# Patient Record
Sex: MALE | Race: WHITE | NOT HISPANIC OR LATINO
[De-identification: names, ages, dates, MRNs, and addresses within clinical notes are randomized per-mention and may not be internally consistent; named-entity substitution may affect disease eponyms.]

---

## 2014-05-15 RX ORDER — ASPIRIN/CALCIUM CARB/MAGNESIUM 324 MG
1 TABLET ORAL
Qty: 0 | Refills: 0 | COMMUNITY
Start: 2014-05-15

## 2017-05-04 ENCOUNTER — APPOINTMENT (OUTPATIENT)
Dept: ORTHOPEDIC SURGERY | Facility: CLINIC | Age: 82
End: 2017-05-04

## 2017-05-04 VITALS
HEART RATE: 64 BPM | BODY MASS INDEX: 26.68 KG/M2 | DIASTOLIC BLOOD PRESSURE: 85 MMHG | HEIGHT: 67 IN | SYSTOLIC BLOOD PRESSURE: 144 MMHG | WEIGHT: 170 LBS

## 2017-05-04 DIAGNOSIS — M25.511 PAIN IN RIGHT SHOULDER: ICD-10-CM

## 2017-05-05 PROBLEM — M25.511 SHOULDER PAIN, RIGHT: Status: ACTIVE | Noted: 2017-05-05

## 2017-05-12 ENCOUNTER — NON-APPOINTMENT (OUTPATIENT)
Age: 82
End: 2017-05-12

## 2017-05-12 ENCOUNTER — APPOINTMENT (OUTPATIENT)
Dept: CARDIOLOGY | Facility: CLINIC | Age: 82
End: 2017-05-12

## 2017-05-12 VITALS
WEIGHT: 170 LBS | OXYGEN SATURATION: 98 % | HEART RATE: 77 BPM | BODY MASS INDEX: 26.68 KG/M2 | HEIGHT: 67 IN | SYSTOLIC BLOOD PRESSURE: 147 MMHG | DIASTOLIC BLOOD PRESSURE: 82 MMHG

## 2017-05-12 DIAGNOSIS — Z86.69 PERSONAL HISTORY OF OTHER DISEASES OF THE NERVOUS SYSTEM AND SENSE ORGANS: ICD-10-CM

## 2017-05-12 DIAGNOSIS — Z78.9 OTHER SPECIFIED HEALTH STATUS: ICD-10-CM

## 2017-05-12 DIAGNOSIS — Z87.39 PERSONAL HISTORY OF OTHER DISEASES OF THE MUSCULOSKELETAL SYSTEM AND CONNECTIVE TISSUE: ICD-10-CM

## 2017-05-12 DIAGNOSIS — E78.00 PURE HYPERCHOLESTEROLEMIA, UNSPECIFIED: ICD-10-CM

## 2017-05-12 DIAGNOSIS — Z82.61 FAMILY HISTORY OF ARTHRITIS: ICD-10-CM

## 2017-05-12 DIAGNOSIS — Z86.79 PERSONAL HISTORY OF OTHER DISEASES OF THE CIRCULATORY SYSTEM: ICD-10-CM

## 2017-05-30 ENCOUNTER — NON-APPOINTMENT (OUTPATIENT)
Age: 82
End: 2017-05-30

## 2017-05-30 ENCOUNTER — APPOINTMENT (OUTPATIENT)
Dept: ELECTROPHYSIOLOGY | Facility: CLINIC | Age: 82
End: 2017-05-30

## 2017-05-30 VITALS
OXYGEN SATURATION: 96 % | BODY MASS INDEX: 26.68 KG/M2 | SYSTOLIC BLOOD PRESSURE: 110 MMHG | DIASTOLIC BLOOD PRESSURE: 70 MMHG | HEIGHT: 67 IN | RESPIRATION RATE: 17 BRPM | HEART RATE: 119 BPM | WEIGHT: 170 LBS

## 2017-06-07 ENCOUNTER — APPOINTMENT (OUTPATIENT)
Dept: ORTHOPEDIC SURGERY | Facility: CLINIC | Age: 82
End: 2017-06-07

## 2017-06-07 DIAGNOSIS — M47.10 OTHER SPONDYLOSIS WITH MYELOPATHY, SITE UNSPECIFIED: ICD-10-CM

## 2017-06-19 ENCOUNTER — FORM ENCOUNTER (OUTPATIENT)
Age: 82
End: 2017-06-19

## 2017-06-20 ENCOUNTER — OUTPATIENT (OUTPATIENT)
Dept: OUTPATIENT SERVICES | Facility: HOSPITAL | Age: 82
LOS: 1 days | End: 2017-06-20
Payer: MEDICARE

## 2017-06-20 ENCOUNTER — APPOINTMENT (OUTPATIENT)
Dept: RADIOLOGY | Facility: HOSPITAL | Age: 82
End: 2017-06-20

## 2017-06-20 DIAGNOSIS — S39.92XA UNSPECIFIED INJURY OF LOWER BACK, INITIAL ENCOUNTER: Chronic | ICD-10-CM

## 2017-06-20 DIAGNOSIS — Z00.00 ENCOUNTER FOR GENERAL ADULT MEDICAL EXAMINATION WITHOUT ABNORMAL FINDINGS: ICD-10-CM

## 2017-06-20 DIAGNOSIS — M48.02 SPINAL STENOSIS, CERVICAL REGION: ICD-10-CM

## 2017-06-20 DIAGNOSIS — Z98.89 OTHER SPECIFIED POSTPROCEDURAL STATES: Chronic | ICD-10-CM

## 2017-06-20 PROCEDURE — 72126 CT NECK SPINE W/DYE: CPT

## 2017-06-20 PROCEDURE — 72129 CT CHEST SPINE W/DYE: CPT | Mod: 26

## 2017-06-20 PROCEDURE — 72126 CT NECK SPINE W/DYE: CPT | Mod: 26

## 2017-06-20 PROCEDURE — 72129 CT CHEST SPINE W/DYE: CPT

## 2017-06-20 PROCEDURE — 62305 MYELOGRAPHY LUMBAR INJECTION: CPT

## 2017-06-20 PROCEDURE — 72132 CT LUMBAR SPINE W/DYE: CPT

## 2017-06-20 PROCEDURE — 72132 CT LUMBAR SPINE W/DYE: CPT | Mod: 26

## 2017-06-21 ENCOUNTER — APPOINTMENT (OUTPATIENT)
Dept: ORTHOPEDIC SURGERY | Facility: CLINIC | Age: 82
End: 2017-06-21

## 2017-06-23 ENCOUNTER — APPOINTMENT (OUTPATIENT)
Dept: CARDIOLOGY | Facility: CLINIC | Age: 82
End: 2017-06-23

## 2017-06-23 VITALS
HEIGHT: 67 IN | WEIGHT: 170 LBS | SYSTOLIC BLOOD PRESSURE: 126 MMHG | OXYGEN SATURATION: 97 % | HEART RATE: 61 BPM | BODY MASS INDEX: 26.68 KG/M2 | DIASTOLIC BLOOD PRESSURE: 81 MMHG

## 2017-07-06 ENCOUNTER — CHART COPY (OUTPATIENT)
Age: 82
End: 2017-07-06

## 2017-07-17 ENCOUNTER — OUTPATIENT (OUTPATIENT)
Dept: OUTPATIENT SERVICES | Facility: HOSPITAL | Age: 82
LOS: 1 days | End: 2017-07-17
Payer: MEDICARE

## 2017-07-17 VITALS
HEIGHT: 65 IN | TEMPERATURE: 98 F | SYSTOLIC BLOOD PRESSURE: 112 MMHG | WEIGHT: 160.06 LBS | HEART RATE: 73 BPM | DIASTOLIC BLOOD PRESSURE: 70 MMHG | RESPIRATION RATE: 14 BRPM

## 2017-07-17 DIAGNOSIS — S39.92XA UNSPECIFIED INJURY OF LOWER BACK, INITIAL ENCOUNTER: Chronic | ICD-10-CM

## 2017-07-17 DIAGNOSIS — Z98.890 OTHER SPECIFIED POSTPROCEDURAL STATES: Chronic | ICD-10-CM

## 2017-07-17 DIAGNOSIS — I25.10 ATHEROSCLEROTIC HEART DISEASE OF NATIVE CORONARY ARTERY WITHOUT ANGINA PECTORIS: ICD-10-CM

## 2017-07-17 DIAGNOSIS — M48.07 SPINAL STENOSIS, LUMBOSACRAL REGION: ICD-10-CM

## 2017-07-17 DIAGNOSIS — G62.9 POLYNEUROPATHY, UNSPECIFIED: ICD-10-CM

## 2017-07-17 DIAGNOSIS — I10 ESSENTIAL (PRIMARY) HYPERTENSION: ICD-10-CM

## 2017-07-17 DIAGNOSIS — I48.91 UNSPECIFIED ATRIAL FIBRILLATION: ICD-10-CM

## 2017-07-17 DIAGNOSIS — E78.5 HYPERLIPIDEMIA, UNSPECIFIED: ICD-10-CM

## 2017-07-17 DIAGNOSIS — Z45.42 ENCOUNTER FOR ADJUSTMENT AND MANAGEMENT OF NEUROSTIMULATOR: Chronic | ICD-10-CM

## 2017-07-17 DIAGNOSIS — Z95.5 PRESENCE OF CORONARY ANGIOPLASTY IMPLANT AND GRAFT: ICD-10-CM

## 2017-07-17 DIAGNOSIS — Z98.89 OTHER SPECIFIED POSTPROCEDURAL STATES: Chronic | ICD-10-CM

## 2017-07-17 LAB
BLD GP AB SCN SERPL QL: NEGATIVE — SIGNIFICANT CHANGE UP
BUN SERPL-MCNC: 17 MG/DL — SIGNIFICANT CHANGE UP (ref 7–23)
CALCIUM SERPL-MCNC: 9 MG/DL — SIGNIFICANT CHANGE UP (ref 8.4–10.5)
CHLORIDE SERPL-SCNC: 101 MMOL/L — SIGNIFICANT CHANGE UP (ref 98–107)
CO2 SERPL-SCNC: 25 MMOL/L — SIGNIFICANT CHANGE UP (ref 22–31)
CREAT SERPL-MCNC: 0.93 MG/DL — SIGNIFICANT CHANGE UP (ref 0.5–1.3)
GLUCOSE SERPL-MCNC: 82 MG/DL — SIGNIFICANT CHANGE UP (ref 70–99)
HCT VFR BLD CALC: 39.3 % — SIGNIFICANT CHANGE UP (ref 39–50)
HGB BLD-MCNC: 12.7 G/DL — LOW (ref 13–17)
MCHC RBC-ENTMCNC: 30.2 PG — SIGNIFICANT CHANGE UP (ref 27–34)
MCHC RBC-ENTMCNC: 32.3 % — SIGNIFICANT CHANGE UP (ref 32–36)
MCV RBC AUTO: 93.3 FL — SIGNIFICANT CHANGE UP (ref 80–100)
NRBC # FLD: 0 — SIGNIFICANT CHANGE UP
PLATELET # BLD AUTO: 152 K/UL — SIGNIFICANT CHANGE UP (ref 150–400)
PMV BLD: 10.1 FL — SIGNIFICANT CHANGE UP (ref 7–13)
POTASSIUM SERPL-MCNC: 4.2 MMOL/L — SIGNIFICANT CHANGE UP (ref 3.5–5.3)
POTASSIUM SERPL-SCNC: 4.2 MMOL/L — SIGNIFICANT CHANGE UP (ref 3.5–5.3)
RBC # BLD: 4.21 M/UL — SIGNIFICANT CHANGE UP (ref 4.2–5.8)
RBC # FLD: 14.1 % — SIGNIFICANT CHANGE UP (ref 10.3–14.5)
RH IG SCN BLD-IMP: POSITIVE — SIGNIFICANT CHANGE UP
SODIUM SERPL-SCNC: 139 MMOL/L — SIGNIFICANT CHANGE UP (ref 135–145)
WBC # BLD: 6.82 K/UL — SIGNIFICANT CHANGE UP (ref 3.8–10.5)
WBC # FLD AUTO: 6.82 K/UL — SIGNIFICANT CHANGE UP (ref 3.8–10.5)

## 2017-07-17 PROCEDURE — 93010 ELECTROCARDIOGRAM REPORT: CPT

## 2017-07-17 NOTE — H&P PST ADULT - HISTORY OF PRESENT ILLNESS
80 y/o male with medical h/o CAD with cardiac stents on Aspirin, Afib on Pradaxa, HTN, HLD, BPH, GERD and Spinal stenosis. Pt has h/o previous spinal surgery in 2003 & 2006. Pt reports in 5/2017 he started experiencing new-onset Neuropathy to both feet and pain to back, buttocks and thighs. Pt presents today for presurgical evaluation for Exploration of Fusion Removal of Hardware Screws, T2 Pelvis Fusion, T11 - T12 Laminectomy scheduled for 8/01/2017.

## 2017-07-17 NOTE — H&P PST ADULT - FAMILY HISTORY
Father  Still living? No  Family history of heart failure, Age at diagnosis: Age Unknown     Mother  Still living? No  Family history of heart failure, Age at diagnosis: Age Unknown     Sibling  Still living? No  Family history of heart failure, Age at diagnosis: Age Unknown

## 2017-07-17 NOTE — H&P PST ADULT - NEGATIVE NEUROLOGICAL SYMPTOMS
no transient paralysis/no generalized seizures/no syncope/no headache/no focal seizures/no difficulty walking/no loss of sensation/no vertigo/no loss of consciousness/no weakness/no tremors

## 2017-07-17 NOTE — H&P PST ADULT - NEGATIVE CARDIOVASCULAR SYMPTOMS
no claudication/no palpitations/no orthopnea/no peripheral edema/no chest pain/no paroxysmal nocturnal dyspnea/no dyspnea on exertion

## 2017-07-17 NOTE — H&P PST ADULT - LYMPHATIC
posterior cervical R/supraclavicular R/posterior cervical L/anterior cervical R/supraclavicular L/anterior cervical L

## 2017-07-17 NOTE — H&P PST ADULT - NSANTHOSAYNRD_GEN_A_CORE
No. DAMIÁN screening performed.  STOP BANG Legend: 0-2 = LOW Risk; 3-4 = INTERMEDIATE Risk; 5-8 = HIGH Risk

## 2017-07-17 NOTE — H&P PST ADULT - NEGATIVE MUSCULOSKELETAL SYMPTOMS
no arthritis/no myalgia/no arm pain R/no neck pain/no muscle cramps/no muscle weakness/no joint swelling/no arm pain L

## 2017-07-17 NOTE — H&P PST ADULT - PROBLEM SELECTOR PLAN 4
Pt on Pradaxa - pending stop date from Cardilogist Pt on Pradaxa - pending stop date from Cardiologist

## 2017-07-17 NOTE — H&P PST ADULT - MUSCULOSKELETAL
details… detailed exam no joint warmth/no joint erythema/ROM intact/no calf tenderness/no joint swelling/normal strength

## 2017-07-17 NOTE — H&P PST ADULT - NEGATIVE GENERAL GENITOURINARY SYMPTOMS
no dysuria/no hematuria/no incontinence/no nocturia/no flank pain L/no bladder infections/no urinary hesitancy/normal urinary frequency/no renal colic/no flank pain R

## 2017-07-17 NOTE — H&P PST ADULT - PMH
Arthritis    Atrial fibrillation    CAD (coronary artery disease)    HLD (hyperlipidemia)    HTN (hypertension)    Inguinal hernia  right  Peripheral neuropathy    Reflux    Rotator cuff disorder, right    Spinal stenosis of lumbosacral region

## 2017-07-17 NOTE — H&P PST ADULT - PROBLEM SELECTOR PLAN 1
Exploration of Fusion Removal of Hardware Screws, T2 Pelvis Fusion, T11 - T12 Laminectomy scheduled for 8/01/2017. Exploration of Fusion Removal of Hardware Screws, T2 Pelvis Fusion, T11 - T12 Laminectomy scheduled for 8/01/2017.  Pre-op instructions given. Pt and wife verbalized understanding..  Pepcid given for GI prophylaxis.  Chlorhexidine wash instructions given.  Medical clearance requested.  Cardiology clearance requested - copy of Echo/Stress done in 2014 in file.  Abnormal EKG - comparison EKG in file

## 2017-07-17 NOTE — H&P PST ADULT - PSH
Back injury  s/p surgery L1-L9 in two separate surgeries 2003 & 2006  S/P angioplasty with stent    S/P cataract extraction    S/P knee replacement, bilateral Back injury  s/p surgery L1-L9 in two separate surgeries 2003 & 2006  Encounter for interrogation of neurostimulator  12/2015  H/O rotator cuff surgery  12/2015  History of skin surgery  melanoma excision - left cheek/face 2016  S/P angioplasty with stent  RCA 2008 & LAD 2014 cardiac stent placement  S/P cataract extraction    S/P knee replacement, bilateral

## 2017-07-20 ENCOUNTER — APPOINTMENT (OUTPATIENT)
Dept: VASCULAR SURGERY | Facility: CLINIC | Age: 82
End: 2017-07-20

## 2017-07-24 ENCOUNTER — NON-APPOINTMENT (OUTPATIENT)
Age: 82
End: 2017-07-24

## 2017-07-24 ENCOUNTER — APPOINTMENT (OUTPATIENT)
Dept: ORTHOPEDIC SURGERY | Facility: CLINIC | Age: 82
End: 2017-07-24

## 2017-07-24 ENCOUNTER — APPOINTMENT (OUTPATIENT)
Dept: CARDIOLOGY | Facility: CLINIC | Age: 82
End: 2017-07-24

## 2017-07-24 VITALS
DIASTOLIC BLOOD PRESSURE: 70 MMHG | HEIGHT: 67 IN | SYSTOLIC BLOOD PRESSURE: 108 MMHG | OXYGEN SATURATION: 96 % | HEART RATE: 65 BPM

## 2017-07-24 VITALS — BODY MASS INDEX: 25.06 KG/M2 | WEIGHT: 160 LBS

## 2017-07-24 DIAGNOSIS — M48.07 SPINAL STENOSIS, LUMBOSACRAL REGION: ICD-10-CM

## 2017-07-31 NOTE — ASU PATIENT PROFILE, ADULT - PSH
Back injury  s/p surgery L1-L9 in two separate surgeries 2003 & 2006  Encounter for interrogation of neurostimulator  12/2015  H/O rotator cuff surgery  12/2015  History of skin surgery  melanoma excision - left cheek/face 2016  S/P angioplasty with stent  RCA 2008 & LAD 2014 cardiac stent placement  S/P cataract extraction    S/P knee replacement, bilateral

## 2017-07-31 NOTE — ASU PATIENT PROFILE, ADULT - VISION (WITH CORRECTIVE LENSES IF THE PATIENT USUALLY WEARS THEM):
Normal vision: sees adequately in most situations; can see medication labels, newsprint Partially impaired: cannot see medication labels or newsprint, but can see obstacles in path, and the surrounding layout; can count fingers at arm's length/wears glasses

## 2017-08-01 ENCOUNTER — INPATIENT (INPATIENT)
Facility: HOSPITAL | Age: 82
LOS: 13 days | Discharge: NURSING, REHAB OR LONG-TERM CARE | End: 2017-08-15
Attending: STUDENT IN AN ORGANIZED HEALTH CARE EDUCATION/TRAINING PROGRAM | Admitting: STUDENT IN AN ORGANIZED HEALTH CARE EDUCATION/TRAINING PROGRAM
Payer: MEDICARE

## 2017-08-01 ENCOUNTER — APPOINTMENT (OUTPATIENT)
Dept: ORTHOPEDIC SURGERY | Facility: HOSPITAL | Age: 82
End: 2017-08-01

## 2017-08-01 ENCOUNTER — RESULT REVIEW (OUTPATIENT)
Age: 82
End: 2017-08-01

## 2017-08-01 VITALS
DIASTOLIC BLOOD PRESSURE: 68 MMHG | RESPIRATION RATE: 16 BRPM | HEIGHT: 65 IN | OXYGEN SATURATION: 98 % | SYSTOLIC BLOOD PRESSURE: 140 MMHG | WEIGHT: 160.06 LBS | HEART RATE: 68 BPM | TEMPERATURE: 98 F

## 2017-08-01 DIAGNOSIS — S39.92XA UNSPECIFIED INJURY OF LOWER BACK, INITIAL ENCOUNTER: Chronic | ICD-10-CM

## 2017-08-01 DIAGNOSIS — Z98.890 OTHER SPECIFIED POSTPROCEDURAL STATES: Chronic | ICD-10-CM

## 2017-08-01 DIAGNOSIS — Z45.42 ENCOUNTER FOR ADJUSTMENT AND MANAGEMENT OF NEUROSTIMULATOR: Chronic | ICD-10-CM

## 2017-08-01 DIAGNOSIS — Z98.89 OTHER SPECIFIED POSTPROCEDURAL STATES: Chronic | ICD-10-CM

## 2017-08-01 DIAGNOSIS — M48.07 SPINAL STENOSIS, LUMBOSACRAL REGION: ICD-10-CM

## 2017-08-01 LAB
BASE EXCESS BLDA CALC-SCNC: -0.4 MMOL/L — SIGNIFICANT CHANGE UP
BASE EXCESS BLDA CALC-SCNC: -1.4 MMOL/L — SIGNIFICANT CHANGE UP
BASE EXCESS BLDA CALC-SCNC: -1.8 MMOL/L — SIGNIFICANT CHANGE UP
BASE EXCESS BLDA CALC-SCNC: -2.9 MMOL/L — SIGNIFICANT CHANGE UP
BASE EXCESS BLDA CALC-SCNC: -3.1 MMOL/L — SIGNIFICANT CHANGE UP
BASE EXCESS BLDA CALC-SCNC: -3.8 MMOL/L — SIGNIFICANT CHANGE UP
BASE EXCESS BLDA CALC-SCNC: -3.9 MMOL/L — SIGNIFICANT CHANGE UP
BASE EXCESS BLDA CALC-SCNC: -4.3 MMOL/L — SIGNIFICANT CHANGE UP
BASOPHILS # BLD AUTO: 0.02 K/UL — SIGNIFICANT CHANGE UP (ref 0–0.2)
BASOPHILS NFR BLD AUTO: 0.1 % — SIGNIFICANT CHANGE UP (ref 0–2)
BUN SERPL-MCNC: 17 MG/DL — SIGNIFICANT CHANGE UP (ref 7–23)
CA-I BLDA-SCNC: 1.04 MMOL/L — LOW (ref 1.15–1.29)
CA-I BLDA-SCNC: 1.08 MMOL/L — LOW (ref 1.15–1.29)
CA-I BLDA-SCNC: 1.14 MMOL/L — LOW (ref 1.15–1.29)
CA-I BLDA-SCNC: 1.19 MMOL/L — SIGNIFICANT CHANGE UP (ref 1.15–1.29)
CA-I BLDA-SCNC: 1.19 MMOL/L — SIGNIFICANT CHANGE UP (ref 1.15–1.29)
CA-I BLDA-SCNC: 1.23 MMOL/L — SIGNIFICANT CHANGE UP (ref 1.15–1.29)
CA-I BLDA-SCNC: 1.23 MMOL/L — SIGNIFICANT CHANGE UP (ref 1.15–1.29)
CALCIUM SERPL-MCNC: 7.3 MG/DL — LOW (ref 8.4–10.5)
CHLORIDE SERPL-SCNC: 106 MMOL/L — SIGNIFICANT CHANGE UP (ref 98–107)
CO2 SERPL-SCNC: 20 MMOL/L — LOW (ref 22–31)
CREAT SERPL-MCNC: 0.75 MG/DL — SIGNIFICANT CHANGE UP (ref 0.5–1.3)
EOSINOPHIL # BLD AUTO: 0.01 K/UL — SIGNIFICANT CHANGE UP (ref 0–0.5)
EOSINOPHIL NFR BLD AUTO: 0.1 % — SIGNIFICANT CHANGE UP (ref 0–6)
GLUCOSE BLDA-MCNC: 110 MG/DL — HIGH (ref 70–99)
GLUCOSE BLDA-MCNC: 137 MG/DL — HIGH (ref 70–99)
GLUCOSE BLDA-MCNC: 145 MG/DL — HIGH (ref 70–99)
GLUCOSE BLDA-MCNC: 174 MG/DL — HIGH (ref 70–99)
GLUCOSE BLDA-MCNC: 180 MG/DL — HIGH (ref 70–99)
GLUCOSE BLDA-MCNC: 185 MG/DL — HIGH (ref 70–99)
GLUCOSE BLDA-MCNC: 205 MG/DL — HIGH (ref 70–99)
GLUCOSE BLDA-MCNC: 210 MG/DL — HIGH (ref 70–99)
GLUCOSE SERPL-MCNC: 222 MG/DL — HIGH (ref 70–99)
GRAM STN WND: SIGNIFICANT CHANGE UP
HCO3 BLDA-SCNC: 21 MMOL/L — LOW (ref 22–26)
HCO3 BLDA-SCNC: 22 MMOL/L — SIGNIFICANT CHANGE UP (ref 22–26)
HCO3 BLDA-SCNC: 22 MMOL/L — SIGNIFICANT CHANGE UP (ref 22–26)
HCO3 BLDA-SCNC: 23 MMOL/L — SIGNIFICANT CHANGE UP (ref 22–26)
HCO3 BLDA-SCNC: 23 MMOL/L — SIGNIFICANT CHANGE UP (ref 22–26)
HCO3 BLDA-SCNC: 25 MMOL/L — SIGNIFICANT CHANGE UP (ref 22–26)
HCT VFR BLD CALC: 26.3 % — LOW (ref 39–50)
HCT VFR BLDA CALC: 27.3 % — LOW (ref 39–51)
HCT VFR BLDA CALC: 28.1 % — LOW (ref 39–51)
HCT VFR BLDA CALC: 29.6 % — LOW (ref 39–51)
HCT VFR BLDA CALC: 30.6 % — LOW (ref 39–51)
HCT VFR BLDA CALC: 31.3 % — LOW (ref 39–51)
HCT VFR BLDA CALC: 32.3 % — LOW (ref 39–51)
HCT VFR BLDA CALC: 36.1 % — LOW (ref 39–51)
HCT VFR BLDA CALC: 36.5 % — LOW (ref 39–51)
HGB BLD-MCNC: 9 G/DL — LOW (ref 13–17)
HGB BLDA-MCNC: 10.1 G/DL — LOW (ref 13–17)
HGB BLDA-MCNC: 10.5 G/DL — LOW (ref 13–17)
HGB BLDA-MCNC: 11.7 G/DL — LOW (ref 13–17)
HGB BLDA-MCNC: 11.8 G/DL — LOW (ref 13–17)
HGB BLDA-MCNC: 8.8 G/DL — LOW (ref 13–17)
HGB BLDA-MCNC: 9.1 G/DL — LOW (ref 13–17)
HGB BLDA-MCNC: 9.6 G/DL — LOW (ref 13–17)
HGB BLDA-MCNC: 9.9 G/DL — LOW (ref 13–17)
HYPOCHROMIA BLD QL: SLIGHT — SIGNIFICANT CHANGE UP
IMM GRANULOCYTES # BLD AUTO: 0.85 # — SIGNIFICANT CHANGE UP
IMM GRANULOCYTES NFR BLD AUTO: 5.3 % — HIGH (ref 0–1.5)
LYMPHOCYTES # BLD AUTO: 0.96 K/UL — LOW (ref 1–3.3)
LYMPHOCYTES # BLD AUTO: 6 % — LOW (ref 13–44)
MANUAL SMEAR VERIFICATION: SIGNIFICANT CHANGE UP
MCHC RBC-ENTMCNC: 30.8 PG — SIGNIFICANT CHANGE UP (ref 27–34)
MCHC RBC-ENTMCNC: 34.2 % — SIGNIFICANT CHANGE UP (ref 32–36)
MCV RBC AUTO: 90.1 FL — SIGNIFICANT CHANGE UP (ref 80–100)
MONOCYTES # BLD AUTO: 1.95 K/UL — HIGH (ref 0–0.9)
MONOCYTES NFR BLD AUTO: 12.2 % — SIGNIFICANT CHANGE UP (ref 2–14)
NEUTROPHILS # BLD AUTO: 12.16 K/UL — HIGH (ref 1.8–7.4)
NEUTROPHILS NFR BLD AUTO: 76.3 % — SIGNIFICANT CHANGE UP (ref 43–77)
NRBC # FLD: 0.03 — SIGNIFICANT CHANGE UP
PCO2 BLDA: 32 MMHG — LOW (ref 35–48)
PCO2 BLDA: 35 MMHG — SIGNIFICANT CHANGE UP (ref 35–48)
PCO2 BLDA: 36 MMHG — SIGNIFICANT CHANGE UP (ref 35–48)
PCO2 BLDA: 40 MMHG — SIGNIFICANT CHANGE UP (ref 35–48)
PCO2 BLDA: 41 MMHG — SIGNIFICANT CHANGE UP (ref 35–48)
PCO2 BLDA: 43 MMHG — SIGNIFICANT CHANGE UP (ref 35–48)
PH BLDA: 7.35 PH — SIGNIFICANT CHANGE UP (ref 7.35–7.45)
PH BLDA: 7.36 PH — SIGNIFICANT CHANGE UP (ref 7.35–7.45)
PH BLDA: 7.38 PH — SIGNIFICANT CHANGE UP (ref 7.35–7.45)
PH BLDA: 7.38 PH — SIGNIFICANT CHANGE UP (ref 7.35–7.45)
PH BLDA: 7.39 PH — SIGNIFICANT CHANGE UP (ref 7.35–7.45)
PH BLDA: 7.47 PH — HIGH (ref 7.35–7.45)
PLATELET # BLD AUTO: 72 K/UL — LOW (ref 150–400)
PLATELET COUNT - ESTIMATE: SIGNIFICANT CHANGE UP
PMV BLD: 10.9 FL — SIGNIFICANT CHANGE UP (ref 7–13)
PO2 BLDA: 218 MMHG — HIGH (ref 83–108)
PO2 BLDA: 253 MMHG — HIGH (ref 83–108)
PO2 BLDA: 270 MMHG — HIGH (ref 83–108)
PO2 BLDA: 276 MMHG — HIGH (ref 83–108)
PO2 BLDA: 277 MMHG — HIGH (ref 83–108)
PO2 BLDA: 280 MMHG — HIGH (ref 83–108)
PO2 BLDA: 290 MMHG — HIGH (ref 83–108)
PO2 BLDA: 313 MMHG — HIGH (ref 83–108)
POTASSIUM BLDA-SCNC: 4 MMOL/L — SIGNIFICANT CHANGE UP (ref 3.4–4.5)
POTASSIUM BLDA-SCNC: 4.1 MMOL/L — SIGNIFICANT CHANGE UP (ref 3.4–4.5)
POTASSIUM BLDA-SCNC: 4.2 MMOL/L — SIGNIFICANT CHANGE UP (ref 3.4–4.5)
POTASSIUM BLDA-SCNC: 4.2 MMOL/L — SIGNIFICANT CHANGE UP (ref 3.4–4.5)
POTASSIUM BLDA-SCNC: 4.3 MMOL/L — SIGNIFICANT CHANGE UP (ref 3.4–4.5)
POTASSIUM BLDA-SCNC: 4.4 MMOL/L — SIGNIFICANT CHANGE UP (ref 3.4–4.5)
POTASSIUM BLDA-SCNC: 4.6 MMOL/L — HIGH (ref 3.4–4.5)
POTASSIUM BLDA-SCNC: 4.9 MMOL/L — HIGH (ref 3.4–4.5)
POTASSIUM SERPL-MCNC: 5.4 MMOL/L — HIGH (ref 3.5–5.3)
POTASSIUM SERPL-SCNC: 5.4 MMOL/L — HIGH (ref 3.5–5.3)
RBC # BLD: 2.92 M/UL — LOW (ref 4.2–5.8)
RBC # FLD: 14.1 % — SIGNIFICANT CHANGE UP (ref 10.3–14.5)
RH IG SCN BLD-IMP: POSITIVE — SIGNIFICANT CHANGE UP
SAO2 % BLDA: 99.4 % — HIGH (ref 95–99)
SAO2 % BLDA: 99.5 % — HIGH (ref 95–99)
SAO2 % BLDA: 99.6 % — HIGH (ref 95–99)
SAO2 % BLDA: 99.8 % — HIGH (ref 95–99)
SAO2 % BLDA: 99.8 % — HIGH (ref 95–99)
SODIUM BLDA-SCNC: 128 MMOL/L — LOW (ref 136–146)
SODIUM BLDA-SCNC: 131 MMOL/L — LOW (ref 136–146)
SODIUM BLDA-SCNC: 133 MMOL/L — LOW (ref 136–146)
SODIUM BLDA-SCNC: 134 MMOL/L — LOW (ref 136–146)
SODIUM BLDA-SCNC: 135 MMOL/L — LOW (ref 136–146)
SODIUM BLDA-SCNC: 137 MMOL/L — SIGNIFICANT CHANGE UP (ref 136–146)
SODIUM BLDA-SCNC: 138 MMOL/L — SIGNIFICANT CHANGE UP (ref 136–146)
SODIUM BLDA-SCNC: 138 MMOL/L — SIGNIFICANT CHANGE UP (ref 136–146)
SODIUM SERPL-SCNC: 136 MMOL/L — SIGNIFICANT CHANGE UP (ref 135–145)
SPECIMEN SOURCE: SIGNIFICANT CHANGE UP
WBC # BLD: 15.95 K/UL — HIGH (ref 3.8–10.5)
WBC # FLD AUTO: 15.95 K/UL — HIGH (ref 3.8–10.5)

## 2017-08-01 PROCEDURE — 63688 REV/RMV IMP SP NPG/R DTCH CN: CPT

## 2017-08-01 PROCEDURE — 22212 INCIS 1 VERTEBRAL SEG THORAC: CPT | Mod: 59

## 2017-08-01 PROCEDURE — 51702 INSERT TEMP BLADDER CATH: CPT

## 2017-08-01 PROCEDURE — 22214 INCIS 1 VERTEBRAL SEG LUMBAR: CPT | Mod: 82

## 2017-08-01 PROCEDURE — 22843 INSERT SPINE FIXATION DEVICE: CPT

## 2017-08-01 PROCEDURE — 22212 INCIS 1 VERTEBRAL SEG THORAC: CPT | Mod: 82,59

## 2017-08-01 PROCEDURE — 63266 EXCISE INTRSPINL LESION THRC: CPT

## 2017-08-01 PROCEDURE — 63046 LAM FACETEC & FORAMOT THRC: CPT

## 2017-08-01 PROCEDURE — 63046 LAM FACETEC & FORAMOT THRC: CPT | Mod: 82,59

## 2017-08-01 PROCEDURE — 22848 INSERT PELV FIXATION DEVICE: CPT

## 2017-08-01 PROCEDURE — 22802 ARTHRD PST DFRM 7-12 VRT SGM: CPT

## 2017-08-01 PROCEDURE — 63661 REMOVE SPINE ELTRD PERQ ARAY: CPT | Mod: 82

## 2017-08-01 PROCEDURE — 22216 INCIS ADDL SPINE SEGMENT: CPT | Mod: 82

## 2017-08-01 PROCEDURE — 22843 INSERT SPINE FIXATION DEVICE: CPT | Mod: 82

## 2017-08-01 PROCEDURE — 88300 SURGICAL PATH GROSS: CPT | Mod: 26,59

## 2017-08-01 PROCEDURE — 22214 INCIS 1 VERTEBRAL SEG LUMBAR: CPT

## 2017-08-01 PROCEDURE — 71010: CPT | Mod: 26

## 2017-08-01 PROCEDURE — 88307 TISSUE EXAM BY PATHOLOGIST: CPT | Mod: 26

## 2017-08-01 PROCEDURE — 99223 1ST HOSP IP/OBS HIGH 75: CPT | Mod: 25,GC

## 2017-08-01 PROCEDURE — 22216 INCIS ADDL SPINE SEGMENT: CPT

## 2017-08-01 PROCEDURE — 63661 REMOVE SPINE ELTRD PERQ ARAY: CPT

## 2017-08-01 PROCEDURE — 63266 EXCISE INTRSPINL LESION THRC: CPT | Mod: 82

## 2017-08-01 PROCEDURE — 22802 ARTHRD PST DFRM 7-12 VRT SGM: CPT | Mod: 82

## 2017-08-01 RX ORDER — CEFAZOLIN SODIUM 1 G
2000 VIAL (EA) INJECTION EVERY 8 HOURS
Qty: 0 | Refills: 0 | Status: COMPLETED | OUTPATIENT
Start: 2017-08-02 | End: 2017-08-02

## 2017-08-01 RX ORDER — ONDANSETRON 8 MG/1
4 TABLET, FILM COATED ORAL EVERY 6 HOURS
Qty: 0 | Refills: 0 | Status: DISCONTINUED | OUTPATIENT
Start: 2017-08-01 | End: 2017-08-01

## 2017-08-01 RX ORDER — ONDANSETRON 8 MG/1
4 TABLET, FILM COATED ORAL EVERY 6 HOURS
Qty: 0 | Refills: 0 | Status: DISCONTINUED | OUTPATIENT
Start: 2017-08-01 | End: 2017-08-02

## 2017-08-01 RX ORDER — SODIUM CHLORIDE 9 MG/ML
1000 INJECTION, SOLUTION INTRAVENOUS
Qty: 0 | Refills: 0 | Status: DISCONTINUED | OUTPATIENT
Start: 2017-08-01 | End: 2017-08-01

## 2017-08-01 RX ORDER — PANTOPRAZOLE SODIUM 20 MG/1
40 TABLET, DELAYED RELEASE ORAL
Qty: 0 | Refills: 0 | Status: DISCONTINUED | OUTPATIENT
Start: 2017-08-01 | End: 2017-08-02

## 2017-08-01 RX ORDER — MORPHINE SULFATE 50 MG/1
2 CAPSULE, EXTENDED RELEASE ORAL EVERY 4 HOURS
Qty: 0 | Refills: 0 | Status: DISCONTINUED | OUTPATIENT
Start: 2017-08-01 | End: 2017-08-02

## 2017-08-01 RX ORDER — DOCUSATE SODIUM 100 MG
100 CAPSULE ORAL THREE TIMES A DAY
Qty: 0 | Refills: 0 | Status: DISCONTINUED | OUTPATIENT
Start: 2017-08-01 | End: 2017-08-02

## 2017-08-01 RX ORDER — SODIUM CHLORIDE 9 MG/ML
3 INJECTION INTRAMUSCULAR; INTRAVENOUS; SUBCUTANEOUS EVERY 8 HOURS
Qty: 0 | Refills: 0 | Status: DISCONTINUED | OUTPATIENT
Start: 2017-08-01 | End: 2017-08-01

## 2017-08-01 RX ORDER — PHENYLEPHRINE HYDROCHLORIDE 10 MG/ML
1 INJECTION INTRAVENOUS
Qty: 80 | Refills: 0 | Status: DISCONTINUED | OUTPATIENT
Start: 2017-08-01 | End: 2017-08-03

## 2017-08-01 RX ORDER — NOREPINEPHRINE BITARTRATE/D5W 8 MG/250ML
0.1 PLASTIC BAG, INJECTION (ML) INTRAVENOUS
Qty: 8 | Refills: 0 | Status: DISCONTINUED | OUTPATIENT
Start: 2017-08-01 | End: 2017-08-01

## 2017-08-01 RX ORDER — MAGNESIUM HYDROXIDE 400 MG/1
30 TABLET, CHEWABLE ORAL EVERY 12 HOURS
Qty: 0 | Refills: 0 | Status: DISCONTINUED | OUTPATIENT
Start: 2017-08-01 | End: 2017-08-02

## 2017-08-01 RX ORDER — HYDROMORPHONE HYDROCHLORIDE 2 MG/ML
30 INJECTION INTRAMUSCULAR; INTRAVENOUS; SUBCUTANEOUS
Qty: 0 | Refills: 0 | Status: DISCONTINUED | OUTPATIENT
Start: 2017-08-01 | End: 2017-08-02

## 2017-08-01 RX ORDER — DEXMEDETOMIDINE HYDROCHLORIDE IN 0.9% SODIUM CHLORIDE 4 UG/ML
0.5 INJECTION INTRAVENOUS
Qty: 200 | Refills: 0 | Status: DISCONTINUED | OUTPATIENT
Start: 2017-08-01 | End: 2017-08-02

## 2017-08-01 RX ORDER — HYDROMORPHONE HYDROCHLORIDE 2 MG/ML
0.5 INJECTION INTRAMUSCULAR; INTRAVENOUS; SUBCUTANEOUS
Qty: 0 | Refills: 0 | Status: DISCONTINUED | OUTPATIENT
Start: 2017-08-01 | End: 2017-08-02

## 2017-08-01 RX ORDER — SODIUM CHLORIDE 9 MG/ML
1000 INJECTION, SOLUTION INTRAVENOUS
Qty: 0 | Refills: 0 | Status: DISCONTINUED | OUTPATIENT
Start: 2017-08-01 | End: 2017-08-02

## 2017-08-01 RX ORDER — TAMSULOSIN HYDROCHLORIDE 0.4 MG/1
0.4 CAPSULE ORAL AT BEDTIME
Qty: 0 | Refills: 0 | Status: DISCONTINUED | OUTPATIENT
Start: 2017-08-01 | End: 2017-08-02

## 2017-08-01 RX ORDER — NALOXONE HYDROCHLORIDE 4 MG/.1ML
0.1 SPRAY NASAL
Qty: 0 | Refills: 0 | Status: DISCONTINUED | OUTPATIENT
Start: 2017-08-01 | End: 2017-08-03

## 2017-08-01 RX ORDER — DILTIAZEM HCL 120 MG
5 CAPSULE, EXT RELEASE 24 HR ORAL
Qty: 125 | Refills: 0 | Status: DISCONTINUED | OUTPATIENT
Start: 2017-08-01 | End: 2017-08-01

## 2017-08-01 RX ORDER — SENNA PLUS 8.6 MG/1
2 TABLET ORAL AT BEDTIME
Qty: 0 | Refills: 0 | Status: DISCONTINUED | OUTPATIENT
Start: 2017-08-01 | End: 2017-08-02

## 2017-08-01 RX ORDER — METOPROLOL TARTRATE 50 MG
25 TABLET ORAL DAILY
Qty: 0 | Refills: 0 | Status: DISCONTINUED | OUTPATIENT
Start: 2017-08-01 | End: 2017-08-02

## 2017-08-01 RX ORDER — HEPARIN SODIUM 5000 [USP'U]/ML
5000 INJECTION INTRAVENOUS; SUBCUTANEOUS EVERY 12 HOURS
Qty: 0 | Refills: 0 | Status: DISCONTINUED | OUTPATIENT
Start: 2017-08-01 | End: 2017-08-02

## 2017-08-01 RX ORDER — HYDROMORPHONE HYDROCHLORIDE 2 MG/ML
1 INJECTION INTRAMUSCULAR; INTRAVENOUS; SUBCUTANEOUS EVERY 4 HOURS
Qty: 0 | Refills: 0 | Status: DISCONTINUED | OUTPATIENT
Start: 2017-08-01 | End: 2017-08-02

## 2017-08-01 RX ADMIN — DEXMEDETOMIDINE HYDROCHLORIDE IN 0.9% SODIUM CHLORIDE 9.07 MICROGRAM(S)/KG/HR: 4 INJECTION INTRAVENOUS at 20:35

## 2017-08-01 RX ADMIN — PHENYLEPHRINE HYDROCHLORIDE 27.23 MICROGRAM(S)/KG/MIN: 10 INJECTION INTRAVENOUS at 20:35

## 2017-08-01 RX ADMIN — HYDROMORPHONE HYDROCHLORIDE 0.5 MILLIGRAM(S): 2 INJECTION INTRAMUSCULAR; INTRAVENOUS; SUBCUTANEOUS at 23:15

## 2017-08-01 RX ADMIN — SODIUM CHLORIDE 100 MILLILITER(S): 9 INJECTION, SOLUTION INTRAVENOUS at 22:37

## 2017-08-01 NOTE — BRIEF OPERATIVE NOTE - PROCEDURE
Spinal cord stimulator replacement  08/01/2017  REMOVAL OF STIMULATOR  Active  PGOLD2  Instrumentation, spine, segmental, posterior  08/01/2017  T4-Pelvis  Active  PGOLD2  Laminectomy  08/01/2017  T11-L1  Active  PGOLD2  Removal of hardware  08/01/2017    Active  PGOLD2

## 2017-08-01 NOTE — PROCEDURE NOTE - NSURITECHNIQUE_GU_A_CORE
The site was cleaned with soap/water and sterile solution (betadine)/The collection bag is below the level of the patient and urinary bladder/All applicable medical record documentation is completed/The catheter was secured with a securement device (e.g. StatLock)/The urinary drainage system is closed at the end of the procedure/Sterile gloves were worn for the duration of the procedure/Proper hand hygiene was performed/A sterile drape was used to cover all adjacent areas/The catheter was appropriately lubricated

## 2017-08-02 ENCOUNTER — TRANSCRIPTION ENCOUNTER (OUTPATIENT)
Age: 82
End: 2017-08-02

## 2017-08-02 LAB
BASE EXCESS BLDA CALC-SCNC: -4.4 MMOL/L — SIGNIFICANT CHANGE UP
BASE EXCESS BLDA CALC-SCNC: -6.2 MMOL/L — SIGNIFICANT CHANGE UP
BASOPHILS # BLD AUTO: 0.02 K/UL — SIGNIFICANT CHANGE UP (ref 0–0.2)
BASOPHILS NFR BLD AUTO: 0.1 % — SIGNIFICANT CHANGE UP (ref 0–2)
BUN SERPL-MCNC: 17 MG/DL — SIGNIFICANT CHANGE UP (ref 7–23)
BUN SERPL-MCNC: 28 MG/DL — HIGH (ref 7–23)
CA-I BLDA-SCNC: 1.14 MMOL/L — LOW (ref 1.15–1.29)
CALCIUM SERPL-MCNC: 7.1 MG/DL — LOW (ref 8.4–10.5)
CALCIUM SERPL-MCNC: 7.3 MG/DL — LOW (ref 8.4–10.5)
CHLORIDE BLDA-SCNC: 106 MMOL/L — SIGNIFICANT CHANGE UP (ref 96–108)
CHLORIDE SERPL-SCNC: 105 MMOL/L — SIGNIFICANT CHANGE UP (ref 98–107)
CHLORIDE SERPL-SCNC: 106 MMOL/L — SIGNIFICANT CHANGE UP (ref 98–107)
CO2 SERPL-SCNC: 18 MMOL/L — LOW (ref 22–31)
CO2 SERPL-SCNC: 20 MMOL/L — LOW (ref 22–31)
CREAT BLDA-MCNC: 0.8 MG/DL — SIGNIFICANT CHANGE UP (ref 0.5–1.3)
CREAT SERPL-MCNC: 0.73 MG/DL — SIGNIFICANT CHANGE UP (ref 0.5–1.3)
CREAT SERPL-MCNC: 0.81 MG/DL — SIGNIFICANT CHANGE UP (ref 0.5–1.3)
EOSINOPHIL # BLD AUTO: 0 K/UL — SIGNIFICANT CHANGE UP (ref 0–0.5)
EOSINOPHIL NFR BLD AUTO: 0 % — SIGNIFICANT CHANGE UP (ref 0–6)
GLUCOSE BLDA-MCNC: 177 MG/DL — HIGH (ref 70–99)
GLUCOSE BLDA-MCNC: 178 MG/DL — HIGH (ref 70–99)
GLUCOSE SERPL-MCNC: 177 MG/DL — HIGH (ref 70–99)
GLUCOSE SERPL-MCNC: 181 MG/DL — HIGH (ref 70–99)
HCO3 BLDA-SCNC: 19 MMOL/L — LOW (ref 22–26)
HCO3 BLDA-SCNC: 21 MMOL/L — LOW (ref 22–26)
HCT VFR BLD CALC: 24.2 % — LOW (ref 39–50)
HCT VFR BLD CALC: 25.9 % — LOW (ref 39–50)
HCT VFR BLD CALC: 31 % — LOW (ref 39–50)
HCT VFR BLDA CALC: 28.2 % — LOW (ref 39–51)
HCT VFR BLDA CALC: 31 % — LOW (ref 39–51)
HGB BLD-MCNC: 10.6 G/DL — LOW (ref 13–17)
HGB BLD-MCNC: 8.4 G/DL — LOW (ref 13–17)
HGB BLD-MCNC: 8.7 G/DL — LOW (ref 13–17)
HGB BLDA-MCNC: 10 G/DL — LOW (ref 13–17)
HGB BLDA-MCNC: 9.1 G/DL — LOW (ref 13–17)
IMM GRANULOCYTES # BLD AUTO: 0.49 # — SIGNIFICANT CHANGE UP
IMM GRANULOCYTES NFR BLD AUTO: 3.2 % — HIGH (ref 0–1.5)
LACTATE BLDA-SCNC: 3.4 MMOL/L — HIGH (ref 0.5–2)
LACTATE BLDA-SCNC: 3.6 MMOL/L — HIGH (ref 0.5–2)
LACTATE SERPL-SCNC: 3 MMOL/L — HIGH (ref 0.5–2)
LYMPHOCYTES # BLD AUTO: 1.56 K/UL — SIGNIFICANT CHANGE UP (ref 1–3.3)
LYMPHOCYTES # BLD AUTO: 10.2 % — LOW (ref 13–44)
MAGNESIUM SERPL-MCNC: 1.3 MG/DL — LOW (ref 1.6–2.6)
MCHC RBC-ENTMCNC: 29.8 PG — SIGNIFICANT CHANGE UP (ref 27–34)
MCHC RBC-ENTMCNC: 30.5 PG — SIGNIFICANT CHANGE UP (ref 27–34)
MCHC RBC-ENTMCNC: 31.2 PG — SIGNIFICANT CHANGE UP (ref 27–34)
MCHC RBC-ENTMCNC: 33.6 % — SIGNIFICANT CHANGE UP (ref 32–36)
MCHC RBC-ENTMCNC: 34.2 % — SIGNIFICANT CHANGE UP (ref 32–36)
MCHC RBC-ENTMCNC: 34.7 % — SIGNIFICANT CHANGE UP (ref 32–36)
MCV RBC AUTO: 88.7 FL — SIGNIFICANT CHANGE UP (ref 80–100)
MCV RBC AUTO: 89.3 FL — SIGNIFICANT CHANGE UP (ref 80–100)
MCV RBC AUTO: 90 FL — SIGNIFICANT CHANGE UP (ref 80–100)
MONOCYTES # BLD AUTO: 2.31 K/UL — HIGH (ref 0–0.9)
MONOCYTES NFR BLD AUTO: 15.1 % — HIGH (ref 2–14)
NEUTROPHILS # BLD AUTO: 10.9 K/UL — HIGH (ref 1.8–7.4)
NEUTROPHILS NFR BLD AUTO: 71.4 % — SIGNIFICANT CHANGE UP (ref 43–77)
NRBC # FLD: 0.02 — SIGNIFICANT CHANGE UP
NRBC # FLD: 0.02 — SIGNIFICANT CHANGE UP
NRBC # FLD: 0.05 — SIGNIFICANT CHANGE UP
PCO2 BLDA: 35 MMHG — SIGNIFICANT CHANGE UP (ref 35–48)
PCO2 BLDA: 36 MMHG — SIGNIFICANT CHANGE UP (ref 35–48)
PH BLDA: 7.33 PH — LOW (ref 7.35–7.45)
PH BLDA: 7.38 PH — SIGNIFICANT CHANGE UP (ref 7.35–7.45)
PHOSPHATE SERPL-MCNC: 2.8 MG/DL — SIGNIFICANT CHANGE UP (ref 2.5–4.5)
PLATELET # BLD AUTO: 65 K/UL — LOW (ref 150–400)
PLATELET # BLD AUTO: 72 K/UL — LOW (ref 150–400)
PLATELET # BLD AUTO: 92 K/UL — LOW (ref 150–400)
PMV BLD: 11.1 FL — SIGNIFICANT CHANGE UP (ref 7–13)
PMV BLD: 11.2 FL — SIGNIFICANT CHANGE UP (ref 7–13)
PMV BLD: 11.4 FL — SIGNIFICANT CHANGE UP (ref 7–13)
PO2 BLDA: 148 MMHG — HIGH (ref 83–108)
PO2 BLDA: 160 MMHG — HIGH (ref 83–108)
POTASSIUM BLDA-SCNC: 4.6 MMOL/L — HIGH (ref 3.4–4.5)
POTASSIUM BLDA-SCNC: 4.8 MMOL/L — HIGH (ref 3.4–4.5)
POTASSIUM SERPL-MCNC: 4.4 MMOL/L — SIGNIFICANT CHANGE UP (ref 3.5–5.3)
POTASSIUM SERPL-MCNC: 5.3 MMOL/L — SIGNIFICANT CHANGE UP (ref 3.5–5.3)
POTASSIUM SERPL-SCNC: 4.4 MMOL/L — SIGNIFICANT CHANGE UP (ref 3.5–5.3)
POTASSIUM SERPL-SCNC: 5.3 MMOL/L — SIGNIFICANT CHANGE UP (ref 3.5–5.3)
RBC # BLD: 2.69 M/UL — LOW (ref 4.2–5.8)
RBC # BLD: 2.92 M/UL — LOW (ref 4.2–5.8)
RBC # BLD: 3.47 M/UL — LOW (ref 4.2–5.8)
RBC # FLD: 14 % — SIGNIFICANT CHANGE UP (ref 10.3–14.5)
RBC # FLD: 14.1 % — SIGNIFICANT CHANGE UP (ref 10.3–14.5)
RBC # FLD: 14.4 % — SIGNIFICANT CHANGE UP (ref 10.3–14.5)
SAO2 % BLDA: 99.3 % — HIGH (ref 95–99)
SAO2 % BLDA: 99.3 % — HIGH (ref 95–99)
SODIUM BLDA-SCNC: 127 MMOL/L — LOW (ref 136–146)
SODIUM BLDA-SCNC: 130 MMOL/L — LOW (ref 136–146)
SODIUM SERPL-SCNC: 135 MMOL/L — SIGNIFICANT CHANGE UP (ref 135–145)
SODIUM SERPL-SCNC: 136 MMOL/L — SIGNIFICANT CHANGE UP (ref 135–145)
WBC # BLD: 14.74 K/UL — HIGH (ref 3.8–10.5)
WBC # BLD: 15.28 K/UL — HIGH (ref 3.8–10.5)
WBC # BLD: 18.13 K/UL — HIGH (ref 3.8–10.5)
WBC # FLD AUTO: 14.74 K/UL — HIGH (ref 3.8–10.5)
WBC # FLD AUTO: 15.28 K/UL — HIGH (ref 3.8–10.5)
WBC # FLD AUTO: 18.13 K/UL — HIGH (ref 3.8–10.5)

## 2017-08-02 PROCEDURE — 99223 1ST HOSP IP/OBS HIGH 75: CPT | Mod: GC

## 2017-08-02 RX ORDER — HYDROMORPHONE HYDROCHLORIDE 2 MG/ML
1 INJECTION INTRAMUSCULAR; INTRAVENOUS; SUBCUTANEOUS EVERY 6 HOURS
Qty: 0 | Refills: 0 | Status: DISCONTINUED | OUTPATIENT
Start: 2017-08-02 | End: 2017-08-03

## 2017-08-02 RX ORDER — PANTOPRAZOLE SODIUM 20 MG/1
40 TABLET, DELAYED RELEASE ORAL
Qty: 0 | Refills: 0 | Status: DISCONTINUED | OUTPATIENT
Start: 2017-08-03 | End: 2017-08-03

## 2017-08-02 RX ORDER — PANTOPRAZOLE SODIUM 20 MG/1
40 TABLET, DELAYED RELEASE ORAL DAILY
Qty: 0 | Refills: 0 | Status: DISCONTINUED | OUTPATIENT
Start: 2017-08-02 | End: 2017-08-02

## 2017-08-02 RX ORDER — MAGNESIUM SULFATE 500 MG/ML
2 VIAL (ML) INJECTION ONCE
Qty: 0 | Refills: 0 | Status: COMPLETED | OUTPATIENT
Start: 2017-08-02 | End: 2017-08-02

## 2017-08-02 RX ORDER — FENTANYL CITRATE 50 UG/ML
1 INJECTION INTRAVENOUS
Qty: 2500 | Refills: 0 | Status: DISCONTINUED | OUTPATIENT
Start: 2017-08-02 | End: 2017-08-02

## 2017-08-02 RX ORDER — ATORVASTATIN CALCIUM 80 MG/1
10 TABLET, FILM COATED ORAL AT BEDTIME
Qty: 0 | Refills: 0 | Status: DISCONTINUED | OUTPATIENT
Start: 2017-08-03 | End: 2017-08-07

## 2017-08-02 RX ORDER — SODIUM CHLORIDE 9 MG/ML
500 INJECTION, SOLUTION INTRAVENOUS ONCE
Qty: 0 | Refills: 0 | Status: COMPLETED | OUTPATIENT
Start: 2017-08-02 | End: 2017-08-02

## 2017-08-02 RX ORDER — SODIUM CHLORIDE 9 MG/ML
1000 INJECTION, SOLUTION INTRAVENOUS
Qty: 0 | Refills: 0 | Status: DISCONTINUED | OUTPATIENT
Start: 2017-08-02 | End: 2017-08-03

## 2017-08-02 RX ORDER — SODIUM CHLORIDE 9 MG/ML
500 INJECTION INTRAMUSCULAR; INTRAVENOUS; SUBCUTANEOUS ONCE
Qty: 0 | Refills: 0 | Status: COMPLETED | OUTPATIENT
Start: 2017-08-02 | End: 2017-08-02

## 2017-08-02 RX ORDER — HEPARIN SODIUM 5000 [USP'U]/ML
5000 INJECTION INTRAVENOUS; SUBCUTANEOUS EVERY 8 HOURS
Qty: 0 | Refills: 0 | Status: DISCONTINUED | OUTPATIENT
Start: 2017-08-02 | End: 2017-08-07

## 2017-08-02 RX ORDER — TAMSULOSIN HYDROCHLORIDE 0.4 MG/1
0.4 CAPSULE ORAL AT BEDTIME
Qty: 0 | Refills: 0 | Status: DISCONTINUED | OUTPATIENT
Start: 2017-08-03 | End: 2017-08-07

## 2017-08-02 RX ORDER — HYDROMORPHONE HYDROCHLORIDE 2 MG/ML
0.5 INJECTION INTRAMUSCULAR; INTRAVENOUS; SUBCUTANEOUS EVERY 4 HOURS
Qty: 0 | Refills: 0 | Status: DISCONTINUED | OUTPATIENT
Start: 2017-08-02 | End: 2017-08-03

## 2017-08-02 RX ORDER — DEXMEDETOMIDINE HYDROCHLORIDE IN 0.9% SODIUM CHLORIDE 4 UG/ML
0.4 INJECTION INTRAVENOUS
Qty: 200 | Refills: 0 | Status: DISCONTINUED | OUTPATIENT
Start: 2017-08-02 | End: 2017-08-02

## 2017-08-02 RX ORDER — DEXTROSE MONOHYDRATE, SODIUM CHLORIDE, AND POTASSIUM CHLORIDE 50; .745; 4.5 G/1000ML; G/1000ML; G/1000ML
1000 INJECTION, SOLUTION INTRAVENOUS
Qty: 0 | Refills: 0 | Status: DISCONTINUED | OUTPATIENT
Start: 2017-08-02 | End: 2017-08-02

## 2017-08-02 RX ADMIN — SODIUM CHLORIDE 1000 MILLILITER(S): 9 INJECTION, SOLUTION INTRAVENOUS at 14:00

## 2017-08-02 RX ADMIN — HYDROMORPHONE HYDROCHLORIDE 0.5 MILLIGRAM(S): 2 INJECTION INTRAMUSCULAR; INTRAVENOUS; SUBCUTANEOUS at 14:21

## 2017-08-02 RX ADMIN — TAMSULOSIN HYDROCHLORIDE 0.4 MILLIGRAM(S): 0.4 CAPSULE ORAL at 21:03

## 2017-08-02 RX ADMIN — HEPARIN SODIUM 5000 UNIT(S): 5000 INJECTION INTRAVENOUS; SUBCUTANEOUS at 17:49

## 2017-08-02 RX ADMIN — HYDROMORPHONE HYDROCHLORIDE 1 MILLIGRAM(S): 2 INJECTION INTRAMUSCULAR; INTRAVENOUS; SUBCUTANEOUS at 17:49

## 2017-08-02 RX ADMIN — HYDROMORPHONE HYDROCHLORIDE 0.5 MILLIGRAM(S): 2 INJECTION INTRAMUSCULAR; INTRAVENOUS; SUBCUTANEOUS at 19:30

## 2017-08-02 RX ADMIN — HEPARIN SODIUM 5000 UNIT(S): 5000 INJECTION INTRAVENOUS; SUBCUTANEOUS at 06:10

## 2017-08-02 RX ADMIN — HYDROMORPHONE HYDROCHLORIDE 0.5 MILLIGRAM(S): 2 INJECTION INTRAMUSCULAR; INTRAVENOUS; SUBCUTANEOUS at 00:00

## 2017-08-02 RX ADMIN — PANTOPRAZOLE SODIUM 40 MILLIGRAM(S): 20 TABLET, DELAYED RELEASE ORAL at 12:00

## 2017-08-02 RX ADMIN — DEXTROSE MONOHYDRATE, SODIUM CHLORIDE, AND POTASSIUM CHLORIDE 125 MILLILITER(S): 50; .745; 4.5 INJECTION, SOLUTION INTRAVENOUS at 13:59

## 2017-08-02 RX ADMIN — PHENYLEPHRINE HYDROCHLORIDE 27.23 MICROGRAM(S)/KG/MIN: 10 INJECTION INTRAVENOUS at 19:00

## 2017-08-02 RX ADMIN — HYDROMORPHONE HYDROCHLORIDE 30 MILLILITER(S): 2 INJECTION INTRAMUSCULAR; INTRAVENOUS; SUBCUTANEOUS at 12:00

## 2017-08-02 RX ADMIN — SODIUM CHLORIDE 125 MILLILITER(S): 9 INJECTION, SOLUTION INTRAVENOUS at 21:03

## 2017-08-02 RX ADMIN — SODIUM CHLORIDE 125 MILLILITER(S): 9 INJECTION, SOLUTION INTRAVENOUS at 15:30

## 2017-08-02 RX ADMIN — FENTANYL CITRATE 7.26 MICROGRAM(S)/KG/HR: 50 INJECTION INTRAVENOUS at 01:39

## 2017-08-02 RX ADMIN — HYDROMORPHONE HYDROCHLORIDE 0.5 MILLIGRAM(S): 2 INJECTION INTRAMUSCULAR; INTRAVENOUS; SUBCUTANEOUS at 19:45

## 2017-08-02 RX ADMIN — FENTANYL CITRATE 1 MICROGRAM(S)/KG/HR: 50 INJECTION INTRAVENOUS at 02:00

## 2017-08-02 RX ADMIN — HYDROMORPHONE HYDROCHLORIDE 0.5 MILLIGRAM(S): 2 INJECTION INTRAMUSCULAR; INTRAVENOUS; SUBCUTANEOUS at 14:07

## 2017-08-02 RX ADMIN — Medication 100 MILLIGRAM(S): at 04:15

## 2017-08-02 RX ADMIN — Medication 50 GRAM(S): at 23:48

## 2017-08-02 RX ADMIN — SODIUM CHLORIDE 100 MILLILITER(S): 9 INJECTION, SOLUTION INTRAVENOUS at 11:00

## 2017-08-02 RX ADMIN — SODIUM CHLORIDE 500 MILLILITER(S): 9 INJECTION INTRAMUSCULAR; INTRAVENOUS; SUBCUTANEOUS at 05:45

## 2017-08-02 NOTE — PROGRESS NOTE ADULT - ASSESSMENT
78 y/o M PMH CAD s/p PCI, Afib on Pradaxa, HTN, HLD, BPH, GERD and Spinal stenosis.Pt admitted for replacement of spinal cord stimulator, T2 Pelvis Fusion, T11 - T12 Laminectomy. intraop pt lost 2L blood. pt received 2u prbc, 800cc cell saver, 6L LR, but still hypotenisve with map 50s. pt required pressors & remained intubated post op & sicu consulted.     Neuro: wean off precedex, on fentanyl drip, once extubated can use pca dilaudid pump prn, q1 neuro checks  Cardio: wean off phenylephedrine   Resp: ventilated, cpap trial in am  GI: npo for nowm  protonix  Renal: LR 100cc/hr, restart tamsulosin once normotensive & off pressors  Heme: on subq heparin for dvt ppx; s/p 2u PRBC intra-op & 1U prbc in pacu  Endo: monitor f/s  ID: s/p ancef  Dispo: SICU

## 2017-08-02 NOTE — PROGRESS NOTE ADULT - SUBJECTIVE AND OBJECTIVE BOX
Patient seen and examined. intubated on precedex and fentanyl. 1U PRBC overnight. requiring pressor support. stable    HEALTH ISSUES - PROBLEM Dx:  Peripheral neuropathy: Peripheral neuropathy  HLD (hyperlipidemia): HLD (hyperlipidemia)  HTN (hypertension): HTN (hypertension)  Atrial fibrillation: Atrial fibrillation  Stented coronary artery: Stented coronary artery  CAD (coronary artery disease): CAD (coronary artery disease)  Spinal stenosis of lumbosacral region: Spinal stenosis of lumbosacral region          MEDICATIONS  (STANDING):  HYDROmorphone PCA (1 mG/mL) 30 milliLiter(s) PCA Continuous PCA Continuous  tamsulosin 0.4 milliGRAM(s) Oral at bedtime  heparin  Injectable 5000 Unit(s) SubCutaneous every 12 hours  phenylephrine    Infusion 1 MICROgram(s)/kG/Min IV Continuous <Continuous>  lactated ringers. 1000 milliLiter(s) IV Continuous <Continuous>  fentaNYL   Infusion 1 MICROgram(s)/kG/Hr IV Continuous <Continuous>  pantoprazole  Injectable 40 milliGRAM(s) IV Push daily  dexmedetomidine Infusion 0.4 MICROgram(s)/kG/Hr IV Continuous <Continuous>  sodium chloride 0.9% Bolus 500 milliLiter(s) IV Bolus once      Allergies    No Known Allergies    Intolerances        PAST MEDICAL & SURGICAL HISTORY:  Spinal stenosis of lumbosacral region  Rotator cuff disorder, right  Arthritis  Inguinal hernia: right  Reflux  CAD (coronary artery disease)  HTN (hypertension)  HLD (hyperlipidemia)  Atrial fibrillation  Peripheral neuropathy  H/O rotator cuff surgery: 12/2015  History of skin surgery: melanoma excision - left cheek/face 2016  Encounter for interrogation of neurostimulator: 12/2015  Back injury: s/p surgery L1-L9 in two separate surgeries 2003 &amp; 2006  S/P angioplasty with stent: RCA 2008 &amp; LAD 2014 cardiac stent placement  S/P knee replacement, bilateral  S/P cataract extraction                            10.6   14.74 )-----------( 65       ( 02 Aug 2017 03:15 )             31.0       02 Aug 2017 00:20    135    |  106    |  17     ----------------------------<  181    5.3     |  20     |  0.73     Ca    7.1        02 Aug 2017 00:20                Vital Signs Last 24 Hrs  T(C): 36.4 (08-02-17 @ 05:00), Max: 36.4 (08-01-17 @ 23:00)  T(F): 97.5 (08-02-17 @ 05:00), Max: 97.5 (08-01-17 @ 23:00)  HR: 83 (08-02-17 @ 06:15) (60 - 105)  BP: 97/61 (08-02-17 @ 06:15) (81/53 - 136/78)  BP(mean): 76 (08-02-17 @ 05:00) (61 - 95)  RR: 12 (08-02-17 @ 06:15) (11 - 26)  SpO2: 100% (08-02-17 @ 06:15) (98% - 100%)    Exam:  Gen: patient intubated/sedated  no gross pitting edema upper and lower extremity  good DP PT pulses bilaterally    A/P: 82 year old male s/p HANNA, T11-L1 lami, T4-Pelvis instrumentation/insitu fusion  - Wean vent as tolerated  - Wean pressors as tolerated  - Monitor CBC, HV output  - Care per SICU

## 2017-08-02 NOTE — CONSULT NOTE ADULT - ATTENDING COMMENTS
(Continued from above)    In the PACU he was treated with LR @ 100 ml/hour and he was given Ancef perioperatively. Additional parenteral fluid was given (LR and NS Bolus) for oliguria. Diltiazem was given to control his heart rate and was given Precedes for sedation. Neosynephrine was given to increase his blood pressure. At approximately 3 AM on 8/2/17 he was placed on an infusion of Fentanyl. During the approximately 22½ hours he was in the PACU he had a:    BP	=	77 – 136/58 - 96  P	=	60 – 120		O2 Sat	=	98% - 100%  R	=	11 – 26			I/O	=	2,100 in/700 out  Temp	=	34.7 – 36.6    While in the PACU reportedly his arterial cannula was not fully functional and a new left axillary arterial cannula was inserted by an anesthesiologist. He continued to require vasopressors and was extubated. Laboratory tests revealed a:    			8/1	8/2	8/2	8/2	8/2  			9	12:20	1:15	3:15	5  			PM	AM	AM	AM	AM  WBC		=	16	15	-	15	-  Neutro		=	76%	72%	-	-	-  Hgb		=	9	9	9	11	-  Hct		=	26%	26%	28%	31%	-  Plts		=	72	72	-	65	-    Na		=	136	135	127	-	130  K		=	5.4	5.3	4.8	-	4.6  Cl		=	106	106	106	-	-  HCO3		=	20	20	21	-	-  Glucose	=	222	181	177	-	170  BUN		=	17	17	-	-	-  Creat		=	0.8	0.7	0.8	-	-    pH		=	-	-	7.38	-	7.33  pCO2		=	-	-	35	-	36  pO2		=	-	-	148	-	160  BE		=	-	-	-4.4	-	-6.2    Lactate		=	-	-	3.6	-	3.4    He was transferred to the SICU for ongoing care and he arrived in the SICU at approximately 7 PM on 7/2/17. He remains in the SICU where he is now seen.    BP		=	116 – 126/90 - 93  P		=	109 - 114		O2 Sat	=	97% - 100%  R		=	14 – 18			I/O	=	2,120 in/730 out (< 24 hours)  Temp		=	36.6					L-Hemovac	=	212 ml  								R-Hemovac	=	100 ml  Glucose	=	-    Brown		=	72.6 Kg  BMI		=	26.6    He is lethargic. When stimulated he is responsive and interactive. He complains of having back pain. He does not appear to be in significant distress and he is not toxic.  He is anicteric. He has reactive pupils. He has healed surgical scars on the left side of his face. He appears pale.  He does not have thrush. His oropharyngeal mucosa is normal. He is edentulous (upper).  He does not have JVD.  His lungs are clear bilaterally and he has non-labored respirations. He has symmetrical chest wall movements.  He has irregular and rapid heart tones. He does not have a murmur.  His abdomen is soft and neither distended nor tender. He does not have guarding or rebound. There are no palpable masses. He has a reducible umbilical hernia. He has a Kim catheter in place.  His extremities are cool. He moves all extremities with better movement in the upper versus the lower extremities.    WBC	=	18	Na		=	136	Lactate		=	3.0  Neutro	=	-	K		=	4.4  Hgb	=	8	Cl		=	105  Hct	=	24%	HCO3		=	18  Plts	=	92	Glucose	=	177  			BUN		=	28  PT	=	-	Creat		=	0.8  PTT	=	-  INR	=	-    Remains on:    1)	NPO  2)	D5½NS @ 125 ml/hour  3)	Neosynephrine IVCD  4)	Dilaudid 0.5 – 1 mg IVP q4 – 6 hours  5)	Protonix 40 mg IV q24 hours  6)	Heparin 5,000 units sq q12 hours  7)	Flomax 0.4 mg po qhs    Assessment:	This patient is assessed as being a hypertensive, 82-year-old male who has chronic atrial fibrillation, hypertension, hyperlipidemia, coronary artery disease (s/p stent placement) without angina, gastro-esophageal reflux, prostatic hypertrophy, melanoma, osteoarthritis, and spinal stenosis who has previously had multiple spinal operations who has had back and buttock pain that radiated to his thighs along with neuropathy in both feet who is now 1 day S/P exploration of the prior spinal fusion, removal of hardware, removal of spinal stimulator, T11-L1 laminectomy, T4 – pelvic spinal fusion with osteotomies. He had a large blood loss and a prolonged general anesthesia. He has had post-operative hypotension for which he is being given parenteral fluid and vasopressors (via a peripheral IV). He has acute blood loss anemia and thrombocytopenia with a low serum bicarbonate and lactic academia.    Plan to:    1)	Admit him to the SICU.  2)	Review imaging studies.  3)	Give additional parenteral fluid.  4)	Transfuse blood  5)	Change the sq heparin to every 8 hours  6)	When off vasopressors will resume ß-blocker and will give a statin.  7)	Provide supplemental oxygen.  8)	Re-check basic laboratory tests – CBC, BMP, Coagulation profile, ABG.  9)	Will require physical therapy. Will leave in bed while he needs vasopressors.  10)	Full support in place    Ben Buchanan  2 hours exclusive of procedures

## 2017-08-02 NOTE — CONSULT NOTE ADULT - ASSESSMENT
80 y/o M PMH CAD s/p PCI, Afib on Pradaxa, HTN, HLD, BPH, GERD and Spinal stenosis. Pt has stimulator from previous lumbar surgery. Pt admitted for replacement of spinal cord stimulator,  Removal of Hardware Screws, T2 Pelvis Fusion, T11 - T12 Laminectomy. intraop pt lost 2L blood. pt received 2u prbc, 800cc cell saver, 6L LR, but still hypotenisve with map 50s. pt required pressors & remained intubated post op & sicu consulted.   Neuro: wean off precedex, on fentanyl drip, once extubated can use pca dilaudid pump prn, q1 neuro checks  Cardio: wean off phenylephedrine   Resp: ventilated, cpap trial in am  GI: npo for nowm  protonix  Renal: LR 100cc/hr, restart tamsulosin once normotensive & off pressors  Heme: on subq heparin for dvt ppx; s/p 2u PRBC intra-op & 1U prbc in pacu  Endo: monitor f/s  ID: s/p ancef  Dispo: SICU

## 2017-08-02 NOTE — CONSULT NOTE ADULT - ATTENDING COMMENTS
August 2nd, 2017  7:40 PM    Hospital Day #1  Post op Day #1    Initial SICU Consultation    I was asked to evaluate this patient, provide a surgical critical care consultation and to provide ongoing care and monitoring. The patient’s chart is reviewed and he is examined.    This patient is an 82-year-old hypertensive male who presented to the orthopedic service at [facility] for elective surgery. He had previously had spinal surgery (2004 and 2006) and in May, 2017 developed back and buttock pain that radiated to his thighs. He also had neuropathy in both feet. A CT myelogram of his cervical, thoracic, and lumbar spine obtained on 6/20/17 revealed a multilevel degenerative disc disease and disc osteophytes with neural foramina narrowing at multiple levels. There was a neurostimulator present and he had spinal fixation hardware form L1 to S1. He had a laminectomy from L3 – L5 levels and there was evidence of chronic arachnoiditis. The findings prompted plans for elective surgery.     He has a medical history if having chronic atrial fibrillation, hypertension, hyperlipidemia, gastro-esophageal reflux, prostatic hypertrophy, melanoma, osteoarthritis, and spinal stenosis. He has undergone spinal surgery in 2003 and 2006 and he has a spinal neurostimulator. He has had coronary artery stents inserted into his right coronary artery in 2008 (95% osteal lesion) and into his LAD in 2014. He also had a cardiac catherization in 2009. He has had a right inguinal herniarrhaphy and he has had surgery for a right rotator cuff abnormality. He has had cataract surgery and he has had bilateral knee replacements. He was hospitalized on the Cardiology Service at [facility] from 5/14/14 – 5/15/14 and underwent a cardiac catherization. A transthoracic echocardiography on 10/29/14 revealed a global left ventricular ejection fraction of 55% with mild MR. A nuclear stress the same revealed a global left ventricular ejection fraction of 70%. There was a medium sized, fixed, mild defect in the inferior wall. On 8/9/16 he had a melanoma resected from the left side of his face with a tissue transfer to cover a defect of the left eyelid. He had a spinal nerve stimulator implanted in 12/15. Prior to the current hospitalization he took:    1)	Pradaxa	2)	Aspirin	3)	Toprol		4)	Protonix  5)	Flomax	6)	Tramadol	7)	Tylenol	8)	Buprenorphine  9)	Viagra		10)	MVI    He is reportedly allergic to lisinopril that results in a rash. Previously, when treated with amiodarone he developed hypothyroidism that resolved when the medication was discontinued. He is not allergic to latex. He does not abuse either ethanol or tobacco. He had second hand smoke exposure as a child. He is  twice and is now  and resides with his spouse, Em Sunshine, in a private home in [place]. There are “five steps up and five steps down” in the home. He has three adult sons. He is a retired  and . His family history is significant for family members who have had heart failure and hypertension. His mother had pancreatitis and one brother succumbed to a trauma with a baseball. He has impaired vision and wears corrective lenses. He has impaired hearing and refused hearing aids. He has upper dentures and lower dental implants. He has neuropathy in both feet and commonly ambulates with the assistance of a cane. He has not had headaches, dizziness or photophobia. He has not recently had chest pain, dyspnea, or palpitations. He has not had anorexia, nausea, or emesis, diarrhea or constipation and he has not been jaundiced. He has not had fever or chills. He has nocturia twice per night and urinary frequency as well as erectile dysfunction. He had had back and joint pain with pain in his legs. He reports that his hands are always cold. His 10-point review of systems was otherwise negative. He has given permission for hospital personnel to discuss his healthcare with his family members.    On presentation to PAT / PST he had a:    BP	=	112/70  P	=	73  R	=	14  Temp	=	36.4  O2 Sat	=	-  VAS	=	-    He was awake, alert, and fully oriented. He was in no acute distress and did not appear toxic.  He was anicteric. He had reactive pupils with post-operative changes. He had intact extra-occular movements.  He did not have thrush. He had normal oropharyngeal mucosa. He had upper dentures.  He did not have JVD. There was no abnormal cervical adenopathy.  He had clear lungs bilaterally and he had non-labored respirations. He had symmetrical chest wall movements.  He had irregular heart tones. He did not have a murmur.  His abdomen was soft and neither distended nor tender. He did not have guarding or rebound. There were no palpable masses, There was a reducible umbilical hernia. He had active bowel sounds. There was no CVA tenderness. He had healed surgical scars.  He had normal external genitalia.  His extremities were well perfused. He did not have a rash.  He had healed surgical scars over his knees, right shoulder and the midline of his back. He had a neurostimulator in the lower left back. His musculoskeletal exam was otherwise normal.    Laboratory tests revealed a    WBC	=	7	Na		=	139  Neutro	=	-	K		=	4.2  Hgb	=	13	Cl		=	101  Hct	=	39%	HCO3		=	25  Plts	=	152	Glucose	=	82  			BUN		=	17  Pt	=	-	Creat		=	0.9  PTTT	=	-  INR	=	-    An EKG revealed atrial fibrillation.    He was admitted to the Orthopedic Spine Service and underwent elective surgery and anesthesia commencing at approximately 7:55 AM on 8/1/17. He was ASA 3 and underwent general anesthesia via a 7.5 ETT that was east to insert. An 18 Fr Coude Kim catheter was inserted. A right radial arterial cannula was inserted for antonia-operative monitoring. During the approximately 13 hours of anesthesia he was given 9,000 ml of crystalloid, 600 ml (2 units) of pRBC, and 820 ml of cell saver blood. He was estimated to have lost 2,000 ml of blood and he produced 1,450 ml of urine. During the anesthesia he required vasopressor support with the administration of Neosynephrine, Ephedrine, and Levophed. At times he was given Diltiazem and Esmolol to control his heart rate. Laboratory tests, obtained during the anesthesia revealed a:    			8:45	10:45	12:10	1:30	2:50	4:30	6	8:30  			AM	AM	PM	PM	PM	PM	PM	PM  pH		=	7.47	7.35	7.36	7.36	7.36	7.38	7.38	7.39  pCO2		=	32	43	41	40	36	35	35	35  pO2		=	280	313	290	277	270	253	276	218  BE		=	-0.4	-1.4	-1.8	-2.9	-4.3	-3.8	-3.9	-3.1    Na		=	137	138	138	135	134	133	131	128  K		=	4.1	4.4	4.0	4.2	4.3	4.2	4.6	4.9  HCO3		=	25	23	23	22	21	21	21	22  Glucose	=	110	137	145	174	205	180	185	210    Hgb		=	12	12	10	10	10	9	11	9  Hct		=	36%	37%	31%	30%	31%	27%	32%	28%    Prior to commencing the operation he was given 2 grams of Ancef and additional doses of Ancef were given at intervals during the operation. He underwent exploration of the prior spinal fusion, removal of hardware, removal of spinal stimulator, T11-L1 laminectomy, T4 – pelvic spinal fusion with osteotomies. On completion of the anesthesia and surgery he was left intubated and transferred to the PACU. He arrived in the PACU at approximately 8:30 PM.    (continued below) August 2nd, 2017  7:40 PM    Hospital Day #1  Post op Day #1    Initial SICU Consultation    I was asked to evaluate this patient, provide a surgical critical care consultation and to provide ongoing care and monitoring. The patient’s chart is reviewed and he is examined.    This patient is an 82-year-old hypertensive male who presented to the orthopedic service at [facility] for elective surgery. He had previously had spinal surgery (2004 and 2006) and in May, 2017 developed back and buttock pain that radiated to his thighs. He also had neuropathy in both feet. A CT myelogram of his cervical, thoracic, and lumbar spine obtained on 6/20/17 revealed a multilevel degenerative disc disease and disc osteophytes with neural foramina narrowing at multiple levels. There was a neurostimulator present and he had spinal fixation hardware form L1 to S1. He had a laminectomy from L3 – L5 levels and there was evidence of chronic arachnoiditis. The findings prompted plans for elective surgery.     He has a medical history if having chronic atrial fibrillation, hypertension, hyperlipidemia, gastro-esophageal reflux, prostatic hypertrophy, melanoma, osteoarthritis, and spinal stenosis. He has undergone spinal surgery in 2003 and 2006 and he has a spinal neurostimulator. He has had coronary artery stents inserted into his right coronary artery in 2008 (95% osteal lesion) and into his LAD in 2014. He also had a cardiac catherization in 2009. He has had a right inguinal herniorrhaphy and he has had surgery for a right rotator cuff abnormality. He has had cataract surgery and he has had bilateral knee replacements. He was hospitalized on the Cardiology Service at [facility] from 5/14/14 – 5/15/14 and underwent a cardiac catherization. A transthoracic echocardiography on 10/29/14 revealed a global left ventricular ejection fraction of 55% with mild MR. A nuclear stress the same revealed a global left ventricular ejection fraction of 70%. There was a medium sized, fixed, mild defect in the inferior wall. On 8/9/16 he had a melanoma resected from the left side of his face with a tissue transfer to cover a defect of the left eyelid. He had a spinal nerve stimulator implanted in 12/15. Prior to the current hospitalization he took:    1)	Pradaxa	2)	Aspirin		3)	Toprol		4)	Protonix  5)	Flomax		6)	Tramadol	7)	Tylenol		8)	Buprenorphine  9)	Viagra		10)	MVI    He is reportedly allergic to lisinopril that results in a rash. Previously, when treated with amiodarone he developed hypothyroidism that resolved when the medication was discontinued. He is not allergic to latex. He does not abuse either ethanol or tobacco. He had second hand smoke exposure as a child. He is  twice and is now  and resides with his spouse, Em Sunshine, in a private home in [place]. There are “five steps up and five steps down” in the home. He has three adult sons. He is a retired  and . His family history is significant for family members who have had heart failure and hypertension. His mother had pancreatitis and one brother succumbed to a trauma with a baseball. He has impaired vision and wears corrective lenses. He has impaired hearing and refused hearing aids. He has upper dentures and lower dental implants. He has neuropathy in both feet and commonly ambulates with the assistance of a cane. He has not had headaches, dizziness or photophobia. He has not recently had chest pain, dyspnea, or palpitations. He has not had anorexia, nausea, or emesis, diarrhea or constipation and he has not been jaundiced. He has not had fever or chills. He has nocturia twice per night and urinary frequency as well as erectile dysfunction. He had had back and joint pain with pain in his legs. He reports that his hands are always cold. His 10-point review of systems was otherwise negative. He has given permission for hospital personnel to discuss his healthcare with his family members.    On presentation to PAT / PST he had a:    BP	=	112/70  P	=	73  R	=	14  Temp	=	36.4  O2 Sat	=	-  VAS	=	-    He was awake, alert, and fully oriented. He was in no acute distress and did not appear toxic.  He was anicteric. He had reactive pupils with post-operative changes. He had intact extra-occular movements.  He did not have thrush. He had normal oropharyngeal mucosa. He had upper dentures.  He did not have JVD. There was no abnormal cervical adenopathy.  He had clear lungs bilaterally and he had non-labored respirations. He had symmetrical chest wall movements.  He had irregular heart tones. He did not have a murmur.  His abdomen was soft and neither distended nor tender. He did not have guarding or rebound. There were no palpable masses, There was a reducible umbilical hernia. He had active bowel sounds. There was no CVA tenderness. He had healed surgical scars.  He had normal external genitalia.  His extremities were well perfused. He did not have a rash.  He had healed surgical scars over his knees, right shoulder and the midline of his back. He had a neurostimulator in the lower left back. His musculoskeletal exam was otherwise normal.    Laboratory tests revealed a    WBC	=	7	Na		=	139  Neutro	=	-	K		=	4.2  Hgb	=	13	Cl		=	101  Hct	=	39%	HCO3		=	25  Plts	=	152	Glucose	=	82  			BUN		=	17  Pt	=	-	Creat		=	0.9  PTTT	=	-  INR	=	-    An EKG revealed atrial fibrillation.    He was admitted to the Orthopedic Spine Service and underwent elective surgery and anesthesia commencing at approximately 7:55 AM on 8/1/17. He was ASA 3 and underwent general anesthesia via a 7.5 ETT that was east to insert. An 18 Fr Coude Kim catheter was inserted. A right radial arterial cannula was inserted for antonia-operative monitoring. During the approximately 13 hours of anesthesia he was given 9,000 ml of crystalloid, 600 ml (2 units) of pRBC, and 820 ml of cell saver blood. He was estimated to have lost 2,000 ml of blood and he produced 1,450 ml of urine. During the anesthesia he required vasopressor support with the administration of Phenylephrine, Ephedrine, and Levophed. At times he was given Diltiazem and Esmolol to control his heart rate. Laboratory tests, obtained during the anesthesia revealed a:    			8:45	10:45	12:10	1:30	2:50	4:30	6	8:30  			AM	AM	PM	PM	PM	PM	PM	PM  pH		=	7.47	7.35	7.36	7.36	7.36	7.38	7.38	7.39  pCO2		=	32	43	41	40	36	35	35	35  pO2		=	280	313	290	277	270	253	276	218  BE		=	-0.4	-1.4	-1.8	-2.9	-4.3	-3.8	-3.9	-3.1    Na		=	137	138	138	135	134	133	131	128  K		=	4.1	4.4	4.0	4.2	4.3	4.2	4.6	4.9  HCO3		=	25	23	23	22	21	21	21	22  Glucose	=	110	137	145	174	205	180	185	210    Hgb		=	12	12	10	10	10	9	11	9  Hct		=	36%	37%	31%	30%	31%	27%	32%	28%    Prior to commencing the operation he was given 2 grams of Ancef and additional doses of Ancef were given at intervals during the operation. He underwent exploration of the prior spinal fusion, removal of hardware, removal of spinal stimulator, T11-L1 laminectomy, T4 – pelvic spinal fusion with osteotomies. On completion of the anesthesia and surgery he was left intubated and transferred to the PACU. He arrived in the PACU at approximately 8:30 PM.    (continued below)

## 2017-08-02 NOTE — CONSULT NOTE ADULT - SUBJECTIVE AND OBJECTIVE BOX
78 y/o M PMH CAD s/p PCI, Afib on Pradaxa, HTN, HLD, BPH, GERD and Spinal stenosis. Pt has stimulator from previous lumbar surgery. Pt admitted for replacement of spinal cord stimulator,  Removal of Hardware Screws, T2 Pelvis Fusion, T11 - T12 Laminectomy. intraop pt lost 2L blood. pt received 2u prbc, 800cc cell saver, 6L LR, but still hypotenisve with map 50s. pt required pressors & remained intubated post op & sicu consulted.       Allergies    No Known Allergies      MEDICATIONS  (STANDING):  HYDROmorphone PCA (1 mG/mL) 30 milliLiter(s) PCA Continuous PCA Continuous  tamsulosin 0.4 milliGRAM(s) Oral at bedtime  ceFAZolin   IVPB 2000 milliGRAM(s) IV Intermittent every 8 hours  heparin  Injectable 5000 Unit(s) SubCutaneous every 12 hours  phenylephrine    Infusion 1 MICROgram(s)/kG/Min (27.225 mL/Hr) IV Continuous <Continuous>  dexmedetomidine Infusion 0.5 MICROgram(s)/kG/Hr (9.075 mL/Hr) IV Continuous <Continuous>  lactated ringers. 1000 milliLiter(s) (100 mL/Hr) IV Continuous <Continuous>  fentaNYL   Infusion 1 MICROgram(s)/kG/Hr (7.26 mL/Hr) IV Continuous <Continuous>  pantoprazole  Injectable 40 milliGRAM(s) IV Push daily    MEDICATIONS  (PRN):  HYDROmorphone PCA (1 mG/mL) Rescue Clinician Bolus 0.5 milliGRAM(s) IV Push every 15 minutes PRN for Pain Scale GREATER THAN 6  naloxone Injectable 0.1 milliGRAM(s) IV Push every 3 minutes PRN For ANY of the following changes in patient status:  A. RR LESS THAN 10 breaths per minute, B. Oxygen saturation LESS THAN 90%, C. Sedation score of 6        PAST MEDICAL & SURGICAL HISTORY:  Spinal stenosis of lumbosacral region  Rotator cuff disorder, right  Arthritis  Inguinal hernia: right  Reflux  CAD (coronary artery disease)  HTN (hypertension)  HLD (hyperlipidemia)  Atrial fibrillation  Peripheral neuropathy  H/O rotator cuff surgery: 12/2015  History of skin surgery: melanoma excision - left cheek/face 2016  Encounter for interrogation of neurostimulator: 12/2015  Back injury: s/p surgery L1-L9 in two separate surgeries 2003 &amp; 2006  S/P angioplasty with stent: RCA 2008 &amp; LAD 2014 cardiac stent placement  S/P knee replacement, bilateral  S/P cataract extraction      FAMILY HISTORY:  Family history of heart failure (Sibling)    ICU Vital Signs Last 24 Hrs  T(C): 36 (02 Aug 2017 01:30), Max: 36.7 (01 Aug 2017 06:06)  T(F): 96.8 (02 Aug 2017 01:30), Max: 98 (01 Aug 2017 06:06)  HR: 91 (02 Aug 2017 02:45) (60 - 98)  BP: 90/68 (02 Aug 2017 02:45) (90/68 - 140/68)  BP(mean): 72 (02 Aug 2017 02:45) (69 - 95)  ABP: 106/66 (02 Aug 2017 00:30) (101/62 - 131/72)  ABP(mean): 79 (02 Aug 2017 00:30) (76 - 82)  RR: 16 (02 Aug 2017 02:45) (11 - 26)  SpO2: 100% (02 Aug 2017 02:45) (98% - 100%)    I&O's Detail    01 Aug 2017 07:01  -  02 Aug 2017 02:54  --------------------------------------------------------  IN:    dexmedetomidine Infusion: 40.9 mL    diltiazem Infusion: 7.5 mL    phenylephrine   Infusion: 122.3 mL  Total IN: 170.7 mL    OUT:    Accordian: 63 mL    Accordian: 650 mL    Indwelling Catheter - Urethral: 315 mL  Total OUT: 1028 mL    Total NET: -857.3 mL        Mode: AC/ CMV (Assist Control/ Continuous Mandatory Ventilation)  RR (machine): 10  TV (machine): 350  FiO2: 40  PEEP: 5  MAP: 7.3  PIP: 16      PHYSICAL EXAM:      General: intubated, on precedex, responds to pain	    Skin/Breast: no e/o  rash, pale	  	  ENMT:	et tube in place    Respiratory and Thorax: ctabl, ventilated  	  Cardiovascular:	iiregularly irregular, s1, s2    Gastrointestinal:	 ntnd, soft    Genitourinary:	    Musculoskeletal: 2 drain from coming on vertebrae	    Neurological: intubated & sedated	          LABS:  CBC Full  -  ( 02 Aug 2017 00:20 )  WBC Count : 15.28 K/uL  Hemoglobin : 8.7 g/dL  Hematocrit : 25.9 %  Platelet Count - Automated : 72 K/uL  Mean Cell Volume : 88.7 fL  Mean Cell Hemoglobin : 29.8 pg  Mean Cell Hemoglobin Concentration : 33.6 %  Auto Neutrophil # : 10.90 K/uL  Auto Lymphocyte # : 1.56 K/uL  Auto Monocyte # : 2.31 K/uL  Auto Eosinophil # : 0.00 K/uL  Auto Basophil # : 0.02 K/uL  Auto Neutrophil % : 71.4 %  Auto Lymphocyte % : 10.2 %  Auto Monocyte % : 15.1 %  Auto Eosinophil % : 0.0 %  Auto Basophil % : 0.1 %    08-02    135  |  106  |  17  ----------------------------<  181<H>  5.3   |  20<L>  |  0.73    Ca    7.1<L>      02 Aug 2017 00:20      CAPILLARY BLOOD GLUCOSE          ABG - ( 02 Aug 2017 01:16 )  pH: 7.38  /  pCO2: 35    /  pO2: 148   / HCO3: 21    / Base Excess: -4.4  /  SaO2: 99.3      EKG:    ECHO, US:    RADIOLOGY:

## 2017-08-02 NOTE — PROGRESS NOTE ADULT - SUBJECTIVE AND OBJECTIVE BOX
Patient intubated/sedated, on phenylephrine. Patient received 1u PRBC overnight.     Vital Signs Last 24 Hrs  T(C): 36.4 (02 Aug 2017 04:00), Max: 36.7 (01 Aug 2017 06:06)  T(F): 97.5 (02 Aug 2017 04:00), Max: 98 (01 Aug 2017 06:06)  HR: 87 (02 Aug 2017 04:30) (60 - 105)  BP: 85/62 (02 Aug 2017 04:30) (81/53 - 140/68)  BP(mean): 68 (02 Aug 2017 04:30) (61 - 95)  RR: 13 (02 Aug 2017 04:30) (11 - 26)  SpO2: 100% (02 Aug 2017 04:30) (98% - 100%)               10.6   14.74 )-----------( 65       ( 02 Aug 2017 03:15 )             31.0   08-02    135  |  106  |  17  ----------------------------<  181<H>  5.3   |  20<L>  |  0.73    Ca    7.1<L>      02 Aug 2017 00:20    Exam:  Gen: patient intubated/sedated    A/P: 82 year old male s/p HANNA, T11-L1 lami, T4-Pelvis instrumentation/insitu fusion  - Wean vent as tolerated  - Wean pressors as tolerated  - Monitor CBC, HV output  - Care per SICU

## 2017-08-02 NOTE — PROCEDURE NOTE - NSPROCDETAILS_GEN_ALL_CORE
location identified, draped/prepped, sterile technique used, needle inserted/introduced/sutured in place/positive blood return obtained via catheter/connected to a pressurized flush line
sterile technique, indwelling urinary device inserted/prior to placement, an active physician order for the placement of a urinary catheter was verified/a urinary catheter insertion kit was used for all insertion materials

## 2017-08-02 NOTE — CONSULT NOTE ADULT - ATTENDING COMMENTS
I have personally examined this patient, reviewed pertinent labs and imaging, and discussed the case with colleagues, residents, physician assistants, and nurses on SICU rounds.     45    minutes in total were spent in providing direct critical care for the diagnoses, assessment and plan outlined below.  These diagnoses are unrelated to the surgical procedure.  Additionally, time spent in the performance of separately billable procedures was not counted toward the critical care time.  There is no overlap.    The active critical care issues are:  1. acute blood loss anemia causing hypotension  2. hypotension, possibly also due to sedative  3. respiratory insufficiency associated with sedation and post-op state    Plan to transfuse blood, stabilize BP and wean sedative in anticipation of vent weaning trial once hypotension has improved.

## 2017-08-02 NOTE — PROGRESS NOTE ADULT - SUBJECTIVE AND OBJECTIVE BOX
SICU Daily Progress Note  =====================================================  Interval/Overnight Events:     ***    POD #          	SICU Day #    ***    HPI:  ((insert from previous note)) ***    PMH:   ***    Allergies: No Known Allergies      MEDICATIONS:   --------------------------------------------------------------------------------------  Neurologic Medications  HYDROmorphone PCA (1 mG/mL) 30 milliLiter(s) PCA Continuous PCA Continuous  HYDROmorphone PCA (1 mG/mL) Rescue Clinician Bolus 0.5 milliGRAM(s) IV Push every 15 minutes PRN for Pain Scale GREATER THAN 6  dexmedetomidine Infusion 0.5 MICROgram(s)/kG/Hr IV Continuous <Continuous>  fentaNYL   Infusion 1 MICROgram(s)/kG/Hr IV Continuous <Continuous>    Respiratory Medications    Cardiovascular Medications  tamsulosin 0.4 milliGRAM(s) Oral at bedtime  phenylephrine    Infusion 1 MICROgram(s)/kG/Min IV Continuous <Continuous>    Gastrointestinal Medications  lactated ringers. 1000 milliLiter(s) IV Continuous <Continuous>  pantoprazole  Injectable 40 milliGRAM(s) IV Push daily    Genitourinary Medications    Hematologic/Oncologic Medications  heparin  Injectable 5000 Unit(s) SubCutaneous every 12 hours    Antimicrobial/Immunologic Medications  ceFAZolin   IVPB 2000 milliGRAM(s) IV Intermittent every 8 hours    Endocrine/Metabolic Medications    Topical/Other Medications  naloxone Injectable 0.1 milliGRAM(s) IV Push every 3 minutes PRN For ANY of the following changes in patient status:  A. RR LESS THAN 10 breaths per minute, B. Oxygen saturation LESS THAN 90%, C. Sedation score of 6    --------------------------------------------------------------------------------------    VITAL SIGNS, INS/OUTS (last 24 hours):  --------------------------------------------------------------------------------------  ((Insert SICU Vitals/Is+Os here))***  --------------------------------------------------------------------------------------    EXAM  NEUROLOGY  RASS:   	GCS:    Exam: Normal, NAD, alert, oriented x3, no focal deficits. ***    HEENT  Exam: Normocephalic, atraumatic, EOMI.  ***    RESPIRATORY  Exam: Lungs clear to auscultation, Normal expansion/effort. ***  Mechanical Ventilation: Mode: AC/ CMV (Assist Control/ Continuous Mandatory Ventilation), RR (machine): 10, TV (machine): 350, FiO2: 50, PEEP: 5, MAP: 7.3, PIP: 16    CARDIOVASCULAR  Exam: S1, S2.  Regular rate and rhythm.   ***    GI/NUTRITION  Exam: Abdomen soft, Non-tender, Non-distended.  ***  Wound:  ***  Current Diet:  NPO***    VASCULAR  Exam: Extremities warm, pink, well-perfused. ***    MUSCULOSKELETAL  Exam: All extremities moving spontaneously without limitations. ***    SKIN  Exam: Good skin turgor, no skin breakdown. ***    METABOLIC/FLUIDS/ELECTROLYTES  lactated ringers. 1000 milliLiter(s) IV Continuous <Continuous>      HEMATOLOGIC  [x] VTE Prophylaxis: heparin  Injectable 5000 Unit(s) SubCutaneous every 12 hours    Transfusions:	[] PRBC	[] Platelets		[] FFP	[] Cryoprecipitate    INFECTIOUS DISEASE  Antimicrobials/Immunologic Medications:  ceFAZolin   IVPB 2000 milliGRAM(s) IV Intermittent every 8 hours    Day #      of     ***    Tubes/Lines/Drains  ***  [x] Peripheral IV  [] Central Venous Line     	[] R	[] L	[] IJ	[] Fem	[] SC	Date Placed:   [] Arterial Line		[] R	[] L	[] Fem	[] Rad	[] Ax	Date Placed:   [] PICC		[] Midline		[] Mediport  [] Urinary Catheter		Date Placed:   [x] Necessity of urinary, arterial, and venous catheters discussed    LABS  --------------------------------------------------------------------------------------  ((Insert SICU Labs here))***  --------------------------------------------------------------------------------------    OTHER LABORATORY:     IMAGING STUDIES:   CXR:     ASSESSMENT:  82y Male    ((insert from previous))***    PLAN:   ***  Neurologic:   Respiratory:   Cardiovascular:   Gastrointestinal/Nutrition:   Renal/Genitourinary:   Hematologic:   Infectious Disease:   Tubes/Lines/Drains:   Endocrine:   Disposition:     --------------------------------------------------------------------------------------    Critical Care Diagnoses: SICU Daily Progress Note  =====================================================  Interval/Overnight Events: hypotensive systolic 80s when weaned off presors. h&h improved with 1prbc.     POD #  1        	SICU Day #    ***    HPI:  80 y/o M PMH CAD s/p PCI, Afib on Pradaxa, HTN, HLD, BPH, GERD and Spinal stenosis.y. Pt admitted for replacement of spinal cord stimulator, T2 Pelvis Fusion, T11 - T12 Laminectomy. intraop pt lost 2L blood. pt received 2u prbc, 800cc cell saver, 6L LR, but was hypotenisve with map 50s. pt required pressors & remained intubated post op & sicu consulted.     PMH:   ***    Allergies: No Known Allergies      MEDICATIONS:   --------------------------------------------------------------------------------------  Neurologic Medications  HYDROmorphone PCA (1 mG/mL) 30 milliLiter(s) PCA Continuous PCA Continuous  HYDROmorphone PCA (1 mG/mL) Rescue Clinician Bolus 0.5 milliGRAM(s) IV Push every 15 minutes PRN for Pain Scale GREATER THAN 6  dexmedetomidine Infusion 0.5 MICROgram(s)/kG/Hr IV Continuous <Continuous>  fentaNYL   Infusion 1 MICROgram(s)/kG/Hr IV Continuous <Continuous>    Respiratory Medications    Cardiovascular Medications  tamsulosin 0.4 milliGRAM(s) Oral at bedtime  phenylephrine    Infusion 1 MICROgram(s)/kG/Min IV Continuous <Continuous>    Gastrointestinal Medications  lactated ringers. 1000 milliLiter(s) IV Continuous <Continuous>  pantoprazole  Injectable 40 milliGRAM(s) IV Push daily    Genitourinary Medications    Hematologic/Oncologic Medications  heparin  Injectable 5000 Unit(s) SubCutaneous every 12 hours    Antimicrobial/Immunologic Medications  ceFAZolin   IVPB 2000 milliGRAM(s) IV Intermittent every 8 hours    Endocrine/Metabolic Medications    Topical/Other Medications  naloxone Injectable 0.1 milliGRAM(s) IV Push every 3 minutes PRN For ANY of the following changes in patient status:  A. RR LESS THAN 10 breaths per minute, B. Oxygen saturation LESS THAN 90%, C. Sedation score of 6    --------------------------------------------------------------------------------------    VITAL SIGNS, INS/OUTS (last 24 hours):  --------------------------------------------------------------------------------------  T(C): 36.4 (08-02-17 @ 04:00), Max: 36.7 (08-01-17 @ 06:06)  HR: 87 (08-02-17 @ 04:30) (60 - 105)  BP: 85/62 (08-02-17 @ 04:30) (81/53 - 140/68)  BP(mean): 68 (08-02-17 @ 04:30) (61 - 95)  ABP: 78/58 (08-02-17 @ 04:00) (77/42 - 131/72)  ABP(mean): 79 (08-02-17 @ 00:30) (76 - 82)  RR: 13 (08-02-17 @ 04:30) (11 - 26)  SpO2: 100% (08-02-17 @ 04:30) (98% - 100%)  Wt(kg): --  CVP(mm Hg): --  CI: --    CAPILLARY BLOOD GLUCOSE       N/A      08-01 @ 07:01  -  08-02 @ 04:42  --------------------------------------------------------  IN:    dexmedetomidine Infusion: 68.2 mL    diltiazem Infusion: 7.5 mL    fentaNYL  Infusion: 14.6 mL    lactated ringers.: 300 mL    phenylephrine   Infusion: 183.5 mL  Total IN: 573.8 mL    OUT:    Accordian: 63 mL    Accordian: 740 mL    Indwelling Catheter - Urethral: 355 mL  Total OUT: 1158 mL    Total NET: -584.2 mL      ((Insert SICU Vitals/Is+Os here))***  --------------------------------------------------------------------------------------    PHYSICAL EXAM:      General: intubated, on precedex, responds to pain	    Skin/Breast: no e/o  rash, pale	  	  ENMT:	et tube in place    Respiratory and Thorax: ctabl, ventilated  	  Cardiovascular:	iiregularly irregular, s1, s2    Gastrointestinal:	 ntnd, soft    Genitourinary:	    Musculoskeletal: 2 drain from coming on vertebrae	    Neurological: intubated & sedated

## 2017-08-03 LAB
ALBUMIN SERPL ELPH-MCNC: 1.9 G/DL — LOW (ref 3.3–5)
ALP SERPL-CCNC: 46 U/L — SIGNIFICANT CHANGE UP (ref 40–120)
ALT FLD-CCNC: 34 U/L — SIGNIFICANT CHANGE UP (ref 4–41)
APPEARANCE UR: CLEAR — SIGNIFICANT CHANGE UP
APTT BLD: 27.7 SEC — SIGNIFICANT CHANGE UP (ref 27.5–37.4)
AST SERPL-CCNC: 61 U/L — HIGH (ref 4–40)
BASE EXCESS BLDA CALC-SCNC: -2.9 MMOL/L — SIGNIFICANT CHANGE UP
BASOPHILS # BLD AUTO: 0.01 K/UL — SIGNIFICANT CHANGE UP (ref 0–0.2)
BASOPHILS NFR BLD AUTO: 0.1 % — SIGNIFICANT CHANGE UP (ref 0–2)
BILIRUB SERPL-MCNC: 0.7 MG/DL — SIGNIFICANT CHANGE UP (ref 0.2–1.2)
BILIRUB UR-MCNC: NEGATIVE — SIGNIFICANT CHANGE UP
BLOOD UR QL VISUAL: HIGH
BUN SERPL-MCNC: 26 MG/DL — HIGH (ref 7–23)
CA-I BLDA-SCNC: 1.18 MMOL/L — SIGNIFICANT CHANGE UP (ref 1.15–1.29)
CALCIUM SERPL-MCNC: 7.1 MG/DL — LOW (ref 8.4–10.5)
CHLORIDE SERPL-SCNC: 105 MMOL/L — SIGNIFICANT CHANGE UP (ref 98–107)
CO2 SERPL-SCNC: 20 MMOL/L — LOW (ref 22–31)
COLOR SPEC: YELLOW — SIGNIFICANT CHANGE UP
CREAT SERPL-MCNC: 0.67 MG/DL — SIGNIFICANT CHANGE UP (ref 0.5–1.3)
EOSINOPHIL # BLD AUTO: 0 K/UL — SIGNIFICANT CHANGE UP (ref 0–0.5)
EOSINOPHIL NFR BLD AUTO: 0 % — SIGNIFICANT CHANGE UP (ref 0–6)
GLUCOSE BLDA-MCNC: 163 MG/DL — HIGH (ref 70–99)
GLUCOSE SERPL-MCNC: 170 MG/DL — HIGH (ref 70–99)
GLUCOSE UR-MCNC: NEGATIVE — SIGNIFICANT CHANGE UP
HCO3 BLDA-SCNC: 22 MMOL/L — SIGNIFICANT CHANGE UP (ref 22–26)
HCT VFR BLD CALC: 23.6 % — LOW (ref 39–50)
HCT VFR BLD CALC: 23.8 % — LOW (ref 39–50)
HCT VFR BLD CALC: 25.3 % — LOW (ref 39–50)
HCT VFR BLDA CALC: 26.6 % — LOW (ref 39–51)
HGB BLD-MCNC: 8.2 G/DL — LOW (ref 13–17)
HGB BLD-MCNC: 8.4 G/DL — LOW (ref 13–17)
HGB BLD-MCNC: 8.8 G/DL — LOW (ref 13–17)
HGB BLDA-MCNC: 8.6 G/DL — LOW (ref 13–17)
IMM GRANULOCYTES # BLD AUTO: 0.25 # — SIGNIFICANT CHANGE UP
IMM GRANULOCYTES NFR BLD AUTO: 1.7 % — HIGH (ref 0–1.5)
INR BLD: 1.07 — SIGNIFICANT CHANGE UP (ref 0.88–1.17)
KETONES UR-MCNC: NEGATIVE — SIGNIFICANT CHANGE UP
LACTATE BLDA-SCNC: 1.8 MMOL/L — SIGNIFICANT CHANGE UP (ref 0.5–2)
LACTATE SERPL-SCNC: 2.6 MMOL/L — HIGH (ref 0.5–2)
LEUKOCYTE ESTERASE UR-ACNC: HIGH
LYMPHOCYTES # BLD AUTO: 1.49 K/UL — SIGNIFICANT CHANGE UP (ref 1–3.3)
LYMPHOCYTES # BLD AUTO: 10.1 % — LOW (ref 13–44)
MCHC RBC-ENTMCNC: 30.8 PG — SIGNIFICANT CHANGE UP (ref 27–34)
MCHC RBC-ENTMCNC: 30.9 PG — SIGNIFICANT CHANGE UP (ref 27–34)
MCHC RBC-ENTMCNC: 30.9 PG — SIGNIFICANT CHANGE UP (ref 27–34)
MCHC RBC-ENTMCNC: 34.7 % — SIGNIFICANT CHANGE UP (ref 32–36)
MCHC RBC-ENTMCNC: 34.8 % — SIGNIFICANT CHANGE UP (ref 32–36)
MCHC RBC-ENTMCNC: 35.3 % — SIGNIFICANT CHANGE UP (ref 32–36)
MCV RBC AUTO: 87.5 FL — SIGNIFICANT CHANGE UP (ref 80–100)
MCV RBC AUTO: 88.5 FL — SIGNIFICANT CHANGE UP (ref 80–100)
MCV RBC AUTO: 89.1 FL — SIGNIFICANT CHANGE UP (ref 80–100)
MONOCYTES # BLD AUTO: 2.33 K/UL — HIGH (ref 0–0.9)
MONOCYTES NFR BLD AUTO: 15.8 % — HIGH (ref 2–14)
MUCOUS THREADS # UR AUTO: SIGNIFICANT CHANGE UP
NEUTROPHILS # BLD AUTO: 10.64 K/UL — HIGH (ref 1.8–7.4)
NEUTROPHILS NFR BLD AUTO: 72.3 % — SIGNIFICANT CHANGE UP (ref 43–77)
NITRITE UR-MCNC: NEGATIVE — SIGNIFICANT CHANGE UP
NRBC # FLD: 0.04 — SIGNIFICANT CHANGE UP
NRBC # FLD: 0.04 — SIGNIFICANT CHANGE UP
NRBC # FLD: 0.06 — SIGNIFICANT CHANGE UP
PCO2 BLDA: 34 MMHG — LOW (ref 35–48)
PH BLDA: 7.41 PH — SIGNIFICANT CHANGE UP (ref 7.35–7.45)
PH UR: 5.5 — SIGNIFICANT CHANGE UP (ref 4.6–8)
PLATELET # BLD AUTO: 66 K/UL — LOW (ref 150–400)
PLATELET # BLD AUTO: 73 K/UL — LOW (ref 150–400)
PLATELET # BLD AUTO: 77 K/UL — LOW (ref 150–400)
PMV BLD: 10.3 FL — SIGNIFICANT CHANGE UP (ref 7–13)
PMV BLD: 10.5 FL — SIGNIFICANT CHANGE UP (ref 7–13)
PMV BLD: 10.8 FL — SIGNIFICANT CHANGE UP (ref 7–13)
PO2 BLDA: 93 MMHG — SIGNIFICANT CHANGE UP (ref 83–108)
POTASSIUM BLDA-SCNC: 3.8 MMOL/L — SIGNIFICANT CHANGE UP (ref 3.4–4.5)
POTASSIUM SERPL-MCNC: 4.1 MMOL/L — SIGNIFICANT CHANGE UP (ref 3.5–5.3)
POTASSIUM SERPL-SCNC: 4.1 MMOL/L — SIGNIFICANT CHANGE UP (ref 3.5–5.3)
PROT SERPL-MCNC: 3.6 G/DL — LOW (ref 6–8.3)
PROT UR-MCNC: 10 — SIGNIFICANT CHANGE UP
PROTHROM AB SERPL-ACNC: 12 SEC — SIGNIFICANT CHANGE UP (ref 9.8–13.1)
RBC # BLD: 2.65 M/UL — LOW (ref 4.2–5.8)
RBC # BLD: 2.72 M/UL — LOW (ref 4.2–5.8)
RBC # BLD: 2.86 M/UL — LOW (ref 4.2–5.8)
RBC # FLD: 13.8 % — SIGNIFICANT CHANGE UP (ref 10.3–14.5)
RBC # FLD: 14.2 % — SIGNIFICANT CHANGE UP (ref 10.3–14.5)
RBC # FLD: 14.6 % — HIGH (ref 10.3–14.5)
RBC CASTS # UR COMP ASSIST: SIGNIFICANT CHANGE UP (ref 0–?)
SAO2 % BLDA: 98.7 % — SIGNIFICANT CHANGE UP (ref 95–99)
SODIUM BLDA-SCNC: 132 MMOL/L — LOW (ref 136–146)
SODIUM SERPL-SCNC: 134 MMOL/L — LOW (ref 135–145)
SP GR SPEC: 1.02 — SIGNIFICANT CHANGE UP (ref 1–1.03)
SQUAMOUS # UR AUTO: SIGNIFICANT CHANGE UP
UROBILINOGEN FLD QL: NORMAL E.U. — SIGNIFICANT CHANGE UP (ref 0.1–0.2)
WBC # BLD: 14.52 K/UL — HIGH (ref 3.8–10.5)
WBC # BLD: 14.72 K/UL — HIGH (ref 3.8–10.5)
WBC # BLD: 15.34 K/UL — HIGH (ref 3.8–10.5)
WBC # FLD AUTO: 14.52 K/UL — HIGH (ref 3.8–10.5)
WBC # FLD AUTO: 14.72 K/UL — HIGH (ref 3.8–10.5)
WBC # FLD AUTO: 15.34 K/UL — HIGH (ref 3.8–10.5)
WBC CLUMPS #/AREA URNS HPF: PRESENT — HIGH (ref 0–?)
WBC UR QL: SIGNIFICANT CHANGE UP (ref 0–?)

## 2017-08-03 PROCEDURE — 99233 SBSQ HOSP IP/OBS HIGH 50: CPT | Mod: GC

## 2017-08-03 RX ORDER — DOCUSATE SODIUM 100 MG
100 CAPSULE ORAL DAILY
Qty: 0 | Refills: 0 | Status: DISCONTINUED | OUTPATIENT
Start: 2017-08-03 | End: 2017-08-03

## 2017-08-03 RX ORDER — DOCUSATE SODIUM 100 MG
100 CAPSULE ORAL THREE TIMES A DAY
Qty: 0 | Refills: 0 | Status: DISCONTINUED | OUTPATIENT
Start: 2017-08-03 | End: 2017-08-04

## 2017-08-03 RX ORDER — METOPROLOL TARTRATE 50 MG
25 TABLET ORAL DAILY
Qty: 0 | Refills: 0 | Status: COMPLETED | OUTPATIENT
Start: 2017-08-03 | End: 2017-08-03

## 2017-08-03 RX ORDER — HYDROMORPHONE HYDROCHLORIDE 2 MG/ML
0.5 INJECTION INTRAMUSCULAR; INTRAVENOUS; SUBCUTANEOUS EVERY 4 HOURS
Qty: 0 | Refills: 0 | Status: DISCONTINUED | OUTPATIENT
Start: 2017-08-03 | End: 2017-08-05

## 2017-08-03 RX ORDER — OXYCODONE HYDROCHLORIDE 5 MG/1
5 TABLET ORAL EVERY 4 HOURS
Qty: 0 | Refills: 0 | Status: DISCONTINUED | OUTPATIENT
Start: 2017-08-03 | End: 2017-08-03

## 2017-08-03 RX ORDER — OXYCODONE HYDROCHLORIDE 5 MG/1
5 TABLET ORAL EVERY 6 HOURS
Qty: 0 | Refills: 0 | Status: DISCONTINUED | OUTPATIENT
Start: 2017-08-03 | End: 2017-08-05

## 2017-08-03 RX ORDER — OXYCODONE HYDROCHLORIDE 5 MG/1
10 TABLET ORAL EVERY 4 HOURS
Qty: 0 | Refills: 0 | Status: DISCONTINUED | OUTPATIENT
Start: 2017-08-03 | End: 2017-08-03

## 2017-08-03 RX ORDER — PANTOPRAZOLE SODIUM 20 MG/1
40 TABLET, DELAYED RELEASE ORAL
Qty: 0 | Refills: 0 | Status: DISCONTINUED | OUTPATIENT
Start: 2017-08-03 | End: 2017-08-07

## 2017-08-03 RX ORDER — METOPROLOL TARTRATE 50 MG
5 TABLET ORAL EVERY 6 HOURS
Qty: 0 | Refills: 0 | Status: DISCONTINUED | OUTPATIENT
Start: 2017-08-03 | End: 2017-08-03

## 2017-08-03 RX ORDER — METOPROLOL TARTRATE 50 MG
25 TABLET ORAL AT BEDTIME
Qty: 0 | Refills: 0 | Status: DISCONTINUED | OUTPATIENT
Start: 2017-08-03 | End: 2017-08-03

## 2017-08-03 RX ORDER — HYDROMORPHONE HYDROCHLORIDE 2 MG/ML
0.5 INJECTION INTRAMUSCULAR; INTRAVENOUS; SUBCUTANEOUS EVERY 6 HOURS
Qty: 0 | Refills: 0 | Status: DISCONTINUED | OUTPATIENT
Start: 2017-08-03 | End: 2017-08-03

## 2017-08-03 RX ORDER — SENNA PLUS 8.6 MG/1
2 TABLET ORAL AT BEDTIME
Qty: 0 | Refills: 0 | Status: DISCONTINUED | OUTPATIENT
Start: 2017-08-03 | End: 2017-08-04

## 2017-08-03 RX ORDER — DILTIAZEM HCL 120 MG
5 CAPSULE, EXT RELEASE 24 HR ORAL
Qty: 125 | Refills: 0 | Status: DISCONTINUED | OUTPATIENT
Start: 2017-08-03 | End: 2017-08-04

## 2017-08-03 RX ORDER — SODIUM CHLORIDE 9 MG/ML
1000 INJECTION, SOLUTION INTRAVENOUS
Qty: 0 | Refills: 0 | Status: DISCONTINUED | OUTPATIENT
Start: 2017-08-03 | End: 2017-08-03

## 2017-08-03 RX ORDER — HYDROMORPHONE HYDROCHLORIDE 2 MG/ML
0.5 INJECTION INTRAMUSCULAR; INTRAVENOUS; SUBCUTANEOUS
Qty: 0 | Refills: 0 | Status: DISCONTINUED | OUTPATIENT
Start: 2017-08-03 | End: 2017-08-03

## 2017-08-03 RX ORDER — DEXTROSE MONOHYDRATE, SODIUM CHLORIDE, AND POTASSIUM CHLORIDE 50; .745; 4.5 G/1000ML; G/1000ML; G/1000ML
1000 INJECTION, SOLUTION INTRAVENOUS
Qty: 0 | Refills: 0 | Status: DISCONTINUED | OUTPATIENT
Start: 2017-08-03 | End: 2017-08-04

## 2017-08-03 RX ORDER — METOPROLOL TARTRATE 50 MG
5 TABLET ORAL ONCE
Qty: 0 | Refills: 0 | Status: COMPLETED | OUTPATIENT
Start: 2017-08-03 | End: 2017-08-03

## 2017-08-03 RX ADMIN — HEPARIN SODIUM 5000 UNIT(S): 5000 INJECTION INTRAVENOUS; SUBCUTANEOUS at 22:03

## 2017-08-03 RX ADMIN — PANTOPRAZOLE SODIUM 40 MILLIGRAM(S): 20 TABLET, DELAYED RELEASE ORAL at 08:12

## 2017-08-03 RX ADMIN — HEPARIN SODIUM 5000 UNIT(S): 5000 INJECTION INTRAVENOUS; SUBCUTANEOUS at 05:55

## 2017-08-03 RX ADMIN — HYDROMORPHONE HYDROCHLORIDE 0.5 MILLIGRAM(S): 2 INJECTION INTRAMUSCULAR; INTRAVENOUS; SUBCUTANEOUS at 17:14

## 2017-08-03 RX ADMIN — HYDROMORPHONE HYDROCHLORIDE 0.5 MILLIGRAM(S): 2 INJECTION INTRAMUSCULAR; INTRAVENOUS; SUBCUTANEOUS at 12:15

## 2017-08-03 RX ADMIN — DEXTROSE MONOHYDRATE, SODIUM CHLORIDE, AND POTASSIUM CHLORIDE 50 MILLILITER(S): 50; .745; 4.5 INJECTION, SOLUTION INTRAVENOUS at 20:22

## 2017-08-03 RX ADMIN — HYDROMORPHONE HYDROCHLORIDE 1 MILLIGRAM(S): 2 INJECTION INTRAMUSCULAR; INTRAVENOUS; SUBCUTANEOUS at 08:13

## 2017-08-03 RX ADMIN — Medication 5 MILLIGRAM(S): at 20:22

## 2017-08-03 RX ADMIN — HYDROMORPHONE HYDROCHLORIDE 0.5 MILLIGRAM(S): 2 INJECTION INTRAMUSCULAR; INTRAVENOUS; SUBCUTANEOUS at 12:30

## 2017-08-03 RX ADMIN — HYDROMORPHONE HYDROCHLORIDE 0.5 MILLIGRAM(S): 2 INJECTION INTRAMUSCULAR; INTRAVENOUS; SUBCUTANEOUS at 17:00

## 2017-08-03 RX ADMIN — Medication 25 MILLIGRAM(S): at 15:54

## 2017-08-03 RX ADMIN — HEPARIN SODIUM 5000 UNIT(S): 5000 INJECTION INTRAVENOUS; SUBCUTANEOUS at 13:45

## 2017-08-03 RX ADMIN — Medication 5 MILLIGRAM(S): at 15:05

## 2017-08-03 RX ADMIN — HYDROMORPHONE HYDROCHLORIDE 1 MILLIGRAM(S): 2 INJECTION INTRAMUSCULAR; INTRAVENOUS; SUBCUTANEOUS at 08:25

## 2017-08-03 NOTE — PHYSICAL THERAPY INITIAL EVALUATION ADULT - PERTINENT HX OF CURRENT PROBLEM, REHAB EVAL
80 y/o male with medical h/o CAD with cardiac stents on Aspirin, Afib on Pradaxa, HTN, HLD, BPH, GERD and Spinal stenosis. Pt has h/o previous spinal surgery in 2003 & 2006. Pt reports in 5/2017 he started experiencing new-onset Neuropathy to both feet and pain to back, buttocks and thighs. S/P back surgery hardware removal.

## 2017-08-03 NOTE — PROGRESS NOTE ADULT - ASSESSMENT
80 y/o M s/p replacement of spinal cord stimulator, T2 Pelvis Fusion, T11 - T12 Laminectomy, required SICU monitoring for hypotension.    Neuro: Dilaudid 0.5mg q4hr, 1mg for break through pain  Cardio: Off david since 3am. s/p 2 uPRBCs. Will start home lopressor for management of a. fib.  Resp: Saturating well on 2L NC  GI: Advance diet today, protonix for GERD  Renal: D5 + NS @ 125 cc/hr, restart tamsulosin once normotensive & off pressors  Heme: SQH for dvt ppx  Endo: No acute issues  ID: No acute issues  Dispo: Downgrade to surgical floor      Suzan Banks PGY-1 78 y/o M s/p replacement of spinal cord stimulator, T2 Pelvis Fusion, T11 - T12 Laminectomy, required SICU monitoring for hypotension.    Neuro: Dilaudid 0.5mg q4hr, 1mg for break through pain  Cardio: Off david since 3am. s/p 2 uPRBCs. Will start home Toprol for management of a. fib.  Resp: Saturating well on 2L NC  GI: Advance diet today, protonix for GERD  Renal: D5 + NS @ 125 cc/hr, restart tamsulosin once normotensive & off pressors  Heme: SQH for dvt ppx  Endo: No acute issues  ID: No acute issues  Dispo: Downgrade to surgical floor      Suzan Banks PGY-1

## 2017-08-03 NOTE — PROGRESS NOTE ADULT - ATTENDING COMMENTS
August 3rd, 2017  6 PM    Hospital Day #2  Post op Day #2  SICU Day #1    This patient was seen and evaluated on daily SICU rounds with the multidisciplinary critical care team. Adjacent SICU resident note and orthopedic resident input reviewed. Care briefly discussed with the patient's surgeon this morning.    BP		=	93 - 136/42 - 93  P		=	70 - 149		O2 Sat	=	95% - 100%  R		=	12 - 23  Temp		=	36.0 - 36.9    Glucose	=	-    Weight		=	72.6  BMI		=	26.6    Lethargic. Responsive and interactive when stimulated. In no distress.  Complains of having back pain.  Pale. Anicteric. Barely opens his eyes.  No thrush.  No JVD.  Lungs clear bilaterally with poor inspiratory effort. Non-labored respirations. Symmetrical chest wall movements.  COR - IRRR. No murmur.  Abdomen soft and neither distended nor tender.  Extremities cool. Upper extremity edema    WBC	=	15	Na		=	134	pH		=	7.41  Neutro	=	72%	K		=	4.1	pCO2		=	34  Hgb	=	8	Cl		=	105	pO2		=	93  Hct	=	24%	HCO3		=	20	BE		=	-2.9  Plts	=	77	Glucose	=	170  			BUN		=	26	Lactate		=	1.8  PT	=	12.0	Creat		=	0.7  PTT	=	27.7  INR	=	1.07	Albumin	=	1.9    			8/3  			2:40  			AM  Bilirubin		=	0.7  Alk Phos	=	46  SGOT		=	61  SGPT		=	34    Remains on:    1)	Regular diet + Ensure enlive  2)	Oxycodone 5 mg po q6 hours  3)	Protonix 40 po qhs  4)	Flomax 0.4 mg po qhs  5)	Lipitor 10 mg po qhs  6)	Colace 100 mg po q24 hours    Assessment:	This patient is assessed as being a hypertensive, 82-year-old male who has chronic atrial fibrillation, hypertension, hyperlipidemia, coronary artery disease (s/p stent placement) without angina, gastro-esophageal reflux, prostatic hypertrophy, melanoma, osteoarthritis, and spinal stenosis who has previously had multiple spinal operations who has had back and buttock pain that radiated to his thighs along with neuropathy in both feet who is now 2 de la cruz S/P exploration of the prior spinal fusion, removal of hardware, removal of spinal stimulator, T11-L1 laminectomy, T4 – pelvic spinal fusion with osteotomies. He had a large blood loss and a prolonged general anesthesia. He has had post-operative hypotension for which he is being given parenteral fluid and vasopressors (via a peripheral IV) and is now off all vasopressors. He has acute blood loss anemia and thrombocytopenia with a low serum bicarbonate and lactic academia. He has been tachycardic.    Plan to:    1)	Give an oral diet. Will plan to continue parenteral fluid as he has not been taking  	much po.  2)	As he is now off vasopressors will resume lopressor. Will give parenterally for now.  3)	Re-check Hgb / Hct and transfuse if needed.  4)	Re-check lactic acid.  5)	Clarify colace and other medication orders.  6)	Consult physical therapy.  7)	Continue SICU monitoring today. Probably can be transferred to the orthopedical  	floor soon.  8)	Full support in place    Ben Buchanan  30 minutes exclusive of procedures August 3rd, 2017  6 PM    Hospital Day #2  Post op Day #2  SICU Day #1    This patient was seen and evaluated on daily SICU rounds with the multidisciplinary critical care team. Adjacent SICU resident note and orthopedic resident input reviewed. Care briefly discussed with the patient's surgeon this morning.    BP		=	93 - 136/42 - 93  P		=	70 - 149		O2 Sat	=	95% - 100%  R		=	12 - 23		I/O	=	3,710 in/1,150  Temp		=	36.0 - 36.9			L-Hemovac = 240  							R-Hemovac = 145  Glucose	=	-    Weight		=	72.6  BMI		=	26.6    Lethargic. Responsive and interactive when stimulated. In no distress.  Complains of having back pain.  Pale. Anicteric. Barely opens his eyes.  No thrush.  No JVD.  Lungs clear bilaterally with poor inspiratory effort. Non-labored respirations. Symmetrical chest wall movements.  COR - IRRR. No murmur.  Abdomen soft and neither distended nor tender.  Extremities cool. Upper extremity edema    WBC	=	15	Na		=	134	pH		=	7.41  Neutro	=	72%	K		=	4.1	pCO2		=	34  Hgb	=	8	Cl		=	105	pO2		=	93  Hct	=	24%	HCO3		=	20	BE		=	-2.9  Plts	=	77	Glucose	=	170  			BUN		=	26	Lactate		=	1.8  PT	=	12.0	Creat		=	0.7  PTT	=	27.7  INR	=	1.07	Albumin	=	1.9    			8/3  			2:40  			AM  Bilirubin		=	0.7  Alk Phos	=	46  SGOT		=	61  SGPT		=	34    Remains on:    1)	Regular diet + Ensure enlive  2)	Oxycodone 5 mg po q6 hours  3)	Protonix 40 po qhs  4)	Flomax 0.4 mg po qhs  5)	Lipitor 10 mg po qhs  6)	Colace 100 mg po q24 hours    Assessment:	This patient is assessed as being a hypertensive, 82-year-old male who has chronic atrial fibrillation, hypertension, hyperlipidemia, coronary artery disease (s/p stent placement) without angina, gastro-esophageal reflux, prostatic hypertrophy, melanoma, osteoarthritis, and spinal stenosis who has previously had multiple spinal operations who has had back and buttock pain that radiated to his thighs along with neuropathy in both feet who is now 2 de la cruz S/P exploration of the prior spinal fusion, removal of hardware, removal of spinal stimulator, T11-L1 laminectomy, T4 – pelvic spinal fusion with osteotomies. He had a large blood loss and a prolonged general anesthesia. He has had post-operative hypotension for which he is being given parenteral fluid and vasopressors (via a peripheral IV) and is now off all vasopressors. He has acute blood loss anemia and thrombocytopenia with a low serum bicarbonate and lactic academia. He has been tachycardic.    Plan to:    1)	Give an oral diet. Will plan to continue parenteral fluid as he has not been taking  	much po.  2)	As he is now off vasopressors will resume lopressor. Will give parenterally for now.  3)	Re-check Hgb / Hct and transfuse if needed.  4)	Re-check lactic acid.  5)	Clarify colace and other medication orders.  6)	Consult physical therapy.  7)	Continue SICU monitoring today. Probably can be transferred to the orthopedical  	floor soon.  8)	Full support in place    Ben Buchanan  30 minutes exclusive of procedures

## 2017-08-03 NOTE — PROGRESS NOTE ADULT - SUBJECTIVE AND OBJECTIVE BOX
Patient seen and examined. No acute events overnight. Pain well controlled. denies numbness/tingling/paresthesias/weakness. Denies headaches. Denies fevers/chills. No other complaints at this time. extubated and off pressors.    HEALTH ISSUES - PROBLEM Dx:  Peripheral neuropathy: Peripheral neuropathy  HLD (hyperlipidemia): HLD (hyperlipidemia)  HTN (hypertension): HTN (hypertension)  Atrial fibrillation: Atrial fibrillation  Stented coronary artery: Stented coronary artery  CAD (coronary artery disease): CAD (coronary artery disease)  Spinal stenosis of lumbosacral region: Spinal stenosis of lumbosacral region          MEDICATIONS  (STANDING):  phenylephrine    Infusion 1 MICROgram(s)/kG/Min IV Continuous <Continuous>  heparin  Injectable 5000 Unit(s) SubCutaneous every 8 hours  pantoprazole  Injectable 40 milliGRAM(s) IV Push <User Schedule>  tamsulosin 0.4 milliGRAM(s) Oral at bedtime  atorvastatin 10 milliGRAM(s) Oral at bedtime  dextrose 5% + sodium chloride 0.9%. 1000 milliLiter(s) IV Continuous <Continuous>      Allergies    No Known Allergies    Intolerances        PAST MEDICAL & SURGICAL HISTORY:  Spinal stenosis of lumbosacral region  Rotator cuff disorder, right  Arthritis  Inguinal hernia: right  Reflux  CAD (coronary artery disease)  HTN (hypertension)  HLD (hyperlipidemia)  Atrial fibrillation  Peripheral neuropathy  H/O rotator cuff surgery: 2015  History of skin surgery: melanoma excision - left cheek/face 2016  Encounter for interrogation of neurostimulator: 2015  Back injury: s/p surgery L1-L9 in two separate surgeries  &amp;   S/P angioplasty with stent: RCA  &amp; LAD  cardiac stent placement  S/P knee replacement, bilateral  S/P cataract extraction                            8.8    14.72 )-----------( 66       ( 03 Aug 2017 02:36 )             25.3       03 Aug 2017 02:36    134    |  105    |  26     ----------------------------<  170    4.1     |  20     |  0.67     Ca    7.1        03 Aug 2017 02:36  Phos  2.8       02 Aug 2017 21:15  Mg     1.3       02 Aug 2017 21:15    TPro  3.6    /  Alb  1.9    /  TBili  0.7    /  DBili  x      /  AST  61     /  ALT  34     /  AlkPhos  46     03 Aug 2017 02:36      PT/INR - ( 03 Aug 2017 02:36 )   PT: 12.0 SEC;   INR: 1.07          PTT - ( 03 Aug 2017 02:36 )  PTT:27.7 SEC    Urinalysis Basic - ( 02 Aug 2017 23:27 )    Color: YELLOW / Appearance: CLEAR / S.023 / pH: 5.5  Gluc: NEGATIVE / Ketone: NEGATIVE  / Bili: NEGATIVE / Urobili: NORMAL E.U.   Blood: SMALL / Protein: 10 / Nitrite: NEGATIVE   Leuk Esterase: SMALL / RBC: 10-25 / WBC 10-25   Sq Epi: OCC / Non Sq Epi: x / Bacteria: x        Vital Signs Last 24 Hrs  T(C): 36.6 (17 @ 04:00), Max: 36.6 (17 @ 16:44)  T(F): 97.9 (17 @ 04:00), Max: 97.9 (17 @ 16:44)  HR: 114 (17 @ 06:00) (72 - 120)  BP: 102/70 (17 @ 16:00) (83/66 - 129/79)  BP(mean): 86 (17 @ 12:00) (63 - 100)  RR: 22 (17 @ 06:00) (11 - 23)  SpO2: 97% (17 @ 06:00) (95% - 100%)    Gen: NAD    Spine PE:  Dressing clean dry intact  HV drain serosang out put: 212, 100    Motor:                   C5                C6              C7               C8           T1   R            5/5                5/5            5/5             5/5          5/5  L             5/5               5/5             5/5             5/5          5/5                L2             L3             L4               L5            S1  R         5/5           5/5          5/5             5/5           5/5  L          5/5          5/5           5/5             5/5           5/5    Sensory:            C5         C6         C7      C8       T1        (0=absent, 1=impaired, 2=normal, NT=not testable)  R         2            2           2        2         2  L          2            2           2        2         2               L2          L3         L4      L5       S1         (0=absent, 1=impaired, 2=normal, NT=not testable)  R         2            2            2        2        2  L          2            2           2        2         2      A/P: 82y Male POD2 HANNA, T11-L1 lami, T4-S2 posterior instrumentation   Pain control  Cont HV drain  WBAT/PT/OT/OOB  Brace for comfort  FU Labs  SCDs  care per SICU  dispo planning

## 2017-08-03 NOTE — DIETITIAN INITIAL EVALUATION ADULT. - OTHER INFO
pt seen for critical care LOS.  At this time, pt remains NPO.  S/P surgery on 8/1-removal of hardware from previous surgery.  Pt now extubated.  Plan to advance diet today.  Unable to obtain nutrition hx at this time.

## 2017-08-03 NOTE — PROGRESS NOTE ADULT - SUBJECTIVE AND OBJECTIVE BOX
SICU Daily Progress Note  =====================================================  Interval/Overnight Events: Overnight, patient was weaned off of pressors and blood pressure remained stable at 110s/60-70s. Patient received 2uPRBCs. Pain was controlled overnight, but has had increased pain this am. A. fib uncontrolled at this time.     POD #  2       	SICU Day #    2    HPI:  80 y/o M PMH CAD s/p PCI, Afib on Pradaxa, HTN, HLD, BPH, GERD and Spinal stenosis, admitted for replacement of spinal cord stimulator, T2 Pelvis Fusion, T11 - T12 Laminectomy. Intraop pt lost 2L blood. pt received 2u prbc, 800cc cell saver, 6L LR, but was hypotenisve with map 50s. pt required pressors & remained intubated post op & sicu consulted.     VITALS      EXAM  NEUROLOGY  Exam: NAD, alert, oriented x3, no focal deficits.     RESPIRATORY  Exam: Non-labored breathing, on room air    CARDIOVASCULAR  Exam: Tachycardic -130s, irregularly irregular    GI/NUTRITION  Exam: Abdomen soft, Non-tender, Non-distended  Current Diet:  NPO except meds    VASCULAR  Exam: Extremities warm, pink, well-perfused.    MUSCULOSKELETAL  Exam: All extremities moving spontaneously without limitations.    SKIN  Exam: Good skin turgor, no skin breakdown.     METABOLIC/FLUIDS/ELECTROLYTES  dextrose 5% + sodium chloride 0.45%. 1000 milliLiter(s) IV Continuous <Continuous>      HEMATOLOGIC  [x] VTE Prophylaxis: heparin  Injectable 5000 Unit(s) SubCutaneous every 8 hours      INFECTIOUS DISEASE    Tubes/Lines/Drains    [x] Peripheral IV  [] Central Venous Line     	[] R	[] L	[] IJ	[] Fem	[] SC	Date Placed:   [] Arterial Line		[] R	[] L	[] Fem	[] Rad	[] Ax	Date Placed:   [] PICC		[] Midline		[] Mediport  [x] Urinary Catheter		Date Placed:   [x] Necessity of urinary, arterial, and venous catheters discussed    LABS  --------------------------------------------------------------------------------------  CBC Full  -  ( 03 Aug 2017 02:36 )  WBC Count : 14.72 K/uL  Hemoglobin : 8.8 g/dL  Hematocrit : 25.3 %  Platelet Count - Automated : 66 K/uL  Mean Cell Volume : 88.5 fL  Mean Cell Hemoglobin : 30.8 pg  Mean Cell Hemoglobin Concentration : 34.8 %  Auto Neutrophil # : 10.64 K/uL  Auto Lymphocyte # : 1.49 K/uL  Auto Monocyte # : 2.33 K/uL  Auto Eosinophil # : 0.00 K/uL  Auto Basophil # : 0.01 K/uL  Auto Neutrophil % : 72.3 %  Auto Lymphocyte % : 10.1 %  Auto Monocyte % : 15.8 %  Auto Eosinophil % : 0.0 %  Auto Basophil % : 0.1 %    03    134<L>  |  105  |  26<H>  ----------------------------<  170<H>  4.1   |  20<L>  |  0.67    Ca    7.1<L>      03 Aug 2017 02:36  Phos  2.8     08-02  Mg     1.3     08-02    TPro  3.6<L>  /  Alb  1.9<L>  /  TBili  0.7  /  DBili  x   /  AST  61<H>  /  ALT  34  /  AlkPhos  46  08-03    LIVER FUNCTIONS - ( 03 Aug 2017 02:36 )  Alb: 1.9 g/dL / Pro: 3.6 g/dL / ALK PHOS: 46 u/L / ALT: 34 u/L / AST: 61 u/L / GGT: x           PT/INR - ( 03 Aug 2017 02:36 )   PT: 12.0 SEC;   INR: 1.07          PTT - ( 03 Aug 2017 02:36 )  PTT:27.7 SEC  Urinalysis Basic - ( 02 Aug 2017 23:27 )    Color: YELLOW / Appearance: CLEAR / S.023 / pH: 5.5  Gluc: NEGATIVE / Ketone: NEGATIVE  / Bili: NEGATIVE / Urobili: NORMAL E.U.   Blood: SMALL / Protein: 10 / Nitrite: NEGATIVE   Leuk Esterase: SMALL / RBC: 10-25 / WBC 10-25   Sq Epi: OCC / Non Sq Epi: x / Bacteria: x  --------------------------------------------------------------------------------------

## 2017-08-03 NOTE — PROGRESS NOTE ADULT - SUBJECTIVE AND OBJECTIVE BOX
SICU Daily Progress Note  =====================================================  Interval/Overnight Events: Was transfused one more unit of pRBCs for downtrending H/H.     POD #  2       	SICU Day #    2    HPI:  78 y/o M PMH CAD s/p PCI, Afib on Pradaxa, HTN, HLD, BPH, GERD and Spinal stenosis, admitted for replacement of spinal cord stimulator, T2 Pelvis Fusion, T11 - T12 Laminectomy. Intraop pt lost 2L blood. pt received 2u prbc, 800cc cell saver, 6L LR, but was hypotenisve with map 50s. pt required pressors & remained intubated post op & sicu consulted.     VITALS      EXAM  NEUROLOGY  Exam: NAD, alert, oriented x3, no focal deficits.     RESPIRATORY  Exam: Non-labored breathing, on room air    CARDIOVASCULAR  Exam: Slightly tachycardic    GI/NUTRITION  Exam: Abdomen soft, Non-tender, Non-distended  Current Diet:  NPO except meds    VASCULAR  Exam: Extremities warm, pink, well-perfused.    MUSCULOSKELETAL  Exam: All extremities moving spontaneously without limitations.    SKIN  Exam: Good skin turgor, no skin breakdown.     METABOLIC/FLUIDS/ELECTROLYTES  dextrose 5% + sodium chloride 0.45%. 1000 milliLiter(s) IV Continuous <Continuous>      HEMATOLOGIC  [x] VTE Prophylaxis: heparin  Injectable 5000 Unit(s) SubCutaneous every 8 hours      INFECTIOUS DISEASE    Tubes/Lines/Drains    [x] Peripheral IV  [] Central Venous Line     	[] R	[] L	[] IJ	[] Fem	[] SC	Date Placed:   [] Arterial Line		[] R	[] L	[] Fem	[] Rad	[] Ax	Date Placed:   [] PICC		[] Midline		[] Mediport  [x] Urinary Catheter		Date Placed:   [x] Necessity of urinary, arterial, and venous catheters discussed    LABS  --------------------------------------------------------------------------------------  CBC Full  -  ( 03 Aug 2017 02:36 )  WBC Count : 14.72 K/uL  Hemoglobin : 8.8 g/dL  Hematocrit : 25.3 %  Platelet Count - Automated : 66 K/uL  Mean Cell Volume : 88.5 fL  Mean Cell Hemoglobin : 30.8 pg  Mean Cell Hemoglobin Concentration : 34.8 %  Auto Neutrophil # : 10.64 K/uL  Auto Lymphocyte # : 1.49 K/uL  Auto Monocyte # : 2.33 K/uL  Auto Eosinophil # : 0.00 K/uL  Auto Basophil # : 0.01 K/uL  Auto Neutrophil % : 72.3 %  Auto Lymphocyte % : 10.1 %  Auto Monocyte % : 15.8 %  Auto Eosinophil % : 0.0 %  Auto Basophil % : 0.1 %        134<L>  |  105  |  26<H>  ----------------------------<  170<H>  4.1   |  20<L>  |  0.67    Ca    7.1<L>      03 Aug 2017 02:36  Phos  2.8     08-02  Mg     1.3     08-02    TPro  3.6<L>  /  Alb  1.9<L>  /  TBili  0.7  /  DBili  x   /  AST  61<H>  /  ALT  34  /  AlkPhos  46  08-03    LIVER FUNCTIONS - ( 03 Aug 2017 02:36 )  Alb: 1.9 g/dL / Pro: 3.6 g/dL / ALK PHOS: 46 u/L / ALT: 34 u/L / AST: 61 u/L / GGT: x           PT/INR - ( 03 Aug 2017 02:36 )   PT: 12.0 SEC;   INR: 1.07          PTT - ( 03 Aug 2017 02:36 )  PTT:27.7 SEC  Urinalysis Basic - ( 02 Aug 2017 23:27 )    Color: YELLOW / Appearance: CLEAR / S.023 / pH: 5.5  Gluc: NEGATIVE / Ketone: NEGATIVE  / Bili: NEGATIVE / Urobili: NORMAL E.U.   Blood: SMALL / Protein: 10 / Nitrite: NEGATIVE   Leuk Esterase: SMALL / RBC: 10-25 / WBC 10-25   Sq Epi: OCC / Non Sq Epi: x / Bacteria: x  --------------------------------------------------------------------------------------

## 2017-08-03 NOTE — PROGRESS NOTE ADULT - ASSESSMENT
78 y/o M s/p replacement of spinal cord stimulator, T2 Pelvis Fusion, T11 - T12 Laminectomy, required SICU monitoring for hypotension.    Neuro: Dilaudid prn  Cardio: Off david since 3am. Did not fully respond to one unit of blood; however remains normotensive  Resp: Saturating well on RA  GI: Advance diet today, protonix for GERD  Renal: D5 + NS @ 125 cc/hr, restart tamsulosin once normotensive & off pressors  Heme: on subq heparin for dvt ppx  Endo: No acute issues  ID: No acute issues  Dispo: Downgrade to surgical floor

## 2017-08-04 LAB
BASOPHILS # BLD AUTO: 0.02 K/UL — SIGNIFICANT CHANGE UP (ref 0–0.2)
BASOPHILS NFR BLD AUTO: 0.1 % — SIGNIFICANT CHANGE UP (ref 0–2)
BLD GP AB SCN SERPL QL: NEGATIVE — SIGNIFICANT CHANGE UP
BUN SERPL-MCNC: 23 MG/DL — SIGNIFICANT CHANGE UP (ref 7–23)
CALCIUM SERPL-MCNC: 7.3 MG/DL — LOW (ref 8.4–10.5)
CHLORIDE SERPL-SCNC: 107 MMOL/L — SIGNIFICANT CHANGE UP (ref 98–107)
CO2 SERPL-SCNC: 22 MMOL/L — SIGNIFICANT CHANGE UP (ref 22–31)
CREAT SERPL-MCNC: 0.57 MG/DL — SIGNIFICANT CHANGE UP (ref 0.5–1.3)
EOSINOPHIL # BLD AUTO: 0 K/UL — SIGNIFICANT CHANGE UP (ref 0–0.5)
EOSINOPHIL NFR BLD AUTO: 0 % — SIGNIFICANT CHANGE UP (ref 0–6)
GLUCOSE SERPL-MCNC: 128 MG/DL — HIGH (ref 70–99)
HCT VFR BLD CALC: 21.8 % — LOW (ref 39–50)
HCT VFR BLD CALC: 25.2 % — LOW (ref 39–50)
HGB BLD-MCNC: 7.6 G/DL — LOW (ref 13–17)
HGB BLD-MCNC: 8.8 G/DL — LOW (ref 13–17)
IMM GRANULOCYTES # BLD AUTO: 0.56 # — SIGNIFICANT CHANGE UP
IMM GRANULOCYTES NFR BLD AUTO: 3.8 % — HIGH (ref 0–1.5)
LACTATE SERPL-SCNC: 2.7 MMOL/L — HIGH (ref 0.5–2)
LYMPHOCYTES # BLD AUTO: 1.64 K/UL — SIGNIFICANT CHANGE UP (ref 1–3.3)
LYMPHOCYTES # BLD AUTO: 11.2 % — LOW (ref 13–44)
MCHC RBC-ENTMCNC: 30.7 PG — SIGNIFICANT CHANGE UP (ref 27–34)
MCHC RBC-ENTMCNC: 31.1 PG — SIGNIFICANT CHANGE UP (ref 27–34)
MCHC RBC-ENTMCNC: 34.9 % — SIGNIFICANT CHANGE UP (ref 32–36)
MCHC RBC-ENTMCNC: 34.9 % — SIGNIFICANT CHANGE UP (ref 32–36)
MCV RBC AUTO: 87.8 FL — SIGNIFICANT CHANGE UP (ref 80–100)
MCV RBC AUTO: 89.3 FL — SIGNIFICANT CHANGE UP (ref 80–100)
MONOCYTES # BLD AUTO: 2.13 K/UL — HIGH (ref 0–0.9)
MONOCYTES NFR BLD AUTO: 14.6 % — HIGH (ref 2–14)
NEUTROPHILS # BLD AUTO: 10.25 K/UL — HIGH (ref 1.8–7.4)
NEUTROPHILS NFR BLD AUTO: 70.3 % — SIGNIFICANT CHANGE UP (ref 43–77)
NRBC # FLD: 0.07 — SIGNIFICANT CHANGE UP
NRBC # FLD: 0.18 — SIGNIFICANT CHANGE UP
NRBC FLD-RTO: 1.3 — SIGNIFICANT CHANGE UP
PLATELET # BLD AUTO: 65 K/UL — LOW (ref 150–400)
PLATELET # BLD AUTO: 69 K/UL — LOW (ref 150–400)
PMV BLD: 10.4 FL — SIGNIFICANT CHANGE UP (ref 7–13)
PMV BLD: 11.3 FL — SIGNIFICANT CHANGE UP (ref 7–13)
POTASSIUM SERPL-MCNC: 4.6 MMOL/L — SIGNIFICANT CHANGE UP (ref 3.5–5.3)
POTASSIUM SERPL-SCNC: 4.6 MMOL/L — SIGNIFICANT CHANGE UP (ref 3.5–5.3)
RBC # BLD: 2.44 M/UL — LOW (ref 4.2–5.8)
RBC # BLD: 2.87 M/UL — LOW (ref 4.2–5.8)
RBC # FLD: 13.7 % — SIGNIFICANT CHANGE UP (ref 10.3–14.5)
RBC # FLD: 14 % — SIGNIFICANT CHANGE UP (ref 10.3–14.5)
RH IG SCN BLD-IMP: POSITIVE — SIGNIFICANT CHANGE UP
SODIUM SERPL-SCNC: 139 MMOL/L — SIGNIFICANT CHANGE UP (ref 135–145)
WBC # BLD: 14.13 K/UL — HIGH (ref 3.8–10.5)
WBC # BLD: 14.6 K/UL — HIGH (ref 3.8–10.5)
WBC # FLD AUTO: 14.13 K/UL — HIGH (ref 3.8–10.5)
WBC # FLD AUTO: 14.6 K/UL — HIGH (ref 3.8–10.5)

## 2017-08-04 PROCEDURE — 99233 SBSQ HOSP IP/OBS HIGH 50: CPT | Mod: GC

## 2017-08-04 PROCEDURE — 71010: CPT | Mod: 26

## 2017-08-04 RX ORDER — FUROSEMIDE 40 MG
20 TABLET ORAL ONCE
Qty: 0 | Refills: 0 | Status: COMPLETED | OUTPATIENT
Start: 2017-08-04 | End: 2017-08-04

## 2017-08-04 RX ORDER — ASPIRIN/CALCIUM CARB/MAGNESIUM 324 MG
81 TABLET ORAL DAILY
Qty: 0 | Refills: 0 | Status: DISCONTINUED | OUTPATIENT
Start: 2017-08-04 | End: 2017-08-07

## 2017-08-04 RX ORDER — CHLORHEXIDINE GLUCONATE 213 G/1000ML
1 SOLUTION TOPICAL DAILY
Qty: 0 | Refills: 0 | Status: DISCONTINUED | OUTPATIENT
Start: 2017-08-04 | End: 2017-08-15

## 2017-08-04 RX ADMIN — Medication 5 MG/HR: at 06:19

## 2017-08-04 RX ADMIN — Medication 81 MILLIGRAM(S): at 12:13

## 2017-08-04 RX ADMIN — HEPARIN SODIUM 5000 UNIT(S): 5000 INJECTION INTRAVENOUS; SUBCUTANEOUS at 14:10

## 2017-08-04 RX ADMIN — HEPARIN SODIUM 5000 UNIT(S): 5000 INJECTION INTRAVENOUS; SUBCUTANEOUS at 21:21

## 2017-08-04 RX ADMIN — Medication 20 MILLIGRAM(S): at 20:19

## 2017-08-04 RX ADMIN — ATORVASTATIN CALCIUM 10 MILLIGRAM(S): 80 TABLET, FILM COATED ORAL at 21:21

## 2017-08-04 RX ADMIN — Medication 100 MILLIGRAM(S): at 06:18

## 2017-08-04 RX ADMIN — HEPARIN SODIUM 5000 UNIT(S): 5000 INJECTION INTRAVENOUS; SUBCUTANEOUS at 06:18

## 2017-08-04 RX ADMIN — TAMSULOSIN HYDROCHLORIDE 0.4 MILLIGRAM(S): 0.4 CAPSULE ORAL at 21:21

## 2017-08-04 RX ADMIN — CHLORHEXIDINE GLUCONATE 1 APPLICATION(S): 213 SOLUTION TOPICAL at 12:13

## 2017-08-04 RX ADMIN — PANTOPRAZOLE SODIUM 40 MILLIGRAM(S): 20 TABLET, DELAYED RELEASE ORAL at 06:18

## 2017-08-04 NOTE — PROGRESS NOTE ADULT - SUBJECTIVE AND OBJECTIVE BOX
Patient seen and examined. 1UPRBC given overnight. mental status have improved. Pain well controlled. Patient has not been out of bed yet. denies numbness/tingling/paresthesias/weakness. Denies headaches. Denies fevers/chills. Patient has difficulty following commands.    HEALTH ISSUES - PROBLEM Dx:  Peripheral neuropathy: Peripheral neuropathy  HLD (hyperlipidemia): HLD (hyperlipidemia)  HTN (hypertension): HTN (hypertension)  Atrial fibrillation: Atrial fibrillation  Stented coronary artery: Stented coronary artery  CAD (coronary artery disease): CAD (coronary artery disease)  Spinal stenosis of lumbosacral region: Spinal stenosis of lumbosacral region          MEDICATIONS  (STANDING):  heparin  Injectable 5000 Unit(s) SubCutaneous every 8 hours  tamsulosin 0.4 milliGRAM(s) Oral at bedtime  atorvastatin 10 milliGRAM(s) Oral at bedtime  senna 2 Tablet(s) Oral at bedtime  pantoprazole    Tablet 40 milliGRAM(s) Oral before breakfast  sodium chloride 0.9% with potassium chloride 20 mEq/L 1000 milliLiter(s) IV Continuous <Continuous>  docusate sodium 100 milliGRAM(s) Oral three times a day  diltiazem Infusion 5 mG/Hr IV Continuous <Continuous>  chlorhexidine 4% Liquid 1 Application(s) Topical daily      Allergies    No Known Allergies    Intolerances        PAST MEDICAL & SURGICAL HISTORY:  Spinal stenosis of lumbosacral region  Rotator cuff disorder, right  Arthritis  Inguinal hernia: right  Reflux  CAD (coronary artery disease)  HTN (hypertension)  HLD (hyperlipidemia)  Atrial fibrillation  Peripheral neuropathy  H/O rotator cuff surgery: 2015  History of skin surgery: melanoma excision - left cheek/face 2016  Encounter for interrogation of neurostimulator: 2015  Back injury: s/p surgery L1-L9 in two separate surgeries  &amp;   S/P angioplasty with stent: RCA  &amp; LAD  cardiac stent placement  S/P knee replacement, bilateral  S/P cataract extraction                            7.6    14.60 )-----------( 69       ( 04 Aug 2017 02:40 )             21.8       04 Aug 2017 02:40    139    |  107    |  23     ----------------------------<  128    4.6     |  22     |  0.57     Ca    7.3        04 Aug 2017 02:40  Phos  2.8       02 Aug 2017 21:15  Mg     1.3       02 Aug 2017 21:15    TPro  3.6    /  Alb  1.9    /  TBili  0.7    /  DBili  x      /  AST  61     /  ALT  34     /  AlkPhos  46     03 Aug 2017 02:36      PT/INR - ( 03 Aug 2017 02:36 )   PT: 12.0 SEC;   INR: 1.07          PTT - ( 03 Aug 2017 02:36 )  PTT:27.7 SEC    Urinalysis Basic - ( 02 Aug 2017 23:27 )    Color: YELLOW / Appearance: CLEAR / S.023 / pH: 5.5  Gluc: NEGATIVE / Ketone: NEGATIVE  / Bili: NEGATIVE / Urobili: NORMAL E.U.   Blood: SMALL / Protein: 10 / Nitrite: NEGATIVE   Leuk Esterase: SMALL / RBC: 10-25 / WBC 10-25   Sq Epi: OCC / Non Sq Epi: x / Bacteria: x        Vital Signs Last 24 Hrs  T(C): 36.7 (17 @ 04:00), Max: 36.9 (17 @ 08:00)  T(F): 98.1 (17 @ 04:00), Max: 98.4 (17 @ 08:00)  HR: 95 (17 @ 06:00) (88 - 149)  BP: 110/61 (17 @ 06:00) (104/54 - 139/86)  BP(mean): 71 (17 @ 06:00) (62 - 99)  RR: 25 (17 @ 06:00) (18 - 32)  SpO2: 99% (17 @ 06:00) (95% - 100%)    Gen: NAD    Spine PE:  AOx3  Dressing clean dry intact  HV drain serosang x2    Motor:                   C5                C6              C7               C8           T1   R            5/5                5/5            5/5             5/5          5/5  L             3+/5               3+/5             4/5             4/5          4/5                  L2             L3             L4               L5            S1  R         5/5           5/5          5/5             5/5           5/5  L          5/5          5/5           5/5             5/5           5/5    Sensory:            C5         C6         C7      C8       T1        (0=absent, 1=impaired, 2=normal, NT=not testable)  R         2            2           2        2         2  L          2            2           2        2         2               L2          L3         L4      L5       S1         (0=absent, 1=impaired, 2=normal, NT=not testable)  R         2            2            2        2        2  L          2            2           2        2         2      A/P: 82y Male POD3 HANNA, T11-L1 laminectomy, T4-S2 posterior instrumentation and insitu fusion  Pain control  Cont HV drain x2  WBAT/PT/OT/OOB  Brace for comfort  FU Labs  SCDs  Medical co-management appreciated  If patient is transferred from the SICU the patient should go to a tele bed, will get cardiology consult  restart patient's aspirin today  monitor mental status  please get patient up and out of bed today

## 2017-08-04 NOTE — PROGRESS NOTE ADULT - ATTENDING COMMENTS
Dx:  I48.2 Chronic a-fib   - rate controlled on diltiazem  D64.9 Anemia, unspecified type   - s/p 2 units without appropriate rise in H/H (Hct 21.8->25.2)  - no clinical signs of bleeding on exam  - continue to monitor  Z91.89 At risk for malnutrition   - on diet, encouraging PO intake  I25.10 Coronary artery disease, angina presence unspecified, unspecified vessel or lesion type, unspecified whether native or transplanted heart   - on asa Dx:  I48.2 Chronic a-fib   - rate controlled on diltiazem  D64.9 Anemia, unspecified type   - s/p 1 unit with appropriate rise in H/H (Hct 21.8->25.2)  - no clinical signs of bleeding on exam  - continue to monitor  Z91.89 At risk for malnutrition   - on diet, encouraging PO intake  I25.10 Coronary artery disease, angina presence unspecified, unspecified vessel or lesion type, unspecified whether native or transplanted heart   - on asa

## 2017-08-04 NOTE — PROGRESS NOTE ADULT - SUBJECTIVE AND OBJECTIVE BOX
SICU Daily Progress Note  =====================================================  Interval/Overnight Events: Overnight patient had an episode of atrial fibrillation which was not rate controlled, he was given a diltiazem load and switched to a diltiazem infusion, he remained rate controlled for the duration of the night. Lethargic, follows very limited simple commands.     POD #  3       	SICU Day #    3    HPI:  78 y/o M PMH CAD s/p PCI, Afib on Pradaxa, HTN, HLD, BPH, GERD and Spinal stenosis, admitted for replacement of spinal cord stimulator, T2 Pelvis Fusion, T11 - T12 Laminectomy. Intraop pt lost 2L blood. pt received 2u prbc, 800cc cell saver, 6L LR, but was hypotenisve with map 50s. pt required pressors & remained intubated post op & sicu consulted.     Allergies: No Known Allergies      MEDICATIONS:   --------------------------------------------------------------------------------------  Neurologic Medications  HYDROmorphone  Injectable 0.5 milliGRAM(s) IV Push every 4 hours PRN breakthrough severe pain  oxyCODONE    IR 5 milliGRAM(s) Oral every 6 hours PRN Moderate to Severe Pain    Respiratory Medications    Cardiovascular Medications  tamsulosin 0.4 milliGRAM(s) Oral at bedtime  diltiazem Infusion 5 mG/Hr IV Continuous <Continuous>    Gastrointestinal Medications  senna 2 Tablet(s) Oral at bedtime  pantoprazole    Tablet 40 milliGRAM(s) Oral before breakfast  sodium chloride 0.9% with potassium chloride 20 mEq/L 1000 milliLiter(s) IV Continuous <Continuous>  docusate sodium 100 milliGRAM(s) Oral three times a day    Genitourinary Medications    Hematologic/Oncologic Medications  heparin  Injectable 5000 Unit(s) SubCutaneous every 8 hours    Antimicrobial/Immunologic Medications    Endocrine/Metabolic Medications  atorvastatin 10 milliGRAM(s) Oral at bedtime    Topical/Other Medications  chlorhexidine 4% Liquid 1 Application(s) Topical daily    --------------------------------------------------------------------------------------    VITAL SIGNS, INS/OUTS (last 24 hours):  --------------------------------------------------------------------------------------  ((Insert SICU Vitals/Is+Os here))***  --------------------------------------------------------------------------------------    EXAM  NEUROLOGY  Exam: NAD, alert, oriented x3, no focal deficits.     RESPIRATORY  Exam: Non-labored breathing, on room air    CARDIOVASCULAR  Exam: Variable heart rate 90-110s, irregularly irregular    GI/NUTRITION  Exam: Abdomen soft, Non-tender, Non-distended  Current Diet:  NPO except meds    VASCULAR  Exam: Extremities warm, pink, well-perfused.    MUSCULOSKELETAL  Exam: All extremities moving spontaneously without limitations.    SKIN  Exam: Good skin turgor, no skin breakdown.     METABOLIC/FLUIDS/ELECTROLYTES  sodium chloride 0.9% with potassium chloride 20 mEq/L 1000 milliLiter(s) IV Continuous <Continuous>      HEMATOLOGIC  [x] VTE Prophylaxis: heparin  Injectable 5000 Unit(s) SubCutaneous every 8 hours    Transfusions:	[] PRBC	[] Platelets		[] FFP	[] Cryoprecipitate      Tubes/Lines/Drains    [x] Peripheral IV  [] Central Venous Line     	[] R	[] L	[] IJ	[] Fem	[] SC	Date Placed:   [] Arterial Line		[] R	[] L	[] Fem	[] Rad	[] Ax	Date Placed:   [] PICC		[] Midline		[] Mediport  [x] Urinary Catheter		Date Placed:   [x] Necessity of urinary, arterial, and venous catheters discussed    LABS  --------------------------------------------------------------------------------------  ((Insert SICU Labs here))***  --------------------------------------------------------------------------------------    OTHER LABORATORY:     IMAGING STUDIES:   CXR:     ASSESSMENT:  78 y/o M s/p replacement of spinal cord stimulator, T2 Pelvis Fusion, T11 - T12 Laminectomy, required SICU monitoring for hypotension.    Neuro: Oxycodone for pain control with breakthrough PRN dilaudid   Cardio: Continue diltiazem gtt, attempt to transition to PO rate control regimen   Resp: Continue nasal cannula, wean O2 as able, IS/OOB  GI: Minimal intake of regular diet, encourage PO intake now that patient is more awake, continue protonix   Renal: D5 + NS @ 125 cc/hr, tamsulosin restarted, discontinue IVF when patient tolerating regular diet well, replete electrolytes PRN, monitor intake & output   Heme: VTE ppx with SQH, trend H/H  Endo: No acute issues  ID: No acute issues  Dispo: SICU    --------------------------------------------------------------------------------------    Critical Care Diagnoses:

## 2017-08-04 NOTE — PROGRESS NOTE ADULT - SUBJECTIVE AND OBJECTIVE BOX
SICU Daily Progress Note  =====================================================  Interval/Overnight Events: Overnight, no acute events. AM CBC showed Hct of 21. Patient received 1uPRBCs and Hct selina appropriately. Patient complains of cough that is non-productive. CXR this am showed atelectasis, interstitial pulmonary edema, and small bilateral pleural effusions. Pain was well controlled. A. fib has become rate controlled at this time.      POD #  3       	SICU Day #    3    HPI:  80 y/o M PMH CAD s/p PCI, Afib on Pradaxa, HTN, HLD, BPH, GERD and Spinal stenosis, admitted for replacement of spinal cord stimulator, T2 Pelvis Fusion, T11 - T12 Laminectomy. Intraop pt lost 2L blood. pt received 2u prbc, 800cc cell saver, 6L LR, but was hypotenisve with map 50s. pt required pressors & remained intubated post op & sicu consulted.     VITALS      EXAM  NEUROLOGY  Exam: NAD, alert, oriented x3, no focal deficits.     RESPIRATORY  Exam: tachypneic, labored breathing, use of abdomen to support breaths. Likely 2/2 fluid overload. On room air.  CARDIOVASCULAR  Exam: Regular rate, irregular rhythm, S1, S1, no murmurs     GI/NUTRITION  Exam: Abdomen soft, Non-tender, Non-distended  Current Diet:  NPO except meds    VASCULAR  Exam: Extremities warm, pink, well-perfused.    MUSCULOSKELETAL  Exam: All extremities moving spontaneously without limitations.    SKIN  Exam: Good skin turgor, no skin breakdown.     METABOLIC/FLUIDS/ELECTROLYTES  dextrose 5% + sodium chloride 0.45%. 1000 milliLiter(s) IV Continuous <Continuous>      HEMATOLOGIC  [x] VTE Prophylaxis: heparin  Injectable 5000 Unit(s) SubCutaneous every 8 hours      INFECTIOUS DISEASE    Tubes/Lines/Drains    [x] Peripheral IV  [] Central Venous Line     	[] R	[] L	[] IJ	[] Fem	[] SC	Date Placed:   [] Arterial Line		[] R	[] L	[] Fem	[] Rad	[] Ax	Date Placed:   [] PICC		[] Midline		[] Mediport  [x] Urinary Catheter		Date Placed:   [x] Necessity of urinary, arterial, and venous catheters discussed    LABS  --------------------------------------------------------------------------------------  CBC Full  -  ( 03 Aug 2017 02:36 )  WBC Count : 14.72 K/uL  Hemoglobin : 8.8 g/dL  Hematocrit : 25.3 %  Platelet Count - Automated : 66 K/uL  Mean Cell Volume : 88.5 fL  Mean Cell Hemoglobin : 30.8 pg  Mean Cell Hemoglobin Concentration : 34.8 %  Auto Neutrophil # : 10.64 K/uL  Auto Lymphocyte # : 1.49 K/uL  Auto Monocyte # : 2.33 K/uL  Auto Eosinophil # : 0.00 K/uL  Auto Basophil # : 0.01 K/uL  Auto Neutrophil % : 72.3 %  Auto Lymphocyte % : 10.1 %  Auto Monocyte % : 15.8 %  Auto Eosinophil % : 0.0 %  Auto Basophil % : 0.1 %    03    134<L>  |  105  |  26<H>  ----------------------------<  170<H>  4.1   |  20<L>  |  0.67    Ca    7.1<L>      03 Aug 2017 02:36  Phos  2.8     08-02  Mg     1.3     08-02    TPro  3.6<L>  /  Alb  1.9<L>  /  TBili  0.7  /  DBili  x   /  AST  61<H>  /  ALT  34  /  AlkPhos  46  08-03    LIVER FUNCTIONS - ( 03 Aug 2017 02:36 )  Alb: 1.9 g/dL / Pro: 3.6 g/dL / ALK PHOS: 46 u/L / ALT: 34 u/L / AST: 61 u/L / GGT: x           PT/INR - ( 03 Aug 2017 02:36 )   PT: 12.0 SEC;   INR: 1.07          PTT - ( 03 Aug 2017 02:36 )  PTT:27.7 SEC  Urinalysis Basic - ( 02 Aug 2017 23:27 )    Color: YELLOW / Appearance: CLEAR / S.023 / pH: 5.5  Gluc: NEGATIVE / Ketone: NEGATIVE  / Bili: NEGATIVE / Urobili: NORMAL E.U.   Blood: SMALL / Protein: 10 / Nitrite: NEGATIVE   Leuk Esterase: SMALL / RBC: 10 / WBC 10-25   Sq Epi: OCC / Non Sq Epi: x / Bacteria: x  --------------------------------------------------------------------------------------

## 2017-08-05 LAB
ALBUMIN SERPL ELPH-MCNC: 1.9 G/DL — LOW (ref 3.3–5)
ALP SERPL-CCNC: 67 U/L — SIGNIFICANT CHANGE UP (ref 40–120)
ALT FLD-CCNC: 39 U/L — SIGNIFICANT CHANGE UP (ref 4–41)
AST SERPL-CCNC: 60 U/L — HIGH (ref 4–40)
BASOPHILS # BLD AUTO: 0.01 K/UL — SIGNIFICANT CHANGE UP (ref 0–0.2)
BASOPHILS NFR BLD AUTO: 0.1 % — SIGNIFICANT CHANGE UP (ref 0–2)
BILIRUB SERPL-MCNC: 1.1 MG/DL — SIGNIFICANT CHANGE UP (ref 0.2–1.2)
BUN SERPL-MCNC: 22 MG/DL — SIGNIFICANT CHANGE UP (ref 7–23)
CALCIUM SERPL-MCNC: 7.5 MG/DL — LOW (ref 8.4–10.5)
CHLORIDE SERPL-SCNC: 104 MMOL/L — SIGNIFICANT CHANGE UP (ref 98–107)
CO2 SERPL-SCNC: 23 MMOL/L — SIGNIFICANT CHANGE UP (ref 22–31)
CREAT SERPL-MCNC: 0.61 MG/DL — SIGNIFICANT CHANGE UP (ref 0.5–1.3)
EOSINOPHIL # BLD AUTO: 0.01 K/UL — SIGNIFICANT CHANGE UP (ref 0–0.5)
EOSINOPHIL NFR BLD AUTO: 0.1 % — SIGNIFICANT CHANGE UP (ref 0–6)
GLUCOSE SERPL-MCNC: 115 MG/DL — HIGH (ref 70–99)
HCT VFR BLD CALC: 25 % — LOW (ref 39–50)
HGB BLD-MCNC: 8.9 G/DL — LOW (ref 13–17)
IMM GRANULOCYTES # BLD AUTO: 0.41 # — SIGNIFICANT CHANGE UP
IMM GRANULOCYTES NFR BLD AUTO: 3.2 % — HIGH (ref 0–1.5)
LYMPHOCYTES # BLD AUTO: 1.42 K/UL — SIGNIFICANT CHANGE UP (ref 1–3.3)
LYMPHOCYTES # BLD AUTO: 11 % — LOW (ref 13–44)
MAGNESIUM SERPL-MCNC: 1.5 MG/DL — LOW (ref 1.6–2.6)
MCHC RBC-ENTMCNC: 31.3 PG — SIGNIFICANT CHANGE UP (ref 27–34)
MCHC RBC-ENTMCNC: 35.6 % — SIGNIFICANT CHANGE UP (ref 32–36)
MCV RBC AUTO: 88 FL — SIGNIFICANT CHANGE UP (ref 80–100)
MONOCYTES # BLD AUTO: 1.77 K/UL — HIGH (ref 0–0.9)
MONOCYTES NFR BLD AUTO: 13.7 % — SIGNIFICANT CHANGE UP (ref 2–14)
NEUTROPHILS # BLD AUTO: 9.31 K/UL — HIGH (ref 1.8–7.4)
NEUTROPHILS NFR BLD AUTO: 71.9 % — SIGNIFICANT CHANGE UP (ref 43–77)
NRBC # FLD: 0.22 — SIGNIFICANT CHANGE UP
NRBC FLD-RTO: 1.7 — SIGNIFICANT CHANGE UP
PHOSPHATE SERPL-MCNC: 1.9 MG/DL — LOW (ref 2.5–4.5)
PLATELET # BLD AUTO: 76 K/UL — LOW (ref 150–400)
PMV BLD: 10.9 FL — SIGNIFICANT CHANGE UP (ref 7–13)
POTASSIUM SERPL-MCNC: 3.6 MMOL/L — SIGNIFICANT CHANGE UP (ref 3.5–5.3)
POTASSIUM SERPL-SCNC: 3.6 MMOL/L — SIGNIFICANT CHANGE UP (ref 3.5–5.3)
PROT SERPL-MCNC: 4.4 G/DL — LOW (ref 6–8.3)
RBC # BLD: 2.84 M/UL — LOW (ref 4.2–5.8)
RBC # FLD: 14.1 % — SIGNIFICANT CHANGE UP (ref 10.3–14.5)
SODIUM SERPL-SCNC: 139 MMOL/L — SIGNIFICANT CHANGE UP (ref 135–145)
WBC # BLD: 12.93 K/UL — HIGH (ref 3.8–10.5)
WBC # FLD AUTO: 12.93 K/UL — HIGH (ref 3.8–10.5)

## 2017-08-05 PROCEDURE — 99233 SBSQ HOSP IP/OBS HIGH 50: CPT | Mod: GC

## 2017-08-05 RX ORDER — ACETAMINOPHEN 500 MG
650 TABLET ORAL EVERY 6 HOURS
Qty: 0 | Refills: 0 | Status: DISCONTINUED | OUTPATIENT
Start: 2017-08-05 | End: 2017-08-05

## 2017-08-05 RX ORDER — ACETAMINOPHEN 500 MG
650 TABLET ORAL EVERY 6 HOURS
Qty: 0 | Refills: 0 | Status: DISCONTINUED | OUTPATIENT
Start: 2017-08-05 | End: 2017-08-07

## 2017-08-05 RX ORDER — POTASSIUM PHOSPHATE, MONOBASIC POTASSIUM PHOSPHATE, DIBASIC 236; 224 MG/ML; MG/ML
15 INJECTION, SOLUTION INTRAVENOUS ONCE
Qty: 0 | Refills: 0 | Status: COMPLETED | OUTPATIENT
Start: 2017-08-05 | End: 2017-08-05

## 2017-08-05 RX ORDER — MAGNESIUM SULFATE 500 MG/ML
2 VIAL (ML) INJECTION
Qty: 0 | Refills: 0 | Status: COMPLETED | OUTPATIENT
Start: 2017-08-05 | End: 2017-08-05

## 2017-08-05 RX ORDER — MAGNESIUM SULFATE 500 MG/ML
3 VIAL (ML) INJECTION ONCE
Qty: 0 | Refills: 0 | Status: DISCONTINUED | OUTPATIENT
Start: 2017-08-05 | End: 2017-08-05

## 2017-08-05 RX ADMIN — Medication 50 GRAM(S): at 06:38

## 2017-08-05 RX ADMIN — PANTOPRAZOLE SODIUM 40 MILLIGRAM(S): 20 TABLET, DELAYED RELEASE ORAL at 06:00

## 2017-08-05 RX ADMIN — HEPARIN SODIUM 5000 UNIT(S): 5000 INJECTION INTRAVENOUS; SUBCUTANEOUS at 06:00

## 2017-08-05 RX ADMIN — HEPARIN SODIUM 5000 UNIT(S): 5000 INJECTION INTRAVENOUS; SUBCUTANEOUS at 22:23

## 2017-08-05 RX ADMIN — POTASSIUM PHOSPHATE, MONOBASIC POTASSIUM PHOSPHATE, DIBASIC 62.5 MILLIMOLE(S): 236; 224 INJECTION, SOLUTION INTRAVENOUS at 06:01

## 2017-08-05 RX ADMIN — Medication 81 MILLIGRAM(S): at 13:28

## 2017-08-05 RX ADMIN — HEPARIN SODIUM 5000 UNIT(S): 5000 INJECTION INTRAVENOUS; SUBCUTANEOUS at 13:28

## 2017-08-05 RX ADMIN — TAMSULOSIN HYDROCHLORIDE 0.4 MILLIGRAM(S): 0.4 CAPSULE ORAL at 22:23

## 2017-08-05 RX ADMIN — Medication 50 GRAM(S): at 06:01

## 2017-08-05 RX ADMIN — ATORVASTATIN CALCIUM 10 MILLIGRAM(S): 80 TABLET, FILM COATED ORAL at 22:23

## 2017-08-05 RX ADMIN — CHLORHEXIDINE GLUCONATE 1 APPLICATION(S): 213 SOLUTION TOPICAL at 13:30

## 2017-08-05 NOTE — PROGRESS NOTE ADULT - ATTENDING COMMENTS
Current issues:  R41.0 Delirium   - intermittently altered and agitated, but currently alert and oriented x3.  Likely ICU delirium.  Avoiding benzos, minimizing narctoics.  I48.2 Chronic a-fib   - Rate controlled  D64.9 Anemia, unspecified type   - no signs of bleeding on exam.  continue to monitor  Z91.89 At risk for malnutrition   - on diet  I25.10 Coronary artery disease, angina presence unspecified, unspecified vessel or lesion type, unspecified whether native or transplanted heart  - on asa

## 2017-08-05 NOTE — PROGRESS NOTE ADULT - SUBJECTIVE AND OBJECTIVE BOX
SICU Daily Progress Note  =====================================================  Interval/Overnight Events: Patient diuresed with 20mg IV Lasix in evening for tachypnea and rales with good response and clinical improvement. Overnight patient had an episode of atrial fibrillation which was not rate controlled, he was given 10mg of IV diltiazem. He remains delirious attempting to move out of bed, awake all night without sleeping.     POD #  4       	SICU Day #    4    HPI:  78 y/o M PMH CAD s/p PCI, Afib on Pradaxa, HTN, HLD, BPH, GERD and Spinal stenosis, admitted for replacement of spinal cord stimulator, T2 Pelvis Fusion, T11 - T12 Laminectomy. Intraop pt lost 2L blood. pt received 2u prbc, 800cc cell saver, 6L LR, but was hypotenisve with map 50s. pt required pressors & remained intubated post op & sicu consulted.     Allergies: No Known Allergies      MEDICATIONS:   --------------------------------------------------------------------------------------  Neurologic Medications  HYDROmorphone  Injectable 0.5 milliGRAM(s) IV Push every 4 hours PRN breakthrough severe pain  oxyCODONE    IR 5 milliGRAM(s) Oral every 6 hours PRN Moderate to Severe Pain    Respiratory Medications    Cardiovascular Medications  tamsulosin 0.4 milliGRAM(s) Oral at bedtime  diltiazem    Tablet 60 milliGRAM(s) Oral every 8 hours  diltiazem Injectable 10 milliGRAM(s) IV Push once    Gastrointestinal Medications  pantoprazole    Tablet 40 milliGRAM(s) Oral before breakfast    Genitourinary Medications    Hematologic/Oncologic Medications  heparin  Injectable 5000 Unit(s) SubCutaneous every 8 hours  aspirin enteric coated 81 milliGRAM(s) Oral daily    Antimicrobial/Immunologic Medications    Endocrine/Metabolic Medications  atorvastatin 10 milliGRAM(s) Oral at bedtime    Topical/Other Medications  chlorhexidine 4% Liquid 1 Application(s) Topical daily    --------------------------------------------------------------------------------------    VITAL SIGNS, INS/OUTS (last 24 hours):  --------------------------------------------------------------------------------------  ((Insert SICU Vitals/Is+Os here))***  --------------------------------------------------------------------------------------    EXAM  NEUROLOGY  Exam: NAD, alert, oriented x3, no focal deficits.     RESPIRATORY  Exam: Non-labored breathing, on room air    CARDIOVASCULAR  Exam: Variable heart rate 90-110s, irregularly irregular    GI/NUTRITION  Exam: Abdomen soft, Non-tender, Non-distended  Current Diet:  Regular diet     VASCULAR  Exam: Extremities warm, pink, well-perfused.    MUSCULOSKELETAL  Exam: All extremities moving spontaneously without limitations.    SKIN  Exam: Good skin turgor, no skin breakdown.     METABOLIC/FLUIDS/ELECTROLYTES      HEMATOLOGIC  [x] VTE Prophylaxis: heparin  Injectable 5000 Unit(s) SubCutaneous every 8 hours  aspirin enteric coated 81 milliGRAM(s) Oral daily    Transfusions:	[] PRBC	[] Platelets		[] FFP	[] Cryoprecipitate      Tubes/Lines/Drains   [x] Peripheral IV  [] Central Venous Line     	[] R	[] L	[] IJ	[] Fem	[] SC	Date Placed:   [] Arterial Line		[] R	[] L	[] Fem	[] Rad	[] Ax	Date Placed:   [] PICC		[] Midline		[] Mediport  [x] Urinary Catheter		Date Placed:   [x] Necessity of urinary, arterial, and venous catheters discussed    LABS  --------------------------------------------------------------------------------------  ((Insert SICU Labs here))***  --------------------------------------------------------------------------------------    OTHER LABORATORY:     IMAGING STUDIES:   CXR:     ASSESSMENT:  78 y/o M s/p replacement of spinal cord stimulator, T2 Pelvis Fusion, T11 - T12 Laminectomy, required SICU monitoring for hypotension.    Neuro: Minimize narcotics, normalize sleep/wake cycle, TV off 10pm, re-orient frequently, pain control with tylenol, re-add narcotics if pain uncontrolled   Cardio: Continue PO cardizem, monitor vital signs, continue ASA   Resp: Supplemental oxygen as needed, wean as tolerated   GI/: Regular diet, continue tamsulosin   Renal: Replete electrolytes PRN, monitor intake & output   Heme: VTE ppx with SQH, trend H/H  Endo: No acute issues  ID: No acute issues  Tubes/Lines/Drains: Maintain george until patient able to ambulate, then TOV  Dispo: SICU

## 2017-08-06 LAB
ALBUMIN SERPL ELPH-MCNC: 1.8 G/DL — LOW (ref 3.3–5)
ALP SERPL-CCNC: 91 U/L — SIGNIFICANT CHANGE UP (ref 40–120)
ALT FLD-CCNC: 49 U/L — HIGH (ref 4–41)
AST SERPL-CCNC: 69 U/L — HIGH (ref 4–40)
BILIRUB SERPL-MCNC: 1.2 MG/DL — SIGNIFICANT CHANGE UP (ref 0.2–1.2)
BUN SERPL-MCNC: 26 MG/DL — HIGH (ref 7–23)
CALCIUM SERPL-MCNC: 7 MG/DL — LOW (ref 8.4–10.5)
CHLORIDE SERPL-SCNC: 104 MMOL/L — SIGNIFICANT CHANGE UP (ref 98–107)
CO2 SERPL-SCNC: 22 MMOL/L — SIGNIFICANT CHANGE UP (ref 22–31)
CREAT SERPL-MCNC: 0.66 MG/DL — SIGNIFICANT CHANGE UP (ref 0.5–1.3)
GLUCOSE SERPL-MCNC: 311 MG/DL — HIGH (ref 70–99)
HCT VFR BLD CALC: 23.2 % — LOW (ref 39–50)
HGB BLD-MCNC: 8 G/DL — LOW (ref 13–17)
MAGNESIUM SERPL-MCNC: 1.8 MG/DL — SIGNIFICANT CHANGE UP (ref 1.6–2.6)
MCHC RBC-ENTMCNC: 30.9 PG — SIGNIFICANT CHANGE UP (ref 27–34)
MCHC RBC-ENTMCNC: 34.5 % — SIGNIFICANT CHANGE UP (ref 32–36)
MCV RBC AUTO: 89.6 FL — SIGNIFICANT CHANGE UP (ref 80–100)
NRBC # FLD: 0.13 — SIGNIFICANT CHANGE UP
NRBC FLD-RTO: 1.6 — SIGNIFICANT CHANGE UP
PHOSPHATE SERPL-MCNC: 2 MG/DL — LOW (ref 2.5–4.5)
PLATELET # BLD AUTO: 89 K/UL — LOW (ref 150–400)
PMV BLD: 10.7 FL — SIGNIFICANT CHANGE UP (ref 7–13)
POTASSIUM SERPL-MCNC: 4.4 MMOL/L — SIGNIFICANT CHANGE UP (ref 3.5–5.3)
POTASSIUM SERPL-SCNC: 4.4 MMOL/L — SIGNIFICANT CHANGE UP (ref 3.5–5.3)
PROT SERPL-MCNC: 4.1 G/DL — LOW (ref 6–8.3)
RBC # BLD: 2.59 M/UL — LOW (ref 4.2–5.8)
RBC # FLD: 14.3 % — SIGNIFICANT CHANGE UP (ref 10.3–14.5)
SODIUM SERPL-SCNC: 138 MMOL/L — SIGNIFICANT CHANGE UP (ref 135–145)
WBC # BLD: 8.05 K/UL — SIGNIFICANT CHANGE UP (ref 3.8–10.5)
WBC # FLD AUTO: 8.05 K/UL — SIGNIFICANT CHANGE UP (ref 3.8–10.5)

## 2017-08-06 PROCEDURE — 99233 SBSQ HOSP IP/OBS HIGH 50: CPT | Mod: GC

## 2017-08-06 PROCEDURE — 71010: CPT | Mod: 26

## 2017-08-06 PROCEDURE — 74000: CPT | Mod: 26

## 2017-08-06 PROCEDURE — 70450 CT HEAD/BRAIN W/O DYE: CPT | Mod: 26

## 2017-08-06 RX ORDER — DEXTROSE MONOHYDRATE, SODIUM CHLORIDE, AND POTASSIUM CHLORIDE 50; .745; 4.5 G/1000ML; G/1000ML; G/1000ML
1000 INJECTION, SOLUTION INTRAVENOUS
Qty: 0 | Refills: 0 | Status: DISCONTINUED | OUTPATIENT
Start: 2017-08-06 | End: 2017-08-07

## 2017-08-06 RX ORDER — MAGNESIUM SULFATE 500 MG/ML
2 VIAL (ML) INJECTION ONCE
Qty: 0 | Refills: 0 | Status: COMPLETED | OUTPATIENT
Start: 2017-08-06 | End: 2017-08-06

## 2017-08-06 RX ADMIN — Medication 650 MILLIGRAM(S): at 09:16

## 2017-08-06 RX ADMIN — Medication 81 MILLIGRAM(S): at 11:39

## 2017-08-06 RX ADMIN — ATORVASTATIN CALCIUM 10 MILLIGRAM(S): 80 TABLET, FILM COATED ORAL at 22:36

## 2017-08-06 RX ADMIN — CHLORHEXIDINE GLUCONATE 1 APPLICATION(S): 213 SOLUTION TOPICAL at 15:37

## 2017-08-06 RX ADMIN — Medication 650 MILLIGRAM(S): at 20:08

## 2017-08-06 RX ADMIN — Medication 50 GRAM(S): at 05:59

## 2017-08-06 RX ADMIN — DEXTROSE MONOHYDRATE, SODIUM CHLORIDE, AND POTASSIUM CHLORIDE 75 MILLILITER(S): 50; .745; 4.5 INJECTION, SOLUTION INTRAVENOUS at 20:07

## 2017-08-06 RX ADMIN — TAMSULOSIN HYDROCHLORIDE 0.4 MILLIGRAM(S): 0.4 CAPSULE ORAL at 22:37

## 2017-08-06 RX ADMIN — DEXTROSE MONOHYDRATE, SODIUM CHLORIDE, AND POTASSIUM CHLORIDE 75 MILLILITER(S): 50; .745; 4.5 INJECTION, SOLUTION INTRAVENOUS at 05:16

## 2017-08-06 RX ADMIN — HEPARIN SODIUM 5000 UNIT(S): 5000 INJECTION INTRAVENOUS; SUBCUTANEOUS at 05:15

## 2017-08-06 RX ADMIN — Medication 650 MILLIGRAM(S): at 21:00

## 2017-08-06 RX ADMIN — PANTOPRAZOLE SODIUM 40 MILLIGRAM(S): 20 TABLET, DELAYED RELEASE ORAL at 06:00

## 2017-08-06 RX ADMIN — Medication 650 MILLIGRAM(S): at 08:44

## 2017-08-06 RX ADMIN — HEPARIN SODIUM 5000 UNIT(S): 5000 INJECTION INTRAVENOUS; SUBCUTANEOUS at 15:37

## 2017-08-06 RX ADMIN — DEXTROSE MONOHYDRATE, SODIUM CHLORIDE, AND POTASSIUM CHLORIDE 75 MILLILITER(S): 50; .745; 4.5 INJECTION, SOLUTION INTRAVENOUS at 08:44

## 2017-08-06 RX ADMIN — HEPARIN SODIUM 5000 UNIT(S): 5000 INJECTION INTRAVENOUS; SUBCUTANEOUS at 22:36

## 2017-08-06 RX ADMIN — Medication 63.75 MILLIMOLE(S): at 05:59

## 2017-08-06 NOTE — PROGRESS NOTE ADULT - ATTENDING COMMENTS
Active issues:	  R41.0 Delirium  - likely ICU delirium in elderly patient with poor reserve.  but given his focal LUE weakness, will check CT  -avoiding benzos or narcotics  I48.2 Chronic a-fib  - rate controlled  - will d/w ortho regarding restarting A/C.  Will check Head CT as described above  D64.9 Anemia, unspecified type  - no signs of bleeding presently  Z91.89 At risk for malnutrition  - nurse reports patient coughs whenever being fed.  Will have formal S&S eval.  place nichelle for feeds  I25.10 Coronary artery disease, angina presence unspecified, unspecified vessel or lesion type, unspecified whether native or transplanted heart  - will d/w ortho regarding asa and a/c

## 2017-08-06 NOTE — PROGRESS NOTE ADULT - SUBJECTIVE AND OBJECTIVE BOX
SICU Daily Progress Note  =====================================================    Interval/Overnight Events: Patient without acute events overnight, remained calm and slept a good portion of the night. Urine output trended down, maintenance fluids were started.     POD #  4       	SICU Day #    4    HPI:  78 y/o M PMH CAD s/p PCI, Afib on Pradaxa, HTN, HLD, BPH, GERD and Spinal stenosis, admitted for replacement of spinal cord stimulator, T2 Pelvis Fusion, T11 - T12 Laminectomy. Intraop pt lost 2L blood. pt received 2u prbc, 800cc cell saver, 6L LR, but was hypotenisve with map 50s. pt required pressors & remained intubated post op & sicu consulted.     Allergies: No Known Allergies      MEDICATIONS:   --------------------------------------------------------------------------------------  Neurologic Medications  acetaminophen   Tablet. 650 milliGRAM(s) Oral every 6 hours PRN Moderate Pain (4 - 6)    Respiratory Medications    Cardiovascular Medications  tamsulosin 0.4 milliGRAM(s) Oral at bedtime  diltiazem    Tablet 60 milliGRAM(s) Oral every 8 hours    Gastrointestinal Medications  pantoprazole    Tablet 40 milliGRAM(s) Oral before breakfast  dextrose 5% + sodium chloride 0.45% with potassium chloride 20 mEq/L 1000 milliLiter(s) IV Continuous <Continuous>    Genitourinary Medications    Hematologic/Oncologic Medications  heparin  Injectable 5000 Unit(s) SubCutaneous every 8 hours  aspirin enteric coated 81 milliGRAM(s) Oral daily    Antimicrobial/Immunologic Medications    Endocrine/Metabolic Medications  atorvastatin 10 milliGRAM(s) Oral at bedtime    Topical/Other Medications  chlorhexidine 4% Liquid 1 Application(s) Topical daily    --------------------------------------------------------------------------------------    VITAL SIGNS, INS/OUTS (last 24 hours):  --------------------------------------------------------------------------------------  ((Insert SICU Vitals/Is+Os here))***  --------------------------------------------------------------------------------------    EXAM  NEUROLOGY  Exam: NAD, alert, oriented x3, no focal deficits.     RESPIRATORY  Exam: Non-labored breathing, on room air    CARDIOVASCULAR  Exam: Variable heart rate 90-110s, irregularly irregular    GI/NUTRITION  Exam: Abdomen soft, Non-tender, Non-distended  Current Diet:  Regular diet     VASCULAR  Exam: Extremities warm, pink, well-perfused.    MUSCULOSKELETAL  Exam: All extremities moving spontaneously without limitations.    SKIN  Exam: Good skin turgor, no skin breakdown.     METABOLIC/FLUIDS/ELECTROLYTES  dextrose 5% + sodium chloride 0.45% with potassium chloride 20 mEq/L 1000 milliLiter(s) IV Continuous <Continuous>      HEMATOLOGIC  [x] VTE Prophylaxis: heparin  Injectable 5000 Unit(s) SubCutaneous every 8 hours  aspirin enteric coated 81 milliGRAM(s) Oral daily    Transfusions:	[] PRBC	[] Platelets		[] FFP	[] Cryoprecipitate      Tubes/Lines/Drains    [x] Peripheral IV  [] Central Venous Line     	[] R	[] L	[] IJ	[] Fem	[] SC	Date Placed:   [] Arterial Line		[] R	[] L	[] Fem	[] Rad	[] Ax	Date Placed:   [] PICC		[] Midline		[] [facility]  [x] Urinary Catheter		Date Placed:   [x] Necessity of urinary, arterial, and venous catheters discussed    LABS  --------------------------------------------------------------------------------------  ((Insert SICU Labs here))***  --------------------------------------------------------------------------------------    OTHER LABORATORY:     IMAGING STUDIES:   CXR:     ASSESSMENT:  81 y/o M s/p replacement of spinal cord stimulator, T2 Pelvis Fusion, T11 - T12 Laminectomy, required SICU monitoring for hypotension.    Neuro: Minimize narcotics, normalize sleep/wake cycle, TV off 10pm, re-orient frequently, pain control with tylenol, re-add narcotics if pain uncontrolled   Cardio: Continue PO cardizem, monitor vital signs, continue ASA   Resp: Supplemental oxygen as needed, wean as tolerated   GI/: Regular diet, continue tamsulosin   Renal: Replete electrolytes PRN, monitor intake & output   Heme: VTE ppx with SQH, trend H/H  Endo: No acute issues  ID: No acute issues  Tubes/Lines/Drains: Maintain george until patient able to ambulate, then TOV  Dispo: SICU    --------------------------------------------------------------------------------------    Critical Care Diagnoses:

## 2017-08-06 NOTE — PROGRESS NOTE ADULT - SUBJECTIVE AND OBJECTIVE BOX
Patient seen and examined. Confused this am, most likely delirium. Denies any pain.     Vital Signs Last 24 Hrs  T(C): 36.8 (06 Aug 2017 04:00), Max: 36.8 (06 Aug 2017 04:00)  T(F): 98.3 (06 Aug 2017 04:00), Max: 98.3 (06 Aug 2017 04:00)  HR: 86 (06 Aug 2017 04:00) (86 - 96)  BP: 110/63 (06 Aug 2017 04:00) (94/68 - 110/63)  BP(mean): 75 (06 Aug 2017 04:00) (63 - 75)  RR: 25 (06 Aug 2017 04:00) (22 - 30)  SpO2: 97% (06 Aug 2017 04:00) (93% - 98%)                          8.0    8.05  )-----------( 89       ( 06 Aug 2017 04:14 )             23.2   08-06    138  |  104  |  26<H>  ----------------------------<  311<H>  4.4   |  22  |  0.66    Ca    7.0<L>      06 Aug 2017 04:14  Phos  2.0     08-06  Mg     1.8     08-06    TPro  4.1<L>  /  Alb  1.8<L>  /  TBili  1.2  /  DBili  x   /  AST  69<H>  /  ALT  49<H>  /  AlkPhos  91  08-06    Gen: NAD    Spine PE:  AOx3  Dressing clean dry intact  HV drain serosang x2  Limited exam due to mental status and not consistently following commands  However grossly appears intact bilat LE in hip flexion, knee flex/ext, ankle flex/ext, and toe flex/ext  WWP    82M POD#5 HNANA, T11-L1 lami, T4-pelvis instrumentation and in situ fusion  - WBAT  - Pain control  - PT  - OOB  - FU labs  - Delirium management  - Should patient be listed for floor, should be transferred to [facility] bed  - Care per SICU appreciated

## 2017-08-07 LAB
BUN SERPL-MCNC: 27 MG/DL — HIGH (ref 7–23)
CALCIUM SERPL-MCNC: 7.2 MG/DL — LOW (ref 8.4–10.5)
CHLORIDE SERPL-SCNC: 107 MMOL/L — SIGNIFICANT CHANGE UP (ref 98–107)
CO2 SERPL-SCNC: 22 MMOL/L — SIGNIFICANT CHANGE UP (ref 22–31)
CREAT SERPL-MCNC: 0.64 MG/DL — SIGNIFICANT CHANGE UP (ref 0.5–1.3)
CULTURE - SURGICAL SITE: SIGNIFICANT CHANGE UP
GLUCOSE SERPL-MCNC: 127 MG/DL — HIGH (ref 70–99)
HCT VFR BLD CALC: 24.5 % — LOW (ref 39–50)
HGB BLD-MCNC: 8.1 G/DL — LOW (ref 13–17)
MAGNESIUM SERPL-MCNC: 2 MG/DL — SIGNIFICANT CHANGE UP (ref 1.6–2.6)
MCHC RBC-ENTMCNC: 31 PG — SIGNIFICANT CHANGE UP (ref 27–34)
MCHC RBC-ENTMCNC: 33.1 % — SIGNIFICANT CHANGE UP (ref 32–36)
MCV RBC AUTO: 93.9 FL — SIGNIFICANT CHANGE UP (ref 80–100)
NRBC # FLD: 0.1 — SIGNIFICANT CHANGE UP
NRBC FLD-RTO: 1.2 — SIGNIFICANT CHANGE UP
PHOSPHATE SERPL-MCNC: 2.2 MG/DL — LOW (ref 2.5–4.5)
PLATELET # BLD AUTO: 130 K/UL — LOW (ref 150–400)
PMV BLD: 10.7 FL — SIGNIFICANT CHANGE UP (ref 7–13)
POTASSIUM SERPL-MCNC: 4.4 MMOL/L — SIGNIFICANT CHANGE UP (ref 3.5–5.3)
POTASSIUM SERPL-SCNC: 4.4 MMOL/L — SIGNIFICANT CHANGE UP (ref 3.5–5.3)
RBC # BLD: 2.61 M/UL — LOW (ref 4.2–5.8)
RBC # FLD: 14.8 % — HIGH (ref 10.3–14.5)
SODIUM SERPL-SCNC: 140 MMOL/L — SIGNIFICANT CHANGE UP (ref 135–145)
SURGICAL PATHOLOGY STUDY: SIGNIFICANT CHANGE UP
WBC # BLD: 8.36 K/UL — SIGNIFICANT CHANGE UP (ref 3.8–10.5)
WBC # FLD AUTO: 8.36 K/UL — SIGNIFICANT CHANGE UP (ref 3.8–10.5)

## 2017-08-07 PROCEDURE — 99233 SBSQ HOSP IP/OBS HIGH 50: CPT

## 2017-08-07 RX ORDER — ACETAMINOPHEN 500 MG
650 TABLET ORAL EVERY 6 HOURS
Qty: 0 | Refills: 0 | Status: DISCONTINUED | OUTPATIENT
Start: 2017-08-07 | End: 2017-08-08

## 2017-08-07 RX ORDER — HEPARIN SODIUM 5000 [USP'U]/ML
5000 INJECTION INTRAVENOUS; SUBCUTANEOUS EVERY 8 HOURS
Qty: 0 | Refills: 0 | Status: DISCONTINUED | OUTPATIENT
Start: 2017-08-07 | End: 2017-08-10

## 2017-08-07 RX ORDER — DEXTROSE MONOHYDRATE, SODIUM CHLORIDE, AND POTASSIUM CHLORIDE 50; .745; 4.5 G/1000ML; G/1000ML; G/1000ML
1000 INJECTION, SOLUTION INTRAVENOUS
Qty: 0 | Refills: 0 | Status: DISCONTINUED | OUTPATIENT
Start: 2017-08-07 | End: 2017-08-07

## 2017-08-07 RX ORDER — PANTOPRAZOLE SODIUM 20 MG/1
40 TABLET, DELAYED RELEASE ORAL ONCE
Qty: 0 | Refills: 0 | Status: COMPLETED | OUTPATIENT
Start: 2017-08-07 | End: 2017-08-07

## 2017-08-07 RX ORDER — FUROSEMIDE 40 MG
40 TABLET ORAL ONCE
Qty: 0 | Refills: 0 | Status: COMPLETED | OUTPATIENT
Start: 2017-08-07 | End: 2017-08-07

## 2017-08-07 RX ORDER — DABIGATRAN ETEXILATE MESYLATE 150 MG/1
150 CAPSULE ORAL EVERY 12 HOURS
Qty: 0 | Refills: 0 | Status: DISCONTINUED | OUTPATIENT
Start: 2017-08-07 | End: 2017-08-07

## 2017-08-07 RX ORDER — ASPIRIN/CALCIUM CARB/MAGNESIUM 324 MG
81 TABLET ORAL DAILY
Qty: 0 | Refills: 0 | Status: DISCONTINUED | OUTPATIENT
Start: 2017-08-07 | End: 2017-08-15

## 2017-08-07 RX ADMIN — Medication 650 MILLIGRAM(S): at 16:33

## 2017-08-07 RX ADMIN — HEPARIN SODIUM 5000 UNIT(S): 5000 INJECTION INTRAVENOUS; SUBCUTANEOUS at 06:50

## 2017-08-07 RX ADMIN — Medication 81 MILLIGRAM(S): at 11:57

## 2017-08-07 RX ADMIN — Medication 650 MILLIGRAM(S): at 16:03

## 2017-08-07 RX ADMIN — HEPARIN SODIUM 5000 UNIT(S): 5000 INJECTION INTRAVENOUS; SUBCUTANEOUS at 23:30

## 2017-08-07 RX ADMIN — PANTOPRAZOLE SODIUM 40 MILLIGRAM(S): 20 TABLET, DELAYED RELEASE ORAL at 11:57

## 2017-08-07 RX ADMIN — Medication 40 MILLIGRAM(S): at 13:31

## 2017-08-07 NOTE — PROGRESS NOTE ADULT - SUBJECTIVE AND OBJECTIVE BOX
SICU Daily Progress Note  =====================================================  Interval/Overnight Events:     ***    POD #          	SICU Day #    ***    HPI:  ((insert from previous note)) ***    PMH:   ***    Allergies: No Known Allergies      MEDICATIONS:   --------------------------------------------------------------------------------------  Neurologic Medications  acetaminophen    Suspension. 650 milliGRAM(s) Oral every 6 hours PRN Pain    Respiratory Medications    Cardiovascular Medications  diltiazem    Tablet 60 milliGRAM(s) Enteral Tube every 8 hours    Gastrointestinal Medications  pantoprazole  Injectable 40 milliGRAM(s) IV Push once    Genitourinary Medications    Hematologic/Oncologic Medications  heparin  Injectable 5000 Unit(s) SubCutaneous every 8 hours  aspirin enteric coated 81 milliGRAM(s) Oral daily    Antimicrobial/Immunologic Medications    Endocrine/Metabolic Medications    Topical/Other Medications  chlorhexidine 4% Liquid 1 Application(s) Topical daily    --------------------------------------------------------------------------------------    VITAL SIGNS, INS/OUTS (last 24 hours):  --------------------------------------------------------------------------------------  ((Insert SICU Vitals/Is+Os here))***  --------------------------------------------------------------------------------------    EXAM  NEUROLOGY  RASS:   	GCS:    Exam: Normal, NAD, alert, oriented x3, no focal deficits. ***    HEENT  Exam: Normocephalic, atraumatic, EOMI.  ***    RESPIRATORY  Exam: Lungs clear to auscultation, Normal expansion/effort. ***  Mechanical Ventilation:     CARDIOVASCULAR  Exam: S1, S2.  Regular rate and rhythm.   ***    GI/NUTRITION  Exam: Abdomen soft, Non-tender, Non-distended.  ***  Wound:  ***  Current Diet:  NPO***    VASCULAR  Exam: Extremities warm, pink, well-perfused. ***    MUSCULOSKELETAL  Exam: All extremities moving spontaneously without limitations. ***    SKIN  Exam: Good skin turgor, no skin breakdown. ***    METABOLIC/FLUIDS/ELECTROLYTES      HEMATOLOGIC  [x] VTE Prophylaxis: heparin  Injectable 5000 Unit(s) SubCutaneous every 8 hours  aspirin enteric coated 81 milliGRAM(s) Oral daily    Transfusions:	[] PRBC	[] Platelets		[] FFP	[] Cryoprecipitate    INFECTIOUS DISEASE  Antimicrobials/Immunologic Medications:    Day #      of     ***    Tubes/Lines/Drains  ***  [x] Peripheral IV  [] Central Venous Line     	[] R	[] L	[] IJ	[] Fem	[] SC	Date Placed:   [] Arterial Line		[] R	[] L	[] Fem	[] Rad	[] Ax	Date Placed:   [] PICC		[] Midline		[] Mediport  [] Urinary Catheter		Date Placed:   [x] Necessity of urinary, arterial, and venous catheters discussed    LABS  --------------------------------------------------------------------------------------  ((Insert SICU Labs here))***  --------------------------------------------------------------------------------------    OTHER LABORATORY:     IMAGING STUDIES:   CXR:     ASSESSMENT:  82y Male    ((insert from previous))***    PLAN:   ***  Neurologic:   Respiratory:   Cardiovascular:   Gastrointestinal/Nutrition:   Renal/Genitourinary:   Hematologic:   Infectious Disease:   Tubes/Lines/Drains:   Endocrine:   Disposition:     --------------------------------------------------------------------------------------    Critical Care Diagnoses:

## 2017-08-07 NOTE — PROGRESS NOTE ADULT - ASSESSMENT
83 y/o M s/p replacement of spinal cord stimulator, T2 Pelvis Fusion, T11 - T12 Laminectomy, required SICU monitoring for hypotension.    Neuro: Minimize narcotics, re-orient frequently, pain control with tylenol  Cardio: Continue PO cardizem & ASA, US IVC & Chest  Resp: Supplemental oxygen as needed, wean as tolerated   GI: tube feeds, titrate to goal  Renal: Replete electrolytes PRN, monitor intake & output, 40IV lasix, stop ivf  Heme: VTE ppx with SQH, trend H/H, consider starting pradaxa for afib today or tomorrow  Endo: No acute issues  ID: No acute issues  Tubes/Lines/Drains: Maintain george until patient able to ambulate, then TOV  Dispo: if respiratory status improved with iv lasix, downgrade to floor  Critical care dx: ICU delirium, afib, spinal fusion, HTN, respiratory abnormality secondary to fluid overload

## 2017-08-07 NOTE — PROGRESS NOTE ADULT - ATTENDING COMMENTS
81 y/o M s/p replacement of spinal cord stimulator, T2 Pelvis Fusion, T11 - T12 Laminectomy, required SICU monitoring for hypotension.    Neuro: Minimize narcotics, re-orient frequently, pain control with tylenol  Cardio: Continue PO cardizem & ASA, US IVC & Chest  Resp: Supplemental oxygen as needed, wean as tolerated   GI: tube feeds, titrate to goal  Renal: Replete electrolytes PRN, monitor intake & output, 40IV lasix, stop ivf  Heme: VTE ppx with SQH, trend H/H, consider starting pradaxa for afib today or tomorrow  Endo: No acute issues  ID: No acute issues  Tubes/Lines/Drains: Maintain george until patient able to ambulate, then TOV  Dispo: if respiratory status improved with iv lasix, downgrade to floor  Critical care dx: ICU delirium, afib, spinal fusion, HTN, respiratory abnormality secondary to fluid overload    The patient is a critical care patient with hemodynamic and metabolic instability in SICU.  I have personally interviewed and examined this patient, reviewed labs and x-rays, discussed with other consultants, House staff and PA's.  I spent    45 minutes  in total providing critical care for the diagnoses, assessment and plan above.  These diagnoses are unrelated to the surgical procedure noted above.  I met with family     min to get further history and make care decisions for this patient who is unable to participate due to altered mental status.  Time involved in performance of separately billable procedures was not counted toward my critical care time.  There is no overlap.

## 2017-08-07 NOTE — PROGRESS NOTE ADULT - ASSESSMENT
ASSESSMENT:  83 y/o M s/p replacement of spinal cord stimulator, T2 Pelvis Fusion, T11 - T12 Laminectomy, required SICU monitoring for hypotension.    Neuro: Minimize narcotics, re-orient frequently, pain control with tylenol  Cardio: Continue PO cardizem, monitor vital signs, continue ASA   Resp: Supplemental oxygen as needed, wean as tolerated   GI/: tube feeds, titrate to goal  Renal: Replete electrolytes PRN, monitor intake & output   Heme: VTE ppx with SQH, trend H/H  Endo: No acute issues  ID: No acute issues  Tubes/Lines/Drains: Maintain george until patient able to ambulate, then TOV      --------------------------------------------------------------------------------------    Critical Care Diagnoses:

## 2017-08-07 NOTE — PROGRESS NOTE ADULT - SUBJECTIVE AND OBJECTIVE BOX
SICU Daily Progress Note  =====================================================    Interval/Overnight Events: Patient without acute events overnight. CT head performed during day for left sided weakness and showed old left parietal white matter ischemia and no hemorrhage. Concern for aspiration so made NPO, ko tube placed, and tube feeds started.    POD #  5       	SICU Day #    5    HPI:  80 y/o M PMH CAD s/p PCI, Afib on Pradaxa, HTN, HLD, BPH, GERD and Spinal stenosis, admitted for replacement of spinal cord stimulator, T2 Pelvis Fusion, T11 - T12 Laminectomy. Intraop pt lost 2L blood. pt received 2u prbc, 800cc cell saver, 6L LR, but was hypotenisve with map 50s. pt required pressors & remained intubated post op & sicu consulted.     Allergies: No Known Allergies      MEDICATIONS:   --------------------------------------------------------------------------------------  Neurologic Medications:  acetaminophen   Tablet. 650 milliGRAM(s) Oral every 6 hours PRN    Respiratory Medications:    Cardiovascular Medications:  tamsulosin 0.4 milliGRAM(s) Oral at bedtime  diltiazem    Tablet 60 milliGRAM(s) Oral every 8 hours    Gastrointestinal Medications:  pantoprazole    Tablet 40 milliGRAM(s) Oral before breakfast  dextrose 5% + sodium chloride 0.45% with potassium chloride 20 mEq/L 1000 milliLiter(s) IV Continuous <Continuous>    Genitourinary Medications:    Hematologic/Oncologic Medications:  heparin  Injectable 5000 Unit(s) SubCutaneous every 8 hours  aspirin enteric coated 81 milliGRAM(s) Oral daily    Antimicrobials/Immunologic Medications:    Endocrine/Metabolic Medications:  atorvastatin 10 milliGRAM(s) Oral at bedtime    Topical/Other Medications:  chlorhexidine 4% Liquid 1 Application(s) Topical daily      --------------------------------------------------------------------------------------    VITAL SIGNS, INS/OUTS (last 24 hours):  --------------------------------------------------------------------------------------  ((Insert SICU Vitals/Is+Os here))***  --------------------------------------------------------------------------------------    EXAM  NEUROLOGY  Exam: NAD, alert, oriented x3, no focal deficits.     RESPIRATORY  Exam: Non-labored breathing, on room air    CARDIOVASCULAR  Exam: S1S2, irregularly irregular    GI/NUTRITION  Exam: Abdomen soft, Non-tender, Non-distended  Current Diet:  Regular diet     VASCULAR  Exam: Extremities warm, pink, well-perfused.    MUSCULOSKELETAL  Exam: All extremities moving spontaneously without limitations.    SKIN  Exam: Good skin turgor, no skin breakdown.     METABOLIC/FLUIDS/ELECTROLYTES        HEMATOLOGIC  [x] VTE Prophylaxis: heparin  Injectable 5000 Unit(s) SubCutaneous every 8 hours  aspirin enteric coated 81 milliGRAM(s) Oral daily    Transfusions:	[] PRBC	[] Platelets		[] FFP	[] Cryoprecipitate      Tubes/Lines/Drains    [x] Peripheral IV  [] Central Venous Line     	[] R	[] L	[] IJ	[] Fem	[] SC	Date Placed:   [] Arterial Line		[] R	[] L	[] Fem	[] Rad	[] Ax	Date Placed:   [] PICC		[] Midline		[] Mediport  [x] Urinary Catheter		Date Placed:   [x] Necessity of urinary, arterial, and venous catheters discussed    LABS  --------------------------------------------------------------------------------------  ((Insert SICU Labs here))***  --------------------------------------------------------------------------------------    OTHER LABORATORY:     IMAGING STUDIES:   CXR:

## 2017-08-07 NOTE — PROGRESS NOTE ADULT - SUBJECTIVE AND OBJECTIVE BOX
ORTHO-SPINE PROGRESS NOTE     KEILA VAUGHAN 82y Male is now s/p HANNA, T11-L1 laminectomy, T4-S2 posterior instrumentation and insitu fusion POD #6. Pt was seen and evaluated at bedside. SICU team endorses pt had trouble swallowing, NG tube was placed head CT was done (negative for bleed) CXR was done (congestion). Pain well controlled, pt denies any cp/sob/n/v/ha at this time.     Vital Signs Last 24 Hrs  T(C): 37.2 (07 Aug 2017 04:00), Max: 37.5 (06 Aug 2017 20:00)  T(F): 99 (07 Aug 2017 04:00), Max: 99.5 (06 Aug 2017 20:00)  HR: 82 (07 Aug 2017 04:00) (78 - 97)  BP: 109/65 (07 Aug 2017 04:00) (95/52 - 121/58)  BP(mean): 76 (07 Aug 2017 04:00) (61 - 94)  RR: 20 (07 Aug 2017 04:00) (20 - 28)  SpO2: 99% (07 Aug 2017 04:00) (82% - 99%)                        8.0    8.05  )-----------( 89       ( 06 Aug 2017 04:14 )             23.2        PE:  Gen: NAD  Spine: BLE   Dressing C/D/I HVx2  In place,   Motor: EHL/FHL/TA/G/S intact 5/5 B/L C5-C6 3/5 b/l    Sensation: Intact to light touch bilaterally   Compartments soft compressible  Distal pulses present     A/P   KEILA VAUGHAN 82y Male is now s/p HANNA, T11-L1 laminectomy, T4-S2 posterior instrumentation and insitu fusionPOD #6. Pain well controlled denies any cp/sob/n/v/ha at this time.  -Pain control  -DVT ppx  -IVF  -NG tube diet  -PT/OT  -WBAT, OOB

## 2017-08-08 DIAGNOSIS — R06.00 DYSPNEA, UNSPECIFIED: ICD-10-CM

## 2017-08-08 DIAGNOSIS — E78.5 HYPERLIPIDEMIA, UNSPECIFIED: ICD-10-CM

## 2017-08-08 DIAGNOSIS — G93.41 METABOLIC ENCEPHALOPATHY: ICD-10-CM

## 2017-08-08 DIAGNOSIS — I48.91 UNSPECIFIED ATRIAL FIBRILLATION: ICD-10-CM

## 2017-08-08 LAB
APTT BLD: 19.4 SEC — LOW (ref 27.5–37.4)
BASE EXCESS BLDA CALC-SCNC: 1.2 MMOL/L — SIGNIFICANT CHANGE UP
BLD GP AB SCN SERPL QL: NEGATIVE — SIGNIFICANT CHANGE UP
BLD GP AB SCN SERPL QL: NEGATIVE — SIGNIFICANT CHANGE UP
BUN SERPL-MCNC: 23 MG/DL — SIGNIFICANT CHANGE UP (ref 7–23)
BUN SERPL-MCNC: 23 MG/DL — SIGNIFICANT CHANGE UP (ref 7–23)
CALCIUM SERPL-MCNC: 7.3 MG/DL — LOW (ref 8.4–10.5)
CALCIUM SERPL-MCNC: 7.5 MG/DL — LOW (ref 8.4–10.5)
CHLORIDE SERPL-SCNC: 107 MMOL/L — SIGNIFICANT CHANGE UP (ref 98–107)
CHLORIDE SERPL-SCNC: 107 MMOL/L — SIGNIFICANT CHANGE UP (ref 98–107)
CO2 SERPL-SCNC: 22 MMOL/L — SIGNIFICANT CHANGE UP (ref 22–31)
CO2 SERPL-SCNC: 24 MMOL/L — SIGNIFICANT CHANGE UP (ref 22–31)
CREAT SERPL-MCNC: 0.51 MG/DL — SIGNIFICANT CHANGE UP (ref 0.5–1.3)
CREAT SERPL-MCNC: 0.53 MG/DL — SIGNIFICANT CHANGE UP (ref 0.5–1.3)
GLUCOSE SERPL-MCNC: 116 MG/DL — HIGH (ref 70–99)
GLUCOSE SERPL-MCNC: 119 MG/DL — HIGH (ref 70–99)
HCO3 BLDA-SCNC: 26 MMOL/L — SIGNIFICANT CHANGE UP (ref 22–26)
HCT VFR BLD CALC: 24.9 % — LOW (ref 39–50)
HCT VFR BLD CALC: 25.8 % — LOW (ref 39–50)
HGB BLD-MCNC: 8.4 G/DL — LOW (ref 13–17)
HGB BLD-MCNC: 8.8 G/DL — LOW (ref 13–17)
INR BLD: 1.14 — SIGNIFICANT CHANGE UP (ref 0.88–1.17)
LACTATE BLDA-SCNC: 1.3 MMOL/L — SIGNIFICANT CHANGE UP (ref 0.5–2)
MAGNESIUM SERPL-MCNC: 1.7 MG/DL — SIGNIFICANT CHANGE UP (ref 1.6–2.6)
MCHC RBC-ENTMCNC: 30.9 PG — SIGNIFICANT CHANGE UP (ref 27–34)
MCHC RBC-ENTMCNC: 31.1 PG — SIGNIFICANT CHANGE UP (ref 27–34)
MCHC RBC-ENTMCNC: 33.7 % — SIGNIFICANT CHANGE UP (ref 32–36)
MCHC RBC-ENTMCNC: 34.1 % — SIGNIFICANT CHANGE UP (ref 32–36)
MCV RBC AUTO: 91.2 FL — SIGNIFICANT CHANGE UP (ref 80–100)
MCV RBC AUTO: 91.5 FL — SIGNIFICANT CHANGE UP (ref 80–100)
NRBC # FLD: 0.08 — SIGNIFICANT CHANGE UP
NRBC # FLD: 0.1 — SIGNIFICANT CHANGE UP
NRBC FLD-RTO: 1 — SIGNIFICANT CHANGE UP
PCO2 BLDA: 26 MMHG — LOW (ref 35–48)
PH BLDA: 7.57 PH — HIGH (ref 7.35–7.45)
PHOSPHATE SERPL-MCNC: 2.6 MG/DL — SIGNIFICANT CHANGE UP (ref 2.5–4.5)
PLATELET # BLD AUTO: 158 K/UL — SIGNIFICANT CHANGE UP (ref 150–400)
PLATELET # BLD AUTO: 161 K/UL — SIGNIFICANT CHANGE UP (ref 150–400)
PMV BLD: 10.2 FL — SIGNIFICANT CHANGE UP (ref 7–13)
PMV BLD: 10.3 FL — SIGNIFICANT CHANGE UP (ref 7–13)
PO2 BLDA: 94 MMHG — SIGNIFICANT CHANGE UP (ref 83–108)
POTASSIUM SERPL-MCNC: 3.8 MMOL/L — SIGNIFICANT CHANGE UP (ref 3.5–5.3)
POTASSIUM SERPL-MCNC: 3.9 MMOL/L — SIGNIFICANT CHANGE UP (ref 3.5–5.3)
POTASSIUM SERPL-SCNC: 3.8 MMOL/L — SIGNIFICANT CHANGE UP (ref 3.5–5.3)
POTASSIUM SERPL-SCNC: 3.9 MMOL/L — SIGNIFICANT CHANGE UP (ref 3.5–5.3)
PROTHROM AB SERPL-ACNC: 12.8 SEC — SIGNIFICANT CHANGE UP (ref 9.8–13.1)
RBC # BLD: 2.72 M/UL — LOW (ref 4.2–5.8)
RBC # BLD: 2.83 M/UL — LOW (ref 4.2–5.8)
RBC # FLD: 14.3 % — SIGNIFICANT CHANGE UP (ref 10.3–14.5)
RBC # FLD: 14.5 % — SIGNIFICANT CHANGE UP (ref 10.3–14.5)
RH IG SCN BLD-IMP: POSITIVE — SIGNIFICANT CHANGE UP
RH IG SCN BLD-IMP: POSITIVE — SIGNIFICANT CHANGE UP
SAO2 % BLDA: 99.3 % — HIGH (ref 95–99)
SODIUM SERPL-SCNC: 139 MMOL/L — SIGNIFICANT CHANGE UP (ref 135–145)
SODIUM SERPL-SCNC: 142 MMOL/L — SIGNIFICANT CHANGE UP (ref 135–145)
WBC # BLD: 10.17 K/UL — SIGNIFICANT CHANGE UP (ref 3.8–10.5)
WBC # BLD: 9.36 K/UL — SIGNIFICANT CHANGE UP (ref 3.8–10.5)
WBC # FLD AUTO: 10.17 K/UL — SIGNIFICANT CHANGE UP (ref 3.8–10.5)
WBC # FLD AUTO: 9.36 K/UL — SIGNIFICANT CHANGE UP (ref 3.8–10.5)

## 2017-08-08 PROCEDURE — 99233 SBSQ HOSP IP/OBS HIGH 50: CPT

## 2017-08-08 PROCEDURE — 93010 ELECTROCARDIOGRAM REPORT: CPT

## 2017-08-08 PROCEDURE — 71010: CPT | Mod: 26,76

## 2017-08-08 PROCEDURE — 71275 CT ANGIOGRAPHY CHEST: CPT | Mod: 26

## 2017-08-08 PROCEDURE — 99223 1ST HOSP IP/OBS HIGH 75: CPT | Mod: AI

## 2017-08-08 RX ORDER — HALOPERIDOL DECANOATE 100 MG/ML
2.5 INJECTION INTRAMUSCULAR ONCE
Qty: 0 | Refills: 0 | Status: COMPLETED | OUTPATIENT
Start: 2017-08-08 | End: 2017-08-08

## 2017-08-08 RX ORDER — IPRATROPIUM/ALBUTEROL SULFATE 18-103MCG
3 AEROSOL WITH ADAPTER (GRAM) INHALATION ONCE
Qty: 0 | Refills: 0 | Status: DISCONTINUED | OUTPATIENT
Start: 2017-08-08 | End: 2017-08-15

## 2017-08-08 RX ORDER — DILTIAZEM HCL 120 MG
8 CAPSULE, EXT RELEASE 24 HR ORAL
Qty: 125 | Refills: 0 | Status: DISCONTINUED | OUTPATIENT
Start: 2017-08-08 | End: 2017-08-09

## 2017-08-08 RX ORDER — FUROSEMIDE 40 MG
40 TABLET ORAL
Qty: 0 | Refills: 0 | Status: DISCONTINUED | OUTPATIENT
Start: 2017-08-08 | End: 2017-08-12

## 2017-08-08 RX ORDER — SODIUM CHLORIDE 9 MG/ML
1000 INJECTION, SOLUTION INTRAVENOUS
Qty: 0 | Refills: 0 | Status: DISCONTINUED | OUTPATIENT
Start: 2017-08-08 | End: 2017-08-09

## 2017-08-08 RX ORDER — ONDANSETRON 8 MG/1
4 TABLET, FILM COATED ORAL EVERY 6 HOURS
Qty: 0 | Refills: 0 | Status: DISCONTINUED | OUTPATIENT
Start: 2017-08-08 | End: 2017-08-15

## 2017-08-08 RX ORDER — FUROSEMIDE 40 MG
40 TABLET ORAL ONCE
Qty: 0 | Refills: 0 | Status: COMPLETED | OUTPATIENT
Start: 2017-08-08 | End: 2017-08-08

## 2017-08-08 RX ORDER — PANTOPRAZOLE SODIUM 20 MG/1
40 TABLET, DELAYED RELEASE ORAL DAILY
Qty: 0 | Refills: 0 | Status: DISCONTINUED | OUTPATIENT
Start: 2017-08-08 | End: 2017-08-15

## 2017-08-08 RX ORDER — TRAMADOL HYDROCHLORIDE 50 MG/1
50 TABLET ORAL EVERY 4 HOURS
Qty: 0 | Refills: 0 | Status: DISCONTINUED | OUTPATIENT
Start: 2017-08-08 | End: 2017-08-14

## 2017-08-08 RX ORDER — MORPHINE SULFATE 50 MG/1
2 CAPSULE, EXTENDED RELEASE ORAL EVERY 4 HOURS
Qty: 0 | Refills: 0 | Status: DISCONTINUED | OUTPATIENT
Start: 2017-08-08 | End: 2017-08-10

## 2017-08-08 RX ADMIN — HALOPERIDOL DECANOATE 2.5 MILLIGRAM(S): 100 INJECTION INTRAMUSCULAR at 08:08

## 2017-08-08 RX ADMIN — Medication 40 MILLIGRAM(S): at 22:16

## 2017-08-08 RX ADMIN — HEPARIN SODIUM 5000 UNIT(S): 5000 INJECTION INTRAVENOUS; SUBCUTANEOUS at 22:17

## 2017-08-08 RX ADMIN — Medication 8 MG/HR: at 13:40

## 2017-08-08 RX ADMIN — SODIUM CHLORIDE 75 MILLILITER(S): 9 INJECTION, SOLUTION INTRAVENOUS at 08:40

## 2017-08-08 RX ADMIN — HEPARIN SODIUM 5000 UNIT(S): 5000 INJECTION INTRAVENOUS; SUBCUTANEOUS at 14:06

## 2017-08-08 RX ADMIN — Medication 40 MILLIGRAM(S): at 12:28

## 2017-08-08 RX ADMIN — HEPARIN SODIUM 5000 UNIT(S): 5000 INJECTION INTRAVENOUS; SUBCUTANEOUS at 06:12

## 2017-08-08 RX ADMIN — PANTOPRAZOLE SODIUM 40 MILLIGRAM(S): 20 TABLET, DELAYED RELEASE ORAL at 14:06

## 2017-08-08 NOTE — CONSULT NOTE ADULT - ATTENDING COMMENTS
HPI: 82y male CAD s/p PCI, Afib on Pradaxa, HTN, HLD, BPH, GERD and Spinal stenosis, s/p stimulator from previous lumbar surgery. Was admitted for removal of spinal cord stimulator, Removal of Hardware Screws, T2 Pelvis Fusion, T11 - T12 Laminectomy. A rapid response was called today for tachycardia and tachypnea. He was desaturating to the 80's on 2L O2, improved to high 90's on 4L. His heart rate was 120-140's, irregular rhythm. Blood pressure remained stable. Patient did not receive his PO cardizem today because pt removed his nichelle tube, and was not given an IV dose. During the rapid response, he was given 10mg IV push cardizem, and then started on a drip. He was given 40mg IV Lasix for the tachypnea. Patient was ruled out for PE with CT scan, and subsequently transferred to telemetry.    SICU was initially consulted after his operation because patient was hypotensive requiring blood and pressors. During his SICU admission, he was transfused one more unit of pRBCs and weaned off of phenylephrine. He developed ICU delirium (waxing and waning mental status, oriented x 1), had a nichelle placed because there was concerns of aspiration. His afib was controlled with PO cardizem.    Continue cardizem, f/u I-O

## 2017-08-08 NOTE — OCCUPATIONAL THERAPY INITIAL EVALUATION ADULT - RANGE OF MOTION EXAMINATION, UPPER EXTREMITY
Left UE AROM: Shoulder flexion 0-20 degrees, Elbow flexion WFL 0-120 degrees (secondary to edema), Wrist flexion/extension WFL (secondary to edema), MP Finger flexion/extension 0-45 degrees. Right UE AROM: WFL Left UE: Shoulder flexion PROM WFL, Elbow flexion A/AROM 0-120 degrees, Wrist flexion/extension PROM WFL, MP Finger flexion/extension PROM 0-45 degrees. Right UE AROM: WFL

## 2017-08-08 NOTE — CONSULT NOTE ADULT - PROBLEM SELECTOR RECOMMENDATION 3
multifactorial secondary to afib/chf/ anesthesia multifactorial secondary to afib/chf/ anesthesia  NPO check swallow eval.

## 2017-08-08 NOTE — CONSULT NOTE ADULT - SUBJECTIVE AND OBJECTIVE BOX
Patient is a 82y old  Male who presents with a chief complaint of "spinal fusion with some exploration and removal of hardware from previous lumbar surgery" (17 Jul 2017 13:16)      HPI:  82 y.o male [place] w/ Hx HTN, high chol, CAD s/p stents x2 in 2008, 2014, Afib on pradaxa, GERD, BPH, DJD, spinal     Exploration of Fusion Removal of Hardware Screws, T2 Pelvis Fusion, T11 - T12 Laminectomy scheduled for 8/01/2017. (17 Jul 2017 13:16)      History limited due to: [ ] Dementia  [ ] Delirium  [ ] Condition    Pain Symptoms if applicable:             	                         none	   mild         moderate         severe  Pain:	                            0	    1-3	     4-6	         7-10  Location:	  Modifying factors:	  Associated symptoms:	    Function: [ ] Independent  [ ] Assistance  [ ] Total care  [ ] Non-ambulatory    Allergies    No Known Allergies    Intolerances        HOME MEDICATIONS: [ ] Reviewed    MEDICATIONS  (STANDING):  chlorhexidine 4% Liquid 1 Application(s) Topical daily  diltiazem    Tablet 60 milliGRAM(s) Enteral Tube every 8 hours  aspirin  chewable 81 milliGRAM(s) Oral daily  heparin  Injectable 5000 Unit(s) SubCutaneous every 8 hours  dextrose 5% + sodium chloride 0.45%. 1000 milliLiter(s) (75 mL/Hr) IV Continuous <Continuous>  pantoprazole  Injectable 40 milliGRAM(s) IV Push daily    MEDICATIONS  (PRN):  acetaminophen    Suspension. 650 milliGRAM(s) Oral every 6 hours PRN Pain  ondansetron Injectable 4 milliGRAM(s) IV Push every 6 hours PRN Nausea and/or Vomiting      PAST MEDICAL & SURGICAL HISTORY:  Spinal stenosis of lumbosacral region  Rotator cuff disorder, right  Arthritis  Inguinal hernia: right  Reflux  CAD (coronary artery disease)  HTN (hypertension)  HLD (hyperlipidemia)  Atrial fibrillation  Peripheral neuropathy  H/O rotator cuff surgery: 12/2015  History of skin surgery: melanoma excision - left cheek/face 2016  Encounter for interrogation of neurostimulator: 12/2015  Back injury: s/p surgery L1-L9 in two separate surgeries 2003 &amp; 2006  S/P angioplasty with stent: RCA 2008 &amp; LAD 2014 cardiac stent placement  S/P knee replacement, bilateral  S/P cataract extraction  [ ] Reviewed     SOCIAL HISTORY:  [facility]  [ ] Community  [ ] Substance abuse:   [ ] Tobacco:   [ ] Alcohol use:     FAMILY HISTORY:  Family history of heart failure (Sibling)  [ ] No pertinent family history in first degree relatives     REVIEW OF SYSTEMS:    CONSTITUTIONAL: No fever, weight loss, or fatigue  EYES: No eye pain, visual disturbances, or discharge  ENMT:  No difficulty hearing, tinnitus, vertigo; No sinus or throat pain  NECK: No pain or stiffness  BREASTS: No pain, masses, or nipple discharge  RESPIRATORY: No cough, wheezing, chills or hemoptysis; No shortness of breath  CARDIOVASCULAR: No chest pain, palpitations, dizziness, or leg swelling  GASTROINTESTINAL: No abdominal or epigastric pain. No nausea, vomiting, or hematemesis; No diarrhea or constipation. No melena or hematochezia.  GENITOURINARY: No dysuria, frequency, hematuria, or incontinence  NEUROLOGICAL: No headaches, memory loss, loss of strength, numbness, or tremors  SKIN: No itching, burning, rashes, or lesions   LYMPH NODES: No enlarged glands  ENDOCRINE: No heat or cold intolerance; No hair loss  MUSCULOSKELETAL: No muscle or back pain  PSYCHIATRIC: No depression, anxiety, mood swings, or difficulty sleeping  HEME/LYMPH: No easy bruising, or bleeding gums  ALLERGY AND IMMUNOLOGIC: No hives or eczema    [  ] All other ROS negative  [  ] Unable to obtain due to poor mental status    Vital Signs Last 24 Hrs  T(C): 36.7 (08 Aug 2017 09:50), Max: 37.4 (07 Aug 2017 16:00)  T(F): 98 (08 Aug 2017 09:50), Max: 99.4 (07 Aug 2017 16:00)  HR: 61 (08 Aug 2017 09:50) (61 - 96)  BP: 136/89 (08 Aug 2017 09:50) (87/50 - 136/89)  BP(mean): 70 (07 Aug 2017 19:00) (58 - 73)  RR: 19 (08 Aug 2017 09:50) (19 - 28)  SpO2: 96% (08 Aug 2017 09:50) (92% - 99%)    PHYSICAL EXAM:    GENERAL: NAD, well-groomed, well-developed  HEAD:  Atraumatic, Normocephalic  EYES: EOMI, PERRLA, conjunctiva and sclera clear  ENMT: Moist mucous membranes  NECK: Supple, No JVD  RESPIRATORY: Clear to auscultation bilaterally; No rales, rhonchi, wheezing, or rubs  CARDIOVASCULAR: Regular rate and rhythm; No murmurs, rubs, or gallops  GASTROINTESTINAL: Soft, Nontender, Nondistended; Bowel sounds present  GENITOURINARY: Not examined  EXTREMITIES:  2+ Peripheral Pulses, No clubbing, cyanosis, or edema  NERVOUS SYSTEM:  Alert & Oriented X3; Moving all 4 extremities; No gross sensory deficits  HEME/LYMPH: No lymphadenopathy noted  SKIN: No rashes or lesions; Incisions C/D/I    LABS:                        8.8    9.36  )-----------( 158      ( 08 Aug 2017 09:44 )             25.8     08-08    142  |  107  |  23  ----------------------------<  119<H>  3.9   |  22  |  0.53    Ca    7.5<L>      08 Aug 2017 09:44  Phos  2.2     08-07  Mg     2.0     08-07          CAPILLARY BLOOD GLUCOSE          RADIOLOGY & ADDITIONAL STUDIES:    EKG:   Personally Reviewed:  [ ] YES     Imaging:   Personally Reviewed:  [ ] YES               Consultant(s) notes reviewed:    Care Discussed with Consultant(s)/Other Providers:    Advanced Directives: [ ] DNR  [ ] No feeding tube  [ ] MOLST in chart  [ ] MOLST completed today  [ ] Unknown    [ ] Probable osteoporosis  [ ] Possible osteomalacia  [ ] Increased delirium risk  [ ] Delirium and other risks can be reduced by:          -early ambulation          -minimizing "tethers" - IV, oxygen, catheters, etc          -avoiding hypnotics and sedatives          -maintaining hydration/nutrition          -avoid anticholinergics - diphenhydramine, etc          -pain control          -supportive environment Patient is a 82y old  Male who presents with a chief complaint of "spinal fusion with some exploration and removal of hardware from previous lumbar surgery" (17 Jul 2017 13:16)      HPI:  82 y.o male [place] w/ Hx HTN, high chol, CAD s/p stents x 2 in 2008, 2014, Afib on Pradaxa, GERD, BPH, left facial melanoma s/p resection 8/2016, DJD, Spinal stenosis  who has previously had multiple spinal operations who in may 2017 developed back and buttock pain that radiated to his thighs along with neuropathy in both feet  now  S/P exploration of the prior spinal fusion, removal of hardware, removal of spinal stimulator, T11-L1 laminectomy, T2  pelvic spinal fusion with osteotomies on 8/1/2017. He lost 2 Liters blood intraoperatively with post op hypotension requiring pressors in SICU. Today patient confused with concern of aspiration ; pulled NGT out x 2. Now NPO.               Pain Symptoms if applicable:             	                         none	   mild         moderate         severe  Pain:	                            0	    1-3	     4-6	         7-10  Location:	  Modifying factors:	  Associated symptoms:	    Function: [ ] Independent  [ ] Assistance  [ ] Total care  [ ] Non-ambulatory    Allergies    No Known Allergies    Intolerances        HOME MEDICATIONS: [ ] Reviewed    MEDICATIONS  (STANDING):  chlorhexidine 4% Liquid 1 Application(s) Topical daily  diltiazem    Tablet 60 milliGRAM(s) Enteral Tube every 8 hours  aspirin  chewable 81 milliGRAM(s) Oral daily  heparin  Injectable 5000 Unit(s) SubCutaneous every 8 hours  dextrose 5% + sodium chloride 0.45%. 1000 milliLiter(s) (75 mL/Hr) IV Continuous <Continuous>  pantoprazole  Injectable 40 milliGRAM(s) IV Push daily    MEDICATIONS  (PRN):  acetaminophen    Suspension. 650 milliGRAM(s) Oral every 6 hours PRN Pain  ondansetron Injectable 4 milliGRAM(s) IV Push every 6 hours PRN Nausea and/or Vomiting      PAST MEDICAL & SURGICAL HISTORY:  Spinal stenosis of lumbosacral region  Rotator cuff disorder, right  Arthritis  Inguinal hernia: right  Reflux  CAD (coronary artery disease)  HTN (hypertension)  HLD (hyperlipidemia)  Atrial fibrillation  Peripheral neuropathy  H/O rotator cuff surgery: 12/2015  History of skin surgery: melanoma excision - left cheek/face 2016  Encounter for interrogation of neurostimulator: 12/2015  Back injury: s/p surgery L1-L9 in two separate surgeries 2003 &amp; 2006  S/P angioplasty with stent: RCA 2008 &amp; LAD 2014 cardiac stent placement  S/P knee replacement, bilateral  S/P cataract extraction  [ ] Reviewed     SOCIAL HISTORY:  Residence: [ ] [facility]  [ ] SNF  [ ] Community  [ ] Substance abuse:   [ ] Tobacco:   [ ] Alcohol use:     FAMILY HISTORY:  Family history of heart failure (Sibling)  [ ] No pertinent family history in first degree relatives     REVIEW OF SYSTEMS:    CONSTITUTIONAL: No fever, weight loss, or fatigue  EYES: No eye pain, visual disturbances, or discharge  ENMT:  No difficulty hearing, tinnitus, vertigo; No sinus or throat pain  NECK: No pain or stiffness  BREASTS: No pain, masses, or nipple discharge  RESPIRATORY: No cough, wheezing, chills or hemoptysis; No shortness of breath  CARDIOVASCULAR: No chest pain, palpitations, dizziness, or leg swelling  GASTROINTESTINAL: No abdominal or epigastric pain. No nausea, vomiting, or hematemesis; No diarrhea or constipation. No melena or hematochezia.  GENITOURINARY: No dysuria, frequency, hematuria, or incontinence  NEUROLOGICAL: No headaches, memory loss, loss of strength, numbness, or tremors  SKIN: No itching, burning, rashes, or lesions   LYMPH NODES: No enlarged glands  ENDOCRINE: No heat or cold intolerance; No hair loss  MUSCULOSKELETAL: No muscle or back pain  PSYCHIATRIC: No depression, anxiety, mood swings, or difficulty sleeping  HEME/LYMPH: No easy bruising, or bleeding gums  ALLERGY AND IMMUNOLOGIC: No hives or eczema    [  ] All other ROS negative  [  ] Unable to obtain due to poor mental status    Vital Signs Last 24 Hrs  T(C): 36.7 (08 Aug 2017 09:50), Max: 37.4 (07 Aug 2017 16:00)  T(F): 98 (08 Aug 2017 09:50), Max: 99.4 (07 Aug 2017 16:00)  HR: 61 (08 Aug 2017 09:50) (61 - 96)  BP: 136/89 (08 Aug 2017 09:50) (87/50 - 136/89)  BP(mean): 70 (07 Aug 2017 19:00) (58 - 73)  RR: 19 (08 Aug 2017 09:50) (19 - 28)  SpO2: 96% (08 Aug 2017 09:50) (92% - 99%)    PHYSICAL EXAM:    GENERAL: NAD, well-groomed, well-developed  HEAD:  Atraumatic, Normocephalic  EYES: EOMI, PERRLA, conjunctiva and sclera clear  ENMT: Moist mucous membranes  NECK: Supple, No JVD  RESPIRATORY: Clear to auscultation bilaterally; No rales, rhonchi, wheezing, or rubs  CARDIOVASCULAR: Regular rate and rhythm; No murmurs, rubs, or gallops  GASTROINTESTINAL: Soft, Nontender, Nondistended; Bowel sounds present  GENITOURINARY: Not examined  EXTREMITIES:  2+ Peripheral Pulses, No clubbing, cyanosis, or edema  NERVOUS SYSTEM:  Alert & Oriented X3; Moving all 4 extremities; No gross sensory deficits  HEME/LYMPH: No lymphadenopathy noted  SKIN: No rashes or lesions; Incisions C/D/I    LABS:                        8.8    9.36  )-----------( 158      ( 08 Aug 2017 09:44 )             25.8     08-08    142  |  107  |  23  ----------------------------<  119<H>  3.9   |  22  |  0.53    Ca    7.5<L>      08 Aug 2017 09:44  Phos  2.2     08-07  Mg     2.0     08-07          CAPILLARY BLOOD GLUCOSE          RADIOLOGY & ADDITIONAL STUDIES:    EKG:   Personally Reviewed:  [ ] YES     Imaging:   Personally Reviewed:  [ ] YES               Consultant(s) notes reviewed:    Care Discussed with Consultant(s)/Other Providers:    Advanced Directives: [ ] DNR  [ ] No feeding tube  [ ] MOLST in chart  [ ] MOLST completed today  [ ] Unknown    [ ] Probable osteoporosis  [ ] Possible osteomalacia  [ ] Increased delirium risk  [ ] Delirium and other risks can be reduced by:          -early ambulation          -minimizing "tethers" - IV, oxygen, catheters, etc          -avoiding hypnotics and sedatives          -maintaining hydration/nutrition          -avoid anticholinergics - diphenhydramine, etc          -pain control          -supportive environment Patient is a 82y old  Male who presents with a chief complaint of "spinal fusion with some exploration and removal of hardware from previous lumbar surgery" (17 Jul 2017 13:16)      HPI:  82 y.o male [place] w/ Hx HTN, high chol, CAD s/p stents x 2 in 2008, 2014, Afib on Pradaxa, GERD, BPH, left facial melanoma s/p resection 8/2016, DJD, Spinal stenosis  who has previously had multiple spinal operations who in may 2017 developed back and buttock pain that radiated to his thighs along with neuropathy in both feet  now  S/P exploration of the prior spinal fusion, removal of hardware, removal of spinal stimulator, T11-L1 laminectomy, T2  pelvic spinal fusion with osteotomies on 8/1/2017. He lost 2 Liters blood intraoperatively with post op hypotension requiring pressors in SICU. Today patient confused with concern of aspiration ; pulled NGT out x 2. Now NPO.               Pain Symptoms if applicable:             	                         none	   mild         moderate         severe  Pain:	                            0	    1-3	     4-6	         7-10  Location:	  Modifying factors:	  Associated symptoms:	    Function: [ ] Independent  [ ] Assistance  [ ] Total care  [ ] Non-ambulatory    Allergies    No Known Allergies    Intolerances        HOME MEDICATIONS: [x ] Reviewed  aspirin 81 mg oral delayed release tablet: Last Dose Taken:  , 1 tab(s) orally once a day in pm  tamsulosin 0.4 mg oral capsule: Last Dose Taken:  , 1 cap(s) orally once a day in pm  Toprol-XL 25 mg oral tablet, extended release: Last Dose Taken:  , 1 tab(s) orally once a day  in pm  omeprazole 20 mg oral delayed release capsule: Last Dose Taken:  , 1 cap(s) orally once a day, As Needed  Pradaxa 150 mg oral capsule: Last Dose Taken:  , 1 cap(s) orally 2 times a day.  	Pending stop date from cardiologist  traMADol 50 mg oral tablet: Last Dose Taken:  , 1 tab(s) orally 2 times a day (after meals)  Butrans 5 mcg/hr transdermal film, extended release: Last Dose Taken:  , 1 patch transdermal once a week  Tylenol 8 Hour 650 mg oral tablet, extended release: Last Dose Taken:  , 2 tab(s) orally every 8 hours, As Needed      MEDICATIONS  (STANDING):  chlorhexidine 4% Liquid 1 Application(s) Topical daily  diltiazem    Tablet 60 milliGRAM(s) Enteral Tube every 8 hours  aspirin  chewable 81 milliGRAM(s) Oral daily  heparin  Injectable 5000 Unit(s) SubCutaneous every 8 hours  dextrose 5% + sodium chloride 0.45%. 1000 milliLiter(s) (75 mL/Hr) IV Continuous <Continuous>  pantoprazole  Injectable 40 milliGRAM(s) IV Push daily    MEDICATIONS  (PRN):  acetaminophen    Suspension. 650 milliGRAM(s) Oral every 6 hours PRN Pain  ondansetron Injectable 4 milliGRAM(s) IV Push every 6 hours PRN Nausea and/or Vomiting      PAST MEDICAL & SURGICAL HISTORY:  Spinal stenosis of lumbosacral region  Rotator cuff disorder, right  Arthritis  Inguinal hernia: right  Reflux  CAD (coronary artery disease)  HTN (hypertension)  HLD (hyperlipidemia)  Atrial fibrillation  Peripheral neuropathy  H/O rotator cuff surgery: 12/2015  History of skin surgery: melanoma excision - left cheek/face 2016  Encounter for interrogation of neurostimulator: 12/2015  Back injury: s/p surgery L1-L9 in two separate surgeries 2003 &amp; 2006  S/P angioplasty with stent: RCA 2008 &amp; LAD 2014 cardiac stent placement  S/P knee replacement, bilateral  S/P cataract extraction  [x ] Reviewed     SOCIAL HISTORY:  Residence: [ ] [facility]  [ ] [facility]  [x ] Community  [x ] Substance abuse: none  [x ] Tobacco: none  [x ] Alcohol use: none    FAMILY HISTORY:  Family history of heart failure (Sibling)      REVIEW OF SYSTEMS:    CONSTITUTIONAL: No fever, weight loss, or fatigue  EYES: No eye pain, visual disturbances, or discharge  ENMT:  No difficulty hearing, tinnitus, vertigo; No sinus or throat pain  NECK: No pain or stiffness  BREASTS: No pain, masses, or nipple discharge  RESPIRATORY: No cough, wheezing, chills or hemoptysis; No shortness of breath  CARDIOVASCULAR: No chest pain, palpitations, dizziness, or leg swelling  GASTROINTESTINAL: No abdominal or epigastric pain. No nausea, vomiting, or hematemesis; No diarrhea or constipation. No melena or hematochezia.  GENITOURINARY: No dysuria, frequency, hematuria, or incontinence  NEUROLOGICAL: No headaches, memory loss, loss of strength, numbness, or tremors  SKIN: No itching, burning, rashes, or lesions   LYMPH NODES: No enlarged glands  ENDOCRINE: No heat or cold intolerance; No hair loss  MUSCULOSKELETAL: No muscle or back pain  PSYCHIATRIC: No depression, anxiety, mood swings, or difficulty sleeping  HEME/LYMPH: No easy bruising, or bleeding gums  ALLERGY AND IMMUNOLOGIC: No hives or eczema    [x  ] All other ROS negative  [  ] Unable to obtain due to poor mental status    Vital Signs Last 24 Hrs  T(C): 36.7 (08 Aug 2017 09:50), Max: 37.4 (07 Aug 2017 16:00)  T(F): 98 (08 Aug 2017 09:50), Max: 99.4 (07 Aug 2017 16:00)  HR: 61 (08 Aug 2017 09:50) (61 - 96)  BP: 136/89 (08 Aug 2017 09:50) (87/50 - 136/89)  BP(mean): 70 (07 Aug 2017 19:00) (58 - 73)  RR: 19 (08 Aug 2017 09:50) (19 - 28)  SpO2: 96% (08 Aug 2017 09:50) (92% - 99%)    PHYSICAL EXAM:    GENERAL: NAD, well-groomed, well-developed  HEAD:  Atraumatic, Normocephalic  EYES: EOMI, PERRLA, conjunctiva and sclera clear  ENMT: Moist mucous membranes  NECK: Supple, No JVD  RESPIRATORY: Clear to auscultation bilaterally; No rales, rhonchi, wheezing, or rubs  CARDIOVASCULAR: Regular rate and rhythm; No murmurs, rubs, or gallops  GASTROINTESTINAL: Soft, Nontender, Nondistended; Bowel sounds present  GENITOURINARY: Not examined  EXTREMITIES:  2+ Peripheral Pulses, No clubbing, cyanosis, or edema  NERVOUS SYSTEM:  Alert & Oriented X3; Moving all 4 extremities; No gross sensory deficits  HEME/LYMPH: No lymphadenopathy noted  SKIN: No rashes or lesions; Incisions C/D/I    LABS:                        8.8    9.36  )-----------( 158      ( 08 Aug 2017 09:44 )             25.8     08-08    142  |  107  |  23  ----------------------------<  119<H>  3.9   |  22  |  0.53    Ca    7.5<L>      08 Aug 2017 09:44  Phos  2.2     08-07  Mg     2.0     08-07          CAPILLARY BLOOD GLUCOSE          RADIOLOGY & ADDITIONAL STUDIES:    Imaging: < from: Xray Chest 1 View AP- PORTABLE-Urgent (08.06.17 @ 18:59) >  Feeding tube extends into the abdomen. The tip is not   included on the image.    Interstitial edema again seen. Slight improvement in right lung airspace   opacity which mayrepresent areas of alveolar pulmonary edema.    Small bilateral pleural effusions, not significantly changed.    < end of copied text >  < from: CT Head No Cont (08.06.17 @ 16:56) >  Age-appropriate involutional and ischemic gliotic changes.   Old left parietal white matter ischemia. No hemorrhage.    < end of copied text >    Personally Reviewed:  [ x] YES               Consultant(s) notes reviewed:    Care Discussed with Consultant(s)/Other Providers: Ortho Patient is a 82y old  Male who presents with a chief complaint of "spinal fusion with some exploration and removal of hardware from previous lumbar surgery" (17 Jul 2017 13:16)      HPI:  82 y.o male [place] w/ Hx HTN, high chol, CAD s/p stents x 2 in 2008, 2014, Afib on Pradaxa, GERD, BPH, left facial melanoma s/p resection 8/2016, DJD, Spinal stenosis  who has previously had multiple spinal operations who in may 2017 developed back and buttock pain that radiated to his thighs along with neuropathy in both feet  now  S/P exploration of the prior spinal fusion, removal of hardware, removal of spinal stimulator, T11-L1 laminectomy, T2  pelvic spinal fusion with osteotomies on 8/1/2017. He lost 2 Liters blood intraoperatively with post op hypotension requiring pressors in SICU. Today patient confused with concern of aspiration ; patient pulled NGT out x 2 from aggitation. Now NPO and calm. But went into rapid afib with respiratory distress. RR: 40/minute. CXR ordered. EKG showed rapid afib. Patient has no new complaints. Denies cp, SOB, abdominal pain, N/V/D.           Pain Symptoms if applicable:             	                         none	   mild         moderate         severe  Pain: 2	                            0	    1-3	     4-6	         7-10  Location: back	  Modifying factors: movement	  Associated symptoms:	    Function: [ ] Independent  [x ] Assistance  [ ] Total care  [ ] Non-ambulatory    Allergies    No Known Allergies    Intolerances        HOME MEDICATIONS: [x ] Reviewed  aspirin 81 mg oral delayed release tablet: Last Dose Taken:  , 1 tab(s) orally once a day in pm  tamsulosin 0.4 mg oral capsule: Last Dose Taken:  , 1 cap(s) orally once a day in pm  Toprol-XL 25 mg oral tablet, extended release: Last Dose Taken:  , 1 tab(s) orally once a day  in pm  omeprazole 20 mg oral delayed release capsule: Last Dose Taken:  , 1 cap(s) orally once a day, As Needed  Pradaxa 150 mg oral capsule: Last Dose Taken:  , 1 cap(s) orally 2 times a day.  	Pending stop date from cardiologist  traMADol 50 mg oral tablet: Last Dose Taken:  , 1 tab(s) orally 2 times a day (after meals)  Butrans 5 mcg/hr transdermal film, extended release: Last Dose Taken:  , 1 patch transdermal once a week  Tylenol 8 Hour 650 mg oral tablet, extended release: Last Dose Taken:  , 2 tab(s) orally every 8 hours, As Needed      MEDICATIONS  (STANDING):  chlorhexidine 4% Liquid 1 Application(s) Topical daily  diltiazem    Tablet 60 milliGRAM(s) Enteral Tube every 8 hours  aspirin  chewable 81 milliGRAM(s) Oral daily  heparin  Injectable 5000 Unit(s) SubCutaneous every 8 hours  dextrose 5% + sodium chloride 0.45%. 1000 milliLiter(s) (75 mL/Hr) IV Continuous <Continuous>  pantoprazole  Injectable 40 milliGRAM(s) IV Push daily    MEDICATIONS  (PRN):  acetaminophen    Suspension. 650 milliGRAM(s) Oral every 6 hours PRN Pain  ondansetron Injectable 4 milliGRAM(s) IV Push every 6 hours PRN Nausea and/or Vomiting      PAST MEDICAL & SURGICAL HISTORY:  Spinal stenosis of lumbosacral region  Rotator cuff disorder, right  Arthritis  Inguinal hernia: right  Reflux  CAD (coronary artery disease)  HTN (hypertension)  HLD (hyperlipidemia)  Atrial fibrillation  Peripheral neuropathy  H/O rotator cuff surgery: 12/2015  History of skin surgery: melanoma excision - left cheek/face 2016  Encounter for interrogation of neurostimulator: 12/2015  Back injury: s/p surgery L1-L9 in two separate surgeries 2003 &amp; 2006  S/P angioplasty with stent: RCA 2008 &amp; LAD 2014 cardiac stent placement  S/P knee replacement, bilateral  S/P cataract extraction  [x ] Reviewed     SOCIAL HISTORY:  Residence: [ ] [facility]  [ ] SNF  [x ] Community  [x ] Substance abuse: none  [x ] Tobacco: none  [x ] Alcohol use: none    FAMILY HISTORY:  Family history of heart failure (Sibling)      REVIEW OF SYSTEMS:    CONSTITUTIONAL: No fever, weight loss, or fatigue  EYES: No eye pain, visual disturbances, or discharge  ENMT:  No difficulty hearing, tinnitus, vertigo; No sinus or throat pain  NECK: No pain or stiffness  BREASTS: No pain, masses, or nipple discharge  RESPIRATORY: No cough, wheezing, chills or hemoptysis; No shortness of breath  CARDIOVASCULAR: No chest pain, palpitations, dizziness, or leg swelling  GASTROINTESTINAL: No abdominal or epigastric pain. No nausea, vomiting, or hematemesis; No diarrhea or constipation. No melena or hematochezia.  GENITOURINARY: No dysuria, frequency, hematuria, or incontinence  NEUROLOGICAL: No headaches, memory loss, loss of strength, numbness, or tremors  SKIN: No itching, burning, rashes, or lesions   LYMPH NODES: No enlarged glands  ENDOCRINE: No heat or cold intolerance; No hair loss  MUSCULOSKELETAL: No muscle or back pain  PSYCHIATRIC: No depression, anxiety, mood swings, or difficulty sleeping  HEME/LYMPH: No easy bruising, or bleeding gums  ALLERGY AND IMMUNOLOGIC: No hives or eczema    [x  ] All other ROS negative  [  ] Unable to obtain due to poor mental status    Vital Signs Last 24 Hrs  T(C): 36.7 (08 Aug 2017 09:50), Max: 37.4 (07 Aug 2017 16:00)  T(F): 98 (08 Aug 2017 09:50), Max: 99.4 (07 Aug 2017 16:00)  HR: 61 (08 Aug 2017 09:50) (61 - 96)  BP: 136/89 (08 Aug 2017 09:50) (87/50 - 136/89)  BP(mean): 70 (07 Aug 2017 19:00) (58 - 73)  RR: 19 (08 Aug 2017 09:50) (19 - 28)  SpO2: 96% (08 Aug 2017 09:50) (92% - 99%)    PHYSICAL EXAM:    GENERAL: NAD, well-groomed, well-developed  HEAD:  Atraumatic, Normocephalic  EYES: EOMI, PERRLA, conjunctiva and sclera clear  ENMT: Moist mucous membranes  NECK: Supple, No JVD  RESPIRATORY: Clear to auscultation bilaterally; No rales, rhonchi, wheezing, or rubs  CARDIOVASCULAR: Regular rate and rhythm; No murmurs, rubs, or gallops  GASTROINTESTINAL: Soft, Nontender, Nondistended; Bowel sounds present  GENITOURINARY: Not examined  EXTREMITIES:  2+ Peripheral Pulses, No clubbing, cyanosis, or edema  NERVOUS SYSTEM:  Alert & Oriented X3; Moving all 4 extremities; No gross sensory deficits  HEME/LYMPH: No lymphadenopathy noted  SKIN: No rashes or lesions; Incisions C/D/I    LABS:                        8.8    9.36  )-----------( 158      ( 08 Aug 2017 09:44 )             25.8     08-08    142  |  107  |  23  ----------------------------<  119<H>  3.9   |  22  |  0.53    Ca    7.5<L>      08 Aug 2017 09:44  Phos  2.2     08-07  Mg     2.0     08-07          CAPILLARY BLOOD GLUCOSE          RADIOLOGY & ADDITIONAL STUDIES:    Imaging: < from: Xray Chest 1 View AP- PORTABLE-Urgent (08.06.17 @ 18:59) >  Feeding tube extends into the abdomen. The tip is not   included on the image.    Interstitial edema again seen. Slight improvement in right lung airspace   opacity which mayrepresent areas of alveolar pulmonary edema.    Small bilateral pleural effusions, not significantly changed.    < end of copied text >  < from: CT Head No Cont (08.06.17 @ 16:56) >  Age-appropriate involutional and ischemic gliotic changes.   Old left parietal white matter ischemia. No hemorrhage.    < end of copied text >    Personally Reviewed:  [ x] YES               Consultant(s) notes reviewed:    Care Discussed with Consultant(s)/Other Providers: Ortho Patient is a 82y old  Male who presents with a chief complaint of "spinal fusion with some exploration and removal of hardware from previous lumbar surgery" (17 Jul 2017 13:16)      HPI:  82 y.o male [place] w/ Hx HTN, high chol, CAD s/p stents x 2 in 2008, 2014, Afib on Pradaxa, GERD, BPH, left facial melanoma s/p resection 8/2016, DJD, Spinal stenosis  who has previously had multiple spinal operations who in may 2017 developed back and buttock pain that radiated to his thighs along with neuropathy in both feet  now  S/P exploration of the prior spinal fusion, removal of hardware, removal of spinal stimulator, T11-L1 laminectomy, T2  pelvic spinal fusion with osteotomies on 8/1/2017. He lost 2 Liters blood intraoperatively with post op hypotension requiring pressors in SICU. Today patient confused with concern of aspiration ; patient pulled NGT out x 2 from aggitation. Now NPO and calm. But went into rapid afib with respiratory distress. RR: 40/minute. CXR ordered. EKG showed rapid afib. Patient has no new complaints. Denies cp, SOB, abdominal pain, N/V/D.           Pain Symptoms if applicable:             	                         none	   mild         moderate         severe  Pain: 2	                            0	    1-3	     4-6	         7-10  Location: back	  Modifying factors: movement	  Associated symptoms:	    Function: [ ] Independent  [x ] Assistance  [ ] Total care  [ ] Non-ambulatory    Allergies    No Known Allergies    Intolerances        HOME MEDICATIONS: [x ] Reviewed  aspirin 81 mg oral delayed release tablet: Last Dose Taken:  , 1 tab(s) orally once a day in pm  tamsulosin 0.4 mg oral capsule: Last Dose Taken:  , 1 cap(s) orally once a day in pm  Toprol-XL 25 mg oral tablet, extended release: Last Dose Taken:  , 1 tab(s) orally once a day  in pm  omeprazole 20 mg oral delayed release capsule: Last Dose Taken:  , 1 cap(s) orally once a day, As Needed  Pradaxa 150 mg oral capsule: Last Dose Taken:  , 1 cap(s) orally 2 times a day.  	Pending stop date from cardiologist  traMADol 50 mg oral tablet: Last Dose Taken:  , 1 tab(s) orally 2 times a day (after meals)  Butrans 5 mcg/hr transdermal film, extended release: Last Dose Taken:  , 1 patch transdermal once a week  Tylenol 8 Hour 650 mg oral tablet, extended release: Last Dose Taken:  , 2 tab(s) orally every 8 hours, As Needed      MEDICATIONS  (STANDING):  chlorhexidine 4% Liquid 1 Application(s) Topical daily  diltiazem    Tablet 60 milliGRAM(s) Enteral Tube every 8 hours  aspirin  chewable 81 milliGRAM(s) Oral daily  heparin  Injectable 5000 Unit(s) SubCutaneous every 8 hours  dextrose 5% + sodium chloride 0.45%. 1000 milliLiter(s) (75 mL/Hr) IV Continuous <Continuous>  pantoprazole  Injectable 40 milliGRAM(s) IV Push daily    MEDICATIONS  (PRN):  acetaminophen    Suspension. 650 milliGRAM(s) Oral every 6 hours PRN Pain  ondansetron Injectable 4 milliGRAM(s) IV Push every 6 hours PRN Nausea and/or Vomiting      PAST MEDICAL & SURGICAL HISTORY:  Spinal stenosis of lumbosacral region  Rotator cuff disorder, right  Arthritis  Inguinal hernia: right  Reflux  CAD (coronary artery disease)  HTN (hypertension)  HLD (hyperlipidemia)  Atrial fibrillation  Peripheral neuropathy  H/O rotator cuff surgery: 12/2015  History of skin surgery: melanoma excision - left cheek/face 2016  Encounter for interrogation of neurostimulator: 12/2015  Back injury: s/p surgery L1-L9 in two separate surgeries 2003 &amp; 2006  S/P angioplasty with stent: RCA 2008 &amp; LAD 2014 cardiac stent placement  S/P knee replacement, bilateral  S/P cataract extraction  [x ] Reviewed     SOCIAL HISTORY:  Residence: [ ] [facility]  [ ] SNF  [x ] Community  [x ] Substance abuse: none  [x ] Tobacco: none  [x ] Alcohol use: none    FAMILY HISTORY:  Family history of heart failure (Sibling)      REVIEW OF SYSTEMS:    CONSTITUTIONAL: No fever, weight loss, or fatigue  EYES: No eye pain, visual disturbances, or discharge  ENMT:  No difficulty hearing, tinnitus, vertigo; No sinus or throat pain  NECK: No pain or stiffness  BREASTS: No pain, masses, or nipple discharge  RESPIRATORY: No cough, wheezing, chills or hemoptysis; No shortness of breath  CARDIOVASCULAR: No chest pain, palpitations, dizziness, or leg swelling  GASTROINTESTINAL: No abdominal or epigastric pain. No nausea, vomiting, or hematemesis; No diarrhea or constipation. No melena or hematochezia.  GENITOURINARY: No dysuria, frequency, hematuria, or incontinence  NEUROLOGICAL: No headaches, memory loss, loss of strength, numbness, or tremors  SKIN: No itching, burning, rashes, or lesions   LYMPH NODES: No enlarged glands  ENDOCRINE: No heat or cold intolerance; No hair loss  MUSCULOSKELETAL: No muscle or back pain  PSYCHIATRIC: No depression, anxiety, mood swings, or difficulty sleeping  HEME/LYMPH: No easy bruising, or bleeding gums  ALLERGY AND IMMUNOLOGIC: No hives or eczema    [x  ] All other ROS negative  [  ] Unable to obtain due to poor mental status    Vital Signs Last 24 Hrs  T(C): 36.7 (08 Aug 2017 09:50), Max: 37.4 (07 Aug 2017 16:00)  T(F): 98 (08 Aug 2017 09:50), Max: 99.4 (07 Aug 2017 16:00)  HR: 61 (08 Aug 2017 09:50) (61 - 96)  BP: 136/89 (08 Aug 2017 09:50) (87/50 - 136/89)  BP(mean): 70 (07 Aug 2017 19:00) (58 - 73)  RR: 19 (08 Aug 2017 09:50) (19 - 28)  SpO2: 96% (08 Aug 2017 09:50) (92% - 99%)    PHYSICAL EXAM:    GENERAL: NAD, well-groomed, well-developed  HEAD:  Atraumatic, Normocephalic  EYES: EOMI, PERRLA, conjunctiva and sclera clear  ENMT: Moist mucous membranes  NECK: Supple, No JVD  RESPIRATORY: using accessory muscles of respiration. Decreased breath sounds B/L. No rhonchi, wheezing, or rubs  CARDIOVASCULAR: Regular rate and rhythm; No murmurs, rubs, or gallops  GASTROINTESTINAL: Soft, Nontender, Nondistended; Bowel sounds present  GENITOURINARY: Not examined  EXTREMITIES:  2+ Peripheral Pulses, No clubbing, cyanosis, or edema  NERVOUS SYSTEM:  Alert & Oriented X3; Moving all 4 extremities; No gross sensory deficits  HEME/LYMPH: No lymphadenopathy noted  SKIN: No rashes or lesions; Incisions C/D/I    LABS:                        8.8    9.36  )-----------( 158      ( 08 Aug 2017 09:44 )             25.8     08-08    142  |  107  |  23  ----------------------------<  119<H>  3.9   |  22  |  0.53    Ca    7.5<L>      08 Aug 2017 09:44  Phos  2.2     08-07  Mg     2.0     08-07          CAPILLARY BLOOD GLUCOSE          RADIOLOGY & ADDITIONAL STUDIES:    Imaging: < from: Xray Chest 1 View AP- PORTABLE-Urgent (08.06.17 @ 18:59) >  Feeding tube extends into the abdomen. The tip is not   included on the image.    Interstitial edema again seen. Slight improvement in right lung airspace   opacity which mayrepresent areas of alveolar pulmonary edema.    Small bilateral pleural effusions, not significantly changed.    < end of copied text >  < from: CT Head No Cont (08.06.17 @ 16:56) >  Age-appropriate involutional and ischemic gliotic changes.   Old left parietal white matter ischemia. No hemorrhage.    < end of copied text >    Personally Reviewed:  [ x] YES               Consultant(s) notes reviewed:    Care Discussed with Consultant(s)/Other Providers: Ortho Patient is a 82y old  Male who presents with a chief complaint of "spinal fusion with some exploration and removal of hardware from previous lumbar surgery" (17 Jul 2017 13:16)      HPI:  82 y.o male [place] w/ Hx HTN, high chol, CAD s/p stents x 2 in 2008, 2014, Afib on Pradaxa, GERD, BPH, left facial melanoma s/p resection 8/2016, DJD, Spinal stenosis  who has previously had multiple spinal operations who in may 2017 developed back and buttock pain that radiated to his thighs along with neuropathy in both feet  now  S/P exploration of the prior spinal fusion, removal of hardware, removal of spinal stimulator, T11-L1 laminectomy, T2  pelvic spinal fusion with osteotomies on 8/1/2017. He lost 2 Liters blood intraoperatively with post op hypotension requiring pressors in SICU. Today patient confused with concern of aspiration ; patient pulled NGT out x 2 from aggitation. Now NPO and calm. But went into rapid afib with respiratory distress. RR: 40/minute. CXR ordered. EKG showed rapid afib. Patient has no new complaints. Denies cp, SOB, abdominal pain, N/V/D.           Pain Symptoms if applicable:             	                         none	   mild         moderate         severe  Pain: 2	                            0	    1-3	     4-6	         7-10  Location: back	  Modifying factors: movement	  Associated symptoms:	    Function: [ ] Independent  [x ] Assistance  [ ] Total care  [ ] Non-ambulatory    Allergies    No Known Allergies    Intolerances        HOME MEDICATIONS: [x ] Reviewed  aspirin 81 mg oral delayed release tablet: Last Dose Taken:  , 1 tab(s) orally once a day in pm  tamsulosin 0.4 mg oral capsule: Last Dose Taken:  , 1 cap(s) orally once a day in pm  Toprol-XL 25 mg oral tablet, extended release: Last Dose Taken:  , 1 tab(s) orally once a day  in pm  omeprazole 20 mg oral delayed release capsule: Last Dose Taken:  , 1 cap(s) orally once a day, As Needed  Pradaxa 150 mg oral capsule: Last Dose Taken:  , 1 cap(s) orally 2 times a day.  	Pending stop date from cardiologist  traMADol 50 mg oral tablet: Last Dose Taken:  , 1 tab(s) orally 2 times a day (after meals)  Butrans 5 mcg/hr transdermal film, extended release: Last Dose Taken:  , 1 patch transdermal once a week  Tylenol 8 Hour 650 mg oral tablet, extended release: Last Dose Taken:  , 2 tab(s) orally every 8 hours, As Needed      MEDICATIONS  (STANDING):  chlorhexidine 4% Liquid 1 Application(s) Topical daily  diltiazem    Tablet 60 milliGRAM(s) Enteral Tube every 8 hours  aspirin  chewable 81 milliGRAM(s) Oral daily  heparin  Injectable 5000 Unit(s) SubCutaneous every 8 hours  dextrose 5% + sodium chloride 0.45%. 1000 milliLiter(s) (75 mL/Hr) IV Continuous <Continuous>  pantoprazole  Injectable 40 milliGRAM(s) IV Push daily    MEDICATIONS  (PRN):  acetaminophen    Suspension. 650 milliGRAM(s) Oral every 6 hours PRN Pain  ondansetron Injectable 4 milliGRAM(s) IV Push every 6 hours PRN Nausea and/or Vomiting      PAST MEDICAL & SURGICAL HISTORY:  Spinal stenosis of lumbosacral region  Rotator cuff disorder, right  Arthritis  Inguinal hernia: right  Reflux  CAD (coronary artery disease)  HTN (hypertension)  HLD (hyperlipidemia)  Atrial fibrillation  Peripheral neuropathy  H/O rotator cuff surgery: 12/2015  History of skin surgery: melanoma excision - left cheek/face 2016  Encounter for interrogation of neurostimulator: 12/2015  Back injury: s/p surgery L1-L9 in two separate surgeries 2003 &amp; 2006  S/P angioplasty with stent: RCA 2008 &amp; LAD 2014 cardiac stent placement  S/P knee replacement, bilateral  S/P cataract extraction  [x ] Reviewed     SOCIAL HISTORY:  Residence: [ ] [facility]  [ ] SNF  [x ] Community  [x ] Substance abuse: none  [x ] Tobacco: none  [x ] Alcohol use: none    FAMILY HISTORY:  Family history of heart failure (Sibling)      REVIEW OF SYSTEMS:    CONSTITUTIONAL: No fever, weight loss, or fatigue  EYES: No eye pain, visual disturbances, or discharge  ENMT:  No difficulty hearing, tinnitus, vertigo; No sinus or throat pain  NECK: No pain or stiffness  BREASTS: No pain, masses, or nipple discharge  RESPIRATORY: No cough, wheezing, chills or hemoptysis; No shortness of breath  CARDIOVASCULAR: No chest pain, palpitations, dizziness, or leg swelling  GASTROINTESTINAL: No abdominal or epigastric pain. No nausea, vomiting, or hematemesis; No diarrhea or constipation. No melena or hematochezia.  GENITOURINARY: No dysuria, frequency, hematuria, or incontinence  NEUROLOGICAL: No headaches, memory loss, loss of strength, numbness, or tremors  SKIN: No itching, burning, rashes, or lesions   LYMPH NODES: No enlarged glands  ENDOCRINE: No heat or cold intolerance; No hair loss  MUSCULOSKELETAL: No muscle or back pain  PSYCHIATRIC: No depression, anxiety, mood swings, or difficulty sleeping  HEME/LYMPH: No easy bruising, or bleeding gums  ALLERGY AND IMMUNOLOGIC: No hives or eczema    [x  ] All other ROS negative  [  ] Unable to obtain due to poor mental status    Vital Signs Last 24 Hrs  T(C): 36.7 (08 Aug 2017 09:50), Max: 37.4 (07 Aug 2017 16:00)  T(F): 98 (08 Aug 2017 09:50), Max: 99.4 (07 Aug 2017 16:00)  HR: 61 (08 Aug 2017 09:50) (61 - 96)  BP: 136/89 (08 Aug 2017 09:50) (87/50 - 136/89)  BP(mean): 70 (07 Aug 2017 19:00) (58 - 73)  RR: 19 (08 Aug 2017 09:50) (19 - 28)  SpO2: 96% (08 Aug 2017 09:50) (92% - 99%)    PHYSICAL EXAM:    GENERAL: NAD, well-groomed, well-developed  HEAD:  Atraumatic, Normocephalic  EYES: EOMI, PERRLA, conjunctiva and sclera clear  ENMT: Moist mucous membranes  NECK: Supple, No JVD  RESPIRATORY: using accessory muscles of respiration. Decreased breath sounds B/L. No rhonchi, wheezing, or rubs  CARDIOVASCULAR: Regular rate and rhythm; No murmurs, rubs, or gallops  GASTROINTESTINAL: Soft, Nontender, Nondistended; Bowel sounds present  GENITOURINARY: Not examined  EXTREMITIES:  2+ Peripheral Pulses, No clubbing, cyanosis, or edema  NERVOUS SYSTEM:  Alert & Oriented X1; Moving all 4 extremities; No gross sensory deficits  HEME/LYMPH: No lymphadenopathy noted  SKIN: No rashes or lesions; Incisions C/D/I    LABS:                        8.8    9.36  )-----------( 158      ( 08 Aug 2017 09:44 )             25.8     08-08    142  |  107  |  23  ----------------------------<  119<H>  3.9   |  22  |  0.53    Ca    7.5<L>      08 Aug 2017 09:44  Phos  2.2     08-07  Mg     2.0     08-07          CAPILLARY BLOOD GLUCOSE          RADIOLOGY & ADDITIONAL STUDIES:    Imaging: < from: Xray Chest 1 View AP- PORTABLE-Urgent (08.06.17 @ 18:59) >  Feeding tube extends into the abdomen. The tip is not   included on the image.    Interstitial edema again seen. Slight improvement in right lung airspace   opacity which mayrepresent areas of alveolar pulmonary edema.    Small bilateral pleural effusions, not significantly changed.    < end of copied text >  < from: CT Head No Cont (08.06.17 @ 16:56) >  Age-appropriate involutional and ischemic gliotic changes.   Old left parietal white matter ischemia. No hemorrhage.    < end of copied text >    Personally Reviewed:  [ x] YES               Consultant(s) notes reviewed:    Care Discussed with Consultant(s)/Other Providers: Ortho

## 2017-08-08 NOTE — CONSULT NOTE ADULT - PROBLEM SELECTOR RECOMMENDATION 2
secondary CHF w/ B/L pleural effusions  will give Lasix 40mg IVP x 1 secondary CHF w/ B/L pleural effusions  will give Lasix 40mg IVP x 1  check TTE

## 2017-08-08 NOTE — OCCUPATIONAL THERAPY INITIAL EVALUATION ADULT - PLANNED THERAPY INTERVENTIONS, OT EVAL
strengthening/ROM/ADL retraining/bed mobility training/transfer training/cognitive, visual perceptual/balance training

## 2017-08-08 NOTE — OCCUPATIONAL THERAPY INITIAL EVALUATION ADULT - DIAGNOSIS, OT EVAL
s/p Exploration of Fusion Removal of Hardware Screws, T4 Pelvis Fusion, & T11 – L1 Laminectomy; Decreased functional mobility; Decreased ADL management. s/p Exploration of Fusion Removal of Hardware Screws, T4 Pelvis Fusion, & T11 – L1 Laminectomy; AAO x1; L UE Weakness; Decreased sitting balance; Decreased functional mobility; Decreased ADL management.

## 2017-08-08 NOTE — OCCUPATIONAL THERAPY INITIAL EVALUATION ADULT - PHYSICAL ASSIST/NONPHYSICAL ASSIST: SUPINE/SIT, REHAB EVAL
2 person assist/verbal cues/set-up required verbal cues/set-up required/2-3 person assist; Log roll technique was utilized to adhere to spinal precautions.

## 2017-08-08 NOTE — CONSULT NOTE ADULT - SUBJECTIVE AND OBJECTIVE BOX
SICU Consultation Note  =====================================================  HPI: 82y male  ***    Surgery Information  OR time:      EBL:       UOP:              IV Fluids:       Blood Products:             PAST MEDICAL & SURGICAL HISTORY:  Spinal stenosis of lumbosacral region  Rotator cuff disorder, right  Arthritis  Inguinal hernia: right  Reflux  CAD (coronary artery disease)  HTN (hypertension)  HLD (hyperlipidemia)  Atrial fibrillation  Peripheral neuropathy  H/O rotator cuff surgery: 12/2015  History of skin surgery: melanoma excision - left cheek/face 2016  Encounter for interrogation of neurostimulator: 12/2015  Back injury: s/p surgery L1-L9 in two separate surgeries 2003 &amp; 2006  S/P angioplasty with stent: RCA 2008 &amp; LAD 2014 cardiac stent placement  S/P knee replacement, bilateral  S/P cataract extraction    Home Meds:   Allergies:   Soc:   Advanced Directives: Presumed Full Code     ROS:    General: Non-Contributory  Skin/Breast: Non-Contributory  Ophthalmologic: Non-Contributory  ENMT: Non-Contributory  Respiratory and Thorax: Non-Contributory  Cardiovascular: Non-Contributory  Gastrointestinal: Non-Contributory  Genitourinary: Non-Contributory  Musculoskeletal: Non-Contributory  Neurological: Non-Contributory  Psychiatric: Non-Contributory  Hematology/Lymphatics: Non-Contributory  Endocrine: Non-Contributory  Allergic/Immunologic: Non-Contributory    CURRENT MEDICATIONS:   --------------------------------------------------------------------------------------  Neurologic Medications  acetaminophen    Suspension. 650 milliGRAM(s) Oral every 6 hours PRN Pain  ondansetron Injectable 4 milliGRAM(s) IV Push every 6 hours PRN Nausea and/or Vomiting    Respiratory Medications  ALBUTerol/ipratropium for Nebulization. 3 milliLiter(s) Nebulizer once    Cardiovascular Medications  diltiazem    Tablet 60 milliGRAM(s) Enteral Tube every 8 hours  diltiazem Infusion 8 mG/Hr IV Continuous <Continuous>    Gastrointestinal Medications  dextrose 5% + sodium chloride 0.45%. 1000 milliLiter(s) IV Continuous <Continuous>  pantoprazole  Injectable 40 milliGRAM(s) IV Push daily    Genitourinary Medications    Hematologic/Oncologic Medications  aspirin  chewable 81 milliGRAM(s) Oral daily  heparin  Injectable 5000 Unit(s) SubCutaneous every 8 hours    Antimicrobial/Immunologic Medications    Endocrine/Metabolic Medications    Topical/Other Medications  chlorhexidine 4% Liquid 1 Application(s) Topical daily    --------------------------------------------------------------------------------------    VITAL SIGNS, INS/OUTS (last 24 hours):  --------------------------------------------------------------------------------------  ((Insert SICU Vitals / Is+Os here)) ***  --------------------------------------------------------------------------------------    EXAM:  General/Neuro  RASS:   GCS:   Exam: Normal, NAD, alert, oriented x 3, no focal deficits. PERRLA  ***    Respiratory  Exam: Lungs clear to auscultation, Normal expansion/effort.  ***  [] Tracheostomy   [] Intubated  Mechanical Ventilation:     Cardiovascular  Exam: S1, S2.  Regular rate and rhythm.  Peripheral edema  ***  Cardiac Rhythm: Normal Sinus Rhythm  ECHO:     GI  Exam: Abdomen soft, Non-tender, Non-distended.  Gastrostomy / Jejunostomy tube in place.  Nasogastric tube in place.  Colostomy / Ileostomy.  ***  Wound:   ***  Current Diet:  NPO***      Tubes/Lines/Drains  ***  [x] Peripheral IV  [] Central Venous Line     	[] R	[] L	[] IJ	[] Fem	[] SC        Type:	    Date Placed:   [] Arterial Line		[] R	[] L	[] Fem	[] Rad	[] Ax	Date Placed:   [] PICC:         	[] Midline		[] Mediport           [] Urinary Catheter		Date Placed:     Extremities  Exam: Extremities warm, pink, well-perfused.        Derm:  Exam: Good skin turgor, no skin breakdown.      :   Exam: Kim catheter in place.     LABS  --------------------------------------------------------------------------------------  ((Insert SICU Labs here))***  --------------------------------------------------------------------------------------    OTHER LABS    IMAGING RESULTS      ASSESSMENT:  82y Male ***    PLAN:   Neurologic:   Respiratory:   Cardiovascular:   Gastrointestinal/Nutrition:   Renal/Genitourinary:   Hematologic:   Infectious Disease:   Lines/Tubes:  Endocrine:   Disposition:     --------------------------------------------------------------------------------------    Critical Care Diagnoses: SICU Consultation Note  =====================================================  HPI: 82y male CAD s/p PCI, Afib on Pradaxa, HTN, HLD, BPH, GERD and Spinal stenosis, s/p stimulator from previous lumbar surgery. Was admitted for removal of spinal cord stimulator, Removal of Hardware Screws, T2 Pelvis Fusion, T11 - T12 Laminectomy. A rapid response was called today for tachycardia and tachypnea. He was desaturating to the 80's on 2L O2, improved to high 90's on 4L. His heart rate was 120-140's, irregular rhythm. Blood pressure remained stable. Patient did not receive his PO cardizem today because pt removed his nichelle tube, and was not given an IV dose. During the rapid response, he was given 10mg IV push cardizem, and then started on a drip. He was given 40mg IV Lasix for the tachypnea. Patient was ruled out for PE with CT scan, and subsequently transferred to telemetry.    SICU was initially consulted after his operation because patient was hypotensive requiring blood and pressors. During his SICU admission, he was transfused one more unit of pRBCs and weaned off of phenylephrine. He developed ICU delirium (waxing and waning mental status, oriented x 1), had a nichelle placed because there was concerns of aspiration. His afib was controlled with PO cardizem.      PAST MEDICAL & SURGICAL HISTORY:  Spinal stenosis of lumbosacral region  Rotator cuff disorder, right  Arthritis  Inguinal hernia: right  Reflux  CAD (coronary artery disease)  HTN (hypertension)  HLD (hyperlipidemia)  Atrial fibrillation  Peripheral neuropathy  H/O rotator cuff surgery: 12/2015  History of skin surgery: melanoma excision - left cheek/face 2016  Encounter for interrogation of neurostimulator: 12/2015  Back injury: s/p surgery L1-L9 in two separate surgeries 2003 &amp; 2006  S/P angioplasty with stent: RCA 2008 &amp; LAD 2014 cardiac stent placement  S/P knee replacement, bilateral  S/P cataract extraction    Home Meds: ASA, Pradaxa, tamsulosin, toprol, omeprazole, tramadol  Allergies: NKA  Advanced Directives: Presumed Full Code     ROS:    General: Non-Contributory  Skin/Breast: Non-Contributory  Ophthalmologic: Non-Contributory  ENMT: Non-Contributory  Respiratory and Thorax: Non-Contributory  Cardiovascular: Non-Contributory  Gastrointestinal: Non-Contributory  Genitourinary: Non-Contributory  Musculoskeletal: Non-Contributory  Neurological: Non-Contributory  Psychiatric: Non-Contributory  Hematology/Lymphatics: Non-Contributory  Endocrine: Non-Contributory  Allergic/Immunologic: Non-Contributory    CURRENT MEDICATIONS:   --------------------------------------------------------------------------------------  Neurologic Medications  acetaminophen    Suspension. 650 milliGRAM(s) Oral every 6 hours PRN Pain  ondansetron Injectable 4 milliGRAM(s) IV Push every 6 hours PRN Nausea and/or Vomiting    Respiratory Medications  ALBUTerol/ipratropium for Nebulization. 3 milliLiter(s) Nebulizer once    Cardiovascular Medications  diltiazem    Tablet 60 milliGRAM(s) Enteral Tube every 8 hours  diltiazem Infusion 8 mG/Hr IV Continuous <Continuous>    Gastrointestinal Medications  dextrose 5% + sodium chloride 0.45%. 1000 milliLiter(s) IV Continuous <Continuous>  pantoprazole  Injectable 40 milliGRAM(s) IV Push daily    Genitourinary Medications  None    Hematologic/Oncologic Medications  aspirin  chewable 81 milliGRAM(s) Oral daily  heparin  Injectable 5000 Unit(s) SubCutaneous every 8 hours    Antimicrobial/Immunologic Medications  None    Endocrine/Metabolic Medications  None    Topical/Other Medications  chlorhexidine 4% Liquid 1 Application(s) Topical daily    --------------------------------------------------------------------------------------    VITAL SIGNS, INS/OUTS (last 24 hours):  --------------------------------------------------------------------------------------  Vital Signs Last 24 Hrs  T(C): 36.7 (08 Aug 2017 15:57), Max: 36.7 (08 Aug 2017 09:50)  T(F): 98.1 (08 Aug 2017 15:57), Max: 98.1 (08 Aug 2017 15:57)  HR: 104 (08 Aug 2017 15:57) (61 - 140)  BP: 97/70 (08 Aug 2017 15:57) (97/70 - 136/92)  BP(mean): 70 (07 Aug 2017 19:00) (70 - 70)  RR: 24 (08 Aug 2017 15:57) (19 - 30)  SpO2: 98% (08 Aug 2017 15:57) (84% - 99%)    I&O's Detail    07 Aug 2017 07:01  -  08 Aug 2017 07:00  --------------------------------------------------------  IN:    dextrose 5% + sodium chloride 0.45% with potassium chloride 20 mEq/L: 225 mL    Enteral Tube Flush: 120 mL    Jevity: 800 mL  Total IN: 1145 mL    OUT:    Accordian: 15 mL    Accordian: 130 mL    Indwelling Catheter - Urethral: 4070 mL  Total OUT: 4215 mL    Total NET: -3070 mL      08 Aug 2017 07:01  -  08 Aug 2017 18:16  --------------------------------------------------------  IN:  Total IN: 0 mL    OUT:    Accordian: 2.5 mL    Accordian: 15 mL    Indwelling Catheter - Urethral: 2910 mL  Total OUT: 2927.5 mL    Total NET: -2927.5 mL    --------------------------------------------------------------------------------------    EXAM:  General/Neuro  Exam: Alert, oriented x 1    Respiratory  Exam: Tachypneic, use of accessory muscles for breathing    Cardiovascular  Exam: S1, S2.  Tachycardic  Cardiac Rhythm: Irregularly irregular    GI  Exam: Abdomen soft, Non-tender, Non-distended.    Current Diet:  NPO      Tubes/Lines/Drains   [x] Peripheral IV x 2  [] Central Venous Line     	[] R	[] L	[] IJ	[] Fem	[] SC        Type:	    Date Placed:   [] Arterial Line		[] R	[] L	[] Fem	[] Rad	[] Ax	Date Placed:   [] PICC:         	[] Midline		[] Mediport           [x] Urinary Catheter		Date Placed:     Extremities  Exam: Extremities warm, pink, well-perfused.      Derm:  Exam: Good skin turgor, no skin breakdown.      :   Exam: Kim catheter in place.     LABS  --------------------------------------------------------------------------------------  CBC Full  -  ( 08 Aug 2017 12:17 )  WBC Count : 10.17 K/uL  Hemoglobin : 8.4 g/dL  Hematocrit : 24.9 %  Platelet Count - Automated : 161 K/uL  Mean Cell Volume : 91.5 fL  Mean Cell Hemoglobin : 30.9 pg  Mean Cell Hemoglobin Concentration : 33.7 %    08-08    139  |  107  |  23  ----------------------------<  116<H>  3.8   |  24  |  0.51    Ca    7.3<L>      08 Aug 2017 12:17  Phos  2.6     08-08  Mg     1.7     08-08      PT/INR - ( 08 Aug 2017 12:12 )   PT: 12.8 SEC;   INR: 1.14          PTT - ( 08 Aug 2017 12:12 )  PTT:19.4 SEC    --------------------------------------------------------------------------------------    OTHER LABS  Blood Gas Profile - Arterial (08.08.17 @ 12:10)    pH, Arterial: 7.57 pH    pCO2, Arterial: 26 mmHg    pO2, Arterial: 94 mmHg    HCO3, Arterial: 26 mmol/L    Base Excess, Arterial: 1.2: BASE EXCESS: REFERENCE RANGE = 0 (+/-) 2 mmol/l mmol/L    Oxygen Saturation, Arterial: 99.3 %      IMAGING RESULTS  CT Angio Chest w/ IV Cont (08.08.17 @ 15:04)   IMPRESSION: No pulmonary arterial emboli.    Moderate to large sized bilateral pleural effusions with bibasilar   atelectasis.    Bilateral patchy groundglass opacities probably related to pulmonary   edema or fluid overload given cardiomegaly and the pleural effusions.

## 2017-08-08 NOTE — OCCUPATIONAL THERAPY INITIAL EVALUATION ADULT - LIVES WITH, PROFILE
Patient reports he lives with family in a house with 2 steps to enter. Patient explains once inside, both the bedroom and the bathroom are on the main level. As per patient, he has a shower stall in his bathroom. Patient reports he lives with family in a house with 2 steps to enter. Patient explains once inside, both the bedroom and the bathroom are on the main level. As per patient, he has a shower stall in his bathroom. Accuracy of Social History questionable secondary to pt. being AAOx1 and confused at times during OT evaluation.

## 2017-08-08 NOTE — OCCUPATIONAL THERAPY INITIAL EVALUATION ADULT - PHYSICAL ASSIST/NONPHYSICAL ASSIST: SIT/SUPINE, REHAB EVAL
set-up required/2 person assist/verbal cues set-up required/2-3 person assist; Log roll technique was utilized to adhere to spinal precautions./verbal cues

## 2017-08-08 NOTE — PROGRESS NOTE ADULT - SUBJECTIVE AND OBJECTIVE BOX
ORTHO PROGRESS NOTE     Pt resting comfortably without complaint. Pt transferred from SICU overnight. Pt had confusion and pulled out KO feed tube twice        Vital Signs Last 24 Hrs  T(C): 36.6 (08 Aug 2017 05:52), Max: 37.4 (07 Aug 2017 16:00)  T(F): 97.9 (08 Aug 2017 05:52), Max: 99.4 (07 Aug 2017 16:00)  HR: 90 (08 Aug 2017 05:52) (85 - 96)  BP: 120/99 (08 Aug 2017 05:52) (87/50 - 123/60)  BP(mean): 70 (07 Aug 2017 19:00) (58 - 83)  RR: 20 (08 Aug 2017 05:52) (20 - 28)  SpO2: 99% (08 Aug 2017 05:52) (92% - 99%)    Gen: NAD, no agitation, AOx2, not oriented to time  Spine: Dressing/ Incision Clean/Dry/Intact  HV in place  B/L LE exam:  EHL/GC/TA 5/5                     Sensation intact                      DP pulse 2+    A/P: 82M s/p HANNA, T11-L1 lami, T4- pelvis instrumentation in situ fusion POD#7           - WBAT           -DVT ppx- SQH           - Pain control           -Incentive spirometer           - speech and swallow eval pending

## 2017-08-08 NOTE — OCCUPATIONAL THERAPY INITIAL EVALUATION ADULT - PRECAUTIONS/LIMITATIONS, REHAB EVAL
surgical precautions/fall precautions/spinal precautions/Constant Observation spinal precautions/fall precautions/surgical precautions

## 2017-08-08 NOTE — OCCUPATIONAL THERAPY INITIAL EVALUATION ADULT - FINE MOTOR COORDINATION, LEFT HAND THUMB/FINGER OPPOSITION SKILLS, OT EVAL
unable to completely oppose 2-5 digits to the thumb. unable to oppose 2-5 digits to the thumb./severe impairment

## 2017-08-08 NOTE — CONSULT NOTE ADULT - ASSESSMENT
ASSESSMENT:  82y Male s/p removal of spinal cord stimulator, Removal of Hardware Screws, T2 Pelvis Fusion, T11 - T12 Laminectomy, rapid response called for tachycardia in setting of missed cardizem dose, and tachypnea secondary to pleural effusions. His heart rate has improved on cardizem, saturating well on supplemental oxygen. Does not require SICU monitoring at this time.    PLAN:   Neurologic: Postoperative pain control as needed  Respiratory: s/p 40mg lasix, limit IVF, wean O2  Cardiovascular: Continue with cardizem  Gastrointestinal/Nutrition: Advance diet  Renal/Genitourinary: C/w Kim  Hematologic: No acute issues  Infectious Disease: No acute issues  Lines/Tubes: PIV x 2, Kim  Endocrine: No acute issues  Disposition: Telemetry

## 2017-08-08 NOTE — OCCUPATIONAL THERAPY INITIAL EVALUATION ADULT - MD ORDER
Occupational Therapy to evaluate and treat. Occupational Therapy to evaluate and treat. Ambulate with Assistance. As per RNBELEN, patient is okay to participate in OT evaluation and perform out of bed activity as tolerated.

## 2017-08-08 NOTE — OCCUPATIONAL THERAPY INITIAL EVALUATION ADULT - GENERAL OBSERVATIONS, REHAB EVAL
Patient received semisupine in bed. NAD. +C/D/I dressing to back, +IV, + Pulseox R finger, + Two Hemovacs, +Urinary Catheter. Patient received semisupine in bed. NAD. +C/D/I dressing to back, +IV, + Pulseox R finger, + Two Hemovacs, +Urinary Catheter. PCA present bedside. PT present bedside to perform PT evaluation in conjunction with OT evaluation. Patient received semisupine in bed. NAD. +C/D/I dressing to back, +IV, + Pulseox R finger, + Two Hemovacs, +Urethral Catheter. Enhanced supervision present bedside. PT present bedside.

## 2017-08-08 NOTE — OCCUPATIONAL THERAPY INITIAL EVALUATION ADULT - PERTINENT HX OF CURRENT PROBLEM, REHAB EVAL
Pt is a 79 year old male with hx of CAD with cardiac stents, Afib, HTN, HLD, BPH, GERD and Spinal stenosis. Pt has hx of previous spinal surgery in 2003 & 2006. Pt reports in 5/2017 he started experiencing new-onset Neuropathy to both feet and pain to back, buttocks and thighs. Pt is s/p Exploration of Fusion Removal of Hardware Screws, T4 Pelvis Fusion, & T11 – L1 Laminectomy on 8/01/2017.

## 2017-08-08 NOTE — OCCUPATIONAL THERAPY INITIAL EVALUATION ADULT - PHYSICAL ASSIST/NONPHYSICAL ASSIST:DRESS LOWER BODY, OT EVAL
1 person assist/verbal cues/set-up required set-up required/verbal cues/2 person assist/1 person assist

## 2017-08-08 NOTE — OCCUPATIONAL THERAPY INITIAL EVALUATION ADULT - LEVEL OF INDEPENDENCE: SIT/STAND, REHAB EVAL
Not appropriate to assess at this time secondary to decreased sitting balance. To be assessed at later date/time when safe and appropriate.

## 2017-08-08 NOTE — OCCUPATIONAL THERAPY INITIAL EVALUATION ADULT - ADDITIONAL COMMENTS
Accuracy of Social History questionable secondary to pt. being AAOx1 and confused at times during OT evaluation. Accuracy of Prior Functioning status questionable secondary to pt. being AAOx1 and confused at times during OT evaluation.

## 2017-08-08 NOTE — CONSULT NOTE ADULT - ASSESSMENT
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 He lost 2 Liters blood intraoperatively with post op hypotension requiring pressors in SICU. Patient currently with metabolic encephalopathy s/p SICU transfer. 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 He lost 2 Liters blood intraoperatively with post op hypotension requiring pressors in SICU. Patient currently with  s/p SICU transfer. 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 He lost 2 Liters blood intraoperatively with post op hypotension requiring pressors in SICU. Patient currently in rapid afib with respiratory distress  s/p SICU transfer. 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 He lost 2 Liters blood intraoperatively with post op hypotension requiring pressors in SICU. Patient currently with metabolic encephalopathy rapid afib with respiratory distress secondary to CHF  s/p SICU transfer.

## 2017-08-09 DIAGNOSIS — I50.1 LEFT VENTRICULAR FAILURE, UNSPECIFIED: ICD-10-CM

## 2017-08-09 LAB
BUN SERPL-MCNC: 26 MG/DL — HIGH (ref 7–23)
CALCIUM SERPL-MCNC: 7.7 MG/DL — LOW (ref 8.4–10.5)
CHLORIDE SERPL-SCNC: 104 MMOL/L — SIGNIFICANT CHANGE UP (ref 98–107)
CK MB BLD-MCNC: 2.59 NG/ML — SIGNIFICANT CHANGE UP (ref 1–6.6)
CK SERPL-CCNC: 180 U/L — SIGNIFICANT CHANGE UP (ref 30–200)
CO2 SERPL-SCNC: 24 MMOL/L — SIGNIFICANT CHANGE UP (ref 22–31)
CREAT SERPL-MCNC: 0.7 MG/DL — SIGNIFICANT CHANGE UP (ref 0.5–1.3)
GLUCOSE SERPL-MCNC: 107 MG/DL — HIGH (ref 70–99)
HCT VFR BLD CALC: 26.6 % — LOW (ref 39–50)
HGB BLD-MCNC: 8.7 G/DL — LOW (ref 13–17)
MCHC RBC-ENTMCNC: 30.5 PG — SIGNIFICANT CHANGE UP (ref 27–34)
MCHC RBC-ENTMCNC: 32.7 % — SIGNIFICANT CHANGE UP (ref 32–36)
MCV RBC AUTO: 93.3 FL — SIGNIFICANT CHANGE UP (ref 80–100)
NRBC # FLD: 0.11 — SIGNIFICANT CHANGE UP
NRBC FLD-RTO: 1.2 — SIGNIFICANT CHANGE UP
PLATELET # BLD AUTO: 195 K/UL — SIGNIFICANT CHANGE UP (ref 150–400)
PMV BLD: 10.5 FL — SIGNIFICANT CHANGE UP (ref 7–13)
POTASSIUM SERPL-MCNC: 3.9 MMOL/L — SIGNIFICANT CHANGE UP (ref 3.5–5.3)
POTASSIUM SERPL-SCNC: 3.9 MMOL/L — SIGNIFICANT CHANGE UP (ref 3.5–5.3)
RBC # BLD: 2.85 M/UL — LOW (ref 4.2–5.8)
RBC # FLD: 14.5 % — SIGNIFICANT CHANGE UP (ref 10.3–14.5)
SODIUM SERPL-SCNC: 140 MMOL/L — SIGNIFICANT CHANGE UP (ref 135–145)
TROPONIN T SERPL-MCNC: 0.12 NG/ML — HIGH (ref 0–0.06)
TROPONIN T SERPL-MCNC: 0.14 NG/ML — HIGH (ref 0–0.06)
TROPONIN T SERPL-MCNC: 0.14 NG/ML — HIGH (ref 0–0.06)
WBC # BLD: 9.34 K/UL — SIGNIFICANT CHANGE UP (ref 3.8–10.5)
WBC # FLD AUTO: 9.34 K/UL — SIGNIFICANT CHANGE UP (ref 3.8–10.5)

## 2017-08-09 PROCEDURE — 74230 X-RAY XM SWLNG FUNCJ C+: CPT | Mod: 26

## 2017-08-09 PROCEDURE — 99233 SBSQ HOSP IP/OBS HIGH 50: CPT

## 2017-08-09 PROCEDURE — 99222 1ST HOSP IP/OBS MODERATE 55: CPT

## 2017-08-09 PROCEDURE — 93010 ELECTROCARDIOGRAM REPORT: CPT

## 2017-08-09 RX ADMIN — HEPARIN SODIUM 5000 UNIT(S): 5000 INJECTION INTRAVENOUS; SUBCUTANEOUS at 22:05

## 2017-08-09 RX ADMIN — HEPARIN SODIUM 5000 UNIT(S): 5000 INJECTION INTRAVENOUS; SUBCUTANEOUS at 13:03

## 2017-08-09 RX ADMIN — Medication 40 MILLIGRAM(S): at 17:59

## 2017-08-09 RX ADMIN — PANTOPRAZOLE SODIUM 40 MILLIGRAM(S): 20 TABLET, DELAYED RELEASE ORAL at 12:17

## 2017-08-09 RX ADMIN — CHLORHEXIDINE GLUCONATE 1 APPLICATION(S): 213 SOLUTION TOPICAL at 12:17

## 2017-08-09 RX ADMIN — Medication 8 MG/HR: at 12:19

## 2017-08-09 RX ADMIN — SODIUM CHLORIDE 30 MILLILITER(S): 9 INJECTION, SOLUTION INTRAVENOUS at 12:18

## 2017-08-09 RX ADMIN — HEPARIN SODIUM 5000 UNIT(S): 5000 INJECTION INTRAVENOUS; SUBCUTANEOUS at 06:04

## 2017-08-09 RX ADMIN — Medication 81 MILLIGRAM(S): at 12:56

## 2017-08-09 NOTE — SWALLOW VFSS/MBS ASSESSMENT ADULT - NS SWALLOW VFSS REC ASPIR MON
pneumonia/throat clearing/change of breathing pattern/upper respiratory infection/position upright (90Y)/cough/gurgly voice/oral hygiene/fever

## 2017-08-09 NOTE — CHART NOTE - NSCHARTNOTEFT_GEN_A_CORE
SATHISH ANTOINE NOTE  -----------------------    Pt seen and examined at bedside.  Wife bedside.  Pt complaining of mild back pain.  Speech and swallow this am, deni Dysphagia I/honey thick fluids.  Weaned off NC, satting well on room air.  Cardiology called.  Medicine following.  Troponins are downtrending.  HR controlled on PO cardizem.        MEDICATIONS:   --------------------------------------------------------------------------------------  Neurologic Medications  ondansetron Injectable 4 milliGRAM(s) IV Push every 6 hours PRN Nausea and/or Vomiting  morphine  - Injectable 2 milliGRAM(s) IV Push every 4 hours PRN moderate to severe pain  traMADol 50 milliGRAM(s) Oral every 4 hours PRN Mild Pain (1 - 3)    Respiratory Medications  ALBUTerol/ipratropium for Nebulization. 3 milliLiter(s) Nebulizer once    Cardiovascular Medications  furosemide   Injectable 40 milliGRAM(s) IV Push two times a day  diltiazem    Tablet 30 milliGRAM(s) Oral every 6 hours    Gastrointestinal Medications  dextrose 5% + sodium chloride 0.45%. 1000 milliLiter(s) IV Continuous <Continuous>  pantoprazole  Injectable 40 milliGRAM(s) IV Push daily    Genitourinary Medications    Hematologic/Oncologic Medications  aspirin  chewable 81 milliGRAM(s) Oral daily  heparin  Injectable 5000 Unit(s) SubCutaneous every 8 hours    Antimicrobial/Immunologic Medications    Endocrine/Metabolic Medications    Topical/Other Medications  chlorhexidine 4% Liquid 1 Application(s) Topical daily    --------------------------------------------------------------------------------------    VITAL SIGNS, INS/OUTS (last 24 hours):  --------------------------------------------------------------------------------------  T(C): 36.9 (08-09-17 @ 08:51), Max: 36.9 (08-09-17 @ 08:51)  HR: 78 (08-09-17 @ 08:51) (78 - 117)  BP: 99/63 (08-09-17 @ 08:51) (95/52 - 103/74)  BP(mean): --  ABP: --  ABP(mean): --  RR: 18 (08-09-17 @ 08:51) (18 - 26)  SpO2: 99% (08-09-17 @ 08:51) (94% - 100%)  Wt(kg): --  CVP(mm Hg): --  CI: --  CAPILLARY BLOOD GLUCOSE       N/A      08-08 @ 07:01  -  08-09 @ 07:00  --------------------------------------------------------  IN:    dextrose 5% + sodium chloride 0.45%.: 450 mL    diltiazem Infusion: 72 mL  Total IN: 522 mL    OUT:    Accordian: 17.5 mL    Accordian: 95 mL    Indwelling Catheter - Urethral: 4460 mL  Total OUT: 4572.5 mL    Total NET: -4050.5 mL      08-09 @ 07:01  -  08-09 @ 14:27  --------------------------------------------------------  IN:    dextrose 5% + sodium chloride 0.45%.: 180 mL    diltiazem Infusion: 40 mL  Total IN: 220 mL    OUT:    Accordian: 25 mL    Accordian: 5 mL    Indwelling Catheter - Urethral: 250 mL  Total OUT: 280 mL    Total NET: -60 mL    --------------------------------------------------------------------------------------    EXAM  NEUROLOGY  Exam: awake/alert, NAD    RESPIRATORY  Exam: Lungs clear to auscultation, Normal expansion/effort.     CARDIOVASCULAR  Exam: S1, S2.  Irregularly irregular rhythm.    GI/NUTRITION  Exam: Abdomen soft, Non-tender, Non-distended.    BACK: 2 accordian drains in place, serosang output    VASCULAR  Exam: Extremities warm, pink, well-perfused.        METABOLIC/FLUIDS/ELECTROLYTES  dextrose 5% + sodium chloride 0.45%. 1000 milliLiter(s) IV Continuous <Continuous>      HEMATOLOGIC  [x] VTE Prophylaxis: aspirin  chewable 81 milliGRAM(s) Oral daily  heparin  Injectable 5000 Unit(s) SubCutaneous every 8 hours      LABS  --------------------------------------------------------------------------------------  CBC (08-08 @ 12:17)                              8.4<L>                         10.17   )----------------(  161        --    % Neutrophils, --    % Lymphocytes, ANC: --                                  24.9<L>              CBC (08-08 @ 09:44)                              8.8<L>                         9.36    )----------------(  158        --    % Neutrophils, --    % Lymphocytes, ANC: --                                  25.8<L>                BMP (08-08 @ 12:17)             139     |  107     |  23    		Ca++ --      Ca 7.3<L>             ---------------------------------( 116<H>		Mg 1.7                3.8     |  24      |  0.51  			Ph 2.6     BMP (08-08 @ 09:44)             142     |  107     |  23    		Ca++ --      Ca 7.5<L>             ---------------------------------( 119<H>		Mg --                 3.9     |  22      |  0.53  			Ph --          Coags (08-08 @ 12:12)  aPTT 19.4<L> / INR 1.14 / PT 12.8    ABG (08-08 @ 12:10)     7.57<H> / 26<L> / 94 / 26 / 1.2 / 99.3<H>%     Lactate: 1.3      Trop 0.14 (0.16)    --------------------------------------------------------------------------------------    OTHER LABORATORY:     IMAGING STUDIES:   CXR: < from: Xray Chest 1 View AP-PORTABLE IMMEDIATE (08.08.17 @ 13:18) >    Impression:  Enteric tube has been removed. Orthopedic surgical hardware of the spine   is revisualized.  Prominent interstitial lung markings consistent with pulmonary edema.   Bilateral pleural effusions appear unchanged. There is worsening right   basilar opacification, consistent with pneumonia versus passive   atelectasis versus pulmonary edema.  No pneumothorax.  The cardiomediastinal silhouette size cannot be accurately assessed on   this projection.    < end of copied text >    < from: CT Angio Chest w/ IV Cont (08.08.17 @ 15:04) >    IMPRESSION: No pulmonary arterial emboli.    Moderate to large sized bilateral pleural effusions with bibasilar   atelectasis.    Bilateral patchy groundglass opacities probably related to pulmonary   edema or fluid overload given cardiomegaly and the pleural effusions.    < end of copied text >      ASSESSMENT:  82y Male s/p replacement of spinal cord stimulator, removal of hardware screws, T2 pelvis fusion, T11-12 laminectomy w/ post op hypotension and a fib RVR requiring cardizem gtt  now controlled on PO cardizem    PLAN:    Neurologic: PRN pain control  Respiratory: satting well on RA, continue with lasix as BP will tolerate, encourage IS, c/w nebulizers  Cardiovascular: f/u cardiology, f/u repeat trops  Gastrointestinal/Nutrition: c/w dysphagia diet  Renal/Genitourinary: doris plan per primary team  Hematologic: f/u ortho re: starting pradaxa, SQH for VTE ppx, c/w ASA  f/u medicine  Care per primary team, reconsult as needed    --------------------------------------------------------------------------------------    Critical Care Diagnoses:

## 2017-08-09 NOTE — PROGRESS NOTE ADULT - PROBLEM SELECTOR PLAN 3
HR now stable after cardizem drip started.  will d/c cardizem drip and switch to cardizem PO when patient able to swallow. HR now stable after cardizem drip started.  will d/c cardizem drip and switch to cardizem PO when patient able to swallow.  restart pradaxa if passes swallow. if fails swallow start Heparin gtt or lovenox Bid

## 2017-08-09 NOTE — PROGRESS NOTE ADULT - SUBJECTIVE AND OBJECTIVE BOX
No acute events overnight. Pain well controlled with pain medications.    Vital Signs Last 24 Hrs  T(C): 36.6 (09 Aug 2017 05:55), Max: 36.8 (08 Aug 2017 21:01)  T(F): 97.8 (09 Aug 2017 05:55), Max: 98.2 (08 Aug 2017 21:01)  HR: 84 (09 Aug 2017 05:55) (61 - 140)  BP: 95/52 (09 Aug 2017 05:55) (95/52 - 136/92)  BP(mean): --  RR: 20 (09 Aug 2017 05:55) (18 - 30)  SpO2: 99% (09 Aug 2017 05:55) (84% - 100%)    Exam:  Gen: NAD  Motor: 5/5 EHL/FHL/TA/Gastrocnemius  Sensory: SILT DP/SP/S/S/T nerve distributions    A/P: 82 year old male s/p T4- Pelvis Fusion  - Pain Control  - Regular Diet  - PT/OT, OOB  - WBAT  - Follow up Troponins  - Follow up Cardiology

## 2017-08-09 NOTE — PROGRESS NOTE ADULT - PROBLEM SELECTOR PLAN 2
clinically improved. waxing and waning mental status yesterday.  needs swallow evaluation.  no NGT b/c pulled out NGT twice. Multifactorial.  clinically improved. waxing and waning mental status yesterday.  needs swallow evaluation.  no NGT b/c pulled out NGT twice.

## 2017-08-09 NOTE — CONSULT NOTE ADULT - SUBJECTIVE AND OBJECTIVE BOX
HISTORY OF PRESENT ILLNESS:      Allergies    No Known Allergies    Intolerances    	    MEDICATIONS:  aspirin  chewable 81 milliGRAM(s) Oral daily  heparin  Injectable 5000 Unit(s) SubCutaneous every 8 hours  furosemide   Injectable 40 milliGRAM(s) IV Push two times a day  diltiazem    Tablet 30 milliGRAM(s) Oral every 6 hours      ALBUTerol/ipratropium for Nebulization. 3 milliLiter(s) Nebulizer once    ondansetron Injectable 4 milliGRAM(s) IV Push every 6 hours PRN  morphine  - Injectable 2 milliGRAM(s) IV Push every 4 hours PRN  traMADol 50 milliGRAM(s) Oral every 4 hours PRN    pantoprazole  Injectable 40 milliGRAM(s) IV Push daily      chlorhexidine 4% Liquid 1 Application(s) Topical daily  dextrose 5% + sodium chloride 0.45%. 1000 milliLiter(s) IV Continuous <Continuous>      PAST MEDICAL & SURGICAL HISTORY:  Spinal stenosis of lumbosacral region  Rotator cuff disorder, right  Arthritis  Inguinal hernia: right  Reflux  CAD (coronary artery disease)  HTN (hypertension)  HLD (hyperlipidemia)  Atrial fibrillation  Peripheral neuropathy  H/O rotator cuff surgery: 2015  History of skin surgery: melanoma excision - left cheek/face 2016  Encounter for interrogation of neurostimulator: 2015  Back injury: s/p surgery L1-L9 in two separate surgeries  &amp;   S/P angioplasty with stent: RCA  &amp; LAD  cardiac stent placement  S/P knee replacement, bilateral  S/P cataract extraction      FAMILY HISTORY:  Family history of heart failure (Sibling)      SOCIAL HISTORY:    [ ] Non-smoker  [ ] Smoker  [ ] Alcohol      REVIEW OF SYSTEMS:  General: no fatigue/malaise, weight loss/gain.  Skin: no rashes.  Ophthalmologic: no blurred vision, no loss of vision. 	  ENT: no sore throat, rhinorrhea, sinus congestion.  Respiratory: no SOB, cough or wheeze.  Gastrointestinal:  no N/V/D, no melena/hematemesis/hematochezia.  Genitourinary: no dysuria/hesitancy or hematuria.  Musculoskeletal: no myalgias or arthralgias.  Neurological: no changes in vision or hearing, no lightheadedness/dizziness, no syncope/near syncope	  Psychiatric: no unusual stress/anxiety.   Hematology/Lymphatics: no unusual bleeding, bruising and no lymphadenopathy.  Endocrine: no unusual thirst.   All others negative except as stated above and in HPI.    PHYSICAL EXAM:  T(C): 36.9 (17 @ 08:51), Max: 36.9 (17 @ 08:51)  HR: 78 (17 @ 08:51) (78 - 117)  BP: 99/63 (17 @ 08:51) (95/52 - 135/87)  RR: 18 (17 @ 08:51) (18 - 26)  SpO2: 99% (17 @ 08:51) (94% - 100%)  Wt(kg): --  I&O's Summary    08 Aug 2017 07:  -  09 Aug 2017 07:00  --------------------------------------------------------  IN: 522 mL / OUT: 4572.5 mL / NET: -4050.5 mL    09 Aug 2017 07:01  -  09 Aug 2017 14:23  --------------------------------------------------------  IN: 220 mL / OUT: 280 mL / NET: -60 mL        Appearance: Normal	  HEENT:   Normal oral mucosa, PERRL, EOMI	  Lymphatic: No lymphadenopathy  Cardiovascular: Normal S1 S2, No JVD, No murmurs, No edema  Respiratory: Lungs clear to auscultation	  Psychiatry: A & O x 3, Mood & affect appropriate  Gastrointestinal:  Soft, Non-tender, + BS	  Skin: No rashes, No ecchymoses, No cyanosis	  Neurologic: Non-focal  Extremities: Normal range of motion, No clubbing, cyanosis or edema  Vascular: Peripheral pulses palpable 2+ bilaterally        LABS:	 	    CBC Full  -  ( 08 Aug 2017 12:17 )  WBC Count : 10.17 K/uL  Hemoglobin : 8.4 g/dL  Hematocrit : 24.9 %  Platelet Count - Automated : 161 K/uL  Mean Cell Volume : 91.5 fL  Mean Cell Hemoglobin : 30.9 pg  Mean Cell Hemoglobin Concentration : 33.7 %  Auto Neutrophil # : x  Auto Lymphocyte # : x  Auto Monocyte # : x  Auto Eosinophil # : x  Auto Basophil # : x  Auto Neutrophil % : x  Auto Lymphocyte % : x  Auto Monocyte % : x  Auto Eosinophil % : x  Auto Basophil % : x    08-08    139  |  107  |  23  ----------------------------<  116<H>  3.8   |  24  |  0.51  08-08    142  |  107  |  23  ----------------------------<  119<H>  3.9   |  22  |  0.53    Ca    7.3<L>      08 Aug 2017 12:17  Ca    7.5<L>      08 Aug 2017 09:44  Phos  2.6       Mg     1.7             proBNP:   Lipid Profile:   HgA1c:   TSH:       CARDIAC MARKERS:      CKMB: 2.59 ng/mL ( @ 06:05)        TELEMETRY: 	    ECG:  	  RADIOLOGY:  OTHER: 	    PREVIOUS DIAGNOSTIC TESTING:    [ ] Echocardiogram: < from: Transthoracic Echocardiogram (10.29.14 @ 08:18) >    Patient name: KEILA VAUGHAN  YOB: 1935   Age: 79 (M)   MR#: [id number]  Study Date: 10/29/2014  Location: O/PSonographer: Jc Jimenez [facility]  Study quality: Technically fair  Referring Physician: LIZETH NY MD  Blood Pressure: 121/79 mmHg  Height: 5ft 6in  Weight: 170 lb  BSA: 1.9 m2  ------------------------------------------------------------------------  PROCEDURE: Transthoracic echocardiogram with 2-D, M-Mode  and complete spectral and color flow Doppler.  INDICATION: Atrial Fibrillation (427.31)  ------------------------------------------------------------------------  Dimensions:    Normal Values:  LA:     4.3    2.0 - 4.0 cm  Ao:     3.2    2.0 - 3.8 cm  SEPTUM: 1.0    0.6 - 1.2 cm  PWT:    0.9    0.6 - 1.1 cm  LVIDd:  4.6    3.0 - 5.6 cm  LVIDs:  3.1    1.8 - 4.0 cm  EF (Teicholtz): 61 %  Doppler Peak Velocity (m/sec): AoV=1.2  ------------------------------------------------------------------------  Observations:  Mitral Valve: Mitral annular calcification. Mild mitral  regurgitation.  Aortic Valve/Aorta: Calcified aortic valve. No aortic valve  regurgitation seen.  Normal aortic root.  Left Atrium: Moderately dilated left atrium.  Left Ventricle: Normal left ventricular systolic function  with an estimated ejection fraction of 55%. No segmental  wall motion abnormalities. Normal left ventricular internal  dimensions and wall thicknesses.  Right Heart: Normal right atrium. Normal right ventricular  size and function. Thickened tricuspid valve. Mild  tricuspid regurgitation. Minimal pulmonic regurgitation.  Pericardium/Pleura: No pericardial effusion.  Hemodynamic: Estimated right atrial pressure is 8 mm Hg.  ------------------------------------------------------------------------  Conclusions:  1.Mitral annular calcification. Mild mitral regurgitation.    2. Calcified aortic valve. No aortic valve regurgitation  seen.  3. Moderately dilated left atrium.  4. Normal left ventricular internal dimensions and wall  thicknesses.  5. Normal left ventricular systolic function with an  estimated ejection fraction of 55%. No segmental wall  motion abnormalities.  6. Normal right ventricular size and function.  7. Thickened tricuspid valve. Mild tricuspid regurgitation.  ------------------------------------------------------------------------  Confirmed on  10/29/2014 - 14:06:49 by Phuong Francis M.D.  ------------------------------------------------------------------------    < end of copied text >    [ ]  Catheterization: < from: Cardiac Cath Lab (14 @ 16:24) >    [facility]  Department of Cardiology  [address] [place], [place] [address] [phone]  Cath Lab Report -- Comprehensive Report  Patient: KEILA VAUGHAN  Study date: 2014  Account number: [id number]  MR number: [id number]  : 1935  Gender: Male  Race: W  Case Physician(s):  DEEPTHI Luz M.D.  Referring Physician:  Lizeth Ny M.D.  INDICATIONS: Coronary artery disease: abnormal stress test.  HISTORY: The patient has a history of coronary artery disease. The patient  has hypertension and medication-treated dyslipidemia.  PROCEDURE:  --  Left heart catheterization with ventriculography.  --  Left coronary angiography.  --  Right coronary angiography.  --  Intervention on proximal LAD: drug-eluting stent.  TECHNIQUE: The risks and alternatives of the procedures and conscious  sedation were explained to the patient and informed consent was obtained.  Cardiac catheterization performed electively. Coronary intervention  performed electively.  Local anesthetic given. Right radial artery access. A PRELUDE KIT was  inserted in the vessel. Left heart catheterization. Ventriculography was  performed. A 5FR FR4.0 EXPO catheter was utilized. Left coronary artery  angiography. The vessel was injected utilizing a 5FR FL3.5 EXPO catheter.  Right coronary artery angiography. The vessel was injected utilizing a 5FR  FR4.0 EXPO catheter. RADIATION EXPOSURE: 4.2 min. A successful  drug-eluting stent was performed on the 90 % lesion in the proximal LAD.  Following intervention there was a 1 % residual stenosis. According to the  ACC/AHA classification system, this lesion was a type C lesion. There was  ABEBA 3 flow after the procedure. There was no dissection. Vessel setup was  performed. A 6FR JL3.5 LAUNCHER guiding catheter was used to intubate the  vessel. Vessel setup was performed. A [employer] UNIVERSAL 190CM wire was used to  cross the lesion. Balloon angioplasty was performed, using a 2.5 X 12  LEGEND balloon, with 1 inflations and a maximum inflation pressure of 15  dirk. A 3.50 X 12 PROMUS PREMIER drug-eluting stent was placed across the  lesion and deployed at a maximum inflation pressure of 22 dirk.  CONTRAST GIVEN: Omnipaque 50 ml. Omnipaque 53 ml.  MEDICATIONS GIVEN: Midazolam, 1 mg, IV. Fentanyl, 25 mcg, IV. Verapamil  (Isoptin, Calan, Covera), 2.5 mg, IA. Nitroglycerin, 100 mcg,  intracoronary. Heparin, 4000 units, IA. Heparin, 4000 units, IV.  VENTRICLES: Global left ventricular function was normal. EF estimatedwas  60 %.  CORONARY VESSELS: The coronary circulation is right dominant.  LM:   --  LM: Normal.  LAD:   --  Proximal LAD: There was a 90 % stenosis.  --  Mid LAD: There was a 30 % stenosis.  CX:   --  Circumflex: Normal.  RCA:   --  Proximal RCA: There was a 40 % stenosis.  --  Distal RCA: There was a 40 % stenosis.  COMPLICATIONS: There were no complications.  INTERVENTIONAL RECOMMENDATIONS: ASA and Plavix for 1 year  Prepared and signed by  Lizeth Ny M.D.  Signed 05/15/2014 17:54:55  HEMODYNAMIC TABLES  Pressures:  Baseline/ Room Air  Pressures:  - HR: 77  Pressures:  - Rhythm:  Pressures:  -- Aortic Pressure (S/D/M): 121/75/93  Pressures:  -- Left Ventricle (s/edp): 115/19/--  Pressures:  Intervention  Pressures:  - HR: 71  Pressures: - Rhythm:  Pressures:  -- Aortic Pressure (S/D/M): 126/84/100  Pressures:  Post Angio  Pressures:  - HR: 87  Pressures:  - Rhythm:  Pressures:  -- Aortic Pressure (S/D/M): 132/87/103  Pressures:  -- Left Ventricle (s/edp): 135/16/--  Outputs:  Baseline/ Room Air  Outputs:  -- CALCULATIONS[age]  Outputs:  -- CALCULATIONS: Body Surface Area: 1.91  Outputs:  -- CALCULATIONS: Height in cm: 170.00  Outputs:  -- CALCULATIONS: Sex: Male  Outputs:  -- CALCULATIONS: Weight in k.40    < end of copied text >    [ ] Stress Test:  	  	< from: Nuclear Stress Test-Pharmacologic (10.29.14 @ 11:00) >    PATIENT: KEILA VAUGHAN  : 1935   AGE: 79 (M)   MR#: [id number]  STUDY DATE: 10/29/2014  LOCATION: O/P  REF. PHYSICIAN(S): LIZETH NY MD  FELLOW: Daniel Nicole M.D.  ------------------------------------------------------------------------  TYPE OF TEST: Rest/Stress Pharmacologic  INDICATION: Coronary Artery Disease (414.9)  ------------------------------------------------------------------------  HISTORY:  CARDIAC HISTORY: 79 year old male with known CAD,  PTCA/STENT, atrial fibrillation who presents for ischemic  evaluation pending rotator cuff surgery.  RISK FACTORS: Elevated Cholesterol, Hypertension  MEDICATIONS: rosuvastatin, aspirin, toprol, dabigatran,  flomax, synthroid, tramadol  ------------------------------------------------------------------------  BASELINE ELECTROCARDIOGRAM:  Rhythm: Atrial Fibrillation - 69 BPM  ST: Nonspecific ST-T wave abnormality.  ------------------------------------------------------------------------  HEMODYNAMIC PARAMETERS:                         HR      BP  Baseline  Pre-Injection             69  118/85  00:00     Inject Regadenoson         0  00:30     Post Injection             0  01:00     Post Injection            70  125/82  02:00     Post Injection            90204/62  03:00     Post Injection            77  110/78  04:00     Post Injection            78  120/81  05:00     Recovery                  75  119/77  06:00     Recovery                  76  115/78  07:00     Recovery                  73  109/72  08:00     Recovery                  78  113/70  ------------------------------------------------------------------------  Agent: Regadenoson 0.4 mg/5 ml NS. injected over 10 sec.  HR: Baseline HR: 69 bpm   Peak HR: 87 bpm (61% of MPHR)  MPHR: 142 bpm   85% of MPHR: 121 bpm  BP: Baseline BP: 118/85 mmHg   Peak BP: 125/82 mmHg   Peak  RPP: 18945 (Rate Pressure Product)  Last Caffeine intake: 12 hrs  Terminated: Completion of protocol  ------------------------------------------------------------------------  SYMPTOMS/FINDINGS:  Symptoms: No Symptom  Chest pain: No chest pain with administration of  Regadenoson  ------------------------------------------------------------------------  ECG ABNORMALITIES DURING/AFTER STRESS:   ST Changes:No ischemic ST segment changes.  ------------------------------------------------------------------------  NEW ARRHYTHMIAS DEVELOPED DURING/AFTER STRESS:  Single VPD occurred during stress.  ------------------------------------------------------------------------  STRESS TEST IMPRESSIONS:  Chest Pain: No chest pain with administration of  Regadenoson.  Symptom: No Symptom.  HR Response: Appropriate.  BP Response: Appropriate.  Heart Rhythm: Atrial Fibrillation - 69 BPM.  Baseline ECG: Nonspecific ST-T wave abnormality.  ECG Changes: No ischemic ST segment changes.  Arrhythmia: Single VPD occurred during stress.  ------------------------------------------------------------------------  PROCEDURE:  7.3 mCi of Tc99m Sestamibi were injected intravenously at  rest. Approximately 70 minutes later, tomographic images  were obtained in a 180 degree arc from right anterior  oblique to left anterior oblique with 64 stops. At a  separate time, 21 mCi of Tc99m Sestamibi were injected  intravenously during stress protocol. Approximately 90  minute(s) later, tomographic images were obtained in a 180  degree arc from right anterior oblique to left anterior  oblique with 64 stops. The tomographic slices were  reconstructed in 3 orthogonal planes (short axis,  horizontal long axis and vertical long axis).  Interpretation was performed both by visual and  quantitative analysis.  Rest and stress images were acquired using CZT-based  system with pinhole collimation ([employer], [employer]), and reconstructed using MLEM algorithm.  Images were re-acquired with the patient in a prone  position.  ------------------------------------------------------------------------  NUCLEAR FINDINGS:  Review of raw data shows: The study is of good technical  quality.  The left ventricle was normal in size. There is a  medium-sized, mild defect in the inferior wall that is  fixed, predominantly corrects with prone imaging, and  demonstrates normal wall motion suggestive of  diaphragmatic attenuation artifact.  ------------------------------------------------------------------------  GATED ANALYSIS:  Post-stress gated wall motion analysis was performed (LVEF  = 68 %;LVEDV = 69 ml.), revealing normal LV function.  ------------------------------------------------------------------------  IMPRESSIONS:Normal Study  * Chest Pain: No chest pain with administration of  Regadenoson.  * Symptom: No Symptom.  * HR Response: Appropriate.  * BP Response: Appropriate.  * Heart Rhythm: Atrial Fibrillation - 69 BPM.  * Baseline ECG: Nonspecific ST-T wave abnormality.  * ECG Changes: No ischemic ST segment changes.  * Arrhythmia: Single VPD occurred during stress.  * Review of raw data shows: The study is of good technical  quality.  * The left ventricle was normal in size. There is a  medium-sized, mild defect in the inferior wall that is  fixed, predominantly corrects with prone imaging, and  demonstrates normal wall motion suggestive of  diaphragmatic attenuation artifact.  * Post-stress gated wall motion analysis was performed  (LVEF = 68 %;LVEDV = 69 ml.), revealing normal LV  function.  ------------------------------------------------------------------------  Confirmed on  10/29/2014 - 16:40:54 by Cm Washington M.D.  -----------------------------------------------------    < end of copied text > HISTORY OF PRESENT ILLNESS:  82yoM with HTN,HLD,CAD ,AF, spinal surgeries, EF 55% in 2014. Admitted for spinal surgery, now POD 8, patient went into afib with rates ~150.  Started on diltiazem 30q6 po. Rates improeved however patient became fluid overloaded. Of note, patient is also npo on IVF@30cc per hour. NPO reversed today.     Allergies    No Known Allergies    Intolerances    	    MEDICATIONS:  aspirin  chewable 81 milliGRAM(s) Oral daily  heparin  Injectable 5000 Unit(s) SubCutaneous every 8 hours  furosemide   Injectable 40 milliGRAM(s) IV Push two times a day  diltiazem    Tablet 30 milliGRAM(s) Oral every 6 hours      ALBUTerol/ipratropium for Nebulization. 3 milliLiter(s) Nebulizer once    ondansetron Injectable 4 milliGRAM(s) IV Push every 6 hours PRN  morphine  - Injectable 2 milliGRAM(s) IV Push every 4 hours PRN  traMADol 50 milliGRAM(s) Oral every 4 hours PRN    pantoprazole  Injectable 40 milliGRAM(s) IV Push daily      chlorhexidine 4% Liquid 1 Application(s) Topical daily  dextrose 5% + sodium chloride 0.45%. 1000 milliLiter(s) IV Continuous <Continuous>      PAST MEDICAL & SURGICAL HISTORY:  Spinal stenosis of lumbosacral region  Rotator cuff disorder, right  Arthritis  Inguinal hernia: right  Reflux  CAD (coronary artery disease)  HTN (hypertension)  HLD (hyperlipidemia)  Atrial fibrillation  Peripheral neuropathy  H/O rotator cuff surgery: 2015  History of skin surgery: melanoma excision - left cheek/face 2016  Encounter for interrogation of neurostimulator: 2015  Back injury: s/p surgery L1-L9 in two separate surgeries  &amp;   S/P angioplasty with stent: RCA  &amp; LAD  cardiac stent placement  S/P knee replacement, bilateral  S/P cataract extraction      FAMILY HISTORY:  Family history of heart failure (Sibling)      SOCIAL HISTORY:    [ ] Non-smoker  [ ] Smoker  [ ] Alcohol      REVIEW OF SYSTEMS:  General: no fatigue/malaise, weight loss/gain.  Skin: no rashes.  Ophthalmologic: no blurred vision, no loss of vision. 	  ENT: no sore throat, rhinorrhea, sinus congestion.  Respiratory: no SOB, cough or wheeze.  Gastrointestinal:  no N/V/D, no melena/hematemesis/hematochezia.  Genitourinary: no dysuria/hesitancy or hematuria.  Musculoskeletal: no myalgias or arthralgias.  Neurological: no changes in vision or hearing, no lightheadedness/dizziness, no syncope/near syncope	  Psychiatric: no unusual stress/anxiety.   Hematology/Lymphatics: no unusual bleeding, bruising and no lymphadenopathy.  Endocrine: no unusual thirst.   All others negative except as stated above and in HPI.    PHYSICAL EXAM:  T(C): 36.9 (17 @ 08:51), Max: 36.9 (17 @ 08:51)  HR: 78 (17 @ 08:51) (78 - 117)  BP: 99/63 (17 @ 08:51) (95/52 - 135/87)  RR: 18 (17 @ 08:51) (18 - 26)  SpO2: 99% (17 @ 08:51) (94% - 100%)  Wt(kg): --  I&O's Summary    08 Aug 2017 07:  -  09 Aug 2017 07:00  --------------------------------------------------------  IN: 522 mL / OUT: 4572.5 mL / NET: -4050.5 mL    09 Aug 2017 07:  -  09 Aug 2017 14:23  --------------------------------------------------------  IN: 220 mL / OUT: 280 mL / NET: -60 mL        Appearance: Normal	  HEENT:   Normal oral mucosa, PERRL, EOMI	  Lymphatic: No lymphadenopathy  Cardiovascular: Normal S1 S2, No JVD, No murmurs, No edema  Respiratory: Lungs clear to auscultation	  Psychiatry: A & O x 3, Mood & affect appropriate  Gastrointestinal:  Soft, Non-tender, + BS	  Skin: No rashes, No ecchymoses, No cyanosis	  Neurologic: Non-focal  Extremities: Normal range of motion, No clubbing, cyanosis or edema  Vascular: Peripheral pulses palpable 2+ bilaterally        LABS:	 	    CBC Full  -  ( 08 Aug 2017 12:17 )  WBC Count : 10.17 K/uL  Hemoglobin : 8.4 g/dL  Hematocrit : 24.9 %  Platelet Count - Automated : 161 K/uL  Mean Cell Volume : 91.5 fL  Mean Cell Hemoglobin : 30.9 pg  Mean Cell Hemoglobin Concentration : 33.7 %  Auto Neutrophil # : x  Auto Lymphocyte # : x  Auto Monocyte # : x  Auto Eosinophil # : x  Auto Basophil # : x  Auto Neutrophil % : x  Auto Lymphocyte % : x  Auto Monocyte % : x  Auto Eosinophil % : x  Auto Basophil % : x    08-08    139  |  107  |  23  ----------------------------<  116<H>  3.8   |  24  |  0.51  08-08    142  |  107  |  23  ----------------------------<  119<H>  3.9   |  22  |  0.53    Ca    7.3<L>      08 Aug 2017 12:17  Ca    7.5<L>      08 Aug 2017 09:44  Phos  2.6     08-08  Mg     1.7     08-08        proBNP:   Lipid Profile:   HgA1c:   TSH:       CARDIAC MARKERS:      CKMB: 2.59 ng/mL ( @ 06:05)        TELEMETRY: 	    ECG:  	  RADIOLOGY:  OTHER: 	    PREVIOUS DIAGNOSTIC TESTING:    [ ] Echocardiogram: < from: Transthoracic Echocardiogram (10.29.14 @ 08:18) >    Patient name: KEILA VAUGHAN  YOB: 1935   Age: 79 (M)   MR#: [id number]  Study Date: 10/29/2014  Location: O/PSonographer: Jc Jimenez [facility]  Study quality: Technically fair  Referring Physician: LIZETH NY MD  Blood Pressure: 121/79 mmHg  Height: 5ft 6in  Weight: 170 lb  BSA: 1.9 m2  ------------------------------------------------------------------------  PROCEDURE: Transthoracic echocardiogram with 2-D, M-Mode  and complete spectral and color flow Doppler.  INDICATION: Atrial Fibrillation (427.31)  ------------------------------------------------------------------------  Dimensions:    Normal Values:  LA:     4.3    2.0 - 4.0 cm  Ao:     3.2    2.0 - 3.8 cm  SEPTUM: 1.0    0.6 - 1.2 cm  PWT:    0.9    0.6 - 1.1 cm  LVIDd:  4.6    3.0 - 5.6 cm  LVIDs:  3.1    1.8 - 4.0 cm  EF (Teicholtz): 61 %  Doppler Peak Velocity (m/sec): AoV=1.2  ------------------------------------------------------------------------  Observations:  Mitral Valve: Mitral annular calcification. Mild mitral  regurgitation.  Aortic Valve/Aorta: Calcified aortic valve. No aortic valve  regurgitation seen.  Normal aortic root.  Left Atrium: Moderately dilated left atrium.  Left Ventricle: Normal left ventricular systolic function  with an estimated ejection fraction of 55%. No segmental  wall motion abnormalities. Normal left ventricular internal  dimensions and wall thicknesses.  Right Heart: Normal right atrium. Normal right ventricular  size and function. Thickened tricuspid valve. Mild  tricuspid regurgitation. Minimal pulmonic regurgitation.  Pericardium/Pleura: No pericardial effusion.  Hemodynamic: Estimated right atrial pressure is 8 mm Hg.  ------------------------------------------------------------------------  Conclusions:  1.Mitral annular calcification. Mild mitral regurgitation.    2. Calcified aortic valve. No aortic valve regurgitation  seen.  3. Moderately dilated left atrium.  4. Normal left ventricular internal dimensions and wall  thicknesses.  5. Normal left ventricular systolic function with an  estimated ejection fraction of 55%. No segmental wall  motion abnormalities.  6. Normal right ventricular size and function.  7. Thickened tricuspid valve. Mild tricuspid regurgitation.  ------------------------------------------------------------------------  Confirmed on  10/29/2014 - 14:06:49 by Phuong Francis M.D.  ------------------------------------------------------------------------    < end of copied text >    [ ]  Catheterization: < from: Cardiac Cath Lab (14 @ 16:24) >    [facility]  Department of Cardiology  [address] [place] [address]  [phone]  Cath Lab Report -- Comprehensive Report  Patient: KEILA VAUGHAN  Study date: 2014  Account number: [id number]  MR number: [id number]  : 1935  Gender: Male  Race: W  Case Physician(s):  DEEPTHI Luz M.D.  Referring Physician:  Lizeth Ny M.D.  INDICATIONS: Coronary artery disease: abnormal stress test.  HISTORY: The patient has a history of coronary artery disease. The patient  has hypertension and medication-treated dyslipidemia.  PROCEDURE:  --  Left heart catheterization with ventriculography.  --  Left coronary angiography.  --  Right coronary angiography.  --  Intervention on proximal LAD: drug-eluting stent.  TECHNIQUE: The risks and alternatives of the procedures and conscious  sedation were explained to the patient and informed consent was obtained.  Cardiac catheterization performed electively. Coronary intervention  performed electively.  Local anesthetic given. Right radial artery access. A PRELUDE KIT was  inserted in the vessel. Left heart catheterization. Ventriculography was  performed. A 5FR FR4.0 EXPO catheter was utilized. Left coronary artery  angiography. The vessel was injected utilizing a 5FR FL3.5 EXPO catheter.  Right coronary artery angiography. The vessel was injected utilizing a 5FR  FR4.0 EXPO catheter. RADIATION EXPOSURE: 4.2 min. A successful  drug-eluting stent was performed on the 90 % lesion in the proximal LAD.  Following intervention there was a 1 % residual stenosis. According to the  ACC/AHA classification system, this lesion was a type C lesion. There was  ABEBA 3 flow after the procedure. There was no dissection. Vessel setup was  performed. A 6FR JL3.5 LAUNCHER guiding catheter was used to intubate the  vessel. Vessel setup was performed. A [employer] UNIVERSAL 190CM wire was used to  cross the lesion. Balloon angioplasty was performed, using a 2.5 X 12  LEGEND balloon, with 1 inflations and a maximum inflation pressure of 15  dirk. A 3.50 X 12 PROMUS PREMIER drug-eluting stent was placed across the  lesion and deployed at a maximum inflation pressure of 22 dirk.  CONTRAST GIVEN: Omnipaque 50 ml. Omnipaque 53 ml.  MEDICATIONS GIVEN: Midazolam, 1 mg, IV. Fentanyl, 25 mcg, IV. Verapamil  (Isoptin, Calan, Covera), 2.5 mg, IA. Nitroglycerin, 100 mcg,  intracoronary. Heparin, 4000 units, IA. Heparin, 4000 units, IV.  VENTRICLES: Global left ventricular function was normal. EF estimatedwas  60 %.  CORONARY VESSELS: The coronary circulation is right dominant.  LM:   --  LM: Normal.  LAD:   --  Proximal LAD: There was a 90 % stenosis.  --  Mid LAD: There was a 30 % stenosis.  CX:   --  Circumflex: Normal.  RCA:   --  Proximal RCA: There was a 40 % stenosis.  --  Distal RCA: There was a 40 % stenosis.  COMPLICATIONS: There were no complications.  INTERVENTIONAL RECOMMENDATIONS: ASA and Plavix for 1 year  Prepared and signed by  Lizeth Ny M.D.  Signed 05/15/2014 17:54:55  HEMODYNAMIC TABLES  Pressures:  Baseline/ Room Air  Pressures:  - HR: 77  Pressures:  - Rhythm:  Pressures:  -- Aortic Pressure (S/D/M): 121/75/93  Pressures:  -- Left Ventricle (s/edp): 115/19/--  Pressures:  Intervention  Pressures:  - HR: 71  Pressures: - Rhythm:  Pressures:  -- Aortic Pressure (S/D/M): 126/84/100  Pressures:  Post Angio  Pressures:  - HR: 87  Pressures:  - Rhythm:  Pressures:  -- Aortic Pressure (S/D/M): 132/87/103  Pressures:  -- Left Ventricle (s/edp): 135/16/--  Outputs:  Baseline/ Room Air  Outputs:  -- CALCULATIONS[age]  Outputs:  -- CALCULATIONS: Body Surface Area: 1.91  Outputs:  -- CALCULATIONS: Height in cm: 170.00  Outputs:  -- CALCULATIONS: Sex: Male  Outputs:  -- CALCULATIONS: Weight in k.40    < end of copied text >    [ ] Stress Test:  	  	< from: Nuclear Stress Test-Pharmacologic (10.29.14 @ 11:00) >    PATIENT: KEILA VAUGHAN  : 1935   AGE: 79 (M)   MR#: [id number]  STUDY DATE: 10/29/2014  LOCATION: O/P  REF. PHYSICIAN(S): LIZETH NY MD  FELLOW: Daniel Nicole M.D.  ------------------------------------------------------------------------  TYPE OF TEST: Rest/Stress Pharmacologic  INDICATION: Coronary Artery Disease (414.9)  ------------------------------------------------------------------------  HISTORY:  CARDIAC HISTORY: 79 year old male with known CAD,  PTCA/STENT, atrial fibrillation who presents for ischemic  evaluation pending rotator cuff surgery.  RISK FACTORS: Elevated Cholesterol, Hypertension  MEDICATIONS: rosuvastatin, aspirin, toprol, dabigatran,  flomax, synthroid, tramadol  ------------------------------------------------------------------------  BASELINE ELECTROCARDIOGRAM:  Rhythm: Atrial Fibrillation - 69 BPM  ST: Nonspecific ST-T wave abnormality.  ------------------------------------------------------------------------  HEMODYNAMIC PARAMETERS:                         HR      BP  Baseline  Pre-Injection             69  118/85  00:00     Inject Regadenoson         0  00:30     Post Injection             0  01:00     Post Injection            70  125/82  02:00     Post Injection            62616/62  03:00     Post Injection            77  110/78  04:00     Post Injection            78  120/81  05:00     Recovery                  75  119/77  06:00     Recovery                  76  115/78  07:00     Recovery                  73  109/72  08:00     Recovery                  78  113/70  ------------------------------------------------------------------------  Agent: Regadenoson 0.4 mg/5 ml NS. injected over 10 sec.  HR: Baseline HR: 69 bpm   Peak HR: 87 bpm (61% of MPHR)  MPHR: 142 bpm   85% of MPHR: 121 bpm  BP: Baseline BP: 118/85 mmHg   Peak BP: 125/82 mmHg   Peak  RPP: 79982 (Rate Pressure Product)  Last Caffeine intake: 12 hrs  Terminated: Completion of protocol  ------------------------------------------------------------------------  SYMPTOMS/FINDINGS:  Symptoms: No Symptom  Chest pain: No chest pain with administration of  Regadenoson  ------------------------------------------------------------------------  ECG ABNORMALITIES DURING/AFTER STRESS:   ST Changes:No ischemic ST segment changes.  ------------------------------------------------------------------------  NEW ARRHYTHMIAS DEVELOPED DURING/AFTER STRESS:  Single VPD occurred during stress.  ------------------------------------------------------------------------  STRESS TEST IMPRESSIONS:  Chest Pain: No chest pain with administration of  Regadenoson.  Symptom: No Symptom.  HR Response: Appropriate.  BP Response: Appropriate.  Heart Rhythm: Atrial Fibrillation - 69 BPM.  Baseline ECG: Nonspecific ST-T wave abnormality.  ECG Changes: No ischemic ST segment changes.  Arrhythmia: Single VPD occurred during stress.  ------------------------------------------------------------------------  PROCEDURE:  7.3 mCi of Tc99m Sestamibi were injected intravenously at  rest. Approximately 70 minutes later, tomographic images  were obtained in a 180 degree arc from right anterior  oblique to left anterior oblique with 64 stops. At a  separate time, 21 mCi of Tc99m Sestamibi were injected  intravenously during stress protocol. Approximately 90  minute(s) later, tomographic images were obtained in a 180  degree arc from right anterior oblique to left anterior  oblique with 64 stops. The tomographic slices were  reconstructed in 3 orthogonal planes (short axis,  horizontal long axis and vertical long axis).  Interpretation was performed both by visual and  quantitative analysis.  Rest and stress images were acquired using CZT-based  system with pinhole collimation ([employer], [employer]), and reconstructed using MLEM algorithm.  Images were re-acquired with the patient in a prone  position.  ------------------------------------------------------------------------  NUCLEAR FINDINGS:  Review of raw data shows: The study is of good technical  quality.  The left ventricle was normal in size. There is a  medium-sized, mild defect in the inferior wall that is  fixed, predominantly corrects with prone imaging, and  demonstrates normal wall motion suggestive of  diaphragmatic attenuation artifact.  ------------------------------------------------------------------------  GATED ANALYSIS:  Post-stress gated wall motion analysis was performed (LVEF  = 68 %;LVEDV = 69 ml.), revealing normal LV function.  ------------------------------------------------------------------------  IMPRESSIONS:Normal Study  * Chest Pain: No chest pain with administration of  Regadenoson.  * Symptom: No Symptom.  * HR Response: Appropriate.  * BP Response: Appropriate.  * Heart Rhythm: Atrial Fibrillation - 69 BPM.  * Baseline ECG: Nonspecific ST-T wave abnormality.  * ECG Changes: No ischemic ST segment changes.  * Arrhythmia: Single VPD occurred during stress.  * Review of raw data shows: The study is of good technical  quality.  * The left ventricle was normal in size. There is a  medium-sized, mild defect in the inferior wall that is  fixed, predominantly corrects with prone imaging, and  demonstrates normal wall motion suggestive of  diaphragmatic attenuation artifact.  * Post-stress gated wall motion analysis was performed  (LVEF = 68 %;LVEDV = 69 ml.), revealing normal LV  function.  ------------------------------------------------------------------------  Confirmed on  10/29/2014 - 16:40:54 by Cm Washington M.D.  -----------------------------------------------------    < end of copied text >

## 2017-08-09 NOTE — PROGRESS NOTE ADULT - SUBJECTIVE AND OBJECTIVE BOX
Patient is a 82y old  Male who presents with a chief complaint of "spinal fusion with some exploration and removal of hardware from previous lumbar surgery" (17 Jul 2017 13:16)      SUBJECTIVE / OVERNIGHT EVENTS:  Patient has no new complaints. Denies cp, SOB, abdominal pain, N/V/D       MEDICATIONS  (STANDING):  chlorhexidine 4% Liquid 1 Application(s) Topical daily  aspirin  chewable 81 milliGRAM(s) Oral daily  heparin  Injectable 5000 Unit(s) SubCutaneous every 8 hours  dextrose 5% + sodium chloride 0.45%. 1000 milliLiter(s) (30 mL/Hr) IV Continuous <Continuous>  pantoprazole  Injectable 40 milliGRAM(s) IV Push daily  ALBUTerol/ipratropium for Nebulization. 3 milliLiter(s) Nebulizer once  diltiazem Infusion 8 mG/Hr (8 mL/Hr) IV Continuous <Continuous>  furosemide   Injectable 40 milliGRAM(s) IV Push two times a day    MEDICATIONS  (PRN):  ondansetron Injectable 4 milliGRAM(s) IV Push every 6 hours PRN Nausea and/or Vomiting  morphine  - Injectable 2 milliGRAM(s) IV Push every 4 hours PRN moderate to severe pain  traMADol 50 milliGRAM(s) Oral every 4 hours PRN Mild Pain (1 - 3)      Vital Signs Last 24 Hrs  T(C): 36.6 (09 Aug 2017 05:55), Max: 36.8 (08 Aug 2017 21:01)  T(F): 97.8 (09 Aug 2017 05:55), Max: 98.2 (08 Aug 2017 21:01)  HR: 84 (09 Aug 2017 05:55) (61 - 140)  BP: 95/52 (09 Aug 2017 05:55) (95/52 - 136/92)  BP(mean): --  RR: 20 (09 Aug 2017 05:55) (18 - 30)  SpO2: 99% (09 Aug 2017 05:55) (84% - 100%)  CAPILLARY BLOOD GLUCOSE        I&O's Summary    08 Aug 2017 07:01  -  09 Aug 2017 07:00  --------------------------------------------------------  IN: 522 mL / OUT: 4572.5 mL / NET: -4050.5 mL    09 Aug 2017 07:01  -  09 Aug 2017 08:49  --------------------------------------------------------  IN: 38 mL / OUT: 0 mL / NET: 38 mL        PHYSICAL EXAM:  GENERAL: NAD, well-developed  HEAD:  Atraumatic, Normocephalic  EYES: EOMI, PERRLA, conjunctiva and sclera clear  NECK: Supple, No JVD  CHEST/LUNG: decreased BS B/L; mild bibasilar crackles. No wheeze  HEART: irregular rate and rhythm; No murmurs, rubs, or gallops  ABDOMEN: Soft, Nontender, Nondistended; Bowel sounds present  EXTREMITIES:  2+ Peripheral Pulses, No clubbing, cyanosis, or edema  PSYCH: AAOx3  NEUROLOGY: non-focal  SKIN: No rashes or lesions    LABS:                        8.4    10.17 )-----------( 161      ( 08 Aug 2017 12:17 )             24.9     08-08    139  |  107  |  23  ----------------------------<  116<H>  3.8   |  24  |  0.51    Ca    7.3<L>      08 Aug 2017 12:17  Phos  2.6     08-08  Mg     1.7     08-08      PT/INR - ( 08 Aug 2017 12:12 )   PT: 12.8 SEC;   INR: 1.14          PTT - ( 08 Aug 2017 12:12 )  PTT:19.4 SEC  CARDIAC MARKERS ( 09 Aug 2017 00:30 )  x     / 0.16 ng/mL / x     / x     / x              RADIOLOGY & ADDITIONAL TESTS:    Imaging Personally Reviewed: < from: CT Angio Chest w/ IV Cont (08.08.17 @ 15:04) >  No pulmonary arterial emboli.    Moderate to large sized bilateral pleural effusions with bibasilar   atelectasis.    Bilateral patchy groundglass opacities probably related to pulmonary   edema or fluid overload given cardiomegaly and the pleural effusions.    < end of copied text >  < from: Xray Chest 1 View AP-PORTABLE IMMEDIATE (08.08.17 @ 13:18) >  Enteric tube has been removed. Orthopedic surgical hardware of the spine   is revisualized.  Prominent interstitial lung markings consistent with pulmonary edema.   Bilateral pleural effusions appear unchanged. There is worsening right   basilar opacification, consistent with pneumonia versus passive   atelectasis versus pulmonary edema.  No pneumothorax.  The cardiomediastinal silhouette size cannot be accurately assessed on   this projection.    < end of copied text >      Consultant(s) Notes Reviewed:      Care Discussed with Consultants/Other Providers: Ortho

## 2017-08-09 NOTE — CONSULT NOTE ADULT - ASSESSMENT
82yoM with HTN,HLD,CAD (YAO pLAD 2014) , Afib on pradaxa, metoprolol as outpt. Admitted for spinal surgery, cardiology consulted for afib with Vrate 140, troponin 0.16.     Afib  - home metoprolol 25xl notably held, may be contributing to tachycardia, would resume.   - would try to wean off cardizem and trasition to metoprolol.     ADHF  - now improved, patient has no history of HF, would check TTE given new symptoms.     Troponin elevation  - troponin 0.16 to 0.14  - agree this is likely not acs, however given his recent revascularization and normal creatinine, it is concerning for CAD. Would like to see echo and if any new wall motion abnormalities are present.     - general cardiology consult to continue to follow  - thank you for this interesting consult.   - For any questions please call [phone] 82yoM with HTN,HLD,CAD (YAO pLAD 2014) , Afib on pradaxa, metoprolol as outpt. Admitted for spinal surgery, cardiology consulted for afib with Vrate 140, troponin 0.16.     Afib  - home metoprolol 25xl notably held, may be contributing to tachycardia, would resume.   - would try to wean off cardizem and trasition to metoprolol.   -continue AC    ADHF  - now improved, patient has no history of HF, would check TTE given new symptoms.   - continue IV lasix 40mg   - stop IV fluids     Troponin elevation  - troponin 0.16 to 0.14  - agree this is likely not acs, however given his recent revascularization and normal creatinine, it is concerning for CAD. Would like to see echo and if any new wall motion abnormalities are present.     - general cardiology consult to continue to follow  - thank you for this interesting consult.   - For any questions please call [phone]

## 2017-08-09 NOTE — SWALLOW VFSS/MBS ASSESSMENT ADULT - RECOMMENDED CONSISTENCY
Dysphagia 1 with honey thick fluids    ****Patient to alternate puree with honey thick fluids, as patient to take on one spoon of puree and then one sip of honey thick fluids and follow that pattern through out all meals/snacks***

## 2017-08-09 NOTE — PROGRESS NOTE ADULT - PROBLEM SELECTOR PLAN 1
clinically improved.  continue Lasix 40mg IV bid  monitoring I&0s  troponin leak most likely secondary to chf and tachycardia.  Consult cardiology clinically improved.  continue Lasix 40mg IV bid  monitoring I&0s  repeat CXR tomorrow.  troponin leak most likely secondary to chf and tachycardia.  Consult cardiology

## 2017-08-09 NOTE — SWALLOW VFSS/MBS ASSESSMENT ADULT - DIAGNOSTIC IMPRESSIONS
1. Mild-moderate oral dysphagia given puree, regular solids, and honey thick and nectar thick fluids marked by poor bolus formation resulting in piecemeal deglutition.  Premature spillage was noted to the level of the valleculae.  Patient with increased mastication given regular solids.  2. Moderate pharyngeal dysphagia given puree, solids, and honey thick fluids marked by delay in the trigger of the swallow and reduced laryngeal excursion and epiglottic deflection.  Additionally, patient with reduced BOT retraction and hypopharyngeal contractility as evidenced by stasis.  Moderate amount of stasis was noted in the valleculae and posterior pharyngeal wall, as a result a honey thick liquid wash was given which aided in clearing most of the stasis.  Greater stasis was evident given solids as compared to puree.  No laryngeal penetration or aspiration was noted through out the swallow, however.  3. Moderate-severe pharyngeal dysphagia given nectar thick fluids marked by delay in the trigger of the swallow, reduced laryngeal excursion, reduced epiglottic deflection, reduced BOT retraction, and reduced hypopharyngeal contractility.  Mild amount of stasis was noted in the valleculae and posterior pharyngeal wall.  Silent laryngeal penetration was noted during the swallow.

## 2017-08-09 NOTE — PROGRESS NOTE ADULT - ASSESSMENT
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 He lost 2 Liters blood intraoperatively with post op hypotension requiring pressors in SICU. Patient transferred out of SICU but was confused and pulled out his NGT that night. He was made NPO but not restarted on Cardizem. Yesterday, patient went into rapid Afib w/ RVR in 140s c/b acute respiratory failure secondary to acute pulmonary edema/B/L Pleural effusions.

## 2017-08-10 ENCOUNTER — TRANSCRIPTION ENCOUNTER (OUTPATIENT)
Age: 82
End: 2017-08-10

## 2017-08-10 DIAGNOSIS — R13.19 OTHER DYSPHAGIA: ICD-10-CM

## 2017-08-10 LAB — TROPONIN T SERPL-MCNC: 0.13 NG/ML — HIGH (ref 0–0.06)

## 2017-08-10 PROCEDURE — 71010: CPT | Mod: 26

## 2017-08-10 PROCEDURE — 99233 SBSQ HOSP IP/OBS HIGH 50: CPT

## 2017-08-10 RX ORDER — ASPIRIN/CALCIUM CARB/MAGNESIUM 324 MG
1 TABLET ORAL
Qty: 0 | Refills: 0 | COMMUNITY
Start: 2017-08-10

## 2017-08-10 RX ORDER — DABIGATRAN ETEXILATE MESYLATE 150 MG/1
150 CAPSULE ORAL EVERY 12 HOURS
Qty: 0 | Refills: 0 | Status: DISCONTINUED | OUTPATIENT
Start: 2017-08-10 | End: 2017-08-15

## 2017-08-10 RX ORDER — METOPROLOL TARTRATE 50 MG
25 TABLET ORAL DAILY
Qty: 0 | Refills: 0 | Status: DISCONTINUED | OUTPATIENT
Start: 2017-08-10 | End: 2017-08-12

## 2017-08-10 RX ADMIN — PANTOPRAZOLE SODIUM 40 MILLIGRAM(S): 20 TABLET, DELAYED RELEASE ORAL at 12:32

## 2017-08-10 RX ADMIN — MORPHINE SULFATE 2 MILLIGRAM(S): 50 CAPSULE, EXTENDED RELEASE ORAL at 09:39

## 2017-08-10 RX ADMIN — HEPARIN SODIUM 5000 UNIT(S): 5000 INJECTION INTRAVENOUS; SUBCUTANEOUS at 05:35

## 2017-08-10 RX ADMIN — DABIGATRAN ETEXILATE MESYLATE 150 MILLIGRAM(S): 150 CAPSULE ORAL at 17:59

## 2017-08-10 RX ADMIN — Medication 81 MILLIGRAM(S): at 12:33

## 2017-08-10 RX ADMIN — Medication 40 MILLIGRAM(S): at 17:59

## 2017-08-10 RX ADMIN — MORPHINE SULFATE 2 MILLIGRAM(S): 50 CAPSULE, EXTENDED RELEASE ORAL at 10:09

## 2017-08-10 RX ADMIN — Medication 40 MILLIGRAM(S): at 05:35

## 2017-08-10 RX ADMIN — Medication 25 MILLIGRAM(S): at 05:37

## 2017-08-10 NOTE — PROVIDER CONTACT NOTE (OTHER) - RECOMMENDATIONS
Ortho team contacted two times to come up and reinsert NG tube.
No intervention required
Retake blood

## 2017-08-10 NOTE — PROGRESS NOTE ADULT - ASSESSMENT
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 He lost 2 Liters blood intraoperatively with post op hypotension requiring pressors in SICU. Patient transferred out of SICU but was confused and pulled out his NGT that night. He was made NPO but not restarted on Cardizem. Yesterday, patient went into rapid Afib w/ RVR in 140s c/b acute respiratory failure secondary to acute pulmonary edema/B/L Pleural effusions. 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 He lost 2 Liters blood intraoperatively with post op hypotension requiring pressors in SICU. Patient transferred out of SICU but was confused and pulled out his NGT that night. He was made NPO but not restarted on Cardizem. Patient went into rapid Afib w/ RVR in 140s c/b acute respiratory failure secondary to acute pulmonary edema/B/L Pleural effusions. Now clinically resolving.

## 2017-08-10 NOTE — PROGRESS NOTE ADULT - ASSESSMENT
82yoM with HTN,HLD,CAD (YAO pLAD 2014) , Afib on pradaxa, metoprolol as outpt. Admitted for spinal surgery, cardiology consulted for afib with Vrate 140, troponin 0.16.     Afib  - now rate controlled.   - stop cardizem  -continue AC    ADHF  - improved, likely 2/2 volume shifts in OR  - continue IV lasix 40mg for today  - tte pending. If unchanged from baseline (normal), no further cardiac testing.   - general cardiology teaching service to continue to follow  - For any questions please call [phone]

## 2017-08-10 NOTE — DISCHARGE NOTE ADULT - CARE PROVIDER_API CALL
Daniel Titus), Orthopaedic Surgery  [address] [place] [address]  Phone: [phone]  Fax: [phone] Daniel Titus), Orthopaedic Surgery  [address] [facility]  [address]  [place] [address]  Phone: [phone]  Fax: [phone]    Kevin Ny), Cardiovascular Disease; Interventional Cardiology  [address] [place] [address]  Phone: [phone]  Fax: [phone]

## 2017-08-10 NOTE — DISCHARGE NOTE ADULT - CONDITIONS AT DISCHARGE
Patient remained hemodynamically stable. Patient denies chest pain, shortness of breath and palpitations. Patient shows no signs of distress. Patient discharged to rehab

## 2017-08-10 NOTE — PROVIDER CONTACT NOTE (OTHER) - SITUATION
Patient came up from SICU with NG tube and tube feedings ordered. Patient pulled out NG tube
Has had high troponin levels. Latest came back 0.13. Previous 0.14.
Patient troponin level 0.16

## 2017-08-10 NOTE — PROGRESS NOTE ADULT - PROBLEM SELECTOR PLAN 4
HR now stable after s/p cardizem drip   wean off PO cardizem and restart Metoprolol  c/w pradaxa HR now stable after s/p cardizem drip   wean off PO cardizem . Decreased Cardizem to 30mg q12hr.   Toprol XL 25 mg qdaily restarted  c/w pradaxa

## 2017-08-10 NOTE — DISCHARGE NOTE ADULT - HOSPITAL COURSE
Patient is s/p HANNA, T11-L1 Laminectomy, T4-Pelvis instrumentation/in situ 8/8/17. Patient tolerated the procedure well without any intraoperative complications. Patient tolerated physical therapy well and pain is controlled. Seen by medical attending for continuity of care and management and cleared for safe discharge. As per surgeon patient stable and ready for discharge.     Please follow up with  in 1-2 weeks. Call office to make an appointment. Please follow up with your PMD for continuity of care and management as medications may have changed. Do not take any NSAIDS (motrin, advil, ibuprofren, aleve), Aspirin, Antiinflammatory medications unless instructed by your orthopaedic surgeon to continue. Avoid any heavy lifting, bending, squatting, twisting motion. Keep dressing/incision clean, dry, intact. Any suture/staples to be removed on post op day # 14 at office visit. Weight bear as tolerated. Patient is s/p HANNA, T11-L1 Laminectomy, T4-Pelvis instrumentation/in situ 8/1/17. Patient tolerated the procedure well without any intraoperative complications. Post operatively, patient placed on telemetry for episode of rapid afib. Patient stabilized and seen by Dr Ny -Cardiologist and Hospitalist for continued care and management. As per Hospitalist, medications reviewed and patient to be sent out on Metoprolol 50 mg ER QD. Patient was also evaluated by Speech and swallow specialist for difficulty swallowing and placed on a Honey Consistency Liquid Diet with alternate sips of food and water. Patient tolerated physical therapy well and pain is controlled. Seen by medical attending for continuity of care and management and cleared for safe discharge. As per Dr Chacon, Dr Titus, and Dr Ny patient is stable and ready for discharge.     Please follow up with Dr. Titus in 1-2 weeks. Call office to make an appointment. Please follow up with your PMD for continuity of care and management as medications may have changed. Do not take any NSAIDS (motrin, advil, ibuprofren, aleve), Antiinflammatory medications unless instructed by your orthopaedic surgeon to continue. Avoid any heavy lifting, bending, squatting, twisting motion. Keep dressing/incision clean, dry, intact. Any suture/staples to be removed on post op day # 14 at office visit. Weight bear as tolerated. Patient is s/p HANNA, T11-L1 Laminectomy, T4-Pelvis instrumentation/in situ 8/1/17. Patient tolerated the procedure well without any intraoperative complications. Post operatively, patient placed on telemetry for episode of rapid afib. Patient stabilized and seen by Dr Ny -Cardiologist and Hospitalist for continued care and management. As per Hospitalist, medications reviewed and patient to be sent out on Metoprolol 50 mg ER QD. Patient was also evaluated by Speech and swallow specialist for difficulty swallowing and placed on a Honey Consistency Liquid Diet with alternate sips of food and water. Patient tolerated physical therapy well and pain is controlled. Seen by medical attending for continuity of care and management and cleared for safe discharge. As per Dr Chacon, Dr Titus, and Dr Ny patient is stable and ready for discharge.     Please follow up with Dr. Titus in 1-2 weeks. Call office to make an appointment. Please follow up with your PMD for continuity of care and management as medications may have changed. Do not take any NSAIDS (motrin, advil, ibuprofren, aleve), Antiinflammatory medications unless instructed by your orthopaedic surgeon to continue. Avoid any heavy lifting, bending, squatting, twisting motion. Keep dressing/incision clean, dry, intact. Any suture/staples to be removed on post op day # 14 at office visit. Weight bear as tolerated. Please follow up with cardiologist Dr Ny for continued care and management as medications may have been altered.

## 2017-08-10 NOTE — DISCHARGE NOTE ADULT - MEDICATION SUMMARY - MEDICATIONS TO TAKE
I will START or STAY ON the medications listed below when I get home from the hospital:    oxyCODONE 5 mg oral capsule  -- 1 cap(s) by mouth every 6 hours, As Needed for pain   -- Indication: For pain med    aspirin 81 mg oral tablet, chewable  -- 1 tab(s) by mouth once a day  -- Indication: For Home med    traMADol 50 mg oral tablet  -- 1 tab(s) by mouth 2 times a day (after meals)  -- Indication: For pain med    Butrans 5 mcg/hr transdermal film, extended release  -- 1 patch by transdermal patch once a week  -- Indication: For Home med    Tylenol 8 Hour 650 mg oral tablet, extended release  -- 2 tab(s) by mouth every 8 hours, As Needed  -- Indication: For Home med    tamsulosin 0.4 mg oral capsule  -- 1 cap(s) by mouth once a day in pm  -- Indication: For Home med    Pradaxa 150 mg oral capsule  -- 1 cap(s) by mouth 2 times a day.  Pending stop date from cardiologist  -- Indication: For Home med     Toprol-XL 25 mg oral tablet, extended release  -- 1 tab(s) by mouth once a day  in pm  -- Indication: For Home med    docusate sodium 100 mg oral capsule  -- 1 cap(s) by mouth 3 times a day  -- Indication: For Home med    polyethylene glycol 3350 oral powder for reconstitution  -- 17 gram(s) by mouth once a day  -- Indication: For Home med    omeprazole 20 mg oral delayed release capsule  -- 1 cap(s) by mouth once a day, As Needed  -- Indication: For Home med I will START or STAY ON the medications listed below when I get home from the hospital:    aspirin 81 mg oral tablet, chewable  -- 1 tab(s) by mouth once a day  -- Indication: For Home med    oxyCODONE 5 mg oral capsule  -- 1 cap(s) by mouth every 6 hours, As Needed for pain   -- Indication: For pain med    traMADol 50 mg oral tablet  -- 1 tab(s) by mouth 2 times a day (after meals)  -- Indication: For pain med    tamsulosin 0.4 mg oral capsule  -- 1 cap(s) by mouth once a day in pm  -- Indication: For Home med    Pradaxa 150 mg oral capsule  -- 1 cap(s) by mouth 2 times a day.  Pending stop date from cardiologist  -- Indication: For Home med     metoprolol succinate 50 mg oral tablet, extended release  -- 1 tab(s) by mouth once a day  -- Indication: For HTN     docusate sodium 100 mg oral capsule  -- 1 cap(s) by mouth 3 times a day  -- Indication: For Stool softener     polyethylene glycol 3350 oral powder for reconstitution  -- 17 gram(s) by mouth once a day  -- Indication: For Soft softener     omeprazole 20 mg oral delayed release capsule  -- 1 cap(s) by mouth once a day, As Needed  -- Indication: For GERD

## 2017-08-10 NOTE — PROGRESS NOTE ADULT - PROBLEM SELECTOR PLAN 1
clinically improved.  continue Lasix 40mg IV bid  monitoring I&0s  repeat CXR today  troponin leak most likely secondary to chf and tachycardia.  cardiology following. clinically improved.  continue Lasix 40mg IV bid  monitoring I&0s  repeat CXR today  check TTE  troponin leak most likely secondary to chf and tachycardia.  cardiology following.

## 2017-08-10 NOTE — DISCHARGE NOTE ADULT - PATIENT PORTAL LINK FT
“You can access the [facility] Patient Portal, offered by [facility], by registering with the following website: [email or web address]”

## 2017-08-10 NOTE — DISCHARGE NOTE ADULT - MEDICATION SUMMARY - MEDICATIONS TO CHANGE
I will SWITCH the dose or number of times a day I take the medications listed below when I get home from the hospital:  None I will SWITCH the dose or number of times a day I take the medications listed below when I get home from the hospital:    Toprol-XL 25 mg oral tablet, extended release  -- 1 tab(s) by mouth once a day  in pm

## 2017-08-10 NOTE — PROGRESS NOTE ADULT - SUBJECTIVE AND OBJECTIVE BOX
Patient is a 82y old  Male who presents with a chief complaint of "spinal fusion with some exploration and removal of hardware from previous lumbar surgery" (17 Jul 2017 13:16)      SUBJECTIVE / OVERNIGHT EVENTS:  Patient has no new complaints. Denies cp, SOB, abdominal pain, N/V/D.    MEDICATIONS  (STANDING):  chlorhexidine 4% Liquid 1 Application(s) Topical daily  aspirin  chewable 81 milliGRAM(s) Oral daily  pantoprazole  Injectable 40 milliGRAM(s) IV Push daily  ALBUTerol/ipratropium for Nebulization. 3 milliLiter(s) Nebulizer once  furosemide   Injectable 40 milliGRAM(s) IV Push two times a day  diltiazem    Tablet 30 milliGRAM(s) Oral every 6 hours  metoprolol succinate ER 25 milliGRAM(s) Oral daily  dabigatran 150 milliGRAM(s) Oral every 12 hours    MEDICATIONS  (PRN):  ondansetron Injectable 4 milliGRAM(s) IV Push every 6 hours PRN Nausea and/or Vomiting  morphine  - Injectable 2 milliGRAM(s) IV Push every 4 hours PRN moderate to severe pain  traMADol 50 milliGRAM(s) Oral every 4 hours PRN Mild Pain (1 - 3)      Vital Signs Last 24 Hrs  T(C): 36.7 (10 Aug 2017 07:55), Max: 36.7 (09 Aug 2017 23:45)  T(F): 98.1 (10 Aug 2017 07:55), Max: 98.1 (10 Aug 2017 07:55)  HR: 84 (10 Aug 2017 07:55) (81 - 100)  BP: 105/75 (10 Aug 2017 07:55) (99/58 - 129/63)  BP(mean): --  RR: 16 (10 Aug 2017 07:55) (16 - 18)  SpO2: 99% (10 Aug 2017 07:55) (97% - 100%)  CAPILLARY BLOOD GLUCOSE        I&O's Summary    09 Aug 2017 07:01  -  10 Aug 2017 07:00  --------------------------------------------------------  IN: 220 mL / OUT: 2467 mL / NET: -2247 mL        PHYSICAL EXAM:  GENERAL: NAD, well-developed  HEAD:  Atraumatic, Normocephalic  EYES: EOMI, PERRLA, conjunctiva and sclera clear  NECK: Supple, No JVD  CHEST/LUNG: decreased BS bilaterally; No wheeze  HEART: irregular rate and rhythm; No murmurs, rubs, or gallops  ABDOMEN: Soft, Nontender, Nondistended; Bowel sounds present  EXTREMITIES:  2+ Peripheral Pulses, No clubbing, cyanosis, or edema  PSYCH: AAOx3  NEUROLOGY: non-focal  SKIN: No rashes or lesions    LABS:                        8.7    9.34  )-----------( 195      ( 09 Aug 2017 12:14 )             26.6     08-09    140  |  104  |  26<H>  ----------------------------<  107<H>  3.9   |  24  |  0.70    Ca    7.7<L>      09 Aug 2017 12:14  Phos  2.6     08-08  Mg     1.7     08-08      PT/INR - ( 08 Aug 2017 12:12 )   PT: 12.8 SEC;   INR: 1.14          PTT - ( 08 Aug 2017 12:12 )  PTT:19.4 SEC  CARDIAC MARKERS ( 10 Aug 2017 01:50 )  x     / 0.13 ng/mL / x     / x     / x      CARDIAC MARKERS ( 09 Aug 2017 18:41 )  x     / 0.14 ng/mL / x     / x     / x      CARDIAC MARKERS ( 09 Aug 2017 12:14 )  x     / 0.12 ng/mL / x     / x     / x      CARDIAC MARKERS ( 09 Aug 2017 06:05 )  x     / 0.14 ng/mL / 180 u/L / 2.59 ng/mL / x      CARDIAC MARKERS ( 09 Aug 2017 00:30 )  x     / 0.16 ng/mL / x     / x     / x              RADIOLOGY & ADDITIONAL TESTS:    Imaging Personally Reviewed:    Consultant(s) Notes Reviewed:      Care Discussed with Consultants/Other Providers: Ortho

## 2017-08-10 NOTE — DISCHARGE NOTE ADULT - ADDITIONAL INSTRUCTIONS
Patient is s/p HANNA, T11-L1 Laminectomy, T4-Pelvis instrumentation/in situ 8/8/17. Patient tolerated the procedure well without any intraoperative complications. Patient tolerated physical therapy well and pain is controlled. Seen by medical attending for continuity of care and management and cleared for safe discharge. As per surgeon patient stable and ready for discharge.     Please follow up with  in 1-2 weeks. Call office to make an appointment. Please follow up with your PMD for continuity of care and management as medications may have changed. Do not take any NSAIDS (motrin, advil, ibuprofren, aleve), Aspirin, Antiinflammatory medications unless instructed by your orthopaedic surgeon to continue. Avoid any heavy lifting, bending, squatting, twisting motion. Keep dressing/incision clean, dry, intact. Any suture/staples to be removed on post op day # 14 at office visit. Weight bear as tolerated.

## 2017-08-10 NOTE — DISCHARGE NOTE ADULT - CARE PLAN
Principal Discharge DX:	Spinal stenosis of lumbosacral region  Goal:	pain control, surgical site healing, ambulation, return to ADL's  Instructions for follow-up, activity and diet:	Patient is s/p HANNA, T11-L1 Laminectomy, T4-Pelvis instrumentation/in situ 8/8/17. Patient tolerated the procedure well without any intraoperative complications. Patient tolerated physical therapy well and pain is controlled. Seen by medical attending for continuity of care and management and cleared for safe discharge. As per surgeon patient stable and ready for discharge.     Please follow up with  in 1-2 weeks. Call office to make an appointment. Please follow up with your PMD for continuity of care and management as medications may have changed. Do not take any NSAIDS (motrin, advil, ibuprofren, aleve), Aspirin, Antiinflammatory medications unless instructed by your orthopaedic surgeon to continue. Avoid any heavy lifting, bending, squatting, twisting motion. Keep dressing/incision clean, dry, intact. Any suture/staples to be removed on post op day # 14 at office visit. Weight bear as tolerated. Principal Discharge DX:	Spinal stenosis of lumbosacral region  Goal:	pain control, surgical site healing, ambulation, return to ADL's  Instructions for follow-up, activity and diet:	Patient is s/p HANNA, T11-L1 Laminectomy, T4-Pelvis instrumentation/in situ 8/1/17. Patient tolerated the procedure well without any intraoperative complications. Post operatively, patient placed on telemetry for episode of rapid afib. Patient stabilized and seen by Dr Ny -Cardiologist and Hospitalist for continued care and management. As per Hospitalist, medications reviewed and patient to be sent out on Metoprolol 50 mg ER QD. Patient was also evaluated by Speech and swallow specialist for difficulty swallowing and placed on a Honey Consistency Liquid Diet with alternate sips of food and water. Patient tolerated physical therapy well and pain is controlled. Seen by medical attending for continuity of care and management and cleared for safe discharge. As per Dr Chacon, Dr Titus, and Dr Ny patient is stable and ready for discharge.     Please follow up with Dr. Titus in 1-2 weeks. Call office to make an appointment. Please follow up with your PMD for continuity of care and management as medications may have changed. Do not take any NSAIDS (motrin, advil, ibuprofren, aleve), Antiinflammatory medications unless instructed by your orthopaedic surgeon to continue. Avoid any heavy lifting, bending, squatting, twisting motion. Keep dressing/incision clean, dry, intact. Any suture/staples to be removed on post op day # 14 at office visit. Weight bear as tolerated. Principal Discharge DX:	Spinal stenosis of lumbosacral region  Goal:	pain control, surgical site healing, ambulation, return to ADL's  Instructions for follow-up, activity and diet:	Patient is s/p HANNA, T11-L1 Laminectomy, T4-Pelvis instrumentation/in situ 8/1/17. Patient tolerated the procedure well without any intraoperative complications. Post operatively, patient placed on telemetry for episode of rapid afib. Patient stabilized and seen by Dr Ny -Cardiologist and Hospitalist for continued care and management. As per Hospitalist, medications reviewed and patient to be sent out on Metoprolol 50 mg ER QD. Patient was also evaluated by Speech and swallow specialist for difficulty swallowing and placed on a Honey Consistency Liquid Diet with alternate sips of food and water. Patient tolerated physical therapy well and pain is controlled. Seen by medical attending for continuity of care and management and cleared for safe discharge. As per Dr Chacon, Dr Ttius, and Dr Ny patient is stable and ready for discharge.     Please follow up with Dr. Titus in 1-2 weeks. Call office to make an appointment. Please follow up with your PMD for continuity of care and management as medications may have changed. Do not take any NSAIDS (motrin, advil, ibuprofren, aleve), Antiinflammatory medications unless instructed by your orthopaedic surgeon to continue. Avoid any heavy lifting, bending, squatting, twisting motion. Keep dressing/incision clean, dry, intact. Any suture/staples to be removed on post op day # 14 at office visit. Weight bear as tolerated.  Please follow up with cardiologist Dr Ny for continued care and management as medications may have been altered. Principal Discharge DX:	Spinal stenosis of lumbosacral region  Goal:	pain control, surgical site healing, ambulation, return to ADL's  Instructions for follow-up, activity and diet:	Patient is s/p HANNA, T11-L1 Laminectomy, T4-Pelvis instrumentation/in situ 8/1/17. Patient tolerated the procedure well without any intraoperative complications. Post operatively, patient placed on telemetry for episode of rapid afib. Patient stabilized and seen by Dr Ny -Cardiologist and Hospitalist for continued care and management. As per Hospitalist, medications reviewed and patient to be sent out on Metoprolol 50 mg ER QD. Patient was also evaluated by Speech and swallow specialist for difficulty swallowing and placed on a Honey Consistency Liquid Diet with alternate sips of food and water. Patient tolerated physical therapy well and pain is controlled. Seen by medical attending for continuity of care and management and cleared for safe discharge. As per Dr Chacon, Dr Titus, and Dr Ny patient is stable and ready for discharge.     Please follow up with Dr. Titus in 1-2 weeks. Call office to make an appointment. Please follow up with your PMD for continuity of care and management as medications may have changed. Do not take any NSAIDS (motrin, advil, ibuprofren, aleve), Antiinflammatory medications unless instructed by your orthopaedic surgeon to continue. Avoid any heavy lifting, bending, squatting, twisting motion. Keep dressing/incision clean, dry, intact. Any suture/staples to be removed on post op day # 14 at office visit. Weight bear as tolerated.  Please follow up with cardiologist Dr Ny for continued care and management as medications may have been altered.

## 2017-08-10 NOTE — PROGRESS NOTE ADULT - SUBJECTIVE AND OBJECTIVE BOX
24H hour events: No acute events in last 24 hours. Patient seen in bed, states feels well, improved since admission.     MEDICATIONS:  aspirin  chewable 81 milliGRAM(s) Oral daily  furosemide   Injectable 40 milliGRAM(s) IV Push two times a day  metoprolol succinate ER 25 milliGRAM(s) Oral daily  dabigatran 150 milliGRAM(s) Oral every 12 hours  diltiazem    Tablet 30 milliGRAM(s) Oral every 12 hours      ALBUTerol/ipratropium for Nebulization. 3 milliLiter(s) Nebulizer once    ondansetron Injectable 4 milliGRAM(s) IV Push every 6 hours PRN  morphine  - Injectable 2 milliGRAM(s) IV Push every 4 hours PRN  traMADol 50 milliGRAM(s) Oral every 4 hours PRN    pantoprazole  Injectable 40 milliGRAM(s) IV Push daily      chlorhexidine 4% Liquid 1 Application(s) Topical daily      REVIEW OF SYSTEMS:  General: no fatigue/malaise, weight loss/gain.  Skin: no rashes.  Ophthalmologic: no blurred vision, no loss of vision. 	  ENT: no sore throat, rhinorrhea, sinus congestion.  Respiratory: no SOB, cough or wheeze.  Gastrointestinal:  no N/V/D, no melena/hematemesis/hematochezia.  Genitourinary: no dysuria/hesitancy or hematuria.  Musculoskeletal: no myalgias or arthralgias.  Neurological: no changes in vision or hearing, no lightheadedness/dizziness, no syncope/near syncope	  Psychiatric: no unusual stress/anxiety.   Hematology/Lymphatics: no unusual bleeding, bruising and no lymphadenopathy.  Endocrine: no unusual thirst.   All others negative except as stated above and in HPI.      PHYSICAL EXAM:  T(C): 36.7 (08-10-17 @ 07:55), Max: 36.7 (08-09-17 @ 23:45)  HR: 84 (08-10-17 @ 07:55) (81 - 100)  BP: 105/75 (08-10-17 @ 07:55) (99/58 - 129/63)  RR: 16 (08-10-17 @ 07:55) (16 - 18)  SpO2: 99% (08-10-17 @ 07:55) (97% - 100%)  Wt(kg): --  I&O's Summary    09 Aug 2017 07:01  -  10 Aug 2017 07:00  --------------------------------------------------------  IN: 220 mL / OUT: 2467 mL / NET: -2247 mL        Appearance: Normal	  HEENT:   Normal oral mucosa, PERRL, EOMI	  Lymphatic: No lymphadenopathy  Cardiovascular: Normal S1 S2, No JVD, No murmurs, No edema  Respiratory: Lungs clear to auscultation	  Psychiatry: A & O x 3, Mood & affect appropriate  Gastrointestinal:  Soft, Non-tender, + BS	  Skin: No rashes, No ecchymoses, No cyanosis	  Neurologic: Non-focal  Extremities: Normal range of motion, No clubbing, cyanosis or edema  Vascular: Peripheral pulses palpable 2+ bilaterally        LABS:	 	    CBC Full  -  ( 09 Aug 2017 12:14 )  WBC Count : 9.34 K/uL  Hemoglobin : 8.7 g/dL  Hematocrit : 26.6 %  Platelet Count - Automated : 195 K/uL  Mean Cell Volume : 93.3 fL  Mean Cell Hemoglobin : 30.5 pg  Mean Cell Hemoglobin Concentration : 32.7 %  Auto Neutrophil # : x  Auto Lymphocyte # : x  Auto Monocyte # : x  Auto Eosinophil # : x  Auto Basophil # : x  Auto Neutrophil % : x  Auto Lymphocyte % : x  Auto Monocyte % : x  Auto Eosinophil % : x  Auto Basophil % : x    08-09    140  |  104  |  26<H>  ----------------------------<  107<H>  3.9   |  24  |  0.70  08-08    139  |  107  |  23  ----------------------------<  116<H>  3.8   |  24  |  0.51    Ca    7.7<L>      09 Aug 2017 12:14  Ca    7.3<L>      08 Aug 2017 12:17  Phos  2.6     08-08  Mg     1.7     08-08        proBNP:   Lipid Profile:   HgA1c:   TSH:       CARDIAC MARKERS:            TELEMETRY: 	  Afib 90s

## 2017-08-10 NOTE — PROVIDER CONTACT NOTE (OTHER) - ASSESSMENT
Patient in no apparent distress. Breathing is normal VS stable.
Vital signs stable. Sleeping in bed.
Vitals stable. Sleeping in bed. Cardiac monitor on

## 2017-08-10 NOTE — PROGRESS NOTE ADULT - SUBJECTIVE AND OBJECTIVE BOX
ORTHO-SPINE PA PROGRESS NOTE     KEILA VAUGHAN 82y Male is now s/p HANNA T11-L1 lami T4-Plevis instrumentation in situ fusion POD #9. Pt was seen and evaluated at bedside by  and the spine team. Pain well controlled, pt denies any cp/sob/n/v/ha at this time.     Vital Signs Last 24 Hrs  T(C): 36.6 (10 Aug 2017 05:09), Max: 36.9 (09 Aug 2017 08:51)  T(F): 97.8 (10 Aug 2017 05:09), Max: 98.5 (09 Aug 2017 08:51)  HR: 100 (10 Aug 2017 05:09) (78 - 100)  BP: 129/63 (10 Aug 2017 05:09) (99/58 - 129/63)  BP(mean): --  RR: 17 (10 Aug 2017 05:09) (17 - 18)  SpO2: 97% (10 Aug 2017 05:09) (97% - 100%)                        8.7    9.34  )-----------( 195      ( 09 Aug 2017 12:14 )             26.6        PE:  Gen: NAD, comfortable  Spine:  B/L LE   Dressing C/D/I HV  In place, 40cc/shift   Motor:  R: EHL/FHL/TA/G/S intact 5/5   L: EHL/FHL/TA/G/S intact 5/5   Sensation: Intact to light touch bilaterally   Compartments soft compressible  Distal pulses present     A/P   KEILA VAUGHAN 82y Male is now s/p  HANNA T11-L1 lami T4-Plevis instrumentation in situ fusion POD #9. Pain well controlled denies any cp/sob/n/v/ha at this time.  -Pain control  -DVT ppx   -IVF  -PO Diet  -PT/OT  -WBAT, OOB  -DC planning

## 2017-08-10 NOTE — PROGRESS NOTE ADULT - SUBJECTIVE AND OBJECTIVE BOX
Patient seen and examined. Mental status is improving. No acute events overnight. Pain well controlled. Walked with PT. denies numbness/tingling/paresthesias/weakness. Denies headaches. Denies fevers/chills. No other complaints at this time. Right HV drain removed on rounds    HEALTH ISSUES - PROBLEM Dx:  Acute pulmonary edema with congestive heart failure: Acute pulmonary edema with congestive heart failure  Hyperlipidemia, unspecified hyperlipidemia type: Hyperlipidemia, unspecified hyperlipidemia type  Metabolic encephalopathy: Metabolic encephalopathy  Acute respiratory distress: Acute respiratory distress  Atrial fibrillation, unspecified type: Atrial fibrillation, unspecified type  Peripheral neuropathy: Peripheral neuropathy  HLD (hyperlipidemia): HLD (hyperlipidemia)  HTN (hypertension): HTN (hypertension)  Atrial fibrillation: Atrial fibrillation  Stented coronary artery: Stented coronary artery  CAD (coronary artery disease): CAD (coronary artery disease)  Spinal stenosis of lumbosacral region: Spinal stenosis of lumbosacral region          MEDICATIONS  (STANDING):  chlorhexidine 4% Liquid 1 Application(s) Topical daily  aspirin  chewable 81 milliGRAM(s) Oral daily  heparin  Injectable 5000 Unit(s) SubCutaneous every 8 hours  pantoprazole  Injectable 40 milliGRAM(s) IV Push daily  ALBUTerol/ipratropium for Nebulization. 3 milliLiter(s) Nebulizer once  furosemide   Injectable 40 milliGRAM(s) IV Push two times a day  diltiazem    Tablet 30 milliGRAM(s) Oral every 6 hours  metoprolol succinate ER 25 milliGRAM(s) Oral daily      Allergies    No Known Allergies    Intolerances        PAST MEDICAL & SURGICAL HISTORY:  Spinal stenosis of lumbosacral region  Rotator cuff disorder, right  Arthritis  Inguinal hernia: right  Reflux  CAD (coronary artery disease)  HTN (hypertension)  HLD (hyperlipidemia)  Atrial fibrillation  Peripheral neuropathy  H/O rotator cuff surgery: 12/2015  History of skin surgery: melanoma excision - left cheek/face 2016  Encounter for interrogation of neurostimulator: 12/2015  Back injury: s/p surgery L1-L9 in two separate surgeries 2003 &amp; 2006  S/P angioplasty with stent: RCA 2008 &amp; LAD 2014 cardiac stent placement  S/P knee replacement, bilateral  S/P cataract extraction                            8.7    9.34  )-----------( 195      ( 09 Aug 2017 12:14 )             26.6       09 Aug 2017 12:14    140    |  104    |  26     ----------------------------<  107    3.9     |  24     |  0.70     Ca    7.7        09 Aug 2017 12:14  Phos  2.6       08 Aug 2017 12:17  Mg     1.7       08 Aug 2017 12:17        PT/INR - ( 08 Aug 2017 12:12 )   PT: 12.8 SEC;   INR: 1.14          PTT - ( 08 Aug 2017 12:12 )  PTT:19.4 SEC        Vital Signs Last 24 Hrs  T(C): 36.6 (08-10-17 @ 05:09), Max: 36.9 (08-09-17 @ 08:51)  T(F): 97.8 (08-10-17 @ 05:09), Max: 98.5 (08-09-17 @ 08:51)  HR: 100 (08-10-17 @ 05:09) (78 - 100)  BP: 129/63 (08-10-17 @ 05:09) (99/58 - 129/63)  BP(mean): --  RR: 17 (08-10-17 @ 05:09) (17 - 18)  SpO2: 97% (08-10-17 @ 05:09) (97% - 100%)    Gen: NAD, AOx1 (self), knew who the president was    Spine PE:  Dressing chagned, incision clean dry intact  HV drain serosang out put: 45/70    Motor:                   C5                C6              C7               C8           T1   R            5/5                5/5            5/5             5/5          5/5  L             5/5               5/5             5/5             5/5          5/5                L2             L3             L4               L5            S1  R         5/5           5/5          5/5             5/5           5/5  L          5/5          5/5           5/5             5/5           5/5    Sensory:            C5         C6         C7      C8       T1        (0=absent, 1=impaired, 2=normal, NT=not testable)  R         2            2           2        2         2  L          2            2           2        2         2               L2          L3         L4      L5       S1         (0=absent, 1=impaired, 2=normal, NT=not testable)  R         2            2            2        2        2  L          2            2           2        2         2      A/P: 82y Male POD L 9 HANNA, T11-L1 laminectomy T4-Pelvis posterior instrumentation  Pain control  Cont HV drain  WBAT/PT/OT/OOB  Brace for comfort  FU Labs  SCDs, A81, prada  Medical co-management appreciated  dispo planning

## 2017-08-10 NOTE — DISCHARGE NOTE ADULT - CARE PROVIDERS DIRECT ADDRESSES
[email or web address]

## 2017-08-10 NOTE — DISCHARGE NOTE ADULT - PLAN OF CARE
pain control, surgical site healing, ambulation, return to ADL's Patient is s/p HANNA, T11-L1 Laminectomy, T4-Pelvis instrumentation/in situ 8/8/17. Patient tolerated the procedure well without any intraoperative complications. Patient tolerated physical therapy well and pain is controlled. Seen by medical attending for continuity of care and management and cleared for safe discharge. As per surgeon patient stable and ready for discharge.     Please follow up with  in 1-2 weeks. Call office to make an appointment. Please follow up with your PMD for continuity of care and management as medications may have changed. Do not take any NSAIDS (motrin, advil, ibuprofren, aleve), Aspirin, Antiinflammatory medications unless instructed by your orthopaedic surgeon to continue. Avoid any heavy lifting, bending, squatting, twisting motion. Keep dressing/incision clean, dry, intact. Any suture/staples to be removed on post op day # 14 at office visit. Weight bear as tolerated. Patient is s/p HANNA, T11-L1 Laminectomy, T4-Pelvis instrumentation/in situ 8/1/17. Patient tolerated the procedure well without any intraoperative complications. Post operatively, patient placed on telemetry for episode of rapid afib. Patient stabilized and seen by Dr Ny -Cardiologist and Hospitalist for continued care and management. As per Hospitalist, medications reviewed and patient to be sent out on Metoprolol 50 mg ER QD. Patient was also evaluated by Speech and swallow specialist for difficulty swallowing and placed on a Honey Consistency Liquid Diet with alternate sips of food and water. Patient tolerated physical therapy well and pain is controlled. Seen by medical attending for continuity of care and management and cleared for safe discharge. As per Dr Chacon, Dr Titus, and Dr Ny patient is stable and ready for discharge.     Please follow up with Dr. Titus in 1-2 weeks. Call office to make an appointment. Please follow up with your PMD for continuity of care and management as medications may have changed. Do not take any NSAIDS (motrin, advil, ibuprofren, aleve), Antiinflammatory medications unless instructed by your orthopaedic surgeon to continue. Avoid any heavy lifting, bending, squatting, twisting motion. Keep dressing/incision clean, dry, intact. Any suture/staples to be removed on post op day # 14 at office visit. Weight bear as tolerated. Patient is s/p HANNA, T11-L1 Laminectomy, T4-Pelvis instrumentation/in situ 8/1/17. Patient tolerated the procedure well without any intraoperative complications. Post operatively, patient placed on telemetry for episode of rapid afib. Patient stabilized and seen by Dr Ny -Cardiologist and Hospitalist for continued care and management. As per Hospitalist, medications reviewed and patient to be sent out on Metoprolol 50 mg ER QD. Patient was also evaluated by Speech and swallow specialist for difficulty swallowing and placed on a Honey Consistency Liquid Diet with alternate sips of food and water. Patient tolerated physical therapy well and pain is controlled. Seen by medical attending for continuity of care and management and cleared for safe discharge. As per Dr Chacon, Dr Titus, and Dr Ny patient is stable and ready for discharge.     Please follow up with Dr. Titus in 1-2 weeks. Call office to make an appointment. Please follow up with your PMD for continuity of care and management as medications may have changed. Do not take any NSAIDS (motrin, advil, ibuprofren, aleve), Antiinflammatory medications unless instructed by your orthopaedic surgeon to continue. Avoid any heavy lifting, bending, squatting, twisting motion. Keep dressing/incision clean, dry, intact. Any suture/staples to be removed on post op day # 14 at office visit. Weight bear as tolerated.  Please follow up with cardiologist Dr Ny for continued care and management as medications may have been altered.

## 2017-08-10 NOTE — DISCHARGE NOTE ADULT - MEDICATION SUMMARY - MEDICATIONS TO STOP TAKING
I will STOP taking the medications listed below when I get home from the hospital:  None I will STOP taking the medications listed below when I get home from the hospital:    Butrans 5 mcg/hr transdermal film, extended release  -- 1 patch by transdermal patch once a week    Tylenol 8 Hour 650 mg oral tablet, extended release  -- 2 tab(s) by mouth every 8 hours, As Needed

## 2017-08-11 DIAGNOSIS — K59.09 OTHER CONSTIPATION: ICD-10-CM

## 2017-08-11 LAB
ALBUMIN SERPL ELPH-MCNC: 2.7 G/DL — LOW (ref 3.3–5)
ALP SERPL-CCNC: 185 U/L — HIGH (ref 40–120)
ALT FLD-CCNC: 76 U/L — HIGH (ref 4–41)
ANISOCYTOSIS BLD QL: SIGNIFICANT CHANGE UP
AST SERPL-CCNC: 49 U/L — HIGH (ref 4–40)
BASOPHILS # BLD AUTO: 0.06 K/UL — SIGNIFICANT CHANGE UP (ref 0–0.2)
BASOPHILS NFR BLD AUTO: 0.5 % — SIGNIFICANT CHANGE UP (ref 0–2)
BASOPHILS NFR SPEC: 0 % — SIGNIFICANT CHANGE UP (ref 0–2)
BILIRUB SERPL-MCNC: 2.2 MG/DL — HIGH (ref 0.2–1.2)
BUN SERPL-MCNC: 29 MG/DL — HIGH (ref 7–23)
CALCIUM SERPL-MCNC: 8.1 MG/DL — LOW (ref 8.4–10.5)
CHLORIDE SERPL-SCNC: 100 MMOL/L — SIGNIFICANT CHANGE UP (ref 98–107)
CO2 SERPL-SCNC: 24 MMOL/L — SIGNIFICANT CHANGE UP (ref 22–31)
CREAT SERPL-MCNC: 0.74 MG/DL — SIGNIFICANT CHANGE UP (ref 0.5–1.3)
DACRYOCYTES BLD QL SMEAR: SLIGHT — SIGNIFICANT CHANGE UP
EOSINOPHIL # BLD AUTO: 0.65 K/UL — HIGH (ref 0–0.5)
EOSINOPHIL NFR BLD AUTO: 5.5 % — SIGNIFICANT CHANGE UP (ref 0–6)
EOSINOPHIL NFR FLD: 3.5 % — SIGNIFICANT CHANGE UP (ref 0–6)
GIANT PLATELETS BLD QL SMEAR: PRESENT — SIGNIFICANT CHANGE UP
GLUCOSE SERPL-MCNC: 125 MG/DL — HIGH (ref 70–99)
HCT VFR BLD CALC: 30.9 % — LOW (ref 39–50)
HGB BLD-MCNC: 10 G/DL — LOW (ref 13–17)
IMM GRANULOCYTES # BLD AUTO: 0.82 # — SIGNIFICANT CHANGE UP
IMM GRANULOCYTES NFR BLD AUTO: 6.9 % — HIGH (ref 0–1.5)
LYMPHOCYTES # BLD AUTO: 1.73 K/UL — SIGNIFICANT CHANGE UP (ref 1–3.3)
LYMPHOCYTES # BLD AUTO: 14.6 % — SIGNIFICANT CHANGE UP (ref 13–44)
LYMPHOCYTES NFR SPEC AUTO: 14.9 % — SIGNIFICANT CHANGE UP (ref 13–44)
MACROCYTES BLD QL: SIGNIFICANT CHANGE UP
MAGNESIUM SERPL-MCNC: 1.9 MG/DL — SIGNIFICANT CHANGE UP (ref 1.6–2.6)
MCHC RBC-ENTMCNC: 30.2 PG — SIGNIFICANT CHANGE UP (ref 27–34)
MCHC RBC-ENTMCNC: 32.4 % — SIGNIFICANT CHANGE UP (ref 32–36)
MCV RBC AUTO: 93.4 FL — SIGNIFICANT CHANGE UP (ref 80–100)
METAMYELOCYTES # FLD: 1.8 % — HIGH (ref 0–1)
MONOCYTES # BLD AUTO: 1.46 K/UL — HIGH (ref 0–0.9)
MONOCYTES NFR BLD AUTO: 12.3 % — SIGNIFICANT CHANGE UP (ref 2–14)
MONOCYTES NFR BLD: 6.1 % — SIGNIFICANT CHANGE UP (ref 2–9)
MYELOCYTES NFR BLD: 0.9 % — HIGH (ref 0–0)
NEUTROPHIL AB SER-ACNC: 71.9 % — SIGNIFICANT CHANGE UP (ref 43–77)
NEUTROPHILS # BLD AUTO: 7.14 K/UL — SIGNIFICANT CHANGE UP (ref 1.8–7.4)
NEUTROPHILS NFR BLD AUTO: 60.2 % — SIGNIFICANT CHANGE UP (ref 43–77)
NEUTS BAND # BLD: 0.9 % — SIGNIFICANT CHANGE UP (ref 0–6)
NRBC # FLD: 0.03 — SIGNIFICANT CHANGE UP
PHOSPHATE SERPL-MCNC: 3.2 MG/DL — SIGNIFICANT CHANGE UP (ref 2.5–4.5)
PLATELET # BLD AUTO: 279 K/UL — SIGNIFICANT CHANGE UP (ref 150–400)
PLATELET COUNT - ESTIMATE: NORMAL — SIGNIFICANT CHANGE UP
PMV BLD: 10.1 FL — SIGNIFICANT CHANGE UP (ref 7–13)
POIKILOCYTOSIS BLD QL AUTO: SLIGHT — SIGNIFICANT CHANGE UP
POLYCHROMASIA BLD QL SMEAR: SIGNIFICANT CHANGE UP
POTASSIUM SERPL-MCNC: 3.7 MMOL/L — SIGNIFICANT CHANGE UP (ref 3.5–5.3)
POTASSIUM SERPL-SCNC: 3.7 MMOL/L — SIGNIFICANT CHANGE UP (ref 3.5–5.3)
PROT SERPL-MCNC: 5.6 G/DL — LOW (ref 6–8.3)
RBC # BLD: 3.31 M/UL — LOW (ref 4.2–5.8)
RBC # FLD: 14.8 % — HIGH (ref 10.3–14.5)
REVIEW TO FOLLOW: YES — SIGNIFICANT CHANGE UP
SODIUM SERPL-SCNC: 140 MMOL/L — SIGNIFICANT CHANGE UP (ref 135–145)
TARGETS BLD QL SMEAR: SLIGHT — SIGNIFICANT CHANGE UP
WBC # BLD: 11.86 K/UL — HIGH (ref 3.8–10.5)
WBC # FLD AUTO: 11.86 K/UL — HIGH (ref 3.8–10.5)

## 2017-08-11 PROCEDURE — 99233 SBSQ HOSP IP/OBS HIGH 50: CPT

## 2017-08-11 RX ORDER — DOCUSATE SODIUM 100 MG
1 CAPSULE ORAL
Qty: 0 | Refills: 0 | COMMUNITY
Start: 2017-08-11

## 2017-08-11 RX ORDER — SENNA PLUS 8.6 MG/1
2 TABLET ORAL AT BEDTIME
Qty: 0 | Refills: 0 | Status: DISCONTINUED | OUTPATIENT
Start: 2017-08-11 | End: 2017-08-15

## 2017-08-11 RX ORDER — POLYETHYLENE GLYCOL 3350 17 G/17G
17 POWDER, FOR SOLUTION ORAL
Qty: 0 | Refills: 0 | COMMUNITY
Start: 2017-08-11

## 2017-08-11 RX ORDER — DOCUSATE SODIUM 100 MG
100 CAPSULE ORAL THREE TIMES A DAY
Qty: 0 | Refills: 0 | Status: DISCONTINUED | OUTPATIENT
Start: 2017-08-11 | End: 2017-08-15

## 2017-08-11 RX ORDER — POLYETHYLENE GLYCOL 3350 17 G/17G
17 POWDER, FOR SOLUTION ORAL DAILY
Qty: 0 | Refills: 0 | Status: DISCONTINUED | OUTPATIENT
Start: 2017-08-11 | End: 2017-08-15

## 2017-08-11 RX ADMIN — Medication 40 MILLIGRAM(S): at 05:42

## 2017-08-11 RX ADMIN — Medication 100 MILLIGRAM(S): at 15:52

## 2017-08-11 RX ADMIN — Medication 100 MILLIGRAM(S): at 21:24

## 2017-08-11 RX ADMIN — PANTOPRAZOLE SODIUM 40 MILLIGRAM(S): 20 TABLET, DELAYED RELEASE ORAL at 12:20

## 2017-08-11 RX ADMIN — POLYETHYLENE GLYCOL 3350 17 GRAM(S): 17 POWDER, FOR SOLUTION ORAL at 12:20

## 2017-08-11 RX ADMIN — DABIGATRAN ETEXILATE MESYLATE 150 MILLIGRAM(S): 150 CAPSULE ORAL at 05:42

## 2017-08-11 RX ADMIN — DABIGATRAN ETEXILATE MESYLATE 150 MILLIGRAM(S): 150 CAPSULE ORAL at 17:23

## 2017-08-11 RX ADMIN — SENNA PLUS 2 TABLET(S): 8.6 TABLET ORAL at 21:24

## 2017-08-11 RX ADMIN — Medication 40 MILLIGRAM(S): at 17:23

## 2017-08-11 RX ADMIN — Medication 81 MILLIGRAM(S): at 12:20

## 2017-08-11 NOTE — PROGRESS NOTE ADULT - SUBJECTIVE AND OBJECTIVE BOX
Patient seen and examined. No acute events overnight, no longer is incontinent. Pain well controlled. Walked with PT in the hallways yesterday. denies numbness/tingling/paresthesias/weakness. Denies headaches. Denies fevers/chills. No other complaints at this time. Patient states "I am getting a little better everyday".    HEALTH ISSUES - PROBLEM Dx:  Other dysphagia: Other dysphagia  Acute pulmonary edema with congestive heart failure: Acute pulmonary edema with congestive heart failure  Hyperlipidemia, unspecified hyperlipidemia type: Hyperlipidemia, unspecified hyperlipidemia type  Metabolic encephalopathy: Metabolic encephalopathy  Acute respiratory distress: Acute respiratory distress  Atrial fibrillation, unspecified type: Atrial fibrillation, unspecified type  Peripheral neuropathy: Peripheral neuropathy  HLD (hyperlipidemia): HLD (hyperlipidemia)  HTN (hypertension): HTN (hypertension)  Atrial fibrillation: Atrial fibrillation  Stented coronary artery: Stented coronary artery  CAD (coronary artery disease): CAD (coronary artery disease)  Spinal stenosis of lumbosacral region: Spinal stenosis of lumbosacral region          MEDICATIONS  (STANDING):  chlorhexidine 4% Liquid 1 Application(s) Topical daily  aspirin  chewable 81 milliGRAM(s) Oral daily  pantoprazole  Injectable 40 milliGRAM(s) IV Push daily  ALBUTerol/ipratropium for Nebulization. 3 milliLiter(s) Nebulizer once  furosemide   Injectable 40 milliGRAM(s) IV Push two times a day  metoprolol succinate ER 25 milliGRAM(s) Oral daily  dabigatran 150 milliGRAM(s) Oral every 12 hours  diltiazem    Tablet 30 milliGRAM(s) Oral every 12 hours      Allergies    No Known Allergies    Intolerances        PAST MEDICAL & SURGICAL HISTORY:  Spinal stenosis of lumbosacral region  Rotator cuff disorder, right  Arthritis  Inguinal hernia: right  Reflux  CAD (coronary artery disease)  HTN (hypertension)  HLD (hyperlipidemia)  Atrial fibrillation  Peripheral neuropathy  H/O rotator cuff surgery: 12/2015  History of skin surgery: melanoma excision - left cheek/face 2016  Encounter for interrogation of neurostimulator: 12/2015  Back injury: s/p surgery L1-L9 in two separate surgeries 2003 &amp; 2006  S/P angioplasty with stent: RCA 2008 &amp; LAD 2014 cardiac stent placement  S/P knee replacement, bilateral  S/P cataract extraction                            8.7    9.34  )-----------( 195      ( 09 Aug 2017 12:14 )             26.6       09 Aug 2017 12:14    140    |  104    |  26     ----------------------------<  107    3.9     |  24     |  0.70     Ca    7.7        09 Aug 2017 12:14                Vital Signs Last 24 Hrs  T(C): 36.4 (08-11-17 @ 05:12), Max: 36.9 (08-10-17 @ 12:34)  T(F): 97.6 (08-11-17 @ 05:12), Max: 98.5 (08-10-17 @ 12:34)  HR: 87 (08-11-17 @ 05:12) (84 - 105)  BP: 105/58 (08-11-17 @ 05:12) (102/51 - 109/66)  BP(mean): --  RR: 17 (08-11-17 @ 05:12) (16 - 18)  SpO2: 97% (08-11-17 @ 05:12) (94% - 99%)    Gen: NAD    Spine PE:  Dressing clean dry intact  HV drain serosang out put: 7/32    Motor:                   C5                C6              C7               C8           T1   R            5/5                5/5            5/5             5/5          5/5  L             5/5               5/5             5/5             5/5          5/5                L2             L3             L4               L5            S1  R         5/5           5/5          5/5             5/5           5/5  L          5/5          5/5           5/5             5/5           5/5    Sensory:            C5         C6         C7      C8       T1        (0=absent, 1=impaired, 2=normal, NT=not testable)  R         2            2           2        2         2  L          2            2           2        2         2               L2          L3         L4      L5       S1         (0=absent, 1=impaired, 2=normal, NT=not testable)  R         2            2            2        2        2  L          2            2           2        2         2      A/P: 82y Male POD HANNA, T11-L1 laminectomy, T4-Pelvis PSIF  Pain control  Cont HV drain  WBAT/PT/OT/OOB  Brace for comfort  FU Labs  SCDs  Medical co-management appreciated  dispo planning

## 2017-08-11 NOTE — PROGRESS NOTE ADULT - PROBLEM SELECTOR PLAN 1
clinically improved.  CXR shows resolving CHF  continue Lasix 40mg IV bid  monitoring I&0s  check TTE  troponin leak most likely secondary to chf and tachycardia.  cardiology following.

## 2017-08-11 NOTE — PROGRESS NOTE ADULT - ASSESSMENT
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 He lost 2 Liters blood intraoperatively with post op hypotension requiring pressors in SICU. Patient transferred out of SICU but was confused and pulled out his NGT that night. He was made NPO but not restarted on Cardizem. Patient went into rapid Afib w/ RVR in 140s c/b acute respiratory failure secondary to acute pulmonary edema/B/L Pleural effusions. Now clinically resolving.

## 2017-08-11 NOTE — PROGRESS NOTE ADULT - SUBJECTIVE AND OBJECTIVE BOX
Patient is a 82y old  Male who presents with a chief complaint of back pain Patient is s/p HANNA, T11-L1 Laminectomy, T4-Pelvis instrumentation/in situ (10 Aug 2017 09:32)      SUBJECTIVE / OVERNIGHT EVENTS:  Patient c/o generalized weakness. Patient has no new complaints. Denies cp, SOB, abdominal pain, N/V/D. No BMs    MEDICATIONS  (STANDING):  chlorhexidine 4% Liquid 1 Application(s) Topical daily  aspirin  chewable 81 milliGRAM(s) Oral daily  pantoprazole  Injectable 40 milliGRAM(s) IV Push daily  ALBUTerol/ipratropium for Nebulization. 3 milliLiter(s) Nebulizer once  furosemide   Injectable 40 milliGRAM(s) IV Push two times a day  metoprolol succinate ER 25 milliGRAM(s) Oral daily  dabigatran 150 milliGRAM(s) Oral every 12 hours    MEDICATIONS  (PRN):  ondansetron Injectable 4 milliGRAM(s) IV Push every 6 hours PRN Nausea and/or Vomiting  morphine  - Injectable 2 milliGRAM(s) IV Push every 4 hours PRN moderate to severe pain  traMADol 50 milliGRAM(s) Oral every 4 hours PRN Mild Pain (1 - 3)      Vital Signs Last 24 Hrs  T(C): 36.6 (11 Aug 2017 07:50), Max: 36.9 (10 Aug 2017 12:34)  T(F): 97.8 (11 Aug 2017 07:50), Max: 98.5 (10 Aug 2017 12:34)  HR: 102 (11 Aug 2017 07:50) (87 - 105)  BP: 102/64 (11 Aug 2017 07:50) (102/51 - 109/66)  BP(mean): --  RR: 16 (11 Aug 2017 07:50) (16 - 18)  SpO2: 99% (11 Aug 2017 07:50) (94% - 99%)  CAPILLARY BLOOD GLUCOSE        I&O's Summary    10 Aug 2017 07:01  -  11 Aug 2017 07:00  --------------------------------------------------------  IN: 0 mL / OUT: 37 mL / NET: -37 mL    11 Aug 2017 07:01  -  11 Aug 2017 09:28  --------------------------------------------------------  IN: 0 mL / OUT: 120 mL / NET: -120 mL        PHYSICAL EXAM:  GENERAL: NAD, well-developed  HEAD:  Atraumatic, Normocephalic  EYES: EOMI, PERRLA, conjunctiva and sclera clear  NECK: Supple, No JVD  CHEST/LUNG: decreased BS bilaterally; No wheeze  HEART: Regular rate and rhythm; No murmurs, rubs, or gallops  ABDOMEN: Soft, Nontender, Nondistended; Bowel sounds present  EXTREMITIES:  2+ Peripheral Pulses, No clubbing, cyanosis, or edema  PSYCH: AAOx3  NEUROLOGY: non-focal  SKIN: No rashes or lesions    LABS:                        8.7    9.34  )-----------( 195      ( 09 Aug 2017 12:14 )             26.6     08-09    140  |  104  |  26<H>  ----------------------------<  107<H>  3.9   |  24  |  0.70    Ca    7.7<L>      09 Aug 2017 12:14        CARDIAC MARKERS ( 10 Aug 2017 01:50 )  x     / 0.13 ng/mL / x     / x     / x      CARDIAC MARKERS ( 09 Aug 2017 18:41 )  x     / 0.14 ng/mL / x     / x     / x      CARDIAC MARKERS ( 09 Aug 2017 12:14 )  x     / 0.12 ng/mL / x     / x     / x              RADIOLOGY & ADDITIONAL TESTS:    Imaging Personally Reviewed: < from: Xray Chest 1 View AP- PORTABLE-Urgent (08.10.17 @ 10:17) >  Heart size and the mediastinum cannot be accurately evaluated on this   projection.  Previous interstitial pulmonary edema is improved.  There continues to be small bilateral pleural effusions which appears   slightly decreased in size. There is likely associated passive   atelectasis.  There is continued retrocardiac opacity with obscuration of the left   hemidiaphragm.  No pneumothorax is seen.    < end of copied text >      Consultant(s) Notes Reviewed:      Care Discussed with Consultants/Other Providers:  Ortho

## 2017-08-11 NOTE — PROGRESS NOTE ADULT - PROBLEM SELECTOR PLAN 4
HR now stable after s/p cardizem drip   discontinued PO cardizem as per cardiology  c/w Toprol XL 25 mg qdaily   c/w pradaxa

## 2017-08-11 NOTE — CHART NOTE - NSCHARTNOTEFT_GEN_A_CORE
NUTRITION FOLLOW-UP: Pt not very verbal. RN reports Pt eating between 50  and 75% of meals    Weight: 88.7 on 8/6/17. Also 3+ edema which may account for weight gain    Current Diet: Dys I Honey Consistency    Enteral Recommendations: Ensure Enlive thickened to honey consistency    RD to Remain Available: Kathie Fisher MS, RDN, CDN pager [phone]     Additional Recommendations:   1) Ensure Enlive 2x/day    2) Monitor weight, labs, po intake and skin integrity.

## 2017-08-12 PROCEDURE — 93306 TTE W/DOPPLER COMPLETE: CPT | Mod: 26

## 2017-08-12 PROCEDURE — 99233 SBSQ HOSP IP/OBS HIGH 50: CPT

## 2017-08-12 RX ORDER — METOPROLOL TARTRATE 50 MG
50 TABLET ORAL DAILY
Qty: 0 | Refills: 0 | Status: DISCONTINUED | OUTPATIENT
Start: 2017-08-12 | End: 2017-08-15

## 2017-08-12 RX ORDER — METOPROLOL TARTRATE 50 MG
25 TABLET ORAL DAILY
Qty: 0 | Refills: 0 | Status: DISCONTINUED | OUTPATIENT
Start: 2017-08-12 | End: 2017-08-12

## 2017-08-12 RX ADMIN — DABIGATRAN ETEXILATE MESYLATE 150 MILLIGRAM(S): 150 CAPSULE ORAL at 05:08

## 2017-08-12 RX ADMIN — Medication 100 MILLIGRAM(S): at 22:06

## 2017-08-12 RX ADMIN — TRAMADOL HYDROCHLORIDE 50 MILLIGRAM(S): 50 TABLET ORAL at 18:07

## 2017-08-12 RX ADMIN — POLYETHYLENE GLYCOL 3350 17 GRAM(S): 17 POWDER, FOR SOLUTION ORAL at 13:11

## 2017-08-12 RX ADMIN — PANTOPRAZOLE SODIUM 40 MILLIGRAM(S): 20 TABLET, DELAYED RELEASE ORAL at 13:12

## 2017-08-12 RX ADMIN — Medication 100 MILLIGRAM(S): at 14:37

## 2017-08-12 RX ADMIN — Medication 40 MILLIGRAM(S): at 05:07

## 2017-08-12 RX ADMIN — Medication 25 MILLIGRAM(S): at 05:07

## 2017-08-12 RX ADMIN — DABIGATRAN ETEXILATE MESYLATE 150 MILLIGRAM(S): 150 CAPSULE ORAL at 17:10

## 2017-08-12 RX ADMIN — Medication 100 MILLIGRAM(S): at 05:07

## 2017-08-12 RX ADMIN — CHLORHEXIDINE GLUCONATE 1 APPLICATION(S): 213 SOLUTION TOPICAL at 13:13

## 2017-08-12 RX ADMIN — Medication 81 MILLIGRAM(S): at 13:11

## 2017-08-12 RX ADMIN — TRAMADOL HYDROCHLORIDE 50 MILLIGRAM(S): 50 TABLET ORAL at 17:10

## 2017-08-12 RX ADMIN — SENNA PLUS 2 TABLET(S): 8.6 TABLET ORAL at 22:06

## 2017-08-12 NOTE — PROGRESS NOTE ADULT - SUBJECTIVE AND OBJECTIVE BOX
24H hour events:   diuressed 2L since started on IV lasix 2 days ago  rate better controlled    MEDICATIONS:  aspirin  chewable 81 milliGRAM(s) Oral daily  furosemide   Injectable 40 milliGRAM(s) IV Push two times a day  dabigatran 150 milliGRAM(s) Oral every 12 hours  metoprolol succinate ER 50 milliGRAM(s) Oral daily      ALBUTerol/ipratropium for Nebulization. 3 milliLiter(s) Nebulizer once    ondansetron Injectable 4 milliGRAM(s) IV Push every 6 hours PRN  morphine  - Injectable 2 milliGRAM(s) IV Push every 4 hours PRN  traMADol 50 milliGRAM(s) Oral every 4 hours PRN    pantoprazole  Injectable 40 milliGRAM(s) IV Push daily  docusate sodium 100 milliGRAM(s) Oral three times a day  senna 2 Tablet(s) Oral at bedtime  polyethylene glycol 3350 17 Gram(s) Oral daily      chlorhexidine 4% Liquid 1 Application(s) Topical daily      REVIEW OF SYSTEMS:  General: no fatigue/malaise, weight loss/gain.  Skin: no rashes.  Ophthalmologic: no blurred vision, no loss of vision. 	  ENT: no sore throat, rhinorrhea, sinus congestion.  Respiratory: no SOB, cough or wheeze.  Gastrointestinal:  no N/V/D, no melena/hematemesis/hematochezia.  Genitourinary: no dysuria/hesitancy or hematuria.  Musculoskeletal: no myalgias or arthralgias.  Neurological: no changes in vision or hearing, no lightheadedness/dizziness, no syncope/near syncope	  Psychiatric: no unusual stress/anxiety.   Hematology/Lymphatics: no unusual bleeding, bruising and no lymphadenopathy.  Endocrine: no unusual thirst.   All others negative except as stated above and in HPI.    PHYSICAL EXAM:  T(C): 36.8 (08-12-17 @ 09:04), Max: 36.8 (08-11-17 @ 21:25)  HR: 84 (08-12-17 @ 09:04) (64 - 119)  BP: 111/76 (08-12-17 @ 09:04) (109/73 - 128/77)  RR: 18 (08-12-17 @ 09:04) (18 - 18)  SpO2: 98% (08-12-17 @ 09:04) (97% - 100%)  Wt(kg): --  I&O's Summary    11 Aug 2017 07:01  -  12 Aug 2017 07:00  --------------------------------------------------------  IN: 0 mL / OUT: 1655 mL / NET: -1655 mL    12 Aug 2017 07:01  -  12 Aug 2017 13:41  --------------------------------------------------------  IN: 100 mL / OUT: 1300 mL / NET: -1200 mL        Appearance: Normal	  HEENT:   Normal oral mucosa, PERRL, EOMI	  Lymphatic: No lymphadenopathy  Cardiovascular: Normal S1 S2, No JVD, No murmurs, No edema  Respiratory: Lungs clear to auscultation	  Psychiatry: A & O x 3, Mood & affect appropriate  Gastrointestinal:  Soft, Non-tender, + BS	  Skin: No rashes, No ecchymoses, No cyanosis	  Neurologic: Non-focal  Extremities: Normal range of motion, No clubbing, cyanosis or edema  Vascular: Peripheral pulses palpable 2+ bilaterally        LABS:	 	    CBC Full  -  ( 11 Aug 2017 09:26 )  WBC Count : 11.86 K/uL  Hemoglobin : 10.0 g/dL  Hematocrit : 30.9 %  Platelet Count - Automated : 279 K/uL  Mean Cell Volume : 93.4 fL  Mean Cell Hemoglobin : 30.2 pg  Mean Cell Hemoglobin Concentration : 32.4 %  Auto Neutrophil # : 7.14 K/uL  Auto Lymphocyte # : 1.73 K/uL  Auto Monocyte # : 1.46 K/uL  Auto Eosinophil # : 0.65 K/uL  Auto Basophil # : 0.06 K/uL  Auto Neutrophil % : 60.2 %  Auto Lymphocyte % : 14.6 %  Auto Monocyte % : 12.3 %  Auto Eosinophil % : 5.5 %  Auto Basophil % : 0.5 %    08-11    140  |  100  |  29<H>  ----------------------------<  125<H>  3.7   |  24  |  0.74    Ca    8.1<L>      11 Aug 2017 09:26  Phos  3.2     08-11  Mg     1.9     08-11    TPro  5.6<L>  /  Alb  2.7<L>  /  TBili  2.2<H>  /  DBili  x   /  AST  49<H>  /  ALT  76<H>  /  AlkPhos  185<H>  08-11    < from: Transthoracic Echocardiogram (10.29.14 @ 08:18) >  Observations:  Mitral Valve: Mitral annular calcification. Mild mitral  regurgitation.  Aortic Valve/Aorta: Calcified aortic valve. No aortic valve  regurgitation seen.  Normal aortic root.  Left Atrium: Moderately dilated left atrium.  Left Ventricle: Normal left ventricular systolic function  with an estimated ejection fraction of 55%. No segmental  wall motion abnormalities. Normal left ventricular internal  dimensions and wall thicknesses.  Right Heart: Normal right atrium. Normal right ventricular  size and function. Thickened tricuspid valve. Mild  tricuspid regurgitation. Minimal pulmonic regurgitation.  Pericardium/Pleura: No pericardial effusion.  Hemodynamic: Estimated right atrial pressure is 8 mm Hg.  ------------------------------------------------------------------------  Conclusions:  1.Mitral annular calcification. Mild mitral regurgitation.    2. Calcified aortic valve. No aortic valve regurgitation  seen.  3. Moderately dilated left atrium.  4. Normal left ventricular internal dimensions and wall  thicknesses.  5. Normal left ventricular systolic function with an  estimated ejection fraction of 55%. No segmental wall  motion abnormalities.  6. Normal right ventricular size and function.  7. Thickened tricuspid valve. Mild tricuspid regurgitation.    < end of copied text >    ASSESSMENT/PLAN: 	  82yoM with HTN,HLD,CAD (YAO pLAD 2014) , Afib on pradaxa, metoprolol as outpt. Admitted for spinal surgery, cardiology consulted for afib with rvr to 140.    Afib  - now rate controlled. Cont metop succ 50 qd  -continue dabigatran 150 bid     ADHF  - improved, likely 2/2 volume shifts in OR  - on lasix 40 ivp bid, uop -2.5L last 48 hours, euvolemic. Can d/c lasix. FU with TTE and monitor if maintainance lasix is needed. was not on lasix at home prior to admission  - general cardiology teaching service to continue to follow  - For any questions please call [phone]       Angeline Cyr MD   house cardiology

## 2017-08-12 NOTE — PROGRESS NOTE ADULT - ASSESSMENT
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 He lost 2 Liters blood intraoperatively with post op hypotension requiring pressors in SICU. Patient transferred out of SICU but was confused and pulled out his NGT that night. He was made NPO but not restarted on Cardizem. Patient went into rapid Afib w/ RVR in 140s c/b acute respiratory failure secondary to acute pulmonary edema/B/L Pleural effusions. Now clinically resolving.

## 2017-08-12 NOTE — PROGRESS NOTE ADULT - SUBJECTIVE AND OBJECTIVE BOX
Patient seen and examined. Mental status is greatly improving. No acute events overnight, -110. Pain well controlled. Walked with PT. denies numbness/tingling/paresthesias/weakness. Denies headaches. Denies fevers/chills. No other complaints at this time.    HEALTH ISSUES - PROBLEM Dx:  Other constipation: Other constipation  Other dysphagia: Other dysphagia  Acute pulmonary edema with congestive heart failure: Acute pulmonary edema with congestive heart failure  Hyperlipidemia, unspecified hyperlipidemia type: Hyperlipidemia, unspecified hyperlipidemia type  Metabolic encephalopathy: Metabolic encephalopathy  Acute respiratory distress: Acute respiratory distress  Atrial fibrillation, unspecified type: Atrial fibrillation, unspecified type  Peripheral neuropathy: Peripheral neuropathy  HLD (hyperlipidemia): HLD (hyperlipidemia)  HTN (hypertension): HTN (hypertension)  Atrial fibrillation: Atrial fibrillation  Stented coronary artery: Stented coronary artery  CAD (coronary artery disease): CAD (coronary artery disease)  Spinal stenosis of lumbosacral region: Spinal stenosis of lumbosacral region          MEDICATIONS  (STANDING):  chlorhexidine 4% Liquid 1 Application(s) Topical daily  aspirin  chewable 81 milliGRAM(s) Oral daily  pantoprazole  Injectable 40 milliGRAM(s) IV Push daily  ALBUTerol/ipratropium for Nebulization. 3 milliLiter(s) Nebulizer once  furosemide   Injectable 40 milliGRAM(s) IV Push two times a day  dabigatran 150 milliGRAM(s) Oral every 12 hours  docusate sodium 100 milliGRAM(s) Oral three times a day  senna 2 Tablet(s) Oral at bedtime  polyethylene glycol 3350 17 Gram(s) Oral daily  metoprolol succinate ER 25 milliGRAM(s) Oral daily      Allergies    No Known Allergies    Intolerances        PAST MEDICAL & SURGICAL HISTORY:  Spinal stenosis of lumbosacral region  Rotator cuff disorder, right  Arthritis  Inguinal hernia: right  Reflux  CAD (coronary artery disease)  HTN (hypertension)  HLD (hyperlipidemia)  Atrial fibrillation  Peripheral neuropathy  H/O rotator cuff surgery: 12/2015  History of skin surgery: melanoma excision - left cheek/face 2016  Encounter for interrogation of neurostimulator: 12/2015  Back injury: s/p surgery L1-L9 in two separate surgeries 2003 &amp; 2006  S/P angioplasty with stent: RCA 2008 &amp; LAD 2014 cardiac stent placement  S/P knee replacement, bilateral  S/P cataract extraction                            10.0   11.86 )-----------( 279      ( 11 Aug 2017 09:26 )             30.9       11 Aug 2017 09:26    140    |  100    |  29     ----------------------------<  125    3.7     |  24     |  0.74     Ca    8.1        11 Aug 2017 09:26  Phos  3.2       11 Aug 2017 09:26  Mg     1.9       11 Aug 2017 09:26    TPro  5.6    /  Alb  2.7    /  TBili  2.2    /  DBili  x      /  AST  49     /  ALT  76     /  AlkPhos  185    11 Aug 2017 09:26              Vital Signs Last 24 Hrs  T(C): 36.7 (08-12-17 @ 05:06), Max: 36.8 (08-11-17 @ 21:25)  T(F): 98.1 (08-12-17 @ 05:06), Max: 98.2 (08-11-17 @ 21:25)  HR: 119 (08-12-17 @ 05:06) (64 - 119)  BP: 113/76 (08-12-17 @ 05:06) (94/56 - 128/77)  BP(mean): --  RR: 18 (08-12-17 @ 05:06) (16 - 18)  SpO2: 98% (08-12-17 @ 05:06) (97% - 100%)    Gen: NAD, AOx2 (person and place), not oriented to time, knew who the president of the [employer] was    Spine PE:  Dressing clean dry intact  HV drain serosang out put: 0/10    Motor:                   C5                C6              C7               C8           T1   R            5/5                5/5            5/5             5/5          5/5  L             5/5               5/5             5/5             5/5          5/5                L2             L3             L4               L5            S1  R         5/5           5/5          5/5             5/5           5/5  L          5/5          5/5           5/5             5/5           5/5    Sensory:            C5         C6         C7      C8       T1        (0=absent, 1=impaired, 2=normal, NT=not testable)  R         2            2           2        2         2  L          2            2           2        2         2               L2          L3         L4      L5       S1         (0=absent, 1=impaired, 2=normal, NT=not testable)  R         2            2            2        2        2  L          2            2           2        2         2      A/P: 82y Male POD HANNA, T11-L1 laminectomy and T4-Pelvis posterior spinal instrumentation and fusion  Pain control  Cont HV drain  WBAT/PT/OT/OOB  Brace for comfort  FU Labs  SCDs  Medical co-management appreciated  dispo planning, rehab

## 2017-08-12 NOTE — PROGRESS NOTE ADULT - SUBJECTIVE AND OBJECTIVE BOX
CHIEF COMPLAINT: f/u back pain    SUBJECTIVE / OVERNIGHT EVENTS: Patient seen and examined. Mental status is greatly improving. No acute events overnight, -110. Pain well controlled. Walked with PT. denies numbness/tingling/paresthesias/weakness. Denies headaches. Denies fevers/chills. No other complaints at this time.    All other review of systems negative.     MEDICATIONS  (STANDING):  chlorhexidine 4% Liquid 1 Application(s) Topical daily  aspirin  chewable 81 milliGRAM(s) Oral daily  pantoprazole  Injectable 40 milliGRAM(s) IV Push daily  ALBUTerol/ipratropium for Nebulization. 3 milliLiter(s) Nebulizer once  furosemide   Injectable 40 milliGRAM(s) IV Push two times a day  dabigatran 150 milliGRAM(s) Oral every 12 hours  docusate sodium 100 milliGRAM(s) Oral three times a day  senna 2 Tablet(s) Oral at bedtime  polyethylene glycol 3350 17 Gram(s) Oral daily  metoprolol succinate ER 50 milliGRAM(s) Oral daily    MEDICATIONS  (PRN):  ondansetron Injectable 4 milliGRAM(s) IV Push every 6 hours PRN Nausea and/or Vomiting  morphine  - Injectable 2 milliGRAM(s) IV Push every 4 hours PRN moderate to severe pain  traMADol 50 milliGRAM(s) Oral every 4 hours PRN Mild Pain (1 - 3)      VITALS:  T(F): 98.2 (08-12-17 @ 09:04), Max: 98.2 (08-11-17 @ 21:25)  HR: 84 (08-12-17 @ 09:04) (64 - 119)  BP: 111/76 (08-12-17 @ 09:04) (105/62 - 128/77)  RR: 18 (08-12-17 @ 09:04) (18 - 18)  SpO2: 98% (08-12-17 @ 09:04)    PHYSICAL EXAM:  GENERAL: NAD, well-developed  HEAD:  Atraumatic, Normocephalic  EYES: EOMI, PERRLA, conjunctiva and sclera clear  CHEST/LUNG: Clear to auscultation bilaterally; No wheeze  HEART: Regular rate and rhythm; No murmurs, rubs, or gallops  ABDOMEN: Soft, Nontender, Nondistended; Bowel sounds present  EXTREMITIES:  2+ Peripheral Pulses, No clubbing, cyanosis, or edema  SKIN: No rashes or lesions    LABS:              10.0                 140  | 24   | 29           11.86 >-----------< 279     ------------------------< 125     Ca 8.1   Mg 1.9   Ph 3.2                30.9                 3.7  | 100  | 0.74              TPro  5.6  /  Alb  2.7      TBili  2.2  /  DBili  x         AST  49  /  ALT  76            AlkPhos  185      MICROBIOLOGY:        RADIOLOGY & ADDITIONAL TESTS:  Imaging Personally Reviewed: [x] Yes  < from: Xray Chest 1 View AP- PORTABLE-Urgent (08.10.17 @ 10:17) >  Improvement in previous interstitial pulmonary edema. Continued small bilateral pleural effusions appearing slightly decreased in size. Continued retrocardiac opacity which may be due to pleural effusion with passive atelectasis, atelectasis of other cause, and/or pneumonia.     < end of copied text >    [ ] Consultant(s) Notes Reviewed:  [x] Care Discussed with Consultants/Other Providers: Orthopedic PA - discussed management of tachycardia

## 2017-08-12 NOTE — PROGRESS NOTE ADULT - PROBLEM SELECTOR PLAN 1
clinically improved.  CXR shows resolving CHF  continue Lasix 40mg IV bid -- f/u with cardiology regarding switching to PO  monitoring I&0s  check TTE  troponin leak most likely secondary to chf and tachycardia.  cardiology following.

## 2017-08-12 NOTE — PROGRESS NOTE ADULT - PROBLEM SELECTOR PLAN 4
HR now stable after s/p cardizem drip   discontinued PO cardizem as per cardiology  increased toprol XL 50 mg qdaily   c/w pradaxa

## 2017-08-13 LAB
BUN SERPL-MCNC: 38 MG/DL — HIGH (ref 7–23)
CALCIUM SERPL-MCNC: 8.3 MG/DL — LOW (ref 8.4–10.5)
CHLORIDE SERPL-SCNC: 103 MMOL/L — SIGNIFICANT CHANGE UP (ref 98–107)
CO2 SERPL-SCNC: 29 MMOL/L — SIGNIFICANT CHANGE UP (ref 22–31)
CREAT SERPL-MCNC: 0.81 MG/DL — SIGNIFICANT CHANGE UP (ref 0.5–1.3)
GLUCOSE SERPL-MCNC: 108 MG/DL — HIGH (ref 70–99)
HCT VFR BLD CALC: 31 % — LOW (ref 39–50)
HGB BLD-MCNC: 10 G/DL — LOW (ref 13–17)
MCHC RBC-ENTMCNC: 31.3 PG — SIGNIFICANT CHANGE UP (ref 27–34)
MCHC RBC-ENTMCNC: 32.3 % — SIGNIFICANT CHANGE UP (ref 32–36)
MCV RBC AUTO: 97.2 FL — SIGNIFICANT CHANGE UP (ref 80–100)
NRBC # FLD: 0 — SIGNIFICANT CHANGE UP
PLATELET # BLD AUTO: 315 K/UL — SIGNIFICANT CHANGE UP (ref 150–400)
PMV BLD: 9.9 FL — SIGNIFICANT CHANGE UP (ref 7–13)
POTASSIUM SERPL-MCNC: 3.8 MMOL/L — SIGNIFICANT CHANGE UP (ref 3.5–5.3)
POTASSIUM SERPL-SCNC: 3.8 MMOL/L — SIGNIFICANT CHANGE UP (ref 3.5–5.3)
RBC # BLD: 3.19 M/UL — LOW (ref 4.2–5.8)
RBC # FLD: 15.4 % — HIGH (ref 10.3–14.5)
SODIUM SERPL-SCNC: 142 MMOL/L — SIGNIFICANT CHANGE UP (ref 135–145)
WBC # BLD: 13.23 K/UL — HIGH (ref 3.8–10.5)
WBC # FLD AUTO: 13.23 K/UL — HIGH (ref 3.8–10.5)

## 2017-08-13 PROCEDURE — 99233 SBSQ HOSP IP/OBS HIGH 50: CPT

## 2017-08-13 RX ADMIN — CHLORHEXIDINE GLUCONATE 1 APPLICATION(S): 213 SOLUTION TOPICAL at 12:54

## 2017-08-13 RX ADMIN — DABIGATRAN ETEXILATE MESYLATE 150 MILLIGRAM(S): 150 CAPSULE ORAL at 05:10

## 2017-08-13 RX ADMIN — Medication 81 MILLIGRAM(S): at 12:54

## 2017-08-13 RX ADMIN — TRAMADOL HYDROCHLORIDE 50 MILLIGRAM(S): 50 TABLET ORAL at 02:30

## 2017-08-13 RX ADMIN — Medication 100 MILLIGRAM(S): at 05:17

## 2017-08-13 RX ADMIN — Medication 50 MILLIGRAM(S): at 05:10

## 2017-08-13 RX ADMIN — PANTOPRAZOLE SODIUM 40 MILLIGRAM(S): 20 TABLET, DELAYED RELEASE ORAL at 12:54

## 2017-08-13 RX ADMIN — Medication 100 MILLIGRAM(S): at 12:54

## 2017-08-13 RX ADMIN — DABIGATRAN ETEXILATE MESYLATE 150 MILLIGRAM(S): 150 CAPSULE ORAL at 17:05

## 2017-08-13 RX ADMIN — SENNA PLUS 2 TABLET(S): 8.6 TABLET ORAL at 21:14

## 2017-08-13 RX ADMIN — TRAMADOL HYDROCHLORIDE 50 MILLIGRAM(S): 50 TABLET ORAL at 03:30

## 2017-08-13 RX ADMIN — Medication 100 MILLIGRAM(S): at 21:14

## 2017-08-13 RX ADMIN — POLYETHYLENE GLYCOL 3350 17 GRAM(S): 17 POWDER, FOR SOLUTION ORAL at 12:54

## 2017-08-13 NOTE — PROGRESS NOTE ADULT - ASSESSMENT
82 y.o male "[place] [employer] w/ Hx HTN, high chol, CAD s/p stents x 2 in 2008, 2014, Afib on Pradaxa, GERD, BPH, left facial melanoma s/p resection 8/2016, DJD, Spinal stenosis  who has previously had multiple spinal operations who in may 2017 developed back and buttock pain that radiated to his thighs along with neuropathy in both feet  now  S/P exploration of the prior spinal fusion, removal of hardware, removal of spinal stimulator, T11-L1 laminectomy, T2  pelvic spinal fusion with osteotomies on 8/1/2017. He lost 2 Liters blood intraoperatively with post op hypotension requiring pressors in SICU. Patient transferred out of SICU but was confused and pulled out his NGT that night. He was made NPO but not restarted on Cardizem. Patient went into rapid Afib w/ RVR in 140s c/b acute respiratory failure secondary to acute pulmonary edema/B/L Pleural effusions. Now clinically resolving.

## 2017-08-13 NOTE — PROGRESS NOTE ADULT - SUBJECTIVE AND OBJECTIVE BOX
CHIEF COMPLAINT: f/u back pain     SUBJECTIVE / OVERNIGHT EVENTS: Patient seen and examined. No acute events overnight. Pain well controlled and patient without any complaints.    MEDICATIONS  (STANDING):  chlorhexidine 4% Liquid 1 Application(s) Topical daily  aspirin  chewable 81 milliGRAM(s) Oral daily  pantoprazole  Injectable 40 milliGRAM(s) IV Push daily  ALBUTerol/ipratropium for Nebulization. 3 milliLiter(s) Nebulizer once  dabigatran 150 milliGRAM(s) Oral every 12 hours  docusate sodium 100 milliGRAM(s) Oral three times a day  senna 2 Tablet(s) Oral at bedtime  polyethylene glycol 3350 17 Gram(s) Oral daily  metoprolol succinate ER 50 milliGRAM(s) Oral daily    MEDICATIONS  (PRN):  ondansetron Injectable 4 milliGRAM(s) IV Push every 6 hours PRN Nausea and/or Vomiting  morphine  - Injectable 2 milliGRAM(s) IV Push every 4 hours PRN moderate to severe pain  traMADol 50 milliGRAM(s) Oral every 4 hours PRN Mild Pain (1 - 3)      VITALS:  T(F): 97.3 (08-13-17 @ 04:39), Max: 98 (08-12-17 @ 13:20)  HR: 92 (08-13-17 @ 04:39) (85 - 137)  BP: 112/67 (08-13-17 @ 04:39) (93/67 - 133/90)  RR: 17 (08-13-17 @ 04:39) (17 - 19)  SpO2: 95% (08-13-17 @ 04:39)  Wt(kg): --      CAPILLARY BLOOD GLUCOSE      PHYSICAL EXAM:  GENERAL: NAD, well-developed  HEAD:  Atraumatic, Normocephalic  EYES: EOMI, PERRLA, conjunctiva and sclera clear  NECK: Supple, No JVD  CHEST/LUNG: decreased BS bilaterally; No wheeze  HEART: Regular rate and rhythm; No murmurs, rubs, or gallops  ABDOMEN: Soft, Nontender, Nondistended; Bowel sounds present  EXTREMITIES:  2+ Peripheral Pulses, No clubbing, cyanosis, or edema  PSYCH: AAOx3  NEUROLOGY: non-focal  SKIN: No rashes or lesions    LABS:              10.0                 142  | 29   | 38           13.23 >-----------< 315     ------------------------< 108                   31.0                 3.8  | 103  | 0.81                                         Ca 8.3   Mg x     Ph x                          MICROBIOLOGY:        RADIOLOGY & ADDITIONAL TESTS:  Imaging Personally Reviewed: [ ] Yes    [ ] Consultant(s) Notes Reviewed:  [x] Care Discussed with Consultants/Other Providers: Orthopedic PA - discussed CHIEF COMPLAINT: f/u back pain     SUBJECTIVE / OVERNIGHT EVENTS: Patient seen and examined. No acute events overnight. Pain well controlled and patient without any complaints.    MEDICATIONS  (STANDING):  chlorhexidine 4% Liquid 1 Application(s) Topical daily  aspirin  chewable 81 milliGRAM(s) Oral daily  pantoprazole  Injectable 40 milliGRAM(s) IV Push daily  ALBUTerol/ipratropium for Nebulization. 3 milliLiter(s) Nebulizer once  dabigatran 150 milliGRAM(s) Oral every 12 hours  docusate sodium 100 milliGRAM(s) Oral three times a day  senna 2 Tablet(s) Oral at bedtime  polyethylene glycol 3350 17 Gram(s) Oral daily  metoprolol succinate ER 50 milliGRAM(s) Oral daily    MEDICATIONS  (PRN):  ondansetron Injectable 4 milliGRAM(s) IV Push every 6 hours PRN Nausea and/or Vomiting  morphine  - Injectable 2 milliGRAM(s) IV Push every 4 hours PRN moderate to severe pain  traMADol 50 milliGRAM(s) Oral every 4 hours PRN Mild Pain (1 - 3)      VITALS:  T(F): 97.3 (08-13-17 @ 04:39), Max: 98 (08-12-17 @ 13:20)  HR: 92 (08-13-17 @ 04:39) (85 - 137)  BP: 112/67 (08-13-17 @ 04:39) (93/67 - 133/90)  RR: 17 (08-13-17 @ 04:39) (17 - 19)  SpO2: 95% (08-13-17 @ 04:39)  Wt(kg): --      CAPILLARY BLOOD GLUCOSE      PHYSICAL EXAM:  GENERAL: NAD, well-developed  HEAD:  Atraumatic, Normocephalic  EYES: EOMI, PERRLA, conjunctiva and sclera clear  NECK: Supple, No JVD  CHEST/LUNG: decreased BS bilaterally; No wheeze  HEART: Regular rate and rhythm; No murmurs, rubs, or gallops  ABDOMEN: Soft, Nontender, Nondistended; Bowel sounds present  EXTREMITIES:  2+ Peripheral Pulses, No clubbing, cyanosis, or edema  PSYCH: AAOx3  NEUROLOGY: non-focal  SKIN: No rashes or lesions    LABS:              10.0                 142  | 29   | 38           13.23 >-----------< 315     ------------------------< 108                   31.0                 3.8  | 103  | 0.81                                         Ca 8.3   Mg x     Ph x                RADIOLOGY & ADDITIONAL TESTS:  Imaging Personally Reviewed: [ ] Yes    [ ] Consultant(s) Notes Reviewed:  [x] Care Discussed with Consultants/Other Providers: Orthopedic PA - discussed obtaining 2D-Echo CHIEF COMPLAINT: f/u back pain     SUBJECTIVE / OVERNIGHT EVENTS: Patient seen and examined. No acute events overnight. Pain well controlled and patient without any complaints.    MEDICATIONS  (STANDING):  chlorhexidine 4% Liquid 1 Application(s) Topical daily  aspirin  chewable 81 milliGRAM(s) Oral daily  pantoprazole  Injectable 40 milliGRAM(s) IV Push daily  ALBUTerol/ipratropium for Nebulization. 3 milliLiter(s) Nebulizer once  dabigatran 150 milliGRAM(s) Oral every 12 hours  docusate sodium 100 milliGRAM(s) Oral three times a day  senna 2 Tablet(s) Oral at bedtime  polyethylene glycol 3350 17 Gram(s) Oral daily  metoprolol succinate ER 50 milliGRAM(s) Oral daily    MEDICATIONS  (PRN):  ondansetron Injectable 4 milliGRAM(s) IV Push every 6 hours PRN Nausea and/or Vomiting  morphine  - Injectable 2 milliGRAM(s) IV Push every 4 hours PRN moderate to severe pain  traMADol 50 milliGRAM(s) Oral every 4 hours PRN Mild Pain (1 - 3)      VITALS:  T(F): 97.3 (08-13-17 @ 04:39), Max: 98 (08-12-17 @ 13:20)  HR: 92 (08-13-17 @ 04:39) (85 - 137)  BP: 112/67 (08-13-17 @ 04:39) (93/67 - 133/90)  RR: 17 (08-13-17 @ 04:39) (17 - 19)  SpO2: 95% (08-13-17 @ 04:39)  Wt(kg): --      CAPILLARY BLOOD GLUCOSE      PHYSICAL EXAM:  GENERAL: NAD, well-developed  HEAD:  Atraumatic, Normocephalic  EYES: EOMI, PERRLA, conjunctiva and sclera clear  NECK: Supple, No JVD  CHEST/LUNG: decreased BS bilaterally; No wheeze  HEART: Regular rate and rhythm; No murmurs, rubs, or gallops  ABDOMEN: Soft, Nontender, Nondistended; Bowel sounds present  EXTREMITIES:  2+ Peripheral Pulses, No clubbing, cyanosis, or edema  PSYCH: AAOx3  NEUROLOGY: non-focal  SKIN: No rashes or lesions    LABS:              10.0                 142  | 29   | 38           13.23 >-----------< 315     ------------------------< 108                   31.0                 3.8  | 103  | 0.81                                         Ca 8.3   Mg x     Ph x                RADIOLOGY & ADDITIONAL TESTS:  Imaging Personally Reviewed: [x] Yes    < from: Transthoracic Echocardiogram (08.12.17 @ 10:40) >  CONCLUSIONS:  1. Normal left ventricular internal dimensions andwall  thicknesses.  2. Endocardium not well visualized; grossly normal left  ventricular systolic function. Endocardial visualization  enhanced with intravenous injection of echo contrast  (Definity).  3. The right ventricle is not well visualized; grossly  normal right ventricular systolic function.    < end of copied text >      [ ] Consultant(s) Notes Reviewed:  [x] Care Discussed with Consultants/Other Providers: Orthopedic PA - discussed 2D-Echo results

## 2017-08-13 NOTE — PROGRESS NOTE ADULT - PROBLEM SELECTOR PLAN 1
clinically improved; likely 2/2 volume shifts in OR  CXR shows resolving CHF  per cardiology, ok to stop lasix and monitor  monitoring I&0s  f/u on TTE and monitor if maintainance lasix is needed. was not on lasix at home prior to admission  troponin leak most likely secondary to chf and tachycardia.  cardiology following. clinically improved; likely 2/2 volume shifts in OR  CXR shows resolving CHF  per cardiology, ok to stop lasix and monitor  monitoring I&0s  2Decho reviewed  monitor if maintainance lasix is needed. was not on lasix at home prior to admission  troponin leak most likely secondary to chf and tachycardia.  cardiology following.

## 2017-08-13 NOTE — PROGRESS NOTE ADULT - SUBJECTIVE AND OBJECTIVE BOX
Patient seen and examined. No acute events overnight.     HEALTH ISSUES - PROBLEM Dx:  Other constipation: Other constipation  Other dysphagia: Other dysphagia  Acute pulmonary edema with congestive heart failure: Acute pulmonary edema with congestive heart failure  Hyperlipidemia, unspecified hyperlipidemia type: Hyperlipidemia, unspecified hyperlipidemia type  Metabolic encephalopathy: Metabolic encephalopathy  Acute respiratory distress: Acute respiratory distress  Atrial fibrillation, unspecified type: Atrial fibrillation, unspecified type  Peripheral neuropathy: Peripheral neuropathy  HLD (hyperlipidemia): HLD (hyperlipidemia)  HTN (hypertension): HTN (hypertension)  Atrial fibrillation: Atrial fibrillation  Stented coronary artery: Stented coronary artery  CAD (coronary artery disease): CAD (coronary artery disease)  Spinal stenosis of lumbosacral region: Spinal stenosis of lumbosacral region          MEDICATIONS  (STANDING):  chlorhexidine 4% Liquid 1 Application(s) Topical daily  aspirin  chewable 81 milliGRAM(s) Oral daily  pantoprazole  Injectable 40 milliGRAM(s) IV Push daily  ALBUTerol/ipratropium for Nebulization. 3 milliLiter(s) Nebulizer once  furosemide   Injectable 40 milliGRAM(s) IV Push two times a day  dabigatran 150 milliGRAM(s) Oral every 12 hours  docusate sodium 100 milliGRAM(s) Oral three times a day  senna 2 Tablet(s) Oral at bedtime  polyethylene glycol 3350 17 Gram(s) Oral daily  metoprolol succinate ER 25 milliGRAM(s) Oral daily      Allergies    No Known Allergies    Intolerances        PAST MEDICAL & SURGICAL HISTORY:  Spinal stenosis of lumbosacral region  Rotator cuff disorder, right  Arthritis  Inguinal hernia: right  Reflux  CAD (coronary artery disease)  HTN (hypertension)  HLD (hyperlipidemia)  Atrial fibrillation  Peripheral neuropathy  H/O rotator cuff surgery: 12/2015  History of skin surgery: melanoma excision - left cheek/face 2016  Encounter for interrogation of neurostimulator: 12/2015  Back injury: s/p surgery L1-L9 in two separate surgeries 2003 &amp; 2006  S/P angioplasty with stent: RCA 2008 &amp; LAD 2014 cardiac stent placement  S/P knee replacement, bilateral  S/P cataract extraction         Vital Signs Last 24 Hrs  T(C): 36.3 (13 Aug 2017 04:39), Max: 36.8 (12 Aug 2017 09:04)  T(F): 97.3 (13 Aug 2017 04:39), Max: 98.2 (12 Aug 2017 09:04)  HR: 92 (13 Aug 2017 04:39) (84 - 137)  BP: 112/67 (13 Aug 2017 04:39) (93/67 - 133/90)  BP(mean): --  RR: 17 (13 Aug 2017 04:39) (17 - 19)  SpO2: 95% (13 Aug 2017 04:39) (95% - 99%)  Spine PE:  Dressing clean dry intact  HV drain serosang out put: 0/10    Motor:                   C5                C6              C7               C8           T1   R            5/5                5/5            5/5             5/5          5/5  L             5/5               5/5             5/5             5/5          5/5                L2             L3             L4               L5            S1  R         5/5           5/5          5/5             5/5           5/5  L          5/5          5/5           5/5             5/5           5/5    Sensory:            C5         C6         C7      C8       T1        (0=absent, 1=impaired, 2=normal, NT=not testable)  R         2            2           2        2         2  L          2            2           2        2         2               L2          L3         L4      L5       S1         (0=absent, 1=impaired, 2=normal, NT=not testable)  R         2            2            2        2        2  L          2            2           2        2         2      A/P: 82y Male POD HANNA, T11-L1 laminectomy and T4-Pelvis posterior spinal instrumentation and fusion  Pain control  WBAT/PT/OT/OOB  Brace for comfort  FU Labs  SCDs  Medical co-management appreciated  dispo planning, rehab Patient seen and examined. No acute events overnight.     Vital Signs Last 24 Hrs  T(C): 36.3 (13 Aug 2017 04:39), Max: 36.8 (12 Aug 2017 09:04)  T(F): 97.3 (13 Aug 2017 04:39), Max: 98.2 (12 Aug 2017 09:04)  HR: 92 (13 Aug 2017 04:39) (84 - 137)  BP: 112/67 (13 Aug 2017 04:39) (93/67 - 133/90)  BP(mean): --  RR: 17 (13 Aug 2017 04:39) (17 - 19)  SpO2: 95% (13 Aug 2017 04:39) (95% - 99%)    Spine PE:  Dressing clean dry intact    Motor:                   C5                C6              C7               C8           T1   R            5/5                5/5            5/5             5/5          5/5  L             5/5               5/5             5/5             5/5          5/5                L2             L3             L4               L5            S1  R         5/5           5/5          5/5             5/5           5/5  L          5/5          5/5           5/5             5/5           5/5    Sensory:            C5         C6         C7      C8       T1        (0=absent, 1=impaired, 2=normal, NT=not testable)  R         2            2           2        2         2  L          2            2           2        2         2               L2          L3         L4      L5       S1         (0=absent, 1=impaired, 2=normal, NT=not testable)  R         2            2            2        2        2  L          2            2           2        2         2      A/P: 82y Male POD HANNA, T11-L1 laminectomy and T4-Pelvis posterior spinal instrumentation and fusion  Pain control  WBAT/PT/OT/OOB  Brace for comfort  FU Labs  SCDs  Medical co-management appreciated  dispo planning, rehab

## 2017-08-13 NOTE — PROGRESS NOTE ADULT - PROBLEM SELECTOR PLAN 8
POD # 10 . management as per ortho. POD # 12. management as per ortho.  c/w pain control with morphine IV prn and ultram prn for now.

## 2017-08-13 NOTE — PROGRESS NOTE ADULT - PROBLEM SELECTOR PLAN 4
HR now stable after s/p cardizem drip   discontinued PO cardizem as per cardiology  c/w Toprol XL 50 mg qdaily   c/w pradaxa

## 2017-08-14 DIAGNOSIS — R26.9 UNSPECIFIED ABNORMALITIES OF GAIT AND MOBILITY: ICD-10-CM

## 2017-08-14 LAB
BUN SERPL-MCNC: 38 MG/DL — HIGH (ref 7–23)
CALCIUM SERPL-MCNC: 8.3 MG/DL — LOW (ref 8.4–10.5)
CHLORIDE SERPL-SCNC: 106 MMOL/L — SIGNIFICANT CHANGE UP (ref 98–107)
CO2 SERPL-SCNC: 27 MMOL/L — SIGNIFICANT CHANGE UP (ref 22–31)
CREAT SERPL-MCNC: 0.73 MG/DL — SIGNIFICANT CHANGE UP (ref 0.5–1.3)
GLUCOSE SERPL-MCNC: 97 MG/DL — SIGNIFICANT CHANGE UP (ref 70–99)
HCT VFR BLD CALC: 30.3 % — LOW (ref 39–50)
HGB BLD-MCNC: 9.7 G/DL — LOW (ref 13–17)
MAGNESIUM SERPL-MCNC: 2.1 MG/DL — SIGNIFICANT CHANGE UP (ref 1.6–2.6)
MCHC RBC-ENTMCNC: 31.1 PG — SIGNIFICANT CHANGE UP (ref 27–34)
MCHC RBC-ENTMCNC: 32 % — SIGNIFICANT CHANGE UP (ref 32–36)
MCV RBC AUTO: 97.1 FL — SIGNIFICANT CHANGE UP (ref 80–100)
NRBC # FLD: 0.02 — SIGNIFICANT CHANGE UP
PHOSPHATE SERPL-MCNC: 3.5 MG/DL — SIGNIFICANT CHANGE UP (ref 2.5–4.5)
PLATELET # BLD AUTO: 334 K/UL — SIGNIFICANT CHANGE UP (ref 150–400)
PMV BLD: 9.7 FL — SIGNIFICANT CHANGE UP (ref 7–13)
POTASSIUM SERPL-MCNC: 3.9 MMOL/L — SIGNIFICANT CHANGE UP (ref 3.5–5.3)
POTASSIUM SERPL-SCNC: 3.9 MMOL/L — SIGNIFICANT CHANGE UP (ref 3.5–5.3)
RBC # BLD: 3.12 M/UL — LOW (ref 4.2–5.8)
RBC # FLD: 16.3 % — HIGH (ref 10.3–14.5)
SODIUM SERPL-SCNC: 142 MMOL/L — SIGNIFICANT CHANGE UP (ref 135–145)
WBC # BLD: 11.61 K/UL — HIGH (ref 3.8–10.5)
WBC # FLD AUTO: 11.61 K/UL — HIGH (ref 3.8–10.5)

## 2017-08-14 PROCEDURE — 99233 SBSQ HOSP IP/OBS HIGH 50: CPT

## 2017-08-14 PROCEDURE — 93010 ELECTROCARDIOGRAM REPORT: CPT

## 2017-08-14 PROCEDURE — 99232 SBSQ HOSP IP/OBS MODERATE 35: CPT | Mod: GC

## 2017-08-14 PROCEDURE — 70450 CT HEAD/BRAIN W/O DYE: CPT | Mod: 26

## 2017-08-14 RX ADMIN — SENNA PLUS 2 TABLET(S): 8.6 TABLET ORAL at 22:40

## 2017-08-14 RX ADMIN — Medication 100 MILLIGRAM(S): at 05:53

## 2017-08-14 RX ADMIN — TRAMADOL HYDROCHLORIDE 50 MILLIGRAM(S): 50 TABLET ORAL at 15:16

## 2017-08-14 RX ADMIN — Medication 81 MILLIGRAM(S): at 15:16

## 2017-08-14 RX ADMIN — DABIGATRAN ETEXILATE MESYLATE 150 MILLIGRAM(S): 150 CAPSULE ORAL at 18:07

## 2017-08-14 RX ADMIN — DABIGATRAN ETEXILATE MESYLATE 150 MILLIGRAM(S): 150 CAPSULE ORAL at 05:53

## 2017-08-14 RX ADMIN — Medication 100 MILLIGRAM(S): at 22:40

## 2017-08-14 RX ADMIN — TRAMADOL HYDROCHLORIDE 50 MILLIGRAM(S): 50 TABLET ORAL at 15:45

## 2017-08-14 RX ADMIN — Medication 50 MILLIGRAM(S): at 05:53

## 2017-08-14 RX ADMIN — PANTOPRAZOLE SODIUM 40 MILLIGRAM(S): 20 TABLET, DELAYED RELEASE ORAL at 15:17

## 2017-08-14 NOTE — PROGRESS NOTE ADULT - ASSESSMENT
ASSESSMENT/PLAN: 	  82yoM with HTN,HLD,CAD (YAO pLAD 2014) , Afib on pradaxa, metoprolol as outpt. Admitted for spinal surgery, cardiology consulted for afib with rvr to 140.    Afib  - now rate controlled. Cont metop succ 50 qd  -continue dabigatran 150 bid     ADHF  - improved, likely 2/2 volume shifts in OR

## 2017-08-14 NOTE — CONSULT NOTE ADULT - SUBJECTIVE AND OBJECTIVE BOX
HPI:  78 y/o male with medical h/o CAD with cardiac stents on Aspirin, Afib on Pradaxa, HTN, HLD, BPH, GERD and Spinal stenosis. Pt has h/o previous spinal surgery in 2003 & 2006. Pt reports in 5/2017 he started experiencing new-onset Neuropathy to both feet and pain to back, buttocks and thighs. Pt s/pExploration of Fusion Removal of Hardware Screws, T2 Pelvis Fusion, T11 - T12 Laminectomy scheduled on 8/01/2017. He lost 2 Liters blood intraoperatively with post op hypotension requiring pressors in SICU. Patient went into rapid Afib w/ RVR in 140s c/b acute respiratory failure secondary to acute pulmonary edema/B/L Pleural effusions. Now clinically resolving, CXR shows improved CHF, off Lasix a per Cardiology. Swallow eval- w/ dysphagia 1 w/ honey thickened fluids.        REVIEW OF SYSTEMS: No chest pain, shortness of breath, nausea, vomiting or diarhea.      PAST MEDICAL & SURGICAL HISTORY  Spinal stenosis of lumbosacral region  Rotator cuff disorder, right  Arthritis  Inguinal hernia  Reflux  CAD (coronary artery disease)  HTN (hypertension)  HLD (hyperlipidemia)  Atrial fibrillation  Peripheral neuropathy  H/O rotator cuff surgery  History of skin surgery  Encounter for interrogation of neurostimulator  Back injury  S/P angioplasty with stent  S/P knee replacement, bilateral  S/P cataract extraction      SOCIAL HISTORY  Smoking - Denied, EtOH - Denied, Drugs - Denied    FUNCTIONAL HISTORY:   Lives with spouse,   Independent PTA    CURRENT FUNCTIONAL STATUS: mod assist      FAMILY HISTORY   Family history of heart failure (Sibling)      RECENT LABS/IMAGING  CBC Full  -  ( 13 Aug 2017 04:56 )  WBC Count : 13.23 K/uL  Hemoglobin : 10.0 g/dL  Hematocrit : 31.0 %  Platelet Count - Automated : 315 K/uL  Mean Cell Volume : 97.2 fL  Mean Cell Hemoglobin : 31.3 pg  Mean Cell Hemoglobin Concentration : 32.3 %  Auto Neutrophil # : x  Auto Lymphocyte # : x  Auto Monocyte # : x  Auto Eosinophil # : x  Auto Basophil # : x  Auto Neutrophil % : x  Auto Lymphocyte % : x  Auto Monocyte % : x  Auto Eosinophil % : x  Auto Basophil % : x    08-13    142  |  103  |  38<H>  ----------------------------<  108<H>  3.8   |  29  |  0.81    Ca    8.3<L>      13 Aug 2017 04:56          VITALS  T(C): 36.4 (08-14-17 @ 08:00), Max: 36.7 (08-13-17 @ 17:21)  HR: 84 (08-14-17 @ 08:00) (77 - 91)  BP: 94/60 (08-14-17 @ 08:00) (94/60 - 114/68)  RR: 18 (08-14-17 @ 08:00) (17 - 18)  SpO2: 100% (08-14-17 @ 08:00) (96% - 100%)  Wt(kg): --    ALLERGIES  No Known Allergies      MEDICATIONS   chlorhexidine 4% Liquid 1 Application(s) Topical daily  aspirin  chewable 81 milliGRAM(s) Oral daily  pantoprazole  Injectable 40 milliGRAM(s) IV Push daily  ondansetron Injectable 4 milliGRAM(s) IV Push every 6 hours PRN  ALBUTerol/ipratropium for Nebulization. 3 milliLiter(s) Nebulizer once  morphine  - Injectable 2 milliGRAM(s) IV Push every 4 hours PRN  traMADol 50 milliGRAM(s) Oral every 4 hours PRN  dabigatran 150 milliGRAM(s) Oral every 12 hours  docusate sodium 100 milliGRAM(s) Oral three times a day  senna 2 Tablet(s) Oral at bedtime  polyethylene glycol 3350 17 Gram(s) Oral daily  metoprolol succinate ER 50 milliGRAM(s) Oral daily      ----------------------------------------------------------------------------------------  PHYSICAL EXAM  Constitutional - NAD, Comfortable  HEENT - NCAT, EOMI  Neck - Supple, No limited ROM  Chest - CTA bilaterally, No wheeze, No rhonchi, No crackles  Cardiovascular - RRR, S1S2, No murmurs  Abdomen - BS+, Soft, NTND  Extremities - No C/C/E, No calf tenderness   Neurologic Exam -                    Cognitive - Awake, Alert, AAO to self, place, date, year, situation     Communication - Fluent, No dysarthria, no aphasia     Cranial Nerves - CN 2-12 intact     Motor - No focal deficits                       Sensory - Intact to LT     Reflexes - DTR Intact, No primitive reflexive     Balance - WNL Static  Psychiatric - Mood stable, Affect WNL HPI:  80 y/o male with medical h/o CAD with cardiac stents on Aspirin, Afib on Pradaxa, HTN, HLD, BPH, GERD and Spinal stenosis. Pt has h/o previous spinal surgery in 2003 & 2006. Pt reports in 5/2017 he started experiencing new-onset Neuropathy to both feet and pain to back, buttocks and thighs. Pt s/pExploration of Fusion Removal of Hardware Screws, T2 Pelvis Fusion, T11 - T12 Laminectomy scheduled on 8/01/2017. He lost 2 Liters blood intraoperatively with post op hypotension requiring pressors in SICU. Patient went into rapid Afib w/ RVR in 140s c/b acute respiratory failure secondary to acute pulmonary edema/B/L Pleural effusions. Now clinically resolving, CXR shows improved CHF, off Lasix a per Cardiology. Swallow eval- w/ dysphagia 1 w/ honey thickened fluids. Pain controlled, reports some confusion and word finding difficulties.  CT head  8/6 with no acute process.         REVIEW OF SYSTEMS: No chest pain, shortness of breath, nausea, vomiting or diarhea.      PAST MEDICAL & SURGICAL HISTORY  Spinal stenosis of lumbosacral region  Rotator cuff disorder, right  Arthritis  Inguinal hernia  Reflux  CAD (coronary artery disease)  HTN (hypertension)  HLD (hyperlipidemia)  Atrial fibrillation  Peripheral neuropathy  H/O rotator cuff surgery  History of skin surgery  Encounter for interrogation of neurostimulator  Back injury  S/P angioplasty with stent  S/P knee replacement, bilateral  S/P cataract extraction      SOCIAL HISTORY  Smoking - Denied, EtOH - Denied, Drugs - Denied    FUNCTIONAL HISTORY:   Lives with spouse,   Independent PTA    CURRENT FUNCTIONAL STATUS: mod assist      FAMILY HISTORY   Family history of heart failure (Sibling)      RECENT LABS/IMAGING  CBC Full  -  ( 13 Aug 2017 04:56 )  WBC Count : 13.23 K/uL  Hemoglobin : 10.0 g/dL  Hematocrit : 31.0 %  Platelet Count - Automated : 315 K/uL  Mean Cell Volume : 97.2 fL  Mean Cell Hemoglobin : 31.3 pg  Mean Cell Hemoglobin Concentration : 32.3 %  Auto Neutrophil # : x  Auto Lymphocyte # : x  Auto Monocyte # : x  Auto Eosinophil # : x  Auto Basophil # : x  Auto Neutrophil % : x  Auto Lymphocyte % : x  Auto Monocyte % : x  Auto Eosinophil % : x  Auto Basophil % : x    08-13    142  |  103  |  38<H>  ----------------------------<  108<H>  3.8   |  29  |  0.81    Ca    8.3<L>      13 Aug 2017 04:56          VITALS  T(C): 36.4 (08-14-17 @ 08:00), Max: 36.7 (08-13-17 @ 17:21)  HR: 84 (08-14-17 @ 08:00) (77 - 91)  BP: 94/60 (08-14-17 @ 08:00) (94/60 - 114/68)  RR: 18 (08-14-17 @ 08:00) (17 - 18)  SpO2: 100% (08-14-17 @ 08:00) (96% - 100%)  Wt(kg): --    ALLERGIES  No Known Allergies      MEDICATIONS   chlorhexidine 4% Liquid 1 Application(s) Topical daily  aspirin  chewable 81 milliGRAM(s) Oral daily  pantoprazole  Injectable 40 milliGRAM(s) IV Push daily  ondansetron Injectable 4 milliGRAM(s) IV Push every 6 hours PRN  ALBUTerol/ipratropium for Nebulization. 3 milliLiter(s) Nebulizer once  morphine  - Injectable 2 milliGRAM(s) IV Push every 4 hours PRN  traMADol 50 milliGRAM(s) Oral every 4 hours PRN  dabigatran 150 milliGRAM(s) Oral every 12 hours  docusate sodium 100 milliGRAM(s) Oral three times a day  senna 2 Tablet(s) Oral at bedtime  polyethylene glycol 3350 17 Gram(s) Oral daily  metoprolol succinate ER 50 milliGRAM(s) Oral daily      ----------------------------------------------------------------------------------------  PHYSICAL EXAM  Constitutional - NAD, Comfortable  HEENT - NCAT, EOMI  Neck - Supple, No limited ROM  Chest - CTA bilaterally, No wheeze, No rhonchi, No crackles  Cardiovascular - RRR, S1S2, No murmurs  Abdomen - BS+, Soft, NTND  Extremities - No C/C/E, No calf tenderness   Neurologic Exam -                    Cognitive - Awake, Alert, AAO to self, place, date, year, situation     Communication -No dysarthria, + word finding difficulty but able to name 3 objects      Cranial Nerves - CN 2-12 intact     Motor - No focal deficits, 4/5 b/l LE                       Sensory - Intact to LT     Reflexes - DTR Intact, No primitive reflexive     Balance - WNL Static  Psychiatric - Mood stable, Affect WNL

## 2017-08-14 NOTE — PROGRESS NOTE ADULT - ASSESSMENT
A/P: 82y Male POD13 s/p HANNA, T11-L1 laminectomy and T4-Pelvis posterior spinal instrumentation and fusion.    Pain control  WBAT/PT/OT/OOB  Brace for comfort  SCDs  Medical co-management appreciated  rehab d/c today     Nirmal Choudhary PGY1  Orthopedic Surgery

## 2017-08-14 NOTE — PROGRESS NOTE ADULT - PROBLEM SELECTOR PLAN 1
clinically improved; likely 2/2 volume shifts in OR  - off diuretic tx; monitor volume status  - incentive spirometry

## 2017-08-14 NOTE — CONSULT NOTE ADULT - CONSULT REASON
Afib
Medical Co-management
This patient requires critical care treatment and monitoring
hypotensive, requiring pressors s/p Spinal cord stimulator replacement & T4-L11 laminectomy, pelvic fusion
rehab goals
tachycardia, tachypnea
This patient requires critical care treatment and monitoring

## 2017-08-14 NOTE — CONSULT NOTE ADULT - PROBLEM SELECTOR RECOMMENDATION 9
1. PT- bed mobility,transfers, gait and balance training  2. OT- ADL'S  3. dysphagia- new onset, in combination with word finding difficulties, would rec repeat head CT and neuro consult if any abnormalities.   4. Patient would benefit from acute rehab, needs a multidisciplinary team including PT, OT and speech and swallow therapy . Can tolerate 3 hours of therapy a day.  Will follow.
EKG showed rapid afib improved to 80-110s with cardizem 10mg IV x 1  Cardizem not restarted after NGT was pulled out by patient.   transfer to tele bed  check cardiac enzymes x 3  restart cardizem drip @ 8/min

## 2017-08-14 NOTE — PROGRESS NOTE ADULT - PROBLEM SELECTOR PLAN 2
resolved resolving  - caution with excessive pain meds or anxiolytics  - frequent reorientation  - fall precautions  - aspiration precautions

## 2017-08-14 NOTE — PROGRESS NOTE ADULT - SUBJECTIVE AND OBJECTIVE BOX
Chief complaint of back pain      SUBJECTIVE / OVERNIGHT EVENTS:    MEDICATIONS  (STANDING):  chlorhexidine 4% Liquid 1 Application(s) Topical daily  aspirin  chewable 81 milliGRAM(s) Oral daily  pantoprazole  Injectable 40 milliGRAM(s) IV Push daily  ALBUTerol/ipratropium for Nebulization. 3 milliLiter(s) Nebulizer once  dabigatran 150 milliGRAM(s) Oral every 12 hours  docusate sodium 100 milliGRAM(s) Oral three times a day  senna 2 Tablet(s) Oral at bedtime  polyethylene glycol 3350 17 Gram(s) Oral daily  metoprolol succinate ER 50 milliGRAM(s) Oral daily    MEDICATIONS  (PRN):  ondansetron Injectable 4 milliGRAM(s) IV Push every 6 hours PRN Nausea and/or Vomiting  morphine  - Injectable 2 milliGRAM(s) IV Push every 4 hours PRN moderate to severe pain  traMADol 50 milliGRAM(s) Oral every 4 hours PRN Mild Pain (1 - 3)    Vital Signs Last 24 Hrs   T(F): 97.8  Max: 98.1    HR: 83 (77 - 91)  BP: 95/62  (94/60 - 114/68)  RR: 18  SpO2: 100% on RA    PHYSICAL EXAM:  GENERAL: NAD, well-developed  HEENT:  Atraumatic, Normocephalic; [place]; conjunctiva and sclera clear; oral mucosa moist; neck supple   CHEST/LUNG: Clear to auscultation bilaterally; No wheeze  HEART: irreg irreg  ABDOMEN: Soft, Nontender, Nondistended; Bowel sounds present  EXTREMITIES:  2+ Peripheral Pulses, No clubbing, cyanosis, or edema  PSYCH: AAOx3  NEUROLOGY: non-focal  SKIN: No rashes or lesions    RADIOLOGY & ADDITIONAL TESTS:    PROCEDURE: Transthoracic echocardiogram with 2-D, M-Mode  and complete spectral and color flow Doppler.  INDICATION: Abnormal electrocardiogram (ECG) (EKG) (R94.31)  ------------------------------------------------------------------------  CONCLUSIONS:  1. Normal left ventricular internal dimensions andwall  thicknesses.  2. Endocardium not well visualized; grossly normal left  ventricular systolic function. Endocardial visualization  enhanced with intravenous injection of echo contrast  (Definity).  3. The right ventricle is not well visualized; grossly  normal right ventricular systolic function.  ------------------------------------------------------------------------  Confirmed on  8/12/2017 - 15:29:16 by Dr. Linnea Hoffman M.D.  ------------------------------------------------------------------------           Care Discussed with Consultants/Other Providers: ortho and cardio Chief complaint of back pain      SUBJECTIVE / OVERNIGHT EVENTS:  Pt still has occasional visual hallucinations, but is overall feeling much better; he denies CP, dyspnea, lightheadness, palpitations, confusion, n/v, or difficulty swallowing; he is tolerating diet and had BM.    MEDICATIONS  (STANDING):  chlorhexidine 4% Liquid 1 Application(s) Topical daily  aspirin  chewable 81 milliGRAM(s) Oral daily  pantoprazole  Injectable 40 milliGRAM(s) IV Push daily  ALBUTerol/ipratropium for Nebulization. 3 milliLiter(s) Nebulizer once  dabigatran 150 milliGRAM(s) Oral every 12 hours  docusate sodium 100 milliGRAM(s) Oral three times a day  senna 2 Tablet(s) Oral at bedtime  polyethylene glycol 3350 17 Gram(s) Oral daily  metoprolol succinate ER 50 milliGRAM(s) Oral daily    MEDICATIONS  (PRN):  ondansetron Injectable 4 milliGRAM(s) IV Push every 6 hours PRN Nausea and/or Vomiting  morphine  - Injectable 2 milliGRAM(s) IV Push every 4 hours PRN moderate to severe pain  traMADol 50 milliGRAM(s) Oral every 4 hours PRN Mild Pain (1 - 3)    Vital Signs Last 24 Hrs  T(F): 97.8  Max: 98.1    HR: 83 (77 - 91)  BP: 95/62  (94/60 - 114/68)  RR: 18  SpO2: 100% on RA    PHYSICAL EXAM:  GENERAL: NAD, well-developed  HEENT:  Atraumatic, Normocephalic; [place]; conjunctiva and sclera clear; oral mucosa moist; neck supple   CHEST/LUNG: Clear to auscultation bilaterally; No wheeze  HEART: irreg irreg  ABDOMEN: Soft, Nontender, Nondistended; Bowel sounds present  EXTREMITIES:  2+ Peripheral Pulses, No clubbing, cyanosis, or edema  PSYCH: AAOx3  NEUROLOGY: non-focal  SKIN: No rashes or lesions    RADIOLOGY & ADDITIONAL TESTS:    PROCEDURE: Transthoracic echocardiogram with 2-D, M-Mode  and complete spectral and color flow Doppler.  INDICATION: Abnormal electrocardiogram (ECG) (EKG) (R94.31)  ------------------------------------------------------------------------  CONCLUSIONS:  1. Normal left ventricular internal dimensions and wall  thicknesses.  2. Endocardium not well visualized; grossly normal left  ventricular systolic function. Endocardial visualization  enhanced with intravenous injection of echo contrast  (Definity).  3. The right ventricle is not well visualized; grossly  normal right ventricular systolic function.  ------------------------------------------------------------------------  Confirmed on  8/12/2017 - 15:29:16 by Dr. Linnea Hoffman M.D.  ------------------------------------------------------------------------           Care Discussed with Consultants/Other Providers: ortho and cardio

## 2017-08-14 NOTE — PROGRESS NOTE ADULT - SUBJECTIVE AND OBJECTIVE BOX
24H hour events:     MEDICATIONS:  aspirin  chewable 81 milliGRAM(s) Oral daily  dabigatran 150 milliGRAM(s) Oral every 12 hours  metoprolol succinate ER 50 milliGRAM(s) Oral daily      ALBUTerol/ipratropium for Nebulization. 3 milliLiter(s) Nebulizer once    ondansetron Injectable 4 milliGRAM(s) IV Push every 6 hours PRN  morphine  - Injectable 2 milliGRAM(s) IV Push every 4 hours PRN  traMADol 50 milliGRAM(s) Oral every 4 hours PRN    pantoprazole  Injectable 40 milliGRAM(s) IV Push daily  docusate sodium 100 milliGRAM(s) Oral three times a day  senna 2 Tablet(s) Oral at bedtime  polyethylene glycol 3350 17 Gram(s) Oral daily      chlorhexidine 4% Liquid 1 Application(s) Topical daily      REVIEW OF SYSTEMS:  General: no fatigue/malaise, weight loss/gain.  Skin: no rashes.  Ophthalmologic: no blurred vision, no loss of vision. 	  ENT: no sore throat, rhinorrhea, sinus congestion.  Respiratory: no SOB, cough or wheeze.  Gastrointestinal:  no N/V/D, no melena/hematemesis/hematochezia.  Genitourinary: no dysuria/hesitancy or hematuria.  Musculoskeletal: no myalgias or arthralgias.  Neurological: no changes in vision or hearing, no lightheadedness/dizziness, no syncope/near syncope	  Psychiatric: no unusual stress/anxiety.   Hematology/Lymphatics: no unusual bleeding, bruising and no lymphadenopathy.  Endocrine: no unusual thirst.   All others negative except as stated above and in HPI.      PHYSICAL EXAM:  T(C): 36.4 (08-14-17 @ 08:00), Max: 36.7 (08-13-17 @ 17:21)  HR: 84 (08-14-17 @ 08:00) (77 - 91)  BP: 94/60 (08-14-17 @ 08:00) (91/67 - 114/68)  RR: 18 (08-14-17 @ 08:00) (17 - 18)  SpO2: 100% (08-14-17 @ 08:00) (96% - 100%)  Wt(kg): --  I&O's Summary    13 Aug 2017 07:01  -  14 Aug 2017 07:00  --------------------------------------------------------  IN: 0 mL / OUT: 200 mL / NET: -200 mL        Appearance: Normal	  HEENT:   Normal oral mucosa, PERRL, EOMI	  Lymphatic: No lymphadenopathy  Cardiovascular: Normal S1 S2, No JVD, No murmurs, No edema  Respiratory: Lungs clear to auscultation	  Psychiatry: A & O x 3, Mood & affect appropriate  Gastrointestinal:  Soft, Non-tender, + BS	  Skin: No rashes, No ecchymoses, No cyanosis	  Neurologic: Non-focal  Extremities: Normal range of motion, No clubbing, cyanosis or edema  Vascular: Peripheral pulses palpable 2+ bilaterally        LABS:	 	    CBC Full  -  ( 13 Aug 2017 04:56 )  WBC Count : 13.23 K/uL  Hemoglobin : 10.0 g/dL  Hematocrit : 31.0 %  Platelet Count - Automated : 315 K/uL  Mean Cell Volume : 97.2 fL  Mean Cell Hemoglobin : 31.3 pg  Mean Cell Hemoglobin Concentration : 32.3 %  Auto Neutrophil # : x  Auto Lymphocyte # : x  Auto Monocyte # : x  Auto Eosinophil # : x  Auto Basophil # : x  Auto Neutrophil % : x  Auto Lymphocyte % : x  Auto Monocyte % : x  Auto Eosinophil % : x  Auto Basophil % : x    08-13    142  |  103  |  38<H>  ----------------------------<  108<H>  3.8   |  29  |  0.81    Ca    8.3<L>      13 Aug 2017 04:56        proBNP:   Lipid Profile:   HgA1c:   TSH:       CARDIAC MARKERS:            TELEMETRY: 	    ECG:  	      PREVIOUS DIAGNOSTIC TESTING:    [ ] Echocardiogram:  [ ]  Catheterization:  [ ] Stress Test:  	        24H hour events:   diuressed 2L since started on IV lasix 2 days ago  rate better controlled    MEDICATIONS:  aspirin  chewable 81 milliGRAM(s) Oral daily  furosemide   Injectable 40 milliGRAM(s) IV Push two times a day  dabigatran 150 milliGRAM(s) Oral every 12 hours  metoprolol succinate ER 50 milliGRAM(s) Oral daily      ALBUTerol/ipratropium for Nebulization. 3 milliLiter(s) Nebulizer once    ondansetron Injectable 4 milliGRAM(s) IV Push every 6 hours PRN  morphine  - Injectable 2 milliGRAM(s) IV Push every 4 hours PRN  traMADol 50 milliGRAM(s) Oral every 4 hours PRN    pantoprazole  Injectable 40 milliGRAM(s) IV Push daily  docusate sodium 100 milliGRAM(s) Oral three times a day  senna 2 Tablet(s) Oral at bedtime  polyethylene glycol 3350 17 Gram(s) Oral daily      chlorhexidine 4% Liquid 1 Application(s) Topical daily      REVIEW OF SYSTEMS:  General: no fatigue/malaise, weight loss/gain.  Skin: no rashes.  Ophthalmologic: no blurred vision, no loss of vision. 	  ENT: no sore throat, rhinorrhea, sinus congestion.  Respiratory: no SOB, cough or wheeze.  Gastrointestinal:  no N/V/D, no melena/hematemesis/hematochezia.  Genitourinary: no dysuria/hesitancy or hematuria.  Musculoskeletal: no myalgias or arthralgias.  Neurological: no changes in vision or hearing, no lightheadedness/dizziness, no syncope/near syncope	  Psychiatric: no unusual stress/anxiety.   Hematology/Lymphatics: no unusual bleeding, bruising and no lymphadenopathy.  Endocrine: no unusual thirst.   All others negative except as stated above and in HPI.    PHYSICAL EXAM:  T(C): 36.8 (08-12-17 @ 09:04), Max: 36.8 (08-11-17 @ 21:25)  HR: 84 (08-12-17 @ 09:04) (64 - 119)  BP: 111/76 (08-12-17 @ 09:04) (109/73 - 128/77)  RR: 18 (08-12-17 @ 09:04) (18 - 18)  SpO2: 98% (08-12-17 @ 09:04) (97% - 100%)  Wt(kg): --  I&O's Summary    11 Aug 2017 07:01  -  12 Aug 2017 07:00  --------------------------------------------------------  IN: 0 mL / OUT: 1655 mL / NET: -1655 mL    12 Aug 2017 07:01  -  12 Aug 2017 13:41  --------------------------------------------------------  IN: 100 mL / OUT: 1300 mL / NET: -1200 mL        Appearance: Normal	  HEENT:   Normal oral mucosa, PERRL, EOMI	  Lymphatic: No lymphadenopathy  Cardiovascular: Normal S1 S2, No JVD, No murmurs, No edema  Respiratory: Lungs clear to auscultation	  Psychiatry: A & O x 3, Mood & affect appropriate  Gastrointestinal:  Soft, Non-tender, + BS	  Skin: No rashes, No ecchymoses, No cyanosis	  Neurologic: Non-focal  Extremities: Normal range of motion, No clubbing, cyanosis or edema  Vascular: Peripheral pulses palpable 2+ bilaterally        LABS:	 	    CBC Full  -  ( 11 Aug 2017 09:26 )  WBC Count : 11.86 K/uL  Hemoglobin : 10.0 g/dL  Hematocrit : 30.9 %  Platelet Count - Automated : 279 K/uL  Mean Cell Volume : 93.4 fL  Mean Cell Hemoglobin : 30.2 pg  Mean Cell Hemoglobin Concentration : 32.4 %  Auto Neutrophil # : 7.14 K/uL  Auto Lymphocyte # : 1.73 K/uL  Auto Monocyte # : 1.46 K/uL  Auto Eosinophil # : 0.65 K/uL  Auto Basophil # : 0.06 K/uL  Auto Neutrophil % : 60.2 %  Auto Lymphocyte % : 14.6 %  Auto Monocyte % : 12.3 %  Auto Eosinophil % : 5.5 %  Auto Basophil % : 0.5 %    08-11    140  |  100  |  29<H>  ----------------------------<  125<H>  3.7   |  24  |  0.74    Ca    8.1<L>      11 Aug 2017 09:26  Phos  3.2     08-11  Mg     1.9     08-11    TPro  5.6<L>  /  Alb  2.7<L>  /  TBili  2.2<H>  /  DBili  x   /  AST  49<H>  /  ALT  76<H>  /  AlkPhos  185<H>  08-11    < from: Transthoracic Echocardiogram (10.29.14 @ 08:18) >  Observations:  Mitral Valve: Mitral annular calcification. Mild mitral  regurgitation.  Aortic Valve/Aorta: Calcified aortic valve. No aortic valve  regurgitation seen.  Normal aortic root.  Left Atrium: Moderately dilated left atrium.  Left Ventricle: Normal left ventricular systolic function  with an estimated ejection fraction of 55%. No segmental  wall motion abnormalities. Normal left ventricular internal  dimensions and wall thicknesses.  Right Heart: Normal right atrium. Normal right ventricular  size and function. Thickened tricuspid valve. Mild  tricuspid regurgitation. Minimal pulmonic regurgitation.  Pericardium/Pleura: No pericardial effusion.  Hemodynamic: Estimated right atrial pressure is 8 mm Hg.  ------------------------------------------------------------------------  Conclusions:  1.Mitral annular calcification. Mild mitral regurgitation.    2. Calcified aortic valve. No aortic valve regurgitation  seen.  3. Moderately dilated left atrium.  4. Normal left ventricular internal dimensions and wall  thicknesses.  5. Normal left ventricular systolic function with an  estimated ejection fraction of 55%. No segmental wall  motion abnormalities.  6. Normal right ventricular size and function.  7. Thickened tricuspid valve. Mild tricuspid regurgitation.    < end of copied text >    ASSESSMENT/PLAN: 	  82yoM with HTN,HLD,CAD (YAO pLAD 2014) , Afib on pradaxa, metoprolol as outpt. Admitted for spinal surgery, cardiology consulted for afib with rvr to 140.    Afib  - now rate controlled. Cont metop succ 50 qd  -continue dabigatran 150 bid     ADHF  - improved, likely 2/2 volume shifts in OR  - on lasix 40 ivp bid, uop -2.5L last 48 hours, euvolemic. Can d/c lasix. FU with TTE and monitor if maintainance lasix is needed. was not on lasix at home prior to admission  - general cardiology teaching service to continue to follow  - For any questions please call [phone]       Angeline Cyr MD   house cardiology 24H hour events:     MEDICATIONS:  aspirin  chewable 81 milliGRAM(s) Oral daily  dabigatran 150 milliGRAM(s) Oral every 12 hours  metoprolol succinate ER 50 milliGRAM(s) Oral daily      ALBUTerol/ipratropium for Nebulization. 3 milliLiter(s) Nebulizer once    ondansetron Injectable 4 milliGRAM(s) IV Push every 6 hours PRN  morphine  - Injectable 2 milliGRAM(s) IV Push every 4 hours PRN  traMADol 50 milliGRAM(s) Oral every 4 hours PRN    pantoprazole  Injectable 40 milliGRAM(s) IV Push daily  docusate sodium 100 milliGRAM(s) Oral three times a day  senna 2 Tablet(s) Oral at bedtime  polyethylene glycol 3350 17 Gram(s) Oral daily      chlorhexidine 4% Liquid 1 Application(s) Topical daily      REVIEW OF SYSTEMS:  General: no fatigue/malaise, weight loss/gain.  Skin: no rashes.  Ophthalmologic: no blurred vision, no loss of vision. 	  ENT: no sore throat, rhinorrhea, sinus congestion.  Respiratory: no SOB, cough or wheeze.  Gastrointestinal:  no N/V/D, no melena/hematemesis/hematochezia.  Genitourinary: no dysuria/hesitancy or hematuria.  Musculoskeletal: no myalgias or arthralgias.  Neurological: no changes in vision or hearing, no lightheadedness/dizziness, no syncope/near syncope	  Psychiatric: no unusual stress/anxiety.   Hematology/Lymphatics: no unusual bleeding, bruising and no lymphadenopathy.  Endocrine: no unusual thirst.   All others negative except as stated above and in HPI.      PHYSICAL EXAM:  T(C): 36.4 (08-14-17 @ 08:00), Max: 36.7 (08-13-17 @ 17:21)  HR: 84 (08-14-17 @ 08:00) (77 - 91)  BP: 94/60 (08-14-17 @ 08:00) (91/67 - 114/68)  RR: 18 (08-14-17 @ 08:00) (17 - 18)  SpO2: 100% (08-14-17 @ 08:00) (96% - 100%)  Wt(kg): --  I&O's Summary    13 Aug 2017 07:01  -  14 Aug 2017 07:00  --------------------------------------------------------  IN: 0 mL / OUT: 200 mL / NET: -200 mL        Appearance: Normal	  HEENT:   Normal oral mucosa, PERRL, EOMI	  Lymphatic: No lymphadenopathy  Cardiovascular: Normal S1 S2, No JVD, No murmurs, No edema  Respiratory: Lungs clear to auscultation	  Psychiatry: A & O x 3, Mood & affect appropriate  Gastrointestinal:  Soft, Non-tender, + BS	  Skin: No rashes, No ecchymoses, No cyanosis	  Neurologic: Non-focal  Extremities: Normal range of motion, No clubbing, cyanosis or edema  Vascular: Peripheral pulses palpable 2+ bilaterally        LABS:	 	    CBC Full  -  ( 13 Aug 2017 04:56 )  WBC Count : 13.23 K/uL  Hemoglobin : 10.0 g/dL  Hematocrit : 31.0 %  Platelet Count - Automated : 315 K/uL  Mean Cell Volume : 97.2 fL  Mean Cell Hemoglobin : 31.3 pg  Mean Cell Hemoglobin Concentration : 32.3 %  Auto Neutrophil # : x  Auto Lymphocyte # : x  Auto Monocyte # : x  Auto Eosinophil # : x  Auto Basophil # : x  Auto Neutrophil % : x  Auto Lymphocyte % : x  Auto Monocyte % : x  Auto Eosinophil % : x  Auto Basophil % : x    08-13    142  |  103  |  38<H>  ----------------------------<  108<H>  3.8   |  29  |  0.81    Ca    8.3<L>      13 Aug 2017 04:56        proBNP:   Lipid Profile:   HgA1c:   TSH:       CARDIAC MARKERS:            TELEMETRY: 	    ECG:  	      PREVIOUS DIAGNOSTIC TESTING:    [ ] Echocardiogram:  [ ]  Catheterization:  [ ] Stress Test:  	        24H hour events:   diuressed 2L since started on IV lasix 2 days ago  rate better controlled    MEDICATIONS:  aspirin  chewable 81 milliGRAM(s) Oral daily  furosemide   Injectable 40 milliGRAM(s) IV Push two times a day  dabigatran 150 milliGRAM(s) Oral every 12 hours  metoprolol succinate ER 50 milliGRAM(s) Oral daily      ALBUTerol/ipratropium for Nebulization. 3 milliLiter(s) Nebulizer once    ondansetron Injectable 4 milliGRAM(s) IV Push every 6 hours PRN  morphine  - Injectable 2 milliGRAM(s) IV Push every 4 hours PRN  traMADol 50 milliGRAM(s) Oral every 4 hours PRN    pantoprazole  Injectable 40 milliGRAM(s) IV Push daily  docusate sodium 100 milliGRAM(s) Oral three times a day  senna 2 Tablet(s) Oral at bedtime  polyethylene glycol 3350 17 Gram(s) Oral daily      chlorhexidine 4% Liquid 1 Application(s) Topical daily      REVIEW OF SYSTEMS:  General: no fatigue/malaise, weight loss/gain.  Skin: no rashes.  Ophthalmologic: no blurred vision, no loss of vision. 	  ENT: no sore throat, rhinorrhea, sinus congestion.  Respiratory: no SOB, cough or wheeze.  Gastrointestinal:  no N/V/D, no melena/hematemesis/hematochezia.  Genitourinary: no dysuria/hesitancy or hematuria.  Musculoskeletal: no myalgias or arthralgias.  Neurological: no changes in vision or hearing, no lightheadedness/dizziness, no syncope/near syncope	  Psychiatric: no unusual stress/anxiety.   Hematology/Lymphatics: no unusual bleeding, bruising and no lymphadenopathy.  Endocrine: no unusual thirst.   All others negative except as stated above and in HPI.    PHYSICAL EXAM:  T(C): 36.8 (08-12-17 @ 09:04), Max: 36.8 (08-11-17 @ 21:25)  HR: 84 (08-12-17 @ 09:04) (64 - 119)  BP: 111/76 (08-12-17 @ 09:04) (109/73 - 128/77)  RR: 18 (08-12-17 @ 09:04) (18 - 18)  SpO2: 98% (08-12-17 @ 09:04) (97% - 100%)  Wt(kg): --  I&O's Summary    11 Aug 2017 07:01  -  12 Aug 2017 07:00  --------------------------------------------------------  IN: 0 mL / OUT: 1655 mL / NET: -1655 mL    12 Aug 2017 07:01  -  12 Aug 2017 13:41  --------------------------------------------------------  IN: 100 mL / OUT: 1300 mL / NET: -1200 mL        Appearance: Normal	  HEENT:   Normal oral mucosa, PERRL, EOMI	  Lymphatic: No lymphadenopathy  Cardiovascular: Normal S1 S2, No JVD, No murmurs, No edema  Respiratory: Lungs clear to auscultation	  Psychiatry: A & O x 3, Mood & affect appropriate  Gastrointestinal:  Soft, Non-tender, + BS	  Skin: No rashes, No ecchymoses, No cyanosis	  Neurologic: Non-focal  Extremities: Normal range of motion, No clubbing, cyanosis or edema  Vascular: Peripheral pulses palpable 2+ bilaterally        LABS:	 	    CBC Full  -  ( 11 Aug 2017 09:26 )  WBC Count : 11.86 K/uL  Hemoglobin : 10.0 g/dL  Hematocrit : 30.9 %  Platelet Count - Automated : 279 K/uL  Mean Cell Volume : 93.4 fL  Mean Cell Hemoglobin : 30.2 pg  Mean Cell Hemoglobin Concentration : 32.4 %  Auto Neutrophil # : 7.14 K/uL  Auto Lymphocyte # : 1.73 K/uL  Auto Monocyte # : 1.46 K/uL  Auto Eosinophil # : 0.65 K/uL  Auto Basophil # : 0.06 K/uL  Auto Neutrophil % : 60.2 %  Auto Lymphocyte % : 14.6 %  Auto Monocyte % : 12.3 %  Auto Eosinophil % : 5.5 %  Auto Basophil % : 0.5 %    08-11    140  |  100  |  29<H>  ----------------------------<  125<H>  3.7   |  24  |  0.74    Ca    8.1<L>      11 Aug 2017 09:26  Phos  3.2     08-11  Mg     1.9     08-11    TPro  5.6<L>  /  Alb  2.7<L>  /  TBili  2.2<H>  /  DBili  x   /  AST  49<H>  /  ALT  76<H>  /  AlkPhos  185<H>  08-11    < from: Transthoracic Echocardiogram (10.29.14 @ 08:18) >  Observations:  Mitral Valve: Mitral annular calcification. Mild mitral  regurgitation.  Aortic Valve/Aorta: Calcified aortic valve. No aortic valve  regurgitation seen.  Normal aortic root.  Left Atrium: Moderately dilated left atrium.  Left Ventricle: Normal left ventricular systolic function  with an estimated ejection fraction of 55%. No segmental  wall motion abnormalities. Normal left ventricular internal  dimensions and wall thicknesses.  Right Heart: Normal right atrium. Normal right ventricular  size and function. Thickened tricuspid valve. Mild  tricuspid regurgitation. Minimal pulmonic regurgitation.  Pericardium/Pleura: No pericardial effusion.  Hemodynamic: Estimated right atrial pressure is 8 mm Hg.  ------------------------------------------------------------------------  Conclusions:  1.Mitral annular calcification. Mild mitral regurgitation.    2. Calcified aortic valve. No aortic valve regurgitation  seen.  3. Moderately dilated left atrium.  4. Normal left ventricular internal dimensions and wall  thicknesses.  5. Normal left ventricular systolic function with an  estimated ejection fraction of 55%. No segmental wall  motion abnormalities.  6. Normal right ventricular size and function.  7. Thickened tricuspid valve. Mild tricuspid regurgitation.    < end of copied text >    ASSESSMENT/PLAN: 	  82yoM with HTN,HLD,CAD (YAO pLAD 2014) , Afib on pradaxa, metoprolol as outpt. Admitted for spinal surgery, cardiology consulted for afib with rvr to 140.    Afib  - now rate controlled. Cont metop succ 50 qd  -continue dabigatran 150 bid     ADHF  - improved, likely 2/2 volume shifts in OR 24H hour events: No acute concerns overnight. Pt. Denies any CP/SOB/ N/V/D.      MEDICATIONS:  aspirin  chewable 81 milliGRAM(s) Oral daily  dabigatran 150 milliGRAM(s) Oral every 12 hours  metoprolol succinate ER 50 milliGRAM(s) Oral daily      ALBUTerol/ipratropium for Nebulization. 3 milliLiter(s) Nebulizer once    ondansetron Injectable 4 milliGRAM(s) IV Push every 6 hours PRN  morphine  - Injectable 2 milliGRAM(s) IV Push every 4 hours PRN  traMADol 50 milliGRAM(s) Oral every 4 hours PRN    pantoprazole  Injectable 40 milliGRAM(s) IV Push daily  docusate sodium 100 milliGRAM(s) Oral three times a day  senna 2 Tablet(s) Oral at bedtime  polyethylene glycol 3350 17 Gram(s) Oral daily      chlorhexidine 4% Liquid 1 Application(s) Topical daily      REVIEW OF SYSTEMS:  General: no fatigue/malaise, weight loss/gain.  Skin: no rashes.  Ophthalmologic: no blurred vision, no loss of vision. 	  ENT: no sore throat, rhinorrhea, sinus congestion.  Respiratory: no SOB, cough or wheeze.  Gastrointestinal:  no N/V/D, no melena/hematemesis/hematochezia.  Genitourinary: no dysuria/hesitancy or hematuria.  Musculoskeletal: no myalgias or arthralgias.  Neurological: no changes in vision or hearing, no lightheadedness/dizziness, no syncope/near syncope	  Psychiatric: no unusual stress/anxiety.   Hematology/Lymphatics: no unusual bleeding, bruising and no lymphadenopathy.  Endocrine: no unusual thirst.   All others negative except as stated above and in HPI.      PHYSICAL EXAM:  T(C): 36.4 (08-14-17 @ 08:00), Max: 36.7 (08-13-17 @ 17:21)  HR: 84 (08-14-17 @ 08:00) (77 - 91)  BP: 94/60 (08-14-17 @ 08:00) (91/67 - 114/68)  RR: 18 (08-14-17 @ 08:00) (17 - 18)  SpO2: 100% (08-14-17 @ 08:00) (96% - 100%)  Wt(kg): --  I&O's Summary    13 Aug 2017 07:01  -  14 Aug 2017 07:00  --------------------------------------------------------  IN: 0 mL / OUT: 200 mL / NET: -200 mL    12 Aug 2017 07:01  -  12 Aug 2017 13:41  --------------------------------------------------------  IN: 100 mL / OUT: 1300 mL / NET: -1200 mL    11 Aug 2017 07:01  -  12 Aug 2017 07:00  --------------------------------------------------------  IN: 0 mL / OUT: 1655 mL / NET: -1655 mL    Appearance: Normal	  HEENT:   Normal oral mucosa, PERRL, EOMI	  Lymphatic: No lymphadenopathy  Cardiovascular: Normal S1 S2, No JVD, No murmurs, No edema  Respiratory: Lungs clear to auscultation	  Psychiatry: A & O x 3, Mood & affect appropriate  Gastrointestinal:  Soft, Non-tender, + BS	  Skin: No rashes, No ecchymoses, No cyanosis	  Neurologic: Non-focal  Extremities: Normal range of motion, No edema  Vascular: Peripheral pulses palpable 2+ bilaterally        LABS:	 	    CBC Full  -  ( 13 Aug 2017 04:56 )  WBC Count : 13.23 K/uL  Hemoglobin : 10.0 g/dL  Hematocrit : 31.0 %  Platelet Count - Automated : 315 K/uL  Mean Cell Volume : 97.2 fL  Mean Cell Hemoglobin : 31.3 pg  Mean Cell Hemoglobin Concentration : 32.3 %  Auto Neutrophil # : x  Auto Lymphocyte # : x  Auto Monocyte # : x  Auto Eosinophil # : x  Auto Basophil # : x  Auto Neutrophil % : x  Auto Lymphocyte % : x  Auto Monocyte % : x  Auto Eosinophil % : x  Auto Basophil % : x    08-13    142  |  103  |  38<H>  ----------------------------<  108<H>  3.8   |  29  |  0.81    Ca    8.3<L>      13 Aug 2017 04:56        proBNP:   Lipid Profile:   HgA1c:   TSH:       CARDIAC MARKERS:            TELEMETRY: 	    ECG:  	      PREVIOUS DIAGNOSTIC TESTING:    [ ] Echocardiogram:  [ ]  Catheterization:  [ ] Stress Test:  	    < from: Transthoracic Echocardiogram (10.29.14 @ 08:18) >  Observations:  Mitral Valve: Mitral annular calcification. Mild mitral  regurgitation.  Aortic Valve/Aorta: Calcified aortic valve. No aortic valve  regurgitation seen.  Normal aortic root.  Left Atrium: Moderately dilated left atrium.  Left Ventricle: Normal left ventricular systolic function  with an estimated ejection fraction of 55%. No segmental  wall motion abnormalities. Normal left ventricular internal  dimensions and wall thicknesses.  Right Heart: Normal right atrium. Normal right ventricular  size and function. Thickened tricuspid valve. Mild  tricuspid regurgitation. Minimal pulmonic regurgitation.  Pericardium/Pleura: No pericardial effusion.  Hemodynamic: Estimated right atrial pressure is 8 mm Hg.  ------------------------------------------------------------------------  Conclusions:  1.Mitral annular calcification. Mild mitral regurgitation.    2. Calcified aortic valve. No aortic valve regurgitation  seen.  3. Moderately dilated left atrium.  4. Normal left ventricular internal dimensions and wall  thicknesses.  5. Normal left ventricular systolic function with an  estimated ejection fraction of 55%. No segmental wall  motion abnormalities.  6. Normal right ventricular size and function.  7. Thickened tricuspid valve. Mild tricuspid regurgitation.    < end of copied text >

## 2017-08-14 NOTE — PROGRESS NOTE ADULT - SUBJECTIVE AND OBJECTIVE BOX
No acute events overnight. Pain controlled. Patient denies fever, chills, CP, SOB, n/v/d. No specific.     PE:  Vital Signs Last 24 Hrs  T(C): 36.4 (14 Aug 2017 05:52), Max: 36.7 (13 Aug 2017 17:21)  T(F): 97.6 (14 Aug 2017 05:52), Max: 98.1 (13 Aug 2017 17:21)  HR: 91 (14 Aug 2017 05:52) (77 - 91)  BP: 114/68 (14 Aug 2017 05:52) (91/67 - 114/68)  BP(mean): --  RR: 18 (14 Aug 2017 05:52) (17 - 18)  SpO2: 100% (14 Aug 2017 05:52) (96% - 100%)    Spine PE:    Motor:                   C5                C6              C7               C8           T1   R            5/5                5/5            5/5             5/5          5/5  L             5/5               5/5             5/5             5/5          5/5                L2             L3             L4               L5            S1  R         5/5           5/5          5/5             5/5           5/5  L          5/5          5/5           5/5             5/5           5/5    Sensory:            C5         C6         C7      C8       T1          R         2            2           2        2         2  L          2            2           2        2         2               L2          L3         L4      L5       S1          R         2            2            2        2        2  L          2            2           2        2         2      Labs:                        10.0   13.23 )-----------( 315      ( 13 Aug 2017 04:56 )             31.0   08-13    142  |  103  |  38<H>  ----------------------------<  108<H>  3.8   |  29  |  0.81    Ca    8.3<L>      13 Aug 2017 04:56 No acute events overnight. Pain controlled. Patient denies fever, chills, CP, SOB, n/v/d. No specific.     PE:  Vital Signs Last 24 Hrs  T(C): 36.4 (14 Aug 2017 05:52), Max: 36.7 (13 Aug 2017 17:21)  T(F): 97.6 (14 Aug 2017 05:52), Max: 98.1 (13 Aug 2017 17:21)  HR: 91 (14 Aug 2017 05:52) (77 - 91)  BP: 114/68 (14 Aug 2017 05:52) (91/67 - 114/68)  BP(mean): --  RR: 18 (14 Aug 2017 05:52) (17 - 18)  SpO2: 100% (14 Aug 2017 05:52) (96% - 100%)    Spine PE:    Motor:     Dressing c/d/i                   C5                C6              C7               C8           T1   R            5/5                5/5            5/5             5/5          5/5  L             5/5               5/5             5/5             5/5          5/5                L2             L3             L4               L5            S1  R         5/5           5/5          5/5             5/5           5/5  L          5/5          5/5           5/5             5/5           5/5    Sensory:            C5         C6         C7      C8       T1          R         2            2           2        2         2  L          2            2           2        2         2               L2          L3         L4      L5       S1          R         2            2            2        2        2  L          2            2           2        2         2      Labs:                        10.0   13.23 )-----------( 315      ( 13 Aug 2017 04:56 )             31.0   08-13    142  |  103  |  38<H>  ----------------------------<  108<H>  3.8   |  29  |  0.81    Ca    8.3<L>      13 Aug 2017 04:56

## 2017-08-14 NOTE — PROGRESS NOTE ADULT - ASSESSMENT
82M [place] with h/o HTN, HLD, CAD s/p YAO to pLAD 2014, Afib on Pradaxa, GERD, BPH, left facial melanoma s/p resection 8/2016, DJD, Spinal stenosis s/p multiple spinal operations, now S/P exploration of the prior spinal fusion, removal of hardware, removal of spinal stimulator, T11-L1 laminectomy, T2 pelvic spinal fusion with osteotomies, POD #13. Hospital course c/b acute intraop blood loss anemia with post op hypotension requiring SICU stay for pressor support, followed by encephalopathy and rapid Afib w/ RVR in 140s associated with acute respiratory failure secondary to acute pulmonary edema, clinically improved.

## 2017-08-14 NOTE — PROGRESS NOTE ADULT - PROBLEM SELECTOR PLAN 3
- s/p swallow evaluation w/ dysphagia 1 w/ honey thickened fluids - s/p swallow evaluation w/ dysphagia 1 w/ honey thickened fluids  - recommend repeating S/S eval in one week

## 2017-08-15 ENCOUNTER — INPATIENT (INPATIENT)
Facility: HOSPITAL | Age: 82
LOS: 30 days | Discharge: HOME | DRG: 91 | End: 2017-09-15
Attending: PHYSICAL MEDICINE & REHABILITATION | Admitting: SPECIALIST
Payer: MEDICARE

## 2017-08-15 VITALS
TEMPERATURE: 98 F | SYSTOLIC BLOOD PRESSURE: 107 MMHG | DIASTOLIC BLOOD PRESSURE: 73 MMHG | OXYGEN SATURATION: 100 % | HEART RATE: 81 BPM | RESPIRATION RATE: 18 BRPM

## 2017-08-15 VITALS
RESPIRATION RATE: 16 BRPM | TEMPERATURE: 98 F | OXYGEN SATURATION: 98 % | SYSTOLIC BLOOD PRESSURE: 100 MMHG | DIASTOLIC BLOOD PRESSURE: 60 MMHG | HEIGHT: 67 IN | WEIGHT: 169.98 LBS | HEART RATE: 96 BPM

## 2017-08-15 DIAGNOSIS — Z98.890 OTHER SPECIFIED POSTPROCEDURAL STATES: Chronic | ICD-10-CM

## 2017-08-15 DIAGNOSIS — Z45.42 ENCOUNTER FOR ADJUSTMENT AND MANAGEMENT OF NEUROSTIMULATOR: Chronic | ICD-10-CM

## 2017-08-15 DIAGNOSIS — S39.92XA UNSPECIFIED INJURY OF LOWER BACK, INITIAL ENCOUNTER: Chronic | ICD-10-CM

## 2017-08-15 DIAGNOSIS — Z51.89 ENCOUNTER FOR OTHER SPECIFIED AFTERCARE: ICD-10-CM

## 2017-08-15 DIAGNOSIS — Z98.89 OTHER SPECIFIED POSTPROCEDURAL STATES: Chronic | ICD-10-CM

## 2017-08-15 PROCEDURE — 71010: CPT | Mod: 26

## 2017-08-15 PROCEDURE — 99233 SBSQ HOSP IP/OBS HIGH 50: CPT

## 2017-08-15 PROCEDURE — 93010 ELECTROCARDIOGRAM REPORT: CPT

## 2017-08-15 RX ORDER — ZINC SULFATE TAB 220 MG (50 MG ZINC EQUIVALENT) 220 (50 ZN) MG
220 TAB ORAL DAILY
Qty: 0 | Refills: 0 | Status: DISCONTINUED | OUTPATIENT
Start: 2017-08-15 | End: 2017-08-30

## 2017-08-15 RX ORDER — METOPROLOL TARTRATE 50 MG
1 TABLET ORAL
Qty: 0 | Refills: 0 | COMMUNITY

## 2017-08-15 RX ORDER — METOPROLOL TARTRATE 50 MG
50 TABLET ORAL DAILY
Qty: 0 | Refills: 0 | Status: DISCONTINUED | OUTPATIENT
Start: 2017-08-15 | End: 2017-08-17

## 2017-08-15 RX ORDER — SODIUM CHLORIDE 9 MG/ML
1000 INJECTION INTRAMUSCULAR; INTRAVENOUS; SUBCUTANEOUS
Qty: 0 | Refills: 0 | Status: DISCONTINUED | OUTPATIENT
Start: 2017-08-15 | End: 2017-08-16

## 2017-08-15 RX ORDER — CHLORHEXIDINE GLUCONATE 213 G/1000ML
15 SOLUTION TOPICAL THREE TIMES A DAY
Qty: 0 | Refills: 0 | Status: DISCONTINUED | OUTPATIENT
Start: 2017-08-15 | End: 2017-09-01

## 2017-08-15 RX ORDER — BUPRENORPHINE 10 UG/H
1 PATCH, EXTENDED RELEASE TRANSDERMAL
Qty: 0 | Refills: 0 | COMMUNITY

## 2017-08-15 RX ORDER — POLYETHYLENE GLYCOL 3350 17 G/17G
17 POWDER, FOR SOLUTION ORAL DAILY
Qty: 0 | Refills: 0 | Status: DISCONTINUED | OUTPATIENT
Start: 2017-08-15 | End: 2017-08-17

## 2017-08-15 RX ORDER — TAMSULOSIN HYDROCHLORIDE 0.4 MG/1
0.4 CAPSULE ORAL AT BEDTIME
Qty: 0 | Refills: 0 | Status: DISCONTINUED | OUTPATIENT
Start: 2017-08-15 | End: 2017-09-15

## 2017-08-15 RX ORDER — DABIGATRAN ETEXILATE MESYLATE 150 MG/1
150 CAPSULE ORAL EVERY 12 HOURS
Qty: 0 | Refills: 0 | Status: DISCONTINUED | OUTPATIENT
Start: 2017-08-15 | End: 2017-09-15

## 2017-08-15 RX ORDER — ASCORBIC ACID 60 MG
500 TABLET,CHEWABLE ORAL
Qty: 0 | Refills: 0 | Status: DISCONTINUED | OUTPATIENT
Start: 2017-08-15 | End: 2017-09-13

## 2017-08-15 RX ORDER — ASPIRIN/CALCIUM CARB/MAGNESIUM 324 MG
81 TABLET ORAL DAILY
Qty: 0 | Refills: 0 | Status: DISCONTINUED | OUTPATIENT
Start: 2017-08-15 | End: 2017-09-15

## 2017-08-15 RX ORDER — ACETAMINOPHEN 500 MG
650 TABLET ORAL EVERY 6 HOURS
Qty: 0 | Refills: 0 | Status: DISCONTINUED | OUTPATIENT
Start: 2017-08-15 | End: 2017-08-16

## 2017-08-15 RX ORDER — ACETAMINOPHEN 500 MG
2 TABLET ORAL
Qty: 0 | Refills: 0 | COMMUNITY

## 2017-08-15 RX ORDER — PANTOPRAZOLE SODIUM 20 MG/1
40 TABLET, DELAYED RELEASE ORAL
Qty: 0 | Refills: 0 | Status: DISCONTINUED | OUTPATIENT
Start: 2017-08-15 | End: 2017-09-01

## 2017-08-15 RX ORDER — SENNA PLUS 8.6 MG/1
2 TABLET ORAL AT BEDTIME
Qty: 0 | Refills: 0 | Status: DISCONTINUED | OUTPATIENT
Start: 2017-08-15 | End: 2017-08-29

## 2017-08-15 RX ORDER — DOCUSATE SODIUM 100 MG
100 CAPSULE ORAL THREE TIMES A DAY
Qty: 0 | Refills: 0 | Status: DISCONTINUED | OUTPATIENT
Start: 2017-08-15 | End: 2017-08-29

## 2017-08-15 RX ORDER — BACITRACIN ZINC 500 UNIT/G
1 OINTMENT IN PACKET (EA) TOPICAL
Qty: 0 | Refills: 0 | Status: DISCONTINUED | OUTPATIENT
Start: 2017-08-15 | End: 2017-09-14

## 2017-08-15 RX ORDER — METOPROLOL TARTRATE 50 MG
1 TABLET ORAL
Qty: 0 | Refills: 0 | COMMUNITY
Start: 2017-08-15

## 2017-08-15 RX ORDER — TRAMADOL HYDROCHLORIDE 50 MG/1
50 TABLET ORAL EVERY 4 HOURS
Qty: 0 | Refills: 0 | Status: DISCONTINUED | OUTPATIENT
Start: 2017-08-15 | End: 2017-08-22

## 2017-08-15 RX ADMIN — Medication 100 MILLIGRAM(S): at 06:52

## 2017-08-15 RX ADMIN — SODIUM CHLORIDE 50 MILLILITER(S): 9 INJECTION INTRAMUSCULAR; INTRAVENOUS; SUBCUTANEOUS at 21:00

## 2017-08-15 RX ADMIN — Medication 100 MILLIGRAM(S): at 22:35

## 2017-08-15 RX ADMIN — TRAMADOL HYDROCHLORIDE 50 MILLIGRAM(S): 50 TABLET ORAL at 18:39

## 2017-08-15 RX ADMIN — CHLORHEXIDINE GLUCONATE 15 MILLILITER(S): 213 SOLUTION TOPICAL at 22:35

## 2017-08-15 RX ADMIN — SENNA PLUS 2 TABLET(S): 8.6 TABLET ORAL at 22:35

## 2017-08-15 RX ADMIN — TRAMADOL HYDROCHLORIDE 50 MILLIGRAM(S): 50 TABLET ORAL at 19:25

## 2017-08-15 RX ADMIN — Medication 100 MILLIGRAM(S): at 13:19

## 2017-08-15 RX ADMIN — DABIGATRAN ETEXILATE MESYLATE 150 MILLIGRAM(S): 150 CAPSULE ORAL at 06:52

## 2017-08-15 RX ADMIN — TAMSULOSIN HYDROCHLORIDE 0.4 MILLIGRAM(S): 0.4 CAPSULE ORAL at 22:35

## 2017-08-15 RX ADMIN — Medication 81 MILLIGRAM(S): at 13:19

## 2017-08-15 RX ADMIN — PANTOPRAZOLE SODIUM 40 MILLIGRAM(S): 20 TABLET, DELAYED RELEASE ORAL at 13:19

## 2017-08-15 RX ADMIN — POLYETHYLENE GLYCOL 3350 17 GRAM(S): 17 POWDER, FOR SOLUTION ORAL at 13:19

## 2017-08-15 RX ADMIN — Medication 50 MILLIGRAM(S): at 07:59

## 2017-08-15 NOTE — PROGRESS NOTE ADULT - PROBLEM SELECTOR PLAN 2
resolving  - caution with excessive pain meds or anxiolytics  - frequent reorientation  - fall precautions  - aspiration precautions

## 2017-08-15 NOTE — PROGRESS NOTE ADULT - SUBJECTIVE AND OBJECTIVE BOX
Chief complaint: back pain    SUBJECTIVE / OVERNIGHT EVENTS:    MEDICATIONS  (STANDING):  chlorhexidine 4% Liquid 1 Application(s) Topical daily  aspirin  chewable 81 milliGRAM(s) Oral daily  pantoprazole  Injectable 40 milliGRAM(s) IV Push daily  ALBUTerol/ipratropium for Nebulization. 3 milliLiter(s) Nebulizer once  dabigatran 150 milliGRAM(s) Oral every 12 hours  docusate sodium 100 milliGRAM(s) Oral three times a day  senna 2 Tablet(s) Oral at bedtime  polyethylene glycol 3350 17 Gram(s) Oral daily  metoprolol succinate ER 50 milliGRAM(s) Oral daily    MEDICATIONS  (PRN):  ondansetron Injectable 4 milliGRAM(s) IV Push every 6 hours PRN Nausea and/or Vomiting  morphine  - Injectable 2 milliGRAM(s) IV Push every 4 hours PRN moderate to severe pain  traMADol 50 milliGRAM(s) Oral every 4 hours PRN Mild Pain (1 - 3)    Vital Signs Last 24 Hrs  T(F): 97.5  Max: 98.1   HR: 74  (74 - 89)  BP: 102/68  (95/62 - 108/69)  RR: 18   SpO2: 97% on RA    PHYSICAL EXAM:  GENERAL: NAD, well-developed  HEENT:  Atraumatic, Normocephalic; [place]; conjunctiva and sclera clear; oral mucosa moist; neck supple   CHEST/LUNG: Clear to auscultation bilaterally; No wheeze  HEART: irreg irreg  ABDOMEN: Soft, Nontender, Nondistended; Bowel sounds present  EXTREMITIES:  2+ Peripheral Pulses, No clubbing, cyanosis, or edema  PSYCH: AAOx3  SKIN: No rashes or lesions            Care Discussed with Consultants/Other Providers: Orthopedics Chief complaint: back pain    SUBJECTIVE / OVERNIGHT EVENTS:  No overnight events; pt reports that pain is controlled; denies CP, palpitations, dyspnea, n/v, lightheadedness.     MEDICATIONS  (STANDING):  chlorhexidine 4% Liquid 1 Application(s) Topical daily  aspirin  chewable 81 milliGRAM(s) Oral daily  pantoprazole  Injectable 40 milliGRAM(s) IV Push daily  ALBUTerol/ipratropium for Nebulization. 3 milliLiter(s) Nebulizer once  dabigatran 150 milliGRAM(s) Oral every 12 hours  docusate sodium 100 milliGRAM(s) Oral three times a day  senna 2 Tablet(s) Oral at bedtime  polyethylene glycol 3350 17 Gram(s) Oral daily  metoprolol succinate ER 50 milliGRAM(s) Oral daily    MEDICATIONS  (PRN):  ondansetron Injectable 4 milliGRAM(s) IV Push every 6 hours PRN Nausea and/or Vomiting  morphine  - Injectable 2 milliGRAM(s) IV Push every 4 hours PRN moderate to severe pain  traMADol 50 milliGRAM(s) Oral every 4 hours PRN Mild Pain (1 - 3)    Vital Signs Last 24 Hrs  T(F): 97.5  Max: 98.1   HR: 74  (74 - 89)  BP: 102/68  (95/62 - 108/69)  RR: 18   SpO2: 97% on RA    PHYSICAL EXAM:  GENERAL: NAD, well-developed  HEENT:  Atraumatic, Normocephalic; [place]; conjunctiva and sclera clear; oral mucosa moist; neck supple   CHEST/LUNG: Clear to auscultation bilaterally; No wheeze  HEART: irreg irreg  ABDOMEN: Soft, Nontender, Nondistended; Bowel sounds present  EXTREMITIES:  2+ Peripheral Pulses, No clubbing, cyanosis, or edema  PSYCH: AAOx3  SKIN: No rashes or lesions            Care Discussed with Consultants/Other Providers: Orthopedics Chief complaint: back pain    SUBJECTIVE / OVERNIGHT EVENTS:  No overnight events; pt reports that pain is controlled; denies CP, palpitations, dyspnea, n/v, lightheadedness.     MEDICATIONS  (STANDING):  chlorhexidine 4% Liquid 1 Application(s) Topical daily  aspirin  chewable 81 milliGRAM(s) Oral daily  pantoprazole  Injectable 40 milliGRAM(s) IV Push daily  ALBUTerol/ipratropium for Nebulization. 3 milliLiter(s) Nebulizer once  dabigatran 150 milliGRAM(s) Oral every 12 hours  docusate sodium 100 milliGRAM(s) Oral three times a day  senna 2 Tablet(s) Oral at bedtime  polyethylene glycol 3350 17 Gram(s) Oral daily  metoprolol succinate ER 50 milliGRAM(s) Oral daily    MEDICATIONS  (PRN):  ondansetron Injectable 4 milliGRAM(s) IV Push every 6 hours PRN Nausea and/or Vomiting  morphine  - Injectable 2 milliGRAM(s) IV Push every 4 hours PRN moderate to severe pain  traMADol 50 milliGRAM(s) Oral every 4 hours PRN Mild Pain (1 - 3)    Vital Signs Last 24 Hrs  T(F): 97.5  Max: 98.1   HR: 74  (74 - 89)  BP: 102/68  (95/62 - 108/69)  RR: 18   SpO2: 97% on RA    PHYSICAL EXAM:  GENERAL: NAD, well-developed  HEENT:  Atraumatic, Normocephalic; [place]; conjunctiva and sclera clear; oral mucosa moist; neck supple   CHEST/LUNG: Clear to auscultation bilaterally; No wheeze  HEART: irreg irreg  ABDOMEN: Soft, Nontender, Nondistended; Bowel sounds present  EXTREMITIES:  2+ Peripheral Pulses, No clubbing, cyanosis, or edema; old surg scars b/l knees  PSYCH: AAOx3  SKIN: No rashes or lesions            Care Discussed with Consultants/Other Providers: Orthopedics

## 2017-08-15 NOTE — PROGRESS NOTE ADULT - PROBLEM SELECTOR PROBLEM 3
Atrial fibrillation, unspecified type
Other dysphagia

## 2017-08-15 NOTE — PROGRESS NOTE ADULT - PROBLEM SELECTOR PLAN 8
- pain management per ortho regimen  - stable for discharge from medical standpoint  - f/u with PMD Dr. Lares and cardiololgy as outpt - pain management per ortho regimen  - stable for discharge from medical standpoint  - f/u with PMD Dr. Lares and cardiololgy Dr. Ny as outpt

## 2017-08-15 NOTE — PROGRESS NOTE ADULT - PROBLEM SELECTOR PROBLEM 8
Spinal stenosis of lumbosacral region

## 2017-08-15 NOTE — PROGRESS NOTE ADULT - PROBLEM SELECTOR PROBLEM 4
HTN (hypertension)
Atrial fibrillation, unspecified type

## 2017-08-15 NOTE — PROGRESS NOTE ADULT - ASSESSMENT
A/P: 82y Male POD13 s/p HANNA, T11-L1 laminectomy and T4-Pelvis posterior spinal instrumentation and fusion.    Pain control  WBAT/PT/OT/OOB  Brace for comfort  SCDs  Medical co-management appreciated  acute rehab per PMR likely tomorrow

## 2017-08-15 NOTE — PROGRESS NOTE ADULT - PROBLEM SELECTOR PLAN 3
- s/p swallow evaluation w/ dysphagia 1 w/ honey thickened fluids  - recommend repeating S/S eval in one week

## 2017-08-15 NOTE — PROGRESS NOTE ADULT - PROBLEM SELECTOR PROBLEM 2
Metabolic encephalopathy

## 2017-08-15 NOTE — PROGRESS NOTE ADULT - ASSESSMENT
82M [place] with h/o HTN, HLD, CAD s/p YAO to pLAD 2014, Afib on Pradaxa, GERD, BPH, left facial melanoma s/p resection 8/2016, DJD, Spinal stenosis s/p multiple spinal operations, now S/P exploration of the prior spinal fusion, removal of hardware, removal of spinal stimulator, T11-L1 laminectomy, T2 pelvic spinal fusion with osteotomies, POD #13. Hospital course c/b acute intraop blood loss anemia with post op hypotension requiring SICU stay for pressor support, followed by encephalopathy and rapid Afib w/ RVR in 140s associated with acute respiratory failure secondary to acute pulmonary edema, clinically improved.

## 2017-08-15 NOTE — PROGRESS NOTE ADULT - PROBLEM SELECTOR PLAN 4
HR controlled s/p cardizem gtt  - c/w Toprol XL 50 po mg daily with holding parameters   - c/w Pradaxa

## 2017-08-15 NOTE — PROGRESS NOTE ADULT - SUBJECTIVE AND OBJECTIVE BOX
No acute events overnight. Pain controlled. Patient denies fever, chills, CP, SOB, n/v/d. No specific.     PE:  Vital Signs Last 24 Hrs  T(C): 36.4 (15 Aug 2017 06:04), Max: 36.7 (14 Aug 2017 20:19)  T(F): 97.5 (15 Aug 2017 06:04), Max: 98.1 (14 Aug 2017 20:19)  HR: 85 (15 Aug 2017 06:04) (79 - 89)  BP: 103/65 (15 Aug 2017 06:21) (94/60 - 108/69)  BP(mean): --  RR: 18 (15 Aug 2017 06:04) (18 - 19)  SpO2: 97% (15 Aug 2017 06:04) (95% - 100%)    Spine PE:  unchanged from previous  5/5 IP/GS/TA/EHL  SILT, wwp at foot

## 2017-08-16 LAB
ALBUMIN SERPL ELPH-MCNC: 3.1 G/DL — LOW (ref 3.3–5.2)
ALP SERPL-CCNC: 229 U/L — HIGH (ref 40–120)
ALT FLD-CCNC: 76 U/L — HIGH
ANION GAP SERPL CALC-SCNC: 12 MMOL/L — SIGNIFICANT CHANGE UP (ref 5–17)
APPEARANCE UR: CLEAR — SIGNIFICANT CHANGE UP
AST SERPL-CCNC: 51 U/L — HIGH
BACTERIA # UR AUTO: ABNORMAL
BASOPHILS # BLD AUTO: 0 K/UL — SIGNIFICANT CHANGE UP (ref 0–0.2)
BASOPHILS NFR BLD AUTO: 0.5 % — SIGNIFICANT CHANGE UP (ref 0–2)
BILIRUB SERPL-MCNC: 2.4 MG/DL — HIGH (ref 0.4–2)
BILIRUB UR-MCNC: ABNORMAL
BUN SERPL-MCNC: 35 MG/DL — HIGH (ref 8–20)
CALCIUM SERPL-MCNC: 8.4 MG/DL — LOW (ref 8.6–10.2)
CHLORIDE SERPL-SCNC: 107 MMOL/L — SIGNIFICANT CHANGE UP (ref 98–107)
CO2 SERPL-SCNC: 25 MMOL/L — SIGNIFICANT CHANGE UP (ref 22–29)
COLOR SPEC: ABNORMAL
COMMENT - URINE: SIGNIFICANT CHANGE UP
CREAT SERPL-MCNC: 0.69 MG/DL — SIGNIFICANT CHANGE UP (ref 0.5–1.3)
DIFF PNL FLD: ABNORMAL
EOSINOPHIL # BLD AUTO: 0.4 K/UL — SIGNIFICANT CHANGE UP (ref 0–0.5)
EOSINOPHIL NFR BLD AUTO: 3.7 % — SIGNIFICANT CHANGE UP (ref 0–5)
EPI CELLS # UR: SIGNIFICANT CHANGE UP
GLUCOSE SERPL-MCNC: 97 MG/DL — SIGNIFICANT CHANGE UP (ref 70–115)
GLUCOSE UR QL: NEGATIVE MG/DL — SIGNIFICANT CHANGE UP
HCT VFR BLD CALC: 31.1 % — LOW (ref 42–52)
HGB BLD-MCNC: 10 G/DL — LOW (ref 14–18)
KETONES UR-MCNC: NEGATIVE — SIGNIFICANT CHANGE UP
LEUKOCYTE ESTERASE UR-ACNC: ABNORMAL
LYMPHOCYTES # BLD AUTO: 1.7 K/UL — SIGNIFICANT CHANGE UP (ref 1–4.8)
LYMPHOCYTES # BLD AUTO: 18.6 % — LOW (ref 20–55)
MCHC RBC-ENTMCNC: 31.1 PG — HIGH (ref 27–31)
MCHC RBC-ENTMCNC: 32.2 G/DL — SIGNIFICANT CHANGE UP (ref 32–36)
MCV RBC AUTO: 96.6 FL — HIGH (ref 80–94)
MONOCYTES # BLD AUTO: 1.4 K/UL — HIGH (ref 0–0.8)
MONOCYTES NFR BLD AUTO: 14.4 % — HIGH (ref 3–10)
NEUTROPHILS # BLD AUTO: 5.5 K/UL — SIGNIFICANT CHANGE UP (ref 1.8–8)
NEUTROPHILS NFR BLD AUTO: 59.2 % — SIGNIFICANT CHANGE UP (ref 37–73)
NITRITE UR-MCNC: NEGATIVE — SIGNIFICANT CHANGE UP
PH UR: 6 — SIGNIFICANT CHANGE UP (ref 5–8)
PLATELET # BLD AUTO: 345 K/UL — SIGNIFICANT CHANGE UP (ref 150–400)
POTASSIUM SERPL-MCNC: 3.8 MMOL/L — SIGNIFICANT CHANGE UP (ref 3.5–5.3)
POTASSIUM SERPL-SCNC: 3.8 MMOL/L — SIGNIFICANT CHANGE UP (ref 3.5–5.3)
PREALB SERPL-MCNC: 19 MG/DL — SIGNIFICANT CHANGE UP (ref 18–38)
PROT SERPL-MCNC: 5.8 G/DL — LOW (ref 6.6–8.7)
PROT UR-MCNC: 15 MG/DL
RBC # BLD: 3.22 M/UL — LOW (ref 4.6–6.2)
RBC # FLD: 16.7 % — HIGH (ref 11–15.6)
RBC CASTS # UR COMP ASSIST: ABNORMAL /HPF (ref 0–4)
SODIUM SERPL-SCNC: 144 MMOL/L — SIGNIFICANT CHANGE UP (ref 135–145)
SP GR SPEC: 1.02 — SIGNIFICANT CHANGE UP (ref 1.01–1.02)
UROBILINOGEN FLD QL: 12 MG/DL
WBC # BLD: 9.4 K/UL — SIGNIFICANT CHANGE UP (ref 4.8–10.8)
WBC # FLD AUTO: 9.4 K/UL — SIGNIFICANT CHANGE UP (ref 4.8–10.8)
WBC UR QL: SIGNIFICANT CHANGE UP

## 2017-08-16 PROCEDURE — 99222 1ST HOSP IP/OBS MODERATE 55: CPT | Mod: AI

## 2017-08-16 RX ORDER — SODIUM CHLORIDE 9 MG/ML
1000 INJECTION INTRAMUSCULAR; INTRAVENOUS; SUBCUTANEOUS
Qty: 0 | Refills: 0 | Status: COMPLETED | OUTPATIENT
Start: 2017-08-16 | End: 2017-08-16

## 2017-08-16 RX ORDER — LIDOCAINE 4 G/100G
1 CREAM TOPICAL THREE TIMES A DAY
Qty: 0 | Refills: 0 | Status: DISCONTINUED | OUTPATIENT
Start: 2017-08-16 | End: 2017-08-24

## 2017-08-16 RX ORDER — LANOLIN ALCOHOL/MO/W.PET/CERES
6 CREAM (GRAM) TOPICAL AT BEDTIME
Qty: 0 | Refills: 0 | Status: DISCONTINUED | OUTPATIENT
Start: 2017-08-16 | End: 2017-08-24

## 2017-08-16 RX ORDER — SODIUM CHLORIDE 9 MG/ML
500 INJECTION INTRAMUSCULAR; INTRAVENOUS; SUBCUTANEOUS ONCE
Qty: 0 | Refills: 0 | Status: COMPLETED | OUTPATIENT
Start: 2017-08-16 | End: 2017-08-16

## 2017-08-16 RX ADMIN — Medication 1 APPLICATION(S): at 17:59

## 2017-08-16 RX ADMIN — Medication 500 MILLIGRAM(S): at 18:00

## 2017-08-16 RX ADMIN — PANTOPRAZOLE SODIUM 40 MILLIGRAM(S): 20 TABLET, DELAYED RELEASE ORAL at 05:55

## 2017-08-16 RX ADMIN — SODIUM CHLORIDE 100 MILLILITER(S): 9 INJECTION INTRAMUSCULAR; INTRAVENOUS; SUBCUTANEOUS at 21:41

## 2017-08-16 RX ADMIN — DABIGATRAN ETEXILATE MESYLATE 150 MILLIGRAM(S): 150 CAPSULE ORAL at 17:59

## 2017-08-16 RX ADMIN — ZINC SULFATE TAB 220 MG (50 MG ZINC EQUIVALENT) 220 MILLIGRAM(S): 220 (50 ZN) TAB at 13:05

## 2017-08-16 RX ADMIN — CHLORHEXIDINE GLUCONATE 15 MILLILITER(S): 213 SOLUTION TOPICAL at 05:54

## 2017-08-16 RX ADMIN — Medication 100 MILLIGRAM(S): at 21:40

## 2017-08-16 RX ADMIN — TRAMADOL HYDROCHLORIDE 50 MILLIGRAM(S): 50 TABLET ORAL at 21:41

## 2017-08-16 RX ADMIN — Medication 81 MILLIGRAM(S): at 13:08

## 2017-08-16 RX ADMIN — DABIGATRAN ETEXILATE MESYLATE 150 MILLIGRAM(S): 150 CAPSULE ORAL at 05:53

## 2017-08-16 RX ADMIN — Medication 50 MILLIGRAM(S): at 05:53

## 2017-08-16 RX ADMIN — SODIUM CHLORIDE 500 MILLILITER(S): 9 INJECTION INTRAMUSCULAR; INTRAVENOUS; SUBCUTANEOUS at 13:50

## 2017-08-16 RX ADMIN — TAMSULOSIN HYDROCHLORIDE 0.4 MILLIGRAM(S): 0.4 CAPSULE ORAL at 21:39

## 2017-08-16 RX ADMIN — CHLORHEXIDINE GLUCONATE 15 MILLILITER(S): 213 SOLUTION TOPICAL at 13:05

## 2017-08-16 RX ADMIN — Medication 500 MILLIGRAM(S): at 05:59

## 2017-08-16 RX ADMIN — SENNA PLUS 2 TABLET(S): 8.6 TABLET ORAL at 21:39

## 2017-08-16 RX ADMIN — Medication 1 APPLICATION(S): at 05:54

## 2017-08-16 RX ADMIN — Medication 1 TABLET(S): at 13:08

## 2017-08-16 RX ADMIN — Medication 100 MILLIGRAM(S): at 05:59

## 2017-08-16 RX ADMIN — POLYETHYLENE GLYCOL 3350 17 GRAM(S): 17 POWDER, FOR SOLUTION ORAL at 13:15

## 2017-08-16 RX ADMIN — CHLORHEXIDINE GLUCONATE 15 MILLILITER(S): 213 SOLUTION TOPICAL at 21:40

## 2017-08-17 LAB
24R-OH-CALCIDIOL SERPL-MCNC: 31 NG/ML — SIGNIFICANT CHANGE UP (ref 30–100)
AMYLASE P1 CFR SERPL: 51 U/L — SIGNIFICANT CHANGE UP (ref 36–128)
LIDOCAIN IGE QN: 41 U/L — SIGNIFICANT CHANGE UP (ref 22–51)
T4 AB SER-ACNC: 6.9 UG/DL — SIGNIFICANT CHANGE UP (ref 4.5–12)
TSH SERPL-MCNC: 5.28 UIU/ML — HIGH (ref 0.27–4.2)

## 2017-08-17 PROCEDURE — 99232 SBSQ HOSP IP/OBS MODERATE 35: CPT

## 2017-08-17 RX ORDER — LEVOTHYROXINE SODIUM 125 MCG
50 TABLET ORAL DAILY
Qty: 0 | Refills: 0 | Status: DISCONTINUED | OUTPATIENT
Start: 2017-08-17 | End: 2017-09-15

## 2017-08-17 RX ORDER — POLYETHYLENE GLYCOL 3350 17 G/17G
17 POWDER, FOR SOLUTION ORAL DAILY
Qty: 0 | Refills: 0 | Status: DISCONTINUED | OUTPATIENT
Start: 2017-08-17 | End: 2017-09-15

## 2017-08-17 RX ORDER — METOPROLOL TARTRATE 50 MG
25 TABLET ORAL DAILY
Qty: 0 | Refills: 0 | Status: DISCONTINUED | OUTPATIENT
Start: 2017-08-17 | End: 2017-08-21

## 2017-08-17 RX ORDER — MIRTAZAPINE 45 MG/1
15 TABLET, ORALLY DISINTEGRATING ORAL AT BEDTIME
Qty: 0 | Refills: 0 | Status: DISCONTINUED | OUTPATIENT
Start: 2017-08-17 | End: 2017-08-24

## 2017-08-17 RX ADMIN — TRAMADOL HYDROCHLORIDE 50 MILLIGRAM(S): 50 TABLET ORAL at 09:00

## 2017-08-17 RX ADMIN — CHLORHEXIDINE GLUCONATE 15 MILLILITER(S): 213 SOLUTION TOPICAL at 06:47

## 2017-08-17 RX ADMIN — Medication 1 TABLET(S): at 11:21

## 2017-08-17 RX ADMIN — Medication 50 MILLIGRAM(S): at 06:46

## 2017-08-17 RX ADMIN — CHLORHEXIDINE GLUCONATE 15 MILLILITER(S): 213 SOLUTION TOPICAL at 14:32

## 2017-08-17 RX ADMIN — Medication 81 MILLIGRAM(S): at 11:21

## 2017-08-17 RX ADMIN — LIDOCAINE 1 APPLICATION(S): 4 CREAM TOPICAL at 21:17

## 2017-08-17 RX ADMIN — PANTOPRAZOLE SODIUM 40 MILLIGRAM(S): 20 TABLET, DELAYED RELEASE ORAL at 06:46

## 2017-08-17 RX ADMIN — TRAMADOL HYDROCHLORIDE 50 MILLIGRAM(S): 50 TABLET ORAL at 08:27

## 2017-08-17 RX ADMIN — SENNA PLUS 2 TABLET(S): 8.6 TABLET ORAL at 21:17

## 2017-08-17 RX ADMIN — DABIGATRAN ETEXILATE MESYLATE 150 MILLIGRAM(S): 150 CAPSULE ORAL at 17:54

## 2017-08-17 RX ADMIN — CHLORHEXIDINE GLUCONATE 15 MILLILITER(S): 213 SOLUTION TOPICAL at 21:18

## 2017-08-17 RX ADMIN — Medication 100 MILLIGRAM(S): at 06:46

## 2017-08-17 RX ADMIN — MIRTAZAPINE 15 MILLIGRAM(S): 45 TABLET, ORALLY DISINTEGRATING ORAL at 21:16

## 2017-08-17 RX ADMIN — Medication 100 MILLIGRAM(S): at 21:17

## 2017-08-17 RX ADMIN — ZINC SULFATE TAB 220 MG (50 MG ZINC EQUIVALENT) 220 MILLIGRAM(S): 220 (50 ZN) TAB at 11:20

## 2017-08-17 RX ADMIN — Medication 500 MILLIGRAM(S): at 17:54

## 2017-08-17 RX ADMIN — TRAMADOL HYDROCHLORIDE 50 MILLIGRAM(S): 50 TABLET ORAL at 18:03

## 2017-08-17 RX ADMIN — Medication 1 APPLICATION(S): at 17:54

## 2017-08-17 RX ADMIN — TAMSULOSIN HYDROCHLORIDE 0.4 MILLIGRAM(S): 0.4 CAPSULE ORAL at 21:17

## 2017-08-17 RX ADMIN — DABIGATRAN ETEXILATE MESYLATE 150 MILLIGRAM(S): 150 CAPSULE ORAL at 19:46

## 2017-08-17 RX ADMIN — Medication 500 MILLIGRAM(S): at 06:46

## 2017-08-17 RX ADMIN — Medication 1 APPLICATION(S): at 06:46

## 2017-08-17 RX ADMIN — Medication 100 MILLIGRAM(S): at 14:32

## 2017-08-17 RX ADMIN — LIDOCAINE 1 APPLICATION(S): 4 CREAM TOPICAL at 06:47

## 2017-08-17 NOTE — DIETITIAN INITIAL EVALUATION ADULT. - OTHER INFO
Pt currently sleeping.  Pt with fair po intake per RN flowsheets and pt refused lunch today and went to sleep per RN.

## 2017-08-17 NOTE — PROGRESS NOTE ADULT - SUBJECTIVE AND OBJECTIVE BOX
HISTORY OF PRESENT ILLNESS:  Pt is an 82M with PMHx significant for spinal stenosis s/p multiple spinal surgeries and spinal cord stimulator admitted to [facility] on  for scheduled management of worsening LE pain and neuropathic pain radiating both feet and pain to back, buttocks and thighs s/p exploration of the prior spinal fusion, removal of hardware, removal of spinal stimulator, T11-L1 laminectomy, T4-pelvis spinal fusion with osteotomies on  with Dr. Titus. Hospital course notable for  intraoperative blood loss requiring 2 unit PRBC and persistent acute blood loss anemia,  post op hypotension requiring  pressors, rapid Afib w/ RVR with acute respiratory failure secondary to acute pulmonary edema and bilateral pleural effusions which improved and now off lasix per cardiology, Echo showed EF 65%, encephalopathy for which HCT ( and )  negative for acute pathology but showed chronic infarcts.  SLP followed for mild-moderate dyspahgia on puree with honey liquids diet. On  had 37 beats of Vtach, and persistent leukocytosis. Deemed stable for discharge to acute rehab on 8/15.      TODAY'S SUBJECTIVE & REVIEW OF SYMPTOMS:  Pt c/o pain 5/10 low back which is non-radiating  Pt reports that he slept well last night  +BM yesterday.  Denies dyspnea, SOB, cough or urinary complaints    [   ] Constitutional WNL  [   ] Cardio WNL  [   ] Resp WNL  [ x  ] GI WNL  [   ] Heme WNL  [   ] Endo WNL  [   ] Skin WNL  [   ] MSK WNL  [   ] Neuro WNL  [   ] Cognitive WNL  [ x  ] Psych WNL        PHYSICAL EXAM  Vital Signs Last 24 Hrs  T(C): 36 (17 Aug 2017 05:11), Max: 36.7 (16 Aug 2017 16:28)  T(F): 96.8 (17 Aug 2017 05:11), Max: 98.1 (16 Aug 2017 16:28)  HR: 92 (17 Aug 2017 05:11) (76 - 92)  BP: 123/79 (17 Aug 2017 05:11) (92/60 - 123/79)  BP(mean): --  RR: 16 (17 Aug 2017 05:11) (16 - 16)  SpO2: 96% (17 Aug 2017 05:11) (96% - 100%)    General:  HEENT: NCAT  Cardio: Irregular rhythm.  Regular rate  Resp: CTAB  Abdomen: Soft NTND  Neuro: Decreased sensation BLE in stocking distribution  Motor: 4-5/5  Extrem: No edema.  Calves soft and NTTP  Skin: Back incision CDI with no erythema noted  Wounds: No decubiti  Cognitive/Psych: Impaired cognition    FUNCTIONAL PROGRESS:  Gait: TBA  ADLs: Bathing max A, UE dress min A, LE dress total A  Transfers: Mod A  Communication: [facility]    RECENT LABS:                          10.0   9.4   )-----------( 345      ( 16 Aug 2017 06:37 )             31.1     08-    144  |  107  |  35.0<H>  ----------------------------<  97  3.8   |  25.0  |  0.69    Ca    8.4<L>      16 Aug 2017 06:37    TPro  5.8<L>  /  Alb  3.1<L>  /  TBili  2.4<H>  /  DBili  x   /  AST  51<H>  /  ALT  76<H>  /  AlkPhos  229<H>        Urinalysis Basic - ( 16 Aug 2017 20:31 )    Color: Annia / Appearance: Clear / S.020 / pH: x  Gluc: x / Ketone: Negative  / Bili: Small / Urobili: 12 mg/dL   Blood: x / Protein: 15 mg/dL / Nitrite: Negative   Leuk Esterase: Trace / RBC: 3-5 /HPF / WBC 0-2   Sq Epi: x / Non Sq Epi: x / Bacteria: x        RADIOLOGY/OTHER RESULTS:    Assessment:  Pt is an 83y/o male with spinal stenosis s/p T11-L1 laminectomy, T4-pelvis spinal fusion    Comprehensive rehab: PT/OT/ST  Spinal stenosis-Spinal precautions.  Pain-Tramadol prn  H/O acute respiratory distress- improved; pt oxygenating well on RA  Metabolic nzpftvatyyahjm-Xwzhavqg-Njsr ST.  Caution with excessive pain meds or anxiolytics, fall precautions  Dysphagia-Puree and honey-thickened liquid diet.  IVF for hydration.  Cont ST.  Add Estim.  MBS pending when more functional  Atrial fibrillation-HR controlled.  Cont Toprol XL 50 po mg daily with holding parameters and Pradaxa  Constipation- bowel regimen.   HTN-c/w Toprol XL 50 mg po daily.  Hyperlipidemia - c/w statin.   Elevated TSH 5.28-Add Synthroid 50 daily  Elevated LFTs-Discontinued tylenol.  Consider change statin.  F/U labs  Acute blood loss anemia-f/u CBC  F/U with PMD Dr. Lares and cardiololgy Dr. Ny as outpt. HISTORY OF PRESENT ILLNESS:  Pt is an 82M with PMHx significant for spinal stenosis s/p multiple spinal surgeries and spinal cord stimulator admitted to [facility] on  for scheduled management of worsening LE pain and neuropathic pain radiating both feet and pain to back, buttocks and thighs s/p exploration of the prior spinal fusion, removal of hardware, removal of spinal stimulator, T11-L1 laminectomy, T4-pelvis spinal fusion with osteotomies on  with Dr. Titus. Hospital course notable for  intraoperative blood loss requiring 2 unit PRBC and persistent acute blood loss anemia,  post op hypotension requiring  pressors, rapid Afib w/ RVR with acute respiratory failure secondary to acute pulmonary edema and bilateral pleural effusions which improved and now off lasix per cardiology, Echo showed EF 65%, encephalopathy for which HCT ( and )  negative for acute pathology but showed chronic infarcts.  SLP followed for mild-moderate dyspahgia on puree with honey liquids diet. On  had 37 beats of Vtach, and persistent leukocytosis. Deemed stable for discharge to acute rehab on 8/15.      TODAY'S SUBJECTIVE & REVIEW OF SYMPTOMS:  Pt c/o pain 5/10 low back which is non-radiating  Pt reports that he slept well last night  +BM yesterday.  Denies dyspnea, SOB, cough or urinary complaints    [   ] Constitutional WNL  [   ] Cardio WNL  [   ] Resp WNL  [ x  ] GI WNL  [   ] Heme WNL  [   ] Endo WNL  [   ] Skin WNL  [   ] MSK WNL  [   ] Neuro WNL  [   ] Cognitive WNL  [ x  ] Psych WNL        PHYSICAL EXAM  Vital Signs Last 24 Hrs  T(C): 36 (17 Aug 2017 05:11), Max: 36.7 (16 Aug 2017 16:28)  T(F): 96.8 (17 Aug 2017 05:11), Max: 98.1 (16 Aug 2017 16:28)  HR: 92 (17 Aug 2017 05:11) (76 - 92)  BP: 123/79 (17 Aug 2017 05:11) (92/60 - 123/79)  BP(mean): --  RR: 16 (17 Aug 2017 05:11) (16 - 16)  SpO2: 96% (17 Aug 2017 05:11) (96% - 100%)    General:  HEENT: NCAT  Cardio: Irregular rhythm.  Regular rate  Resp: CTAB  Abdomen: Soft NTND  Neuro: Decreased sensation BLE in stocking distribution  Motor: 4-5/5  Extrem: No edema.  Calves soft and NTTP  Skin: Back incision CDI with no erythema noted  Wounds: No decubiti  Cognitive/Psych: Impaired cognition    FUNCTIONAL PROGRESS:  Gait: TBA  ADLs: Bathing max A, UE dress min A, LE dress total A  Transfers: Mod A  Communication: [facility]    RECENT LABS:                          10.0   9.4   )-----------( 345      ( 16 Aug 2017 06:37 )             31.1     -    144  |  107  |  35.0<H>  ----------------------------<  97  3.8   |  25.0  |  0.69    Ca    8.4<L>      16 Aug 2017 06:37    TPro  5.8<L>  /  Alb  3.1<L>  /  TBili  2.4<H>  /  DBili  x   /  AST  51<H>  /  ALT  76<H>  /  AlkPhos  229<H>        Urinalysis Basic - ( 16 Aug 2017 20:31 )    Color: Annia / Appearance: Clear / S.020 / pH: x  Gluc: x / Ketone: Negative  / Bili: Small / Urobili: 12 mg/dL   Blood: x / Protein: 15 mg/dL / Nitrite: Negative   Leuk Esterase: Trace / RBC: 3-5 /HPF / WBC 0-2   Sq Epi: x / Non Sq Epi: x / Bacteria: x        RADIOLOGY/OTHER RESULTS:    Assessment:  Pt is an 83y/o male with spinal stenosis s/p T11-L1 laminectomy, T4-pelvis spinal fusion    Comprehensive rehab: PT/OT/ST  Spinal stenosis-Spinal precautions.  Pain-Tramadol prn  H/O acute respiratory distress- improved; pt oxygenating well on RA  Metabolic mnhrvkjjkhczpn-Denbytqn-Fpmz ST.  Caution with excessive pain meds or anxiolytics, fall precautions  Dysphagia-Puree and honey-thickened liquid diet.  IVF for hydration.  Cont ST.  Add Estim.  MBS pending when more functional  Atrial fibrillation-HR controlled.  Cont Toprol XL daily with holding parameters and Pradaxa  Constipation- bowel regimen.   HTN-SBPs low at   Decrease Toprol XL 50 mg po daily to 25mg daily as per pre-op.  Hyperlipidemia - c/w statin.   Elevated TSH 5.28-Add Synthroid 50 daily  Elevated LFTs-Discontinued tylenol.  Consider change statin.  F/U labs  Acute blood loss anemia-f/u CBC  F/U with PMD Dr. Lares and cardiololgy Dr. Ny as outpt.

## 2017-08-17 NOTE — DIETITIAN INITIAL EVALUATION ADULT. - PERTINENT LABORATORY DATA
08-16 Na144 mmol/L Glu 97 mg/dL K+ 3.8 mmol/L Cr  0.69 mg/dL BUN 35.0 mg/dL<H> Phos n/a   Alb 3.1 g/dL<L> PAB 19 mg/dL

## 2017-08-18 LAB
CULTURE RESULTS: SIGNIFICANT CHANGE UP
SPECIMEN SOURCE: SIGNIFICANT CHANGE UP

## 2017-08-18 PROCEDURE — 99232 SBSQ HOSP IP/OBS MODERATE 35: CPT

## 2017-08-18 RX ORDER — ERGOCALCIFEROL 1.25 MG/1
50000 CAPSULE ORAL
Qty: 0 | Refills: 0 | Status: DISCONTINUED | OUTPATIENT
Start: 2017-08-18 | End: 2017-09-15

## 2017-08-18 RX ADMIN — Medication 1 APPLICATION(S): at 18:39

## 2017-08-18 RX ADMIN — TRAMADOL HYDROCHLORIDE 50 MILLIGRAM(S): 50 TABLET ORAL at 18:38

## 2017-08-18 RX ADMIN — ERGOCALCIFEROL 50000 UNIT(S): 1.25 CAPSULE ORAL at 18:35

## 2017-08-18 RX ADMIN — LIDOCAINE 1 APPLICATION(S): 4 CREAM TOPICAL at 22:08

## 2017-08-18 RX ADMIN — Medication 1 APPLICATION(S): at 06:32

## 2017-08-18 RX ADMIN — Medication 25 MILLIGRAM(S): at 06:31

## 2017-08-18 RX ADMIN — Medication 50 MICROGRAM(S): at 06:29

## 2017-08-18 RX ADMIN — CHLORHEXIDINE GLUCONATE 15 MILLILITER(S): 213 SOLUTION TOPICAL at 22:08

## 2017-08-18 RX ADMIN — DABIGATRAN ETEXILATE MESYLATE 150 MILLIGRAM(S): 150 CAPSULE ORAL at 06:29

## 2017-08-18 RX ADMIN — TRAMADOL HYDROCHLORIDE 50 MILLIGRAM(S): 50 TABLET ORAL at 19:10

## 2017-08-18 RX ADMIN — CHLORHEXIDINE GLUCONATE 15 MILLILITER(S): 213 SOLUTION TOPICAL at 06:29

## 2017-08-18 RX ADMIN — ZINC SULFATE TAB 220 MG (50 MG ZINC EQUIVALENT) 220 MILLIGRAM(S): 220 (50 ZN) TAB at 12:21

## 2017-08-18 RX ADMIN — TRAMADOL HYDROCHLORIDE 50 MILLIGRAM(S): 50 TABLET ORAL at 06:28

## 2017-08-18 RX ADMIN — Medication 1 TABLET(S): at 12:21

## 2017-08-18 RX ADMIN — Medication 100 MILLIGRAM(S): at 13:36

## 2017-08-18 RX ADMIN — MIRTAZAPINE 15 MILLIGRAM(S): 45 TABLET, ORALLY DISINTEGRATING ORAL at 22:08

## 2017-08-18 RX ADMIN — CHLORHEXIDINE GLUCONATE 15 MILLILITER(S): 213 SOLUTION TOPICAL at 13:36

## 2017-08-18 RX ADMIN — LIDOCAINE 1 APPLICATION(S): 4 CREAM TOPICAL at 13:36

## 2017-08-18 RX ADMIN — LIDOCAINE 1 APPLICATION(S): 4 CREAM TOPICAL at 06:32

## 2017-08-18 RX ADMIN — SENNA PLUS 2 TABLET(S): 8.6 TABLET ORAL at 22:08

## 2017-08-18 RX ADMIN — Medication 100 MILLIGRAM(S): at 06:29

## 2017-08-18 RX ADMIN — DABIGATRAN ETEXILATE MESYLATE 150 MILLIGRAM(S): 150 CAPSULE ORAL at 18:35

## 2017-08-18 RX ADMIN — PANTOPRAZOLE SODIUM 40 MILLIGRAM(S): 20 TABLET, DELAYED RELEASE ORAL at 06:32

## 2017-08-18 RX ADMIN — Medication 81 MILLIGRAM(S): at 12:21

## 2017-08-18 RX ADMIN — TAMSULOSIN HYDROCHLORIDE 0.4 MILLIGRAM(S): 0.4 CAPSULE ORAL at 22:50

## 2017-08-18 RX ADMIN — Medication 100 MILLIGRAM(S): at 22:08

## 2017-08-18 RX ADMIN — Medication 500 MILLIGRAM(S): at 06:29

## 2017-08-18 RX ADMIN — Medication 500 MILLIGRAM(S): at 18:35

## 2017-08-18 NOTE — PROGRESS NOTE ADULT - SUBJECTIVE AND OBJECTIVE BOX
HISTORY OF PRESENT ILLNESS:  Pt is an 82M with PMHx significant for spinal stenosis s/p multiple spinal surgeries and spinal cord stimulator admitted to [facility] on  for scheduled management of worsening LE pain and neuropathic pain radiating both feet and pain to back, buttocks and thighs s/p exploration of the prior spinal fusion, removal of hardware, removal of spinal stimulator, T11-L1 laminectomy, T4-pelvis spinal fusion with osteotomies on  with Dr. Titus. Hospital course notable for  intraoperative blood loss requiring 2 unit PRBC and persistent acute blood loss anemia,  post op hypotension requiring  pressors, rapid Afib w/ RVR with acute respiratory failure secondary to acute pulmonary edema and bilateral pleural effusions which improved and now off lasix per cardiology, Echo showed EF 65%, encephalopathy for which HCT ( and )  negative for acute pathology but showed chronic infarcts.  SLP followed for mild-moderate dyspahgia on puree with honey liquids diet. On  had 37 beats of Vtach, and persistent leukocytosis. Deemed stable for discharge to acute rehab on 8/15.      TODAY'S SUBJECTIVE & REVIEW OF SYMPTOMS:  Pt c/o pain 5-6/10 low back which is non-radiating  Pt reports that he slept well last night  +BM yesterday.  Denies dyspnea,  cough or urinary complaints  Pt reports SOB on exertion    [   ] Constitutional WNL  [   ] Cardio WNL  [   ] Resp WNL  [ x  ] GI WNL  [   ] Heme WNL  [   ] Endo WNL  [   ] Skin WNL  [   ] MSK WNL  [   ] Neuro WNL  [   ] Cognitive WNL  [ x  ] Psych WNL        PHYSICAL EXAM  Vital Signs Last 24 Hrs  T(C): 36.1 (18 Aug 2017 05:31), Max: 36.4 (17 Aug 2017 20:50)  T(F): 97 (18 Aug 2017 05:31), Max: 97.5 (17 Aug 2017 20:50)  HR: 95 (18 Aug 2017 05:31) (77 - 95)  BP: 124/85 (18 Aug 2017 05:31) (112/74 - 124/85)  BP(mean): --  RR: 18 (18 Aug 2017 05:31) (18 - 18)  SpO2: 96% (18 Aug 2017 05:31) (96% - 98%)    General:  HEENT: NCAT  Cardio: Irregular rhythm.  Regular rate  Resp: CTAB  Abdomen: Soft NTND  Neuro: Decreased sensation BLE in stocking distribution  Motor: 4-5/5  Extrem: No edema.  Calves soft and NTTP  Skin: Back incision CDI with no erythema noted  Wounds: No decubiti  Cognitive/Psych: Impaired cognition    FUNCTIONAL PROGRESS:  Gait: 5' mod A RW  ADLs: Bathing max A, UE dress min A, LE dress max A  Transfers: Min-mod A  Bed mobility: Mod A      RECENT LABS:                          10.0   9.4   )-----------( 345      ( 16 Aug 2017 06:37 )             31.1         144  |  107  |  35.0<H>  ----------------------------<  97  3.8   |  25.0  |  0.69    Ca    8.4<L>      16 Aug 2017 06:37    TPro  5.8<L>  /  Alb  3.1<L>  /  TBili  2.4<H>  /  DBili  x   /  AST  51<H>  /  ALT  76<H>  /  AlkPhos  229<H>        Urinalysis Basic - ( 16 Aug 2017 20:31 )    Color: Annia / Appearance: Clear / S.020 / pH: x  Gluc: x / Ketone: Negative  / Bili: Small / Urobili: 12 mg/dL   Blood: x / Protein: 15 mg/dL / Nitrite: Negative   Leuk Esterase: Trace / RBC: 3-5 /HPF / WBC 0-2   Sq Epi: x / Non Sq Epi: x / Bacteria: x    Urine culture <10,000cfu/mL GNR and GPC    RADIOLOGY/OTHER RESULTS:    Assessment:  Pt is an 83y/o male with spinal stenosis s/p T11-L1 laminectomy, T4-pelvis spinal fusion    Comprehensive rehab: PT/OT/ST  Spinal stenosis-Spinal precautions.  Pain-Tramadol prn  H/O acute respiratory distress- improved; pt oxygenating well on RA  Metabolic crszdkwpbbsctj-Zhwvmswm-Afzg ST.  Caution with excessive pain meds or anxiolytics, fall precautions  Dysphagia-Puree and honey-thickened liquid diet.  IVF for hydration.  Cont ST.  Added Estim.  MBS pending when more functional  Atrial fibrillation-HR controlled.  Cont Toprol XL daily with holding parameters and Pradaxa  Constipation- bowel regimen.   HTN-SBPs low at   Decreased Toprol XL 50 mg po daily to 25mg daily as per pre-op with improvement.  Hyperlipidemia - c/w statin.   Elevated TSH 5.28-Add Synthroid 50 daily  Elevated LFTs-Discontinued tylenol.  Consider change statin.  F/U labs  Acute blood loss anemia-f/u CBC  Vitamin D deficiency-Low normal Vit D at 31.  Add Vit D 50,000U weekly  F/U with PMD Dr. Lares and cardiololgy Dr. Ny as outpt.

## 2017-08-19 PROCEDURE — 99232 SBSQ HOSP IP/OBS MODERATE 35: CPT

## 2017-08-19 RX ADMIN — DABIGATRAN ETEXILATE MESYLATE 150 MILLIGRAM(S): 150 CAPSULE ORAL at 17:42

## 2017-08-19 RX ADMIN — LIDOCAINE 1 APPLICATION(S): 4 CREAM TOPICAL at 21:36

## 2017-08-19 RX ADMIN — TRAMADOL HYDROCHLORIDE 50 MILLIGRAM(S): 50 TABLET ORAL at 22:37

## 2017-08-19 RX ADMIN — Medication 81 MILLIGRAM(S): at 12:00

## 2017-08-19 RX ADMIN — PANTOPRAZOLE SODIUM 40 MILLIGRAM(S): 20 TABLET, DELAYED RELEASE ORAL at 06:43

## 2017-08-19 RX ADMIN — CHLORHEXIDINE GLUCONATE 15 MILLILITER(S): 213 SOLUTION TOPICAL at 06:43

## 2017-08-19 RX ADMIN — Medication 500 MILLIGRAM(S): at 17:42

## 2017-08-19 RX ADMIN — ZINC SULFATE TAB 220 MG (50 MG ZINC EQUIVALENT) 220 MILLIGRAM(S): 220 (50 ZN) TAB at 12:00

## 2017-08-19 RX ADMIN — Medication 100 MILLIGRAM(S): at 21:36

## 2017-08-19 RX ADMIN — Medication 1 TABLET(S): at 12:00

## 2017-08-19 RX ADMIN — Medication 100 MILLIGRAM(S): at 12:00

## 2017-08-19 RX ADMIN — Medication 1 APPLICATION(S): at 17:42

## 2017-08-19 RX ADMIN — SENNA PLUS 2 TABLET(S): 8.6 TABLET ORAL at 21:35

## 2017-08-19 RX ADMIN — Medication 50 MICROGRAM(S): at 06:43

## 2017-08-19 RX ADMIN — Medication 25 MILLIGRAM(S): at 06:43

## 2017-08-19 RX ADMIN — Medication 1 APPLICATION(S): at 06:44

## 2017-08-19 RX ADMIN — Medication 500 MILLIGRAM(S): at 06:44

## 2017-08-19 RX ADMIN — Medication 100 MILLIGRAM(S): at 06:43

## 2017-08-19 RX ADMIN — CHLORHEXIDINE GLUCONATE 15 MILLILITER(S): 213 SOLUTION TOPICAL at 21:36

## 2017-08-19 RX ADMIN — DABIGATRAN ETEXILATE MESYLATE 150 MILLIGRAM(S): 150 CAPSULE ORAL at 06:44

## 2017-08-19 RX ADMIN — MIRTAZAPINE 15 MILLIGRAM(S): 45 TABLET, ORALLY DISINTEGRATING ORAL at 21:36

## 2017-08-19 RX ADMIN — TAMSULOSIN HYDROCHLORIDE 0.4 MILLIGRAM(S): 0.4 CAPSULE ORAL at 21:35

## 2017-08-19 RX ADMIN — TRAMADOL HYDROCHLORIDE 50 MILLIGRAM(S): 50 TABLET ORAL at 21:37

## 2017-08-19 NOTE — PROGRESS NOTE ADULT - SUBJECTIVE AND OBJECTIVE BOX
No overnight events. Slept well. Denies pain, no SOB.  Continues to be confused.     REVIEW OF SYSTEMS  Constitutional - No fever,  +fatigue  Neurological - No headaches, +memory loss, +loss of strength  Psychiatric - No depression, No anxiety    VITALS  T(C): 36.2 (08-19-17 @ 05:21), Max: 36.8 (08-18-17 @ 20:37)  HR: 95 (08-19-17 @ 05:21) (74 - 95)  BP: 141/91 (08-19-17 @ 05:21) (110/74 - 141/91)  RR: 18 (08-19-17 @ 05:21) (16 - 18)  SpO2: 100% (08-19-17 @ 05:21) (92% - 100%)  Wt(kg): --    MEDICATIONS   aspirin  chewable 81 milliGRAM(s) daily  traMADol 50 milliGRAM(s) every 4 hours PRN  tamsulosin 0.4 milliGRAM(s) at bedtime  dabigatran 150 milliGRAM(s) every 12 hours  docusate sodium 100 milliGRAM(s) three times a day  senna 2 Tablet(s) at bedtime  chlorhexidine 0.12% Liquid 15 milliLiter(s) three times a day  pantoprazole   Suspension 40 milliGRAM(s) before breakfast  ascorbic acid 500 milliGRAM(s) two times a day  zinc sulfate 220 milliGRAM(s) daily  multivitamin 1 Tablet(s) daily  BACItracin   Ointment 1 Application(s) two times a day  melatonin. 6 milliGRAM(s) at bedtime PRN  lidocaine 2% Gel 1 Application(s) three times a day  levothyroxine 50 MICROGram(s) daily  polyethylene glycol 3350 17 Gram(s) daily PRN  metoprolol succinate ER 25 milliGRAM(s) daily  mirtazapine 15 milliGRAM(s) at bedtime  ergocalciferol 28658 Unit(s) every week      ---------  PHYSICAL EXAM  Constitutional - NAD, Comfortable  Pulm - Breathing comfortably, No wheezing  Abd - Soft, NTND  Extremities - No C/C/E, No calf tenderness  Neurologic Exam -                    Cognitive - AAOx self     Communication - Expressive deficits     Motor - No focal deficits  Psychiatric - Mood WNL, Affect Flat    ASSESSMENT/PLAN  82y Male with functional deficits  HTN - Toprol XL  Sleep - Remeron, Melatonin PRN  Pain - Tramadol PRN  DVT PPX - Pradaxa    Continue 3hrs a day of comprehensive rehab program.

## 2017-08-20 PROCEDURE — 99232 SBSQ HOSP IP/OBS MODERATE 35: CPT

## 2017-08-20 RX ADMIN — SENNA PLUS 2 TABLET(S): 8.6 TABLET ORAL at 21:19

## 2017-08-20 RX ADMIN — Medication 1 APPLICATION(S): at 06:47

## 2017-08-20 RX ADMIN — Medication 100 MILLIGRAM(S): at 11:42

## 2017-08-20 RX ADMIN — MIRTAZAPINE 15 MILLIGRAM(S): 45 TABLET, ORALLY DISINTEGRATING ORAL at 21:20

## 2017-08-20 RX ADMIN — CHLORHEXIDINE GLUCONATE 15 MILLILITER(S): 213 SOLUTION TOPICAL at 11:42

## 2017-08-20 RX ADMIN — Medication 500 MILLIGRAM(S): at 05:50

## 2017-08-20 RX ADMIN — Medication 100 MILLIGRAM(S): at 21:19

## 2017-08-20 RX ADMIN — CHLORHEXIDINE GLUCONATE 15 MILLILITER(S): 213 SOLUTION TOPICAL at 21:19

## 2017-08-20 RX ADMIN — Medication 500 MILLIGRAM(S): at 17:32

## 2017-08-20 RX ADMIN — ZINC SULFATE TAB 220 MG (50 MG ZINC EQUIVALENT) 220 MILLIGRAM(S): 220 (50 ZN) TAB at 11:42

## 2017-08-20 RX ADMIN — Medication 25 MILLIGRAM(S): at 05:50

## 2017-08-20 RX ADMIN — PANTOPRAZOLE SODIUM 40 MILLIGRAM(S): 20 TABLET, DELAYED RELEASE ORAL at 05:49

## 2017-08-20 RX ADMIN — Medication 1 APPLICATION(S): at 17:32

## 2017-08-20 RX ADMIN — Medication 50 MICROGRAM(S): at 05:50

## 2017-08-20 RX ADMIN — LIDOCAINE 1 APPLICATION(S): 4 CREAM TOPICAL at 21:20

## 2017-08-20 RX ADMIN — DABIGATRAN ETEXILATE MESYLATE 150 MILLIGRAM(S): 150 CAPSULE ORAL at 05:49

## 2017-08-20 RX ADMIN — Medication 81 MILLIGRAM(S): at 11:41

## 2017-08-20 RX ADMIN — DABIGATRAN ETEXILATE MESYLATE 150 MILLIGRAM(S): 150 CAPSULE ORAL at 17:32

## 2017-08-20 RX ADMIN — TAMSULOSIN HYDROCHLORIDE 0.4 MILLIGRAM(S): 0.4 CAPSULE ORAL at 21:19

## 2017-08-20 RX ADMIN — LIDOCAINE 1 APPLICATION(S): 4 CREAM TOPICAL at 05:50

## 2017-08-20 RX ADMIN — CHLORHEXIDINE GLUCONATE 15 MILLILITER(S): 213 SOLUTION TOPICAL at 05:49

## 2017-08-20 RX ADMIN — Medication 1 TABLET(S): at 11:41

## 2017-08-20 RX ADMIN — Medication 100 MILLIGRAM(S): at 05:50

## 2017-08-20 NOTE — PROGRESS NOTE ADULT - SUBJECTIVE AND OBJECTIVE BOX
Patient having some back pain. Repositioned and improved.   Slept well after he received pain medications. Otherwise, no CP, no SOB. Continues to be confused. Yesterday had orthostasis with therapy. Push PO fluids and applied stockings. Bp is improved. Will continue to monitor.     REVIEW OF SYSTEMS  Constitutional - No fever,  +fatigue  Neurological - No headaches, +memory loss, +loss of strength, No numbness, No tremors  Musculoskeletal - +joint pain    VITALS  T(C): 36.5 (08-20-17 @ 05:33), Max: 36.6 (08-19-17 @ 20:16)  HR: 92 (08-20-17 @ 05:33) (76 - 100)  BP: 117/74 (08-20-17 @ 05:33) (117/74 - 119/86)  RR: 18 (08-20-17 @ 05:33) (17 - 18)  SpO2: 95% (08-20-17 @ 05:33) (92% - 98%)  Wt(kg): --      MEDICATIONS   aspirin  chewable 81 milliGRAM(s) daily  traMADol 50 milliGRAM(s) every 4 hours PRN  tamsulosin 0.4 milliGRAM(s) at bedtime  dabigatran 150 milliGRAM(s) every 12 hours  docusate sodium 100 milliGRAM(s) three times a day  senna 2 Tablet(s) at bedtime  chlorhexidine 0.12% Liquid 15 milliLiter(s) three times a day  pantoprazole   Suspension 40 milliGRAM(s) before breakfast  ascorbic acid 500 milliGRAM(s) two times a day  zinc sulfate 220 milliGRAM(s) daily  multivitamin 1 Tablet(s) daily  BACItracin   Ointment 1 Application(s) two times a day  melatonin. 6 milliGRAM(s) at bedtime PRN  lidocaine 2% Gel 1 Application(s) three times a day  levothyroxine 50 MICROGram(s) daily  polyethylene glycol 3350 17 Gram(s) daily PRN  metoprolol succinate ER 25 milliGRAM(s) daily  mirtazapine 15 milliGRAM(s) at bedtime  ergocalciferol 81857 Unit(s) every week      ---------  PHYSICAL EXAM  Constitutional - NAD, Uncomfortable  Pulm - Breathing comfortably, No wheezing  Abd - Soft, NTND  Extremities - No calf tenderness  Neurologic Exam -                    Cognitive - AAOx self     Motor - No focal deficits  Psychiatric - Mood anxious, Affect Flat    ASSESSMENT/PLAN  82y Male with functional deficits  CAD - ASA  HTN - Toprol XL  Sleep - Remeron, Melatonin PRN  Pain - Tramadol PRN  DVT PPX - Pradaxa, SCDs, TEDs    Continue 3hrs a day of comprehensive rehab program.

## 2017-08-21 DIAGNOSIS — E78.4 OTHER HYPERLIPIDEMIA: ICD-10-CM

## 2017-08-21 DIAGNOSIS — R63.0 ANOREXIA: ICD-10-CM

## 2017-08-21 DIAGNOSIS — I95.1 ORTHOSTATIC HYPOTENSION: ICD-10-CM

## 2017-08-21 DIAGNOSIS — I95.9 HYPOTENSION, UNSPECIFIED: ICD-10-CM

## 2017-08-21 DIAGNOSIS — M48.07 SPINAL STENOSIS, LUMBOSACRAL REGION: ICD-10-CM

## 2017-08-21 DIAGNOSIS — I25.10 ATHEROSCLEROTIC HEART DISEASE OF NATIVE CORONARY ARTERY WITHOUT ANGINA PECTORIS: ICD-10-CM

## 2017-08-21 DIAGNOSIS — E78.5 HYPERLIPIDEMIA, UNSPECIFIED: ICD-10-CM

## 2017-08-21 DIAGNOSIS — I48.91 UNSPECIFIED ATRIAL FIBRILLATION: ICD-10-CM

## 2017-08-21 DIAGNOSIS — Z29.9 ENCOUNTER FOR PROPHYLACTIC MEASURES, UNSPECIFIED: ICD-10-CM

## 2017-08-21 DIAGNOSIS — I47.2 VENTRICULAR TACHYCARDIA: ICD-10-CM

## 2017-08-21 LAB
ALBUMIN SERPL ELPH-MCNC: 3 G/DL — LOW (ref 3.3–5.2)
ALP SERPL-CCNC: 236 U/L — HIGH (ref 40–120)
ALT FLD-CCNC: 63 U/L — HIGH
ANION GAP SERPL CALC-SCNC: 13 MMOL/L — SIGNIFICANT CHANGE UP (ref 5–17)
AST SERPL-CCNC: 35 U/L — SIGNIFICANT CHANGE UP
BILIRUB SERPL-MCNC: 1.7 MG/DL — SIGNIFICANT CHANGE UP (ref 0.4–2)
BUN SERPL-MCNC: 23 MG/DL — HIGH (ref 8–20)
CALCIUM SERPL-MCNC: 8.6 MG/DL — SIGNIFICANT CHANGE UP (ref 8.6–10.2)
CHLORIDE SERPL-SCNC: 107 MMOL/L — SIGNIFICANT CHANGE UP (ref 98–107)
CO2 SERPL-SCNC: 23 MMOL/L — SIGNIFICANT CHANGE UP (ref 22–29)
CREAT SERPL-MCNC: 0.62 MG/DL — SIGNIFICANT CHANGE UP (ref 0.5–1.3)
GLUCOSE SERPL-MCNC: 90 MG/DL — SIGNIFICANT CHANGE UP (ref 70–115)
HCT VFR BLD CALC: 32 % — LOW (ref 42–52)
HGB BLD-MCNC: 10.5 G/DL — LOW (ref 14–18)
MAGNESIUM SERPL-MCNC: 2 MG/DL — SIGNIFICANT CHANGE UP (ref 1.6–2.6)
MCHC RBC-ENTMCNC: 32.8 G/DL — SIGNIFICANT CHANGE UP (ref 32–36)
MCHC RBC-ENTMCNC: 32.9 PG — HIGH (ref 27–31)
MCV RBC AUTO: 100.3 FL — HIGH (ref 80–94)
PLATELET # BLD AUTO: 284 K/UL — SIGNIFICANT CHANGE UP (ref 150–400)
POTASSIUM SERPL-MCNC: 3.9 MMOL/L — SIGNIFICANT CHANGE UP (ref 3.5–5.3)
POTASSIUM SERPL-SCNC: 3.9 MMOL/L — SIGNIFICANT CHANGE UP (ref 3.5–5.3)
PREALB SERPL-MCNC: 17 MG/DL — LOW (ref 18–38)
PROT SERPL-MCNC: 6.2 G/DL — LOW (ref 6.6–8.7)
RBC # BLD: 3.19 M/UL — LOW (ref 4.6–6.2)
RBC # FLD: 16.9 % — HIGH (ref 11–15.6)
SODIUM SERPL-SCNC: 143 MMOL/L — SIGNIFICANT CHANGE UP (ref 135–145)
WBC # BLD: 7.7 K/UL — SIGNIFICANT CHANGE UP (ref 4.8–10.8)
WBC # FLD AUTO: 7.7 K/UL — SIGNIFICANT CHANGE UP (ref 4.8–10.8)

## 2017-08-21 PROCEDURE — 93010 ELECTROCARDIOGRAM REPORT: CPT

## 2017-08-21 PROCEDURE — 99232 SBSQ HOSP IP/OBS MODERATE 35: CPT

## 2017-08-21 PROCEDURE — 71010: CPT | Mod: 26

## 2017-08-21 PROCEDURE — 99223 1ST HOSP IP/OBS HIGH 75: CPT

## 2017-08-21 RX ORDER — SODIUM CHLORIDE 9 MG/ML
500 INJECTION INTRAMUSCULAR; INTRAVENOUS; SUBCUTANEOUS ONCE
Qty: 0 | Refills: 0 | Status: COMPLETED | OUTPATIENT
Start: 2017-08-21 | End: 2017-08-21

## 2017-08-21 RX ORDER — FOLIC ACID 0.8 MG
1 TABLET ORAL DAILY
Qty: 0 | Refills: 0 | Status: DISCONTINUED | OUTPATIENT
Start: 2017-08-21 | End: 2017-09-15

## 2017-08-21 RX ORDER — DEXTROSE MONOHYDRATE, SODIUM CHLORIDE, AND POTASSIUM CHLORIDE 50; .745; 4.5 G/1000ML; G/1000ML; G/1000ML
1000 INJECTION, SOLUTION INTRAVENOUS
Qty: 0 | Refills: 0 | Status: DISCONTINUED | OUTPATIENT
Start: 2017-08-21 | End: 2017-08-22

## 2017-08-21 RX ORDER — METOPROLOL TARTRATE 50 MG
25 TABLET ORAL DAILY
Qty: 0 | Refills: 0 | Status: DISCONTINUED | OUTPATIENT
Start: 2017-08-21 | End: 2017-08-30

## 2017-08-21 RX ORDER — IPRATROPIUM/ALBUTEROL SULFATE 18-103MCG
3 AEROSOL WITH ADAPTER (GRAM) INHALATION EVERY 6 HOURS
Qty: 0 | Refills: 0 | Status: DISCONTINUED | OUTPATIENT
Start: 2017-08-21 | End: 2017-08-21

## 2017-08-21 RX ADMIN — Medication 1 TABLET(S): at 10:48

## 2017-08-21 RX ADMIN — ZINC SULFATE TAB 220 MG (50 MG ZINC EQUIVALENT) 220 MILLIGRAM(S): 220 (50 ZN) TAB at 10:48

## 2017-08-21 RX ADMIN — TRAMADOL HYDROCHLORIDE 50 MILLIGRAM(S): 50 TABLET ORAL at 10:47

## 2017-08-21 RX ADMIN — LIDOCAINE 1 APPLICATION(S): 4 CREAM TOPICAL at 05:56

## 2017-08-21 RX ADMIN — TAMSULOSIN HYDROCHLORIDE 0.4 MILLIGRAM(S): 0.4 CAPSULE ORAL at 21:09

## 2017-08-21 RX ADMIN — Medication 1 MILLIGRAM(S): at 12:26

## 2017-08-21 RX ADMIN — Medication 50 MICROGRAM(S): at 05:56

## 2017-08-21 RX ADMIN — SODIUM CHLORIDE 500 MILLILITER(S): 9 INJECTION INTRAMUSCULAR; INTRAVENOUS; SUBCUTANEOUS at 13:20

## 2017-08-21 RX ADMIN — Medication 100 MILLIGRAM(S): at 21:09

## 2017-08-21 RX ADMIN — CHLORHEXIDINE GLUCONATE 15 MILLILITER(S): 213 SOLUTION TOPICAL at 13:29

## 2017-08-21 RX ADMIN — MIRTAZAPINE 15 MILLIGRAM(S): 45 TABLET, ORALLY DISINTEGRATING ORAL at 21:09

## 2017-08-21 RX ADMIN — PANTOPRAZOLE SODIUM 40 MILLIGRAM(S): 20 TABLET, DELAYED RELEASE ORAL at 05:57

## 2017-08-21 RX ADMIN — Medication 1 APPLICATION(S): at 17:12

## 2017-08-21 RX ADMIN — CHLORHEXIDINE GLUCONATE 15 MILLILITER(S): 213 SOLUTION TOPICAL at 05:55

## 2017-08-21 RX ADMIN — CHLORHEXIDINE GLUCONATE 15 MILLILITER(S): 213 SOLUTION TOPICAL at 21:10

## 2017-08-21 RX ADMIN — Medication 81 MILLIGRAM(S): at 10:48

## 2017-08-21 RX ADMIN — TRAMADOL HYDROCHLORIDE 50 MILLIGRAM(S): 50 TABLET ORAL at 12:00

## 2017-08-21 RX ADMIN — Medication 100 MILLIGRAM(S): at 13:28

## 2017-08-21 RX ADMIN — Medication 500 MILLIGRAM(S): at 05:56

## 2017-08-21 RX ADMIN — SENNA PLUS 2 TABLET(S): 8.6 TABLET ORAL at 21:09

## 2017-08-21 RX ADMIN — DEXTROSE MONOHYDRATE, SODIUM CHLORIDE, AND POTASSIUM CHLORIDE 75 MILLILITER(S): 50; .745; 4.5 INJECTION, SOLUTION INTRAVENOUS at 20:13

## 2017-08-21 RX ADMIN — Medication 100 MILLIGRAM(S): at 05:56

## 2017-08-21 RX ADMIN — Medication 1 APPLICATION(S): at 05:55

## 2017-08-21 RX ADMIN — Medication 500 MILLIGRAM(S): at 17:12

## 2017-08-21 RX ADMIN — DABIGATRAN ETEXILATE MESYLATE 150 MILLIGRAM(S): 150 CAPSULE ORAL at 17:11

## 2017-08-21 RX ADMIN — Medication 25 MILLIGRAM(S): at 05:56

## 2017-08-21 RX ADMIN — DABIGATRAN ETEXILATE MESYLATE 150 MILLIGRAM(S): 150 CAPSULE ORAL at 05:56

## 2017-08-21 NOTE — PROGRESS NOTE ADULT - SUBJECTIVE AND OBJECTIVE BOX
HISTORY OF PRESENT ILLNESS:  Pt is an 82M with PMHx significant for spinal stenosis s/p multiple spinal surgeries and spinal cord stimulator admitted to [facility] on  for scheduled management of worsening LE pain and neuropathic pain radiating both feet and pain to back, buttocks and thighs s/p exploration of the prior spinal fusion, removal of hardware, removal of spinal stimulator, T11-L1 laminectomy, T4-pelvis spinal fusion with osteotomies on  with Dr. Titus. Hospital course notable for  intraoperative blood loss requiring 2 unit PRBC and persistent acute blood loss anemia,  post op hypotension requiring  pressors, rapid Afib w/ RVR with acute respiratory failure secondary to acute pulmonary edema and bilateral pleural effusions which improved and now off lasix per cardiology, Echo showed EF 65%, encephalopathy for which HCT ( and )  negative for acute pathology but showed chronic infarcts.  SLP followed for mild-moderate dyspahgia on puree with honey liquids diet. On  had 37 beats of Vtach, and persistent leukocytosis. Deemed stable for discharge to acute rehab on 8/15.      TODAY'S SUBJECTIVE & REVIEW OF SYMPTOMS:  Pt c/o pain improved at 4/10 low back which is non-radiating  Pt reports that he slept well last night  +BM yesterday.  Pt c/o dizziness and SOB Denies dyspnea,  cough or urinary complaints  Pt reports SOB on exertion as well as dizziness with standing.  Pt eating more. After OT this a.m pt c/o heart fluttering    [   ] Constitutional WNL  [   ] Cardio WNL  [   ] Resp WNL  [ x  ] GI WNL  [   ] Heme WNL  [   ] Endo WNL  [   ] Skin WNL  [   ] MSK WNL  [   ] Neuro WNL  [   ] Cognitive WNL  [ x  ] Psych WNL        PHYSICAL EXAM  Vital Signs Last 24 Hrs  T(C): 36.6 (21 Aug 2017 05:52), Max: 37.1 (20 Aug 2017 12:00)  T(F): 97.8 (21 Aug 2017 05:52), Max: 98.8 (20 Aug 2017 12:00)  HR: 94 (21 Aug 2017 05:52) (68 - 94)  BP: 140/91 (21 Aug 2017 05:52) (100/66 - 140/91)  BP(mean): --  RR: 18 (21 Aug 2017 05:52) (17 - 18)  SpO2: 98% (21 Aug 2017 05:52) (95% - 100%)    General:  HEENT: NCAT  Cardio: Irregular rhythm.  Regular rate  Resp: CTAB  Abdomen: Soft NTND  Neuro: Decreased sensation BLE in stocking distribution  Motor: 4-5/5  Extrem: No edema.  Calves soft and NTTP  Skin: Back incision CDI with no erythema noted  Wounds: No decubiti  Cognitive/Psych: Impaired cognition    FUNCTIONAL PROGRESS:  Gait: 5' mod A RW  ADLs: Bathing max A, UE dress min A, LE dress max A  Transfers: Min-mod A  Bed mobility: Mod A      RECENT LABS:                          10.5   7.7   )-----------( 284      ( 21 Aug 2017 08:28 )             32.0     08-    143  |  107  |  23.0<H>  ----------------------------<  90  3.9   |  23.0  |  0.62    Ca    8.6      21 Aug 2017 08:28    TPro  6.2<L>  /  Alb  3.0<L>  /  TBili  1.7  /  DBili  x   /  AST  35  /  ALT  63<H>  /  AlkPhos  236<H>          Urinalysis Basic - ( 16 Aug 2017 20:31 )    Color: Annia / Appearance: Clear / S.020 / pH: x  Gluc: x / Ketone: Negative  / Bili: Small / Urobili: 12 mg/dL   Blood: x / Protein: 15 mg/dL / Nitrite: Negative   Leuk Esterase: Trace / RBC: 3-5 /HPF / WBC 0-2   Sq Epi: x / Non Sq Epi: x / Bacteria: x    Urine culture <10,000cfu/mL GNR and GPC    RADIOLOGY/OTHER RESULTS:    Assessment:  Pt is an 81y/o male with spinal stenosis s/p T11-L1 laminectomy, T4-pelvis spinal fusion    Comprehensive rehab: PT/OT/ST  Spinal stenosis-Spinal precautions.  Pain-Tramadol prn  H/O acute respiratory distress- improved; pt oxygenating well on RA although persistent SOB on exertion; request repeat CXR and order duonebs q6hrs x 3 days.  Request hospitalist consult  Metabolic xkqsamqvpkmygm-Nfdeywmw-Vcbh ST.  Caution with excessive pain meds or anxiolytics, fall precautions  Dysphagia-Puree and honey-thickened liquid diet.  IVF for hydration.  Cont ST.  Added Estim.  MBS pending when more functional  Atrial fibrillation-HR controlled.  Cont Toprol XL daily with holding parameters and Pradaxa.  Rx EKG due to c/o "heart fluttering"  Constipation- bowel regimen.   HTN-SBPs was low at   Decreased Toprol XL 50 mg po daily to 25mg daily as per pre-op with improvement.  Hyperlipidemia - c/w statin.   Elevated TSH 5.28-Added Synthroid 50 daily  Elevated LFTs-Discontinued tylenol.  Consider change statin.  AST/ALT improved.  F/U labs  Acute blood loss anemia-f/u CBC  Vitamin D deficiency-Low normal Vit D at 31.  Add Vit D 50,000U weekly  F/U with PMD Dr. Lares and cardiololgy Dr. Ny as outpt.

## 2017-08-21 NOTE — CONSULT NOTE ADULT - PROBLEM SELECTOR RECOMMENDATION 3
likely due to dehydration and decrease PO intake. increase PO intake. IV fluids .  Low votlage QRS,. however, no echo evidence of pericardial effusiona dn ECG has been low voltage for a month now.
BB for rate control  Pradaxa

## 2017-08-21 NOTE — CONSULT NOTE ADULT - ATTENDING COMMENTS
Thank you for allowing me to participate in care of your patient.   Please call as needed.
Thank you for the consult . Will follow .

## 2017-08-21 NOTE — CONSULT NOTE ADULT - SUBJECTIVE AND OBJECTIVE BOX
HPI:   Pt is an 82M with PMHx significant for spinal stenosis s/p multiple spinal surgeries and spinal cord stimulator admitted to [facility] on 8/1 for scheduled management of worsening LE pain and neuropathic pain radiating both feet and pain to back, buttocks and thighs s/p exploration of the prior spinal fusion, removal of hardware, removal of spinal stimulator, T11-L1 laminectomy, T4-pelvis spinal fusion with osteotomies on 8/1 with Dr. Titus. Hospital course notable for  intraoperative blood loss requiring 2 unit PRBC and persistent acute blood loss anemia,  post op hypotension requiring  pressors, rapid Afib w/ RVR with acute respiratory failure secondary to acute pulmonary edema and bilateral pleural effusions which improved and now off lasix per cardiology, Echo showed EF 65%, encephalopathy for which HCT (8/6 and 8/14)  negative for acute pathology but showed chronic infarcts.  SLP followed for mild-moderate dyspahgia on puree with honey liquids diet. On 8/14 had 37 beats of Vtach, and persistent leukocytosis. Deemed stable for discharge to acute rehab on 8/15.      PAST MEDICAL & SURGICAL HISTORY:  Spinal stenosis of lumbosacral region  Rotator cuff disorder, right  Arthritis  Inguinal hernia: right  Reflux  CAD (coronary artery disease)  HTN (hypertension)  HLD (hyperlipidemia)  Atrial fibrillation  Peripheral neuropathy  H/O rotator cuff surgery: 12/2015  History of skin surgery: melanoma excision - left cheek/face 2016  Encounter for interrogation of neurostimulator: 12/2015  Back injury: s/p surgery L1-L9 in two separate surgeries 2003 &amp; 2006  S/P angioplasty with stent: RCA 2008 &amp; LAD 2014 cardiac stent placement  S/P knee replacement, bilateral  S/P cataract extraction      MEDICATIONS  (STANDING):  aspirin  chewable 81 milliGRAM(s) Oral daily  tamsulosin 0.4 milliGRAM(s) Oral at bedtime  dabigatran 150 milliGRAM(s) Oral every 12 hours  docusate sodium 100 milliGRAM(s) Oral three times a day  senna 2 Tablet(s) Oral at bedtime  chlorhexidine 0.12% Liquid 15 milliLiter(s) Swish and Spit three times a day  pantoprazole   Suspension 40 milliGRAM(s) Oral before breakfast  ascorbic acid 500 milliGRAM(s) Oral two times a day  zinc sulfate 220 milliGRAM(s) Oral daily  multivitamin 1 Tablet(s) Oral daily  BACItracin   Ointment 1 Application(s) Topical two times a day  lidocaine 2% Gel 1 Application(s) Topical three times a day  levothyroxine 50 MICROGram(s) Oral daily  metoprolol succinate ER 25 milliGRAM(s) Oral daily  mirtazapine 15 milliGRAM(s) Oral at bedtime  ergocalciferol 78212 Unit(s) Oral every week  ALBUTerol/ipratropium for Nebulization 3 milliLiter(s) Nebulizer every 6 hours  folic acid 1 milliGRAM(s) Oral daily    MEDICATIONS  (PRN):  traMADol 50 milliGRAM(s) Oral every 4 hours PRN Moderate Pain (4 - 6)  melatonin. 6 milliGRAM(s) Oral at bedtime PRN Insomnia  polyethylene glycol 3350 17 Gram(s) Oral daily PRN Constipation      Allergies    No Known Allergies    Intolerances        SOCIAL HISTORY:  Never a smoker  Denies ETOH/IVDA      FAMILY HISTORY:  Family history of heart failure (Sibling)      Vital Signs Last 24 Hrs  T(C): 36.6 (21 Aug 2017 05:52), Max: 36.7 (20 Aug 2017 20:46)  T(F): 97.8 (21 Aug 2017 05:52), Max: 98 (20 Aug 2017 20:46)  HR: 94 (21 Aug 2017 05:52) (68 - 94)  BP: 140/91 (21 Aug 2017 05:52) (112/80 - 140/91)  BP(mean): --  RR: 18 (21 Aug 2017 05:52) (17 - 18)  SpO2: 98% (21 Aug 2017 05:52) (95% - 98%)    LABS:                        10.5   7.7   )-----------( 284      ( 21 Aug 2017 08:28 )             32.0     08-21    143  |  107  |  23.0<H>  ----------------------------<  90  3.9   |  23.0  |  0.62    Ca    8.6      21 Aug 2017 08:28    TPro  6.2<L>  /  Alb  3.0<L>  /  TBili  1.7  /  DBili  x   /  AST  35  /  ALT  63<H>  /  AlkPhos  236<H>  08-21            RADIOLOGY & ADDITIONAL STUDIES: HPI:   Pt is an 82M with PMHx significant for spinal stenosis s/p multiple spinal surgeries and spinal cord stimulator admitted to [facility] on 8/1 for scheduled management of worsening LE pain and neuropathic pain radiating both feet and pain to back, buttocks and thighs s/p exploration of the prior spinal fusion, removal of hardware, removal of spinal stimulator, T11-L1 laminectomy, T4-pelvis spinal fusion with osteotomies on 8/1 with Dr. Titus. Hospital course notable for  intraoperative blood loss requiring 2 unit PRBC and persistent acute blood loss anemia,  post op hypotension requiring  pressors, rapid Afib w/ RVR with acute respiratory failure secondary to acute pulmonary edema and bilateral pleural effusions which improved and now off lasix per cardiology, Echo showed EF 65%, encephalopathy for which HCT (8/6 and 8/14)  negative for acute pathology but showed chronic infarcts.  SLP followed for mild-moderate dyspahgia on puree with honey liquids diet. On 8/14 had 37 beats of Vtach, and persistent leukocytosis. Deemed stable for discharge to acute rehab on 8/15.      PAST MEDICAL & SURGICAL HISTORY:  Spinal stenosis of lumbosacral region  Rotator cuff disorder, right  Arthritis  Inguinal hernia: right  Reflux  CAD (coronary artery disease)  HTN (hypertension)  HLD (hyperlipidemia)  Atrial fibrillation  Peripheral neuropathy  H/O rotator cuff surgery: 12/2015  History of skin surgery: melanoma excision - left cheek/face 2016  Encounter for interrogation of neurostimulator: 12/2015  Back injury: s/p surgery L1-L9 in two separate surgeries 2003 &amp; 2006  S/P angioplasty with stent: RCA 2008 &amp; LAD 2014 cardiac stent placement  S/P knee replacement, bilateral  S/P cataract extraction      MEDICATIONS  (STANDING):  aspirin  chewable 81 milliGRAM(s) Oral daily  tamsulosin 0.4 milliGRAM(s) Oral at bedtime  dabigatran 150 milliGRAM(s) Oral every 12 hours  docusate sodium 100 milliGRAM(s) Oral three times a day  senna 2 Tablet(s) Oral at bedtime  chlorhexidine 0.12% Liquid 15 milliLiter(s) Swish and Spit three times a day  pantoprazole   Suspension 40 milliGRAM(s) Oral before breakfast  ascorbic acid 500 milliGRAM(s) Oral two times a day  zinc sulfate 220 milliGRAM(s) Oral daily  multivitamin 1 Tablet(s) Oral daily  BACItracin   Ointment 1 Application(s) Topical two times a day  lidocaine 2% Gel 1 Application(s) Topical three times a day  levothyroxine 50 MICROGram(s) Oral daily  metoprolol succinate ER 25 milliGRAM(s) Oral daily  mirtazapine 15 milliGRAM(s) Oral at bedtime  ergocalciferol 34079 Unit(s) Oral every week  ALBUTerol/ipratropium for Nebulization 3 milliLiter(s) Nebulizer every 6 hours  folic acid 1 milliGRAM(s) Oral daily    MEDICATIONS  (PRN):  traMADol 50 milliGRAM(s) Oral every 4 hours PRN Moderate Pain (4 - 6)  melatonin. 6 milliGRAM(s) Oral at bedtime PRN Insomnia  polyethylene glycol 3350 17 Gram(s) Oral daily PRN Constipation      Allergies    No Known Allergies    Intolerances        SOCIAL HISTORY:  Never a smoker  Denies ETOH/IVDA      FAMILY HISTORY:  Family history of heart failure (Sibling)      Vital Signs Last 24 Hrs  T(C): 36.6 (21 Aug 2017 05:52), Max: 36.7 (20 Aug 2017 20:46)  T(F): 97.8 (21 Aug 2017 05:52), Max: 98 (20 Aug 2017 20:46)  HR: 94 (21 Aug 2017 05:52) (68 - 94)  BP: 140/91 (21 Aug 2017 05:52) (112/80 - 140/91)  BP(mean): --  RR: 18 (21 Aug 2017 05:52) (17 - 18)  SpO2: 98% (21 Aug 2017 05:52) (95% - 98%)    LABS:                        10.5   7.7   )-----------( 284      ( 21 Aug 2017 08:28 )             32.0     08-21    143  |  107  |  23.0<H>  ----------------------------<  90  3.9   |  23.0  |  0.62    Ca    8.6      21 Aug 2017 08:28    TPro  6.2<L>  /  Alb  3.0<L>  /  TBili  1.7  /  DBili  x   /  AST  35  /  ALT  63<H>  /  AlkPhos  236<H>  08-21        EKG: AFIBBQS III, AVF, poor R wave progression V1-V3    RADIOLOGY & ADDITIONAL STUDIES: HPI:   Pt is an 82M with PMHx significant for spinal stenosis s/p multiple spinal surgeries and spinal cord stimulator admitted to [facility] on 8/1 for scheduled management of worsening LE pain and neuropathic pain radiating both feet and pain to back, buttocks and thighs s/p exploration of the prior spinal fusion, removal of hardware, removal of spinal stimulator, T11-L1 laminectomy, T4-pelvis spinal fusion with osteotomies on 8/1 with Dr. Titus. Hospital course notable for  intraoperative blood loss requiring 2 unit PRBC and persistent acute blood loss anemia,  post op hypotension requiring  pressors, rapid Afib w/ RVR with acute respiratory failure secondary to acute pulmonary edema and bilateral pleural effusions which improved and now off lasix per cardiology, Echo showed EF 65%, encephalopathy for which HCT (8/6 and 8/14)  negative for acute pathology but showed chronic infarcts.  SLP followed for mild-moderate dyspahgia on puree with honey liquids diet. On 8/14, as per RN note,  had 37 beats of  Vtach,  Ortho team was notified, [facility] was not informed and had signed off on 8/12.  Patient was discharged to acute rehab on 8/15.      PAST MEDICAL & SURGICAL HISTORY:  Spinal stenosis of lumbosacral region  Rotator cuff disorder, right  Arthritis  Inguinal hernia: right  Reflux  CAD (coronary artery disease)  HTN (hypertension)  HLD (hyperlipidemia)  Atrial fibrillation  Peripheral neuropathy  H/O rotator cuff surgery: 12/2015  History of skin surgery: melanoma excision - left cheek/face 2016  Encounter for interrogation of neurostimulator: 12/2015  Back injury: s/p surgery L1-L9 in two separate surgeries 2003 &amp; 2006  S/P angioplasty with stent: RCA 2008 &amp; LAD 2014 cardiac stent placement  S/P knee replacement, bilateral  S/P cataract extraction      MEDICATIONS  (STANDING):  aspirin  chewable 81 milliGRAM(s) Oral daily  tamsulosin 0.4 milliGRAM(s) Oral at bedtime  dabigatran 150 milliGRAM(s) Oral every 12 hours  docusate sodium 100 milliGRAM(s) Oral three times a day  senna 2 Tablet(s) Oral at bedtime  chlorhexidine 0.12% Liquid 15 milliLiter(s) Swish and Spit three times a day  pantoprazole   Suspension 40 milliGRAM(s) Oral before breakfast  ascorbic acid 500 milliGRAM(s) Oral two times a day  zinc sulfate 220 milliGRAM(s) Oral daily  multivitamin 1 Tablet(s) Oral daily  BACItracin   Ointment 1 Application(s) Topical two times a day  lidocaine 2% Gel 1 Application(s) Topical three times a day  levothyroxine 50 MICROGram(s) Oral daily  metoprolol succinate ER 25 milliGRAM(s) Oral daily  mirtazapine 15 milliGRAM(s) Oral at bedtime  ergocalciferol 56071 Unit(s) Oral every week  ALBUTerol/ipratropium for Nebulization 3 milliLiter(s) Nebulizer every 6 hours  folic acid 1 milliGRAM(s) Oral daily    MEDICATIONS  (PRN):  traMADol 50 milliGRAM(s) Oral every 4 hours PRN Moderate Pain (4 - 6)  melatonin. 6 milliGRAM(s) Oral at bedtime PRN Insomnia  polyethylene glycol 3350 17 Gram(s) Oral daily PRN Constipation      Allergies    No Known Allergies    Intolerances        SOCIAL HISTORY:  Never a smoker  Denies ETOH/IVDA      FAMILY HISTORY:  Family history of heart failure (Sibling)      Vital Signs Last 24 Hrs  T(C): 36.6 (21 Aug 2017 05:52), Max: 36.7 (20 Aug 2017 20:46)  T(F): 97.8 (21 Aug 2017 05:52), Max: 98 (20 Aug 2017 20:46)  HR: 94 (21 Aug 2017 05:52) (68 - 94)  BP: 140/91 (21 Aug 2017 05:52) (112/80 - 140/91)  BP(mean): --  RR: 18 (21 Aug 2017 05:52) (17 - 18)  SpO2: 98% (21 Aug 2017 05:52) (95% - 98%)    LABS:                        10.5   7.7   )-----------( 284      ( 21 Aug 2017 08:28 )             32.0     08-21    143  |  107  |  23.0<H>  ----------------------------<  90  3.9   |  23.0  |  0.62    Ca    8.6      21 Aug 2017 08:28    TPro  6.2<L>  /  Alb  3.0<L>  /  TBili  1.7  /  DBili  x   /  AST  35  /  ALT  63<H>  /  AlkPhos  236<H>  08-21        EKG: AFIBB QS III, AVF, poor R wave progression V1-V3    RADIOLOGY & ADDITIONAL STUDIES:    Patient name: Daniel Luna  YOB: 1935   Age: 82 (M)   MR#: [id number]  Study Date: 8/12/2017  Location: [id number]: MACHO Kenny  Study quality: Technically good  Referring Physician: Daniel Titus MD  Blood Pressure:111/76 mmHg  Height: 165 cm  Weight: 68 kg  BSA: 1.8 m2  ------------------------------------------------------------------------  PROCEDURE: Transthoracic echocardiogram with 2-D, M-Mode  and complete spectral and color flow Doppler.  INDICATION: Abnormal electrocardiogram (ECG) (EKG) (R94.31)  ------------------------------------------------------------------------  DIMENSIONS:  Dimensions:     Normal Values:  LA:     4.7 cm    2.0 - 4.0 cm  Ao:     3.3 cm    2.0 - 3.8 cm  SEPTUM: 1.0 cm    0.6- 1.2 cm  PWT:    1.0 cm    0.6 - 1.1 cm  LVIDd:  3.7 cm    3.0 - 5.6 cm  LVIDs:  2.2 cm    1.8 - 4.0 cm  Derived Variables:  LVMI: 64 g/m2  RWT: 0.54  Fractional short: 41 %  Ejection Fraction (Visual Estimate): 65 %  ------------------------------------------------------------------------  OBSERVATIONS:  Mitral Valve: Mitral annular calcification, otherwise  normal mitral valve. Mild-moderate mitral regurgitation.  Aortic Root: Normal size aortic root. (Ao:3.3 cm).  Aortic Valve: Aortic valve not well visualized; appears  calcified. No aortic valve regurgitation seen.  Left Atrium: Normal left atrium.  LA volume index = 29  cc/m2.  Left Ventricle: Endocardium not well visualized; grossly  normal left ventricular systolic function. Endocardial  visualization enhanced with intravenous injection of echo  contrast (Definity). Normal left ventricular internal  dimensions and wall thicknesses.  Right Heart: Normal right atrium. The right ventricle is  not well visualized; grossly normal rightventricular  systolic function. Normal tricuspid valve. Mild tricuspid  regurgitation. Normal pulmonic valve.  Pericardium/PleuraNormal pericardium with small anterior  pericardial effusion.  Hemodynamic: Estimated right ventricular systolic pressure  equals 31 mm Hg, assuming right atrial pressure equals 10  mm Hg, consistent with normal pulmonary pressures.  ------------------------------------------------------------------------  CONCLUSIONS:  1. Normal left ventricular internal dimensions andwall  thicknesses.  2. Endocardium not well visualized; grossly normal left  ventricular systolic function. Endocardial visualization  enhanced with intravenous injection of echo contrast  (Definity).  3. The right ventricle is not well visualized; grossly  normal right ventricular systolic function.  ------------------------------------------------------------------------  Confirmed on  8/12/2017 - 15:29:16 by Dr. Linnea Hoffman M.D. Patient seen and examined . NAD . c/o feeling fatigue , palpitations , found to have orthostatic hypotension .    HPI:   Pt is an 82M with PMHx significant for spinal stenosis s/p multiple spinal surgeries and spinal cord stimulator admitted to [facility] on 8/1 for scheduled management of worsening LE pain and neuropathic pain radiating both feet and pain to back, buttocks and thighs s/p exploration of the prior spinal fusion, removal of hardware, removal of spinal stimulator, T11-L1 laminectomy, T4-pelvis spinal fusion with osteotomies on 8/1 with Dr. Titus. Hospital course notable for  intraoperative blood loss requiring 2 unit PRBC and persistent acute blood loss anemia,  post op hypotension requiring  pressors, rapid Afib w/ RVR with acute respiratory failure secondary to acute pulmonary edema and bilateral pleural effusions which improved and now off lasix per cardiology, Echo showed EF 65%, encephalopathy for which HCT (8/6 and 8/14)  negative for acute pathology but showed chronic infarcts.  SLP followed for mild-moderate dyspahgia on puree with honey liquids diet. On 8/14, as per RN note,  had 37 beats of  Vtach,  Ortho team was notified, [facility] was not informed and had signed off on 8/12.  Patient was discharged to acute rehab on 8/15.      PAST MEDICAL & SURGICAL HISTORY:  Spinal stenosis of lumbosacral region  Rotator cuff disorder, right  Arthritis  Inguinal hernia: right  Reflux  CAD (coronary artery disease)  HTN (hypertension)  HLD (hyperlipidemia)  Atrial fibrillation  Peripheral neuropathy  H/O rotator cuff surgery: 12/2015  History of skin surgery: melanoma excision - left cheek/face 2016  Encounter for interrogation of neurostimulator: 12/2015  Back injury: s/p surgery L1-L9 in two separate surgeries 2003 &amp; 2006  S/P angioplasty with stent: RCA 2008 &amp; LAD 2014 cardiac stent placement  S/P knee replacement, bilateral  S/P cataract extraction      MEDICATIONS  (STANDING):  aspirin  chewable 81 milliGRAM(s) Oral daily  tamsulosin 0.4 milliGRAM(s) Oral at bedtime  dabigatran 150 milliGRAM(s) Oral every 12 hours  docusate sodium 100 milliGRAM(s) Oral three times a day  senna 2 Tablet(s) Oral at bedtime  chlorhexidine 0.12% Liquid 15 milliLiter(s) Swish and Spit three times a day  pantoprazole   Suspension 40 milliGRAM(s) Oral before breakfast  ascorbic acid 500 milliGRAM(s) Oral two times a day  zinc sulfate 220 milliGRAM(s) Oral daily  multivitamin 1 Tablet(s) Oral daily  BACItracin   Ointment 1 Application(s) Topical two times a day  lidocaine 2% Gel 1 Application(s) Topical three times a day  levothyroxine 50 MICROGram(s) Oral daily  metoprolol succinate ER 25 milliGRAM(s) Oral daily  mirtazapine 15 milliGRAM(s) Oral at bedtime  ergocalciferol 28477 Unit(s) Oral every week  ALBUTerol/ipratropium for Nebulization 3 milliLiter(s) Nebulizer every 6 hours  folic acid 1 milliGRAM(s) Oral daily    MEDICATIONS  (PRN):  traMADol 50 milliGRAM(s) Oral every 4 hours PRN Moderate Pain (4 - 6)  melatonin. 6 milliGRAM(s) Oral at bedtime PRN Insomnia  polyethylene glycol 3350 17 Gram(s) Oral daily PRN Constipation      Allergies    No Known Allergies    Intolerances        SOCIAL HISTORY:  Never a smoker  Denies ETOH/IVDA      FAMILY HISTORY:  Family history of heart failure (Sibling)      Vital Signs Last 24 Hrs  T(C): 36.6 (21 Aug 2017 05:52), Max: 36.7 (20 Aug 2017 20:46)  T(F): 97.8 (21 Aug 2017 05:52), Max: 98 (20 Aug 2017 20:46)  HR: 94 (21 Aug 2017 05:52) (68 - 94)  BP: 140/91 (21 Aug 2017 05:52) (112/80 - 140/91)  BP(mean): --  RR: 18 (21 Aug 2017 05:52) (17 - 18)  SpO2: 98% (21 Aug 2017 05:52) (95% - 98%)    LABS:                        10.5   7.7   )-----------( 284      ( 21 Aug 2017 08:28 )             32.0     08-21    143  |  107  |  23.0<H>  ----------------------------<  90  3.9   |  23.0  |  0.62    Ca    8.6      21 Aug 2017 08:28    TPro  6.2<L>  /  Alb  3.0<L>  /  TBili  1.7  /  DBili  x   /  AST  35  /  ALT  63<H>  /  AlkPhos  236<H>  08-21        EKG: AFIBB QS III, AVF, poor R wave progression V1-V3    RADIOLOGY & ADDITIONAL STUDIES:    Patient name: Daniel Luna  YOB: 1935   Age: 82 (M)   MR#: [id number]  Study Date: 8/12/2017  Location: [id number]: MACHO Kenny  Study quality: Technically good  Referring Physician: Daniel Titus MD  Blood Pressure:111/76 mmHg  Height: 165 cm  Weight: 68 kg  BSA: 1.8 m2  ------------------------------------------------------------------------  PROCEDURE: Transthoracic echocardiogram with 2-D, M-Mode  and complete spectral and color flow Doppler.  INDICATION: Abnormal electrocardiogram (ECG) (EKG) (R94.31)  ------------------------------------------------------------------------  DIMENSIONS:  Dimensions:     Normal Values:  LA:     4.7 cm    2.0 - 4.0 cm  Ao:     3.3 cm    2.0 - 3.8 cm  SEPTUM: 1.0 cm    0.6- 1.2 cm  PWT:    1.0 cm    0.6 - 1.1 cm  LVIDd:  3.7 cm    3.0 - 5.6 cm  LVIDs:  2.2 cm    1.8 - 4.0 cm  Derived Variables:  LVMI: 64 g/m2  RWT: 0.54  Fractional short: 41 %  Ejection Fraction (Visual Estimate): 65 %  ------------------------------------------------------------------------  OBSERVATIONS:  Mitral Valve: Mitral annular calcification, otherwise  normal mitral valve. Mild-moderate mitral regurgitation.  Aortic Root: Normal size aortic root. (Ao:3.3 cm).  Aortic Valve: Aortic valve not well visualized; appears  calcified. No aortic valve regurgitation seen.  Left Atrium: Normal left atrium.  LA volume index = 29  cc/m2.  Left Ventricle: Endocardium not well visualized; grossly  normal left ventricular systolic function. Endocardial  visualization enhanced with intravenous injection of echo  contrast (Definity). Normal left ventricular internal  dimensions and wall thicknesses.  Right Heart: Normal right atrium. The right ventricle is  not well visualized; grossly normal rightventricular  systolic function. Normal tricuspid valve. Mild tricuspid  regurgitation. Normal pulmonic valve.  Pericardium/PleuraNormal pericardium with small anterior  pericardial effusion.  Hemodynamic: Estimated right ventricular systolic pressure  equals 31 mm Hg, assuming right atrial pressure equals 10  mm Hg, consistent with normal pulmonary pressures.  ------------------------------------------------------------------------  CONCLUSIONS:  1. Normal left ventricular internal dimensions andwall  thicknesses.  2. Endocardium not well visualized; grossly normal left  ventricular systolic function. Endocardial visualization  enhanced with intravenous injection of echo contrast  (Definity).  3. The right ventricle is not well visualized; grossly  normal right ventricular systolic function.  ------------------------------------------------------------------------  Confirmed on  8/12/2017 - 15:29:16 by Dr. Linnea Hofmfan M.D.

## 2017-08-21 NOTE — CONSULT NOTE ADULT - SUBJECTIVE AND OBJECTIVE BOX
[facility]                                                        Office: [address] [address]                                                       Telephone: [phone]. Fax:[phone]                                                              CARDIOLOGY CONSULTATION NOTE                                                                                             Consult requested by:  Dr. Kelvin Webb    Reason for Consultation: non-sustained ventricular tachycardia and hypotension    History obtained by: Patient and medical record     obtained: No    Chief complaint:    Patient is a 82y old  Male who presents with a chief complaint of hypotension and nsvt     HPI:      Pt is an 82M with PMHx significant for spinal stenosis s/p multiple spinal surgeries and spinal cord stimulator admitted to [facility] on 8/1 for scheduled management of worsening LE pain and neuropathic pain radiating both feet and pain to back, buttocks and thighs s/p exploration of the prior spinal fusion, removal of hardware, removal of spinal stimulator, T11-L1 laminectomy, T4-pelvis spinal fusion with osteotomies on 8/1 with Dr. Titus. Hospital course notable for  intraoperative blood loss requiring 2 unit PRBC and persistent acute blood loss anemia,  post op hypotension requiring  pressors, rapid Afib w/ RVR with acute respiratory failure secondary to acute pulmonary edema and bilateral pleural effusions which improved and now off lasix per cardiology, Echo showed EF 65%, encephalopathy for which HCT (8/6 and 8/14)  negative for acute pathology but showed chronic infarcts.  SLP followed for mild-moderate dyspahgia on puree with honey liquids diet. On 8/14, as per RN note,  had 37 beats of  Vtach,  Ortho team was notified, House Cardiology was not informed and had signed off on 8/12.  Patient was discharged to acute rehab on 8/15.    This is a 82 year old male with prior history of stents ,last stent placement 2014 in ostial LAD, cervical stenosis s/p  spinal surgery now for rehab.   aptient states he has not been feeling good. Complains of weakness and lethargy. has not been eating much. Dysphagia.    patient has episodes of hypotension today after standing up. + orthostasis  Denies any chest pain. patient received 500 ml of fluid bolus. patient felt better after that. patient is suppose to get some fluid overnight 75 ml /hr .   Denies any palpitations.     REVIEW OF SYMPTOMS: Cardiovascular:  See HPI. No chest pain,  No dyspnea,  + dizziness and near syncope,  No palpitations, +  dizziness, No Orthopnea,      No Paroxsymal nocturnal dyspnea;  Respiratory:  No Dyspnea, No cough,     Genitourinary:  No dysuria, no hematuria; Gastrointestinal:  No nausea, no vomiting. No diarrhea.  No abdominal pain. No dark color stool, no melena ; Neurological: No headache, +  dizziness, no slurred speech;  Psychiatric: No agitation, no anxiety.  ALL OTHER REVIEW OF SYSTEMS ARE NEGATIVE.    ALLERGIES: No Known Allergies    Intolerances          CURRENT MEDICATIONS:  tamsulosin 0.4 milliGRAM(s) Oral at bedtime  metoprolol succinate ER 25 milliGRAM(s) Oral daily     mirtazapine  docusate sodium  senna  pantoprazole   Suspension  aspirin  chewable  dabigatran  chlorhexidine 0.12% Liquid  ascorbic acid  zinc sulfate  multivitamin  BACItracin   Ointment  lidocaine 2% Gel  levothyroxine  ergocalciferol  folic acid  sodium chloride 0.9% with potassium chloride 40 mEq/L      HOME MEDICATIONS:  not available     PAST MEDICAL HISTORY  Spinal stenosis of lumbosacral region  Rotator cuff disorder, right  Arthritis  Inguinal hernia  Reflux  CAD (coronary artery disease)  HTN (hypertension)  HLD (hyperlipidemia)  Atrial fibrillation  Peripheral neuropathy      PAST SURGICAL HISTORY  H/O rotator cuff surgery  History of skin surgery  Encounter for interrogation of neurostimulator  Back injury  S/P angioplasty with stent  S/P knee replacement, bilateral  S/P cataract extraction      FAMILY HISTORY:  Family history of heart failure (Sibling)      SOCIAL HISTORY:  Denies smoking/alcohol/drugs      Vital Signs Last 24 Hrs  T(C): 36.8 (21 Aug 2017 14:30), Max: 36.8 (21 Aug 2017 14:30)  T(F): 98.3 (21 Aug 2017 14:30), Max: 98.3 (21 Aug 2017 14:30)  HR: 81 (21 Aug 2017 14:30) (68 - 94)  BP: 114/74 (21 Aug 2017 14:30) (112/80 - 140/91)  BP(mean): --  RR: 18 (21 Aug 2017 14:30) (17 - 18)  SpO2: 96% (21 Aug 2017 14:30) (95% - 98%)      PHYSICAL EXAM:  Constitutional: Comfortable . No acute distress.   HEENT: Atraumatic and normcephalic , neck is supple . no JVD. No carotid bruit. PEERL   CNS: A&Ox3. No focal deficits. EOMI. Cranial nerves II-IX are intact.   Lymph Nodes: Cervical : Not palpable.  Respiratory: CTAB  Cardiovascular: S1S2 =irregular distant heart sounds.  No murmur/rubs or gallop.  Gastrointestinal: Soft non-tender and non distended . +Bowel sounds. negative Lutz's sign.  Extremities: No edema.   Psychiatric: Calm . no agitation.  Skin: No skin rash/ulcers visualized to face, hands or feet.    Intake and output:   08-20 @ 07:01  -  08-21 @ 07:00  --------------------------------------------------------  IN: 0 mL / OUT: 401 mL / NET: -401 mL        LABS:                        10.5   7.7   )-----------( 284      ( 21 Aug 2017 08:28 )             32.0     08-21    143  |  107  |  23.0<H>  ----------------------------<  90  3.9   |  23.0  |  0.62    Ca    8.6      21 Aug 2017 08:28  Mg     2.0     08-21    TPro  6.2<L>  /  Alb  3.0<L>  /  TBili  1.7  /  DBili  x   /  AST  35  /  ALT  63<H>  /  AlkPhos  236<H>  08-21      ;p-BNP=        INTERPRETATION OF TELEMETRY: Reviewed by me.   ECG: Reviewed by me. < from: 12 Lead ECG (08.15.17 @ 19:22) >   Atrial fibrillation  Low voltage QRS  Nonspecific ST and T wave abnormality  Abnormal ECG      RADIOLOGY & ADDITIONAL STUDIES:    X-ray:  reviewed by me. < from: Xray Chest 1 View AP/PA. (08.15.17 @ 18:53) >  Trace left pleural effusion. Marked improvement in aeration        ECHO FINDINGS: Date: 89/2017: No effusion. normal LV and RV function.     STRESS  FINDINGS: Date: 2014: normal     CATHETERIZATION FINDINGS:  Date:  2014 VENTRICLES: Global left ventricular function was normal. EF estimatedwas  60 %.  CORONARY VESSELS: The coronary circulation is right dominant.  LM:   --  LM: Normal.  LAD:   --  Proximal LAD: There was a 90 % stenosis. --  Mid LAD: There was a 30 % stenosis.  CX:   --  Circumflex: Normal.  RCA:   --  Proximal RCA: There was a 40 % stenosis.--  Distal RCA: There was a 40 % stenosis.  COMPLICATIONS: There were no complications.  INTERVENTIONAL RECOMMENDATIONS: ASA and Plavix for 1 year

## 2017-08-21 NOTE — CONSULT NOTE ADULT - PROBLEM SELECTOR RECOMMENDATION 5
Deep venous thrombosis prophylaxis: heparin or lovenox SQ.
s/p exploration of the prior spinal fusion, removal of hardware, removal of spinal stimulator, T11-L1 laminectomy, T4-pelvis spinal fusion with osteotomies on 8/1 with Dr. Titus with complicated post operative coarse  Rehab program

## 2017-08-21 NOTE — CONSULT NOTE ADULT - PROBLEM SELECTOR PROBLEM 2
Coronary artery disease involving native coronary artery of native heart without angina pectoris
Nonsustained ventricular tachycardia

## 2017-08-21 NOTE — CONSULT NOTE ADULT - ASSESSMENT
82 year old male with history of CAD with LAD stent (2014), recent spinal surgery with episode of 34 beats of NSVT during hospitalisation and episode of orthostasis that improved with IV fluids.
Pt is an 82M with PMHx significant for spinal stenosis s/p multiple spinal surgeries and spinal cord stimulator admitted to [facility] on 8/1 for scheduled management of worsening LE pain and neuropathic pain radiating both feet and pain to back, buttocks and thighs s/p exploration of the prior spinal fusion, removal of hardware, removal of spinal stimulator, T11-L1 laminectomy, T4-pelvis spinal fusion with osteotomies on 8/1 with Dr. Titus. Hospital course notable for  intraoperative blood loss requiring 2 unit PRBC and persistent acute blood loss anemia,  post op hypotension requiring  pressors, rapid Afib w/ RVR with acute respiratory failure secondary to acute pulmonary edema and bilateral pleural effusions which improved and now off lasix per cardiology, Echo showed EF 65%, encephalopathy for which HCT (8/6 and 8/14)  negative for acute pathology but showed chronic infarcts.  SLP followed for mild-moderate dyspahgia on puree with honey liquids diet. On 8/14, as per RN note,  had 37 beats of  Vtach,  Ortho team was notified, [facility]iology was not informed and had signed off on 8/12.  Patient was discharged to acute rehab on 8/15.

## 2017-08-21 NOTE — CONSULT NOTE ADULT - PROBLEM SELECTOR RECOMMENDATION 9
ostial LAD s/p stent in the past. currently no chest pain or dyspnea.   however, high likelihood of worsening disease.  nothing per orally after midnight. nuclear stress test.  ct metoprolol hold for SBP <90; Mg > 2 and Potassium > 4
Orthostatic: SBP from supine 102 to 80  will give  cc bolus  IV fluids from 3pm thru overnight  hold PT until after bolus  shweta stockings, abdominal binder

## 2017-08-22 LAB
TROPONIN T SERPL-MCNC: 0.01 NG/ML — SIGNIFICANT CHANGE UP (ref 0–0.06)
TROPONIN T SERPL-MCNC: 0.01 NG/ML — SIGNIFICANT CHANGE UP (ref 0–0.06)

## 2017-08-22 PROCEDURE — 99233 SBSQ HOSP IP/OBS HIGH 50: CPT

## 2017-08-22 PROCEDURE — 99232 SBSQ HOSP IP/OBS MODERATE 35: CPT

## 2017-08-22 RX ORDER — DEXTROSE MONOHYDRATE, SODIUM CHLORIDE, AND POTASSIUM CHLORIDE 50; .745; 4.5 G/1000ML; G/1000ML; G/1000ML
1000 INJECTION, SOLUTION INTRAVENOUS
Qty: 0 | Refills: 0 | Status: DISCONTINUED | OUTPATIENT
Start: 2017-08-22 | End: 2017-08-23

## 2017-08-22 RX ADMIN — Medication 100 MILLIGRAM(S): at 21:51

## 2017-08-22 RX ADMIN — DABIGATRAN ETEXILATE MESYLATE 150 MILLIGRAM(S): 150 CAPSULE ORAL at 17:15

## 2017-08-22 RX ADMIN — CHLORHEXIDINE GLUCONATE 15 MILLILITER(S): 213 SOLUTION TOPICAL at 05:58

## 2017-08-22 RX ADMIN — TRAMADOL HYDROCHLORIDE 50 MILLIGRAM(S): 50 TABLET ORAL at 09:44

## 2017-08-22 RX ADMIN — MIRTAZAPINE 15 MILLIGRAM(S): 45 TABLET, ORALLY DISINTEGRATING ORAL at 21:51

## 2017-08-22 RX ADMIN — DABIGATRAN ETEXILATE MESYLATE 150 MILLIGRAM(S): 150 CAPSULE ORAL at 05:59

## 2017-08-22 RX ADMIN — Medication 100 MILLIGRAM(S): at 13:01

## 2017-08-22 RX ADMIN — TRAMADOL HYDROCHLORIDE 50 MILLIGRAM(S): 50 TABLET ORAL at 10:00

## 2017-08-22 RX ADMIN — Medication 500 MILLIGRAM(S): at 05:59

## 2017-08-22 RX ADMIN — Medication 500 MILLIGRAM(S): at 17:15

## 2017-08-22 RX ADMIN — Medication 81 MILLIGRAM(S): at 11:27

## 2017-08-22 RX ADMIN — Medication 50 MICROGRAM(S): at 05:58

## 2017-08-22 RX ADMIN — SENNA PLUS 2 TABLET(S): 8.6 TABLET ORAL at 21:51

## 2017-08-22 RX ADMIN — Medication 25 MILLIGRAM(S): at 05:58

## 2017-08-22 RX ADMIN — Medication 100 MILLIGRAM(S): at 06:00

## 2017-08-22 RX ADMIN — Medication 1 APPLICATION(S): at 05:59

## 2017-08-22 RX ADMIN — TAMSULOSIN HYDROCHLORIDE 0.4 MILLIGRAM(S): 0.4 CAPSULE ORAL at 21:51

## 2017-08-22 RX ADMIN — DEXTROSE MONOHYDRATE, SODIUM CHLORIDE, AND POTASSIUM CHLORIDE 75 MILLILITER(S): 50; .745; 4.5 INJECTION, SOLUTION INTRAVENOUS at 18:30

## 2017-08-22 RX ADMIN — Medication 1 MILLIGRAM(S): at 11:27

## 2017-08-22 RX ADMIN — PANTOPRAZOLE SODIUM 40 MILLIGRAM(S): 20 TABLET, DELAYED RELEASE ORAL at 06:00

## 2017-08-22 RX ADMIN — Medication 1 TABLET(S): at 11:27

## 2017-08-22 RX ADMIN — CHLORHEXIDINE GLUCONATE 15 MILLILITER(S): 213 SOLUTION TOPICAL at 21:51

## 2017-08-22 RX ADMIN — TRAMADOL HYDROCHLORIDE 50 MILLIGRAM(S): 50 TABLET ORAL at 17:15

## 2017-08-22 RX ADMIN — LIDOCAINE 1 APPLICATION(S): 4 CREAM TOPICAL at 21:51

## 2017-08-22 RX ADMIN — ZINC SULFATE TAB 220 MG (50 MG ZINC EQUIVALENT) 220 MILLIGRAM(S): 220 (50 ZN) TAB at 11:27

## 2017-08-22 RX ADMIN — CHLORHEXIDINE GLUCONATE 15 MILLILITER(S): 213 SOLUTION TOPICAL at 16:16

## 2017-08-22 RX ADMIN — Medication 1 APPLICATION(S): at 17:15

## 2017-08-22 NOTE — PROGRESS NOTE ADULT - ASSESSMENT
Pt is an 82M with PMHx significant for spinal stenosis s/p multiple spinal surgeries and spinal cord stimulator admitted to [facility] on 8/1 for scheduled management of worsening LE pain and neuropathic pain radiating both feet and pain to back, buttocks and thighs s/p exploration of the prior spinal fusion, removal of hardware, removal of spinal stimulator, T11-L1 laminectomy, T4-pelvis spinal fusion with osteotomies on 8/1 with Dr. Titus. Hospital course notable for  intraoperative blood loss requiring 2 unit PRBC and persistent acute blood loss anemia,  post op hypotension requiring  pressors, rapid Afib w/ RVR with acute respiratory failure secondary to acute pulmonary edema and bilateral pleural effusions which improved and now off lasix per cardiology, Echo showed EF 65%, encephalopathy for which HCT (8/6 and 8/14)  negative for acute pathology but showed chronic infarcts.  SLP followed for mild-moderate dyspahgia on puree with honey liquids diet. On 8/14, as per RN note,  had 37 beats of  Vtach,  Ortho team was notified, [facility] Cardiology was not informed and had signed off on 8/12.  Patient was discharged to acute rehab on 8/15.    Problem/Recommendation - 1:  Problem: Hypotension. Recommendation: Orthostatic:   improved after  cc bolus yesterday  IV fluids  overnight  shweta stockings, abdominal binder.    Problem/Recommendation - 2:  ·  Problem: Nonsustained ventricular tachycardia.  Recommendation: keep Mag>2, K>4  Cardio input appreciated, for Nuclear stress test in am    Problem/Recommendation - 3:  ·  Problem: Atrial fibrillation.  Recommendation: BB for rate control  Pradaxa.     Problem/Recommendation - 4:  ·  Problem: CAD (coronary artery disease).  Recommendation: ASA/Statin/BB. For Nuclear stress test in am as per Cardio. NPO except medications after midnight    Problem/Recommendation - 5:  ·  Problem: Spinal stenosis of lumbosacral region.  Recommendation: s/p exploration of the prior spinal fusion, removal of hardware, removal of spinal stimulator, T11-L1 laminectomy, T4-pelvis spinal fusion with osteotomies on 8/1 with Dr. Titus with complicated post operative coarse  Rehab program.     Problem/Recommendation - 6:  Problem: HLD (hyperlipidemia). Recommendation: statin.    Problem/Recommendation - 7:  Problem: Poor appetite. Recommendation: Dysphagia diet, for MBS later this week, patient is anxious to advance diet   IV hydration  encourage oral intake.

## 2017-08-22 NOTE — PROGRESS NOTE ADULT - SUBJECTIVE AND OBJECTIVE BOX
CC: Fatigue , palpitations , found to have orthostatic hypotension . S/P spinal surgery    HPI:   Pt is an 82M with PMHx significant for spinal stenosis s/p multiple spinal surgeries and spinal cord stimulator admitted to [facility] on 8/1 for scheduled management of worsening LE pain and neuropathic pain radiating both feet and pain to back, buttocks and thighs s/p exploration of the prior spinal fusion, removal of hardware, removal of spinal stimulator, T11-L1 laminectomy, T4-pelvis spinal fusion with osteotomies on 8/1 with Dr. Titus. Hospital course notable for  intraoperative blood loss requiring 2 unit PRBC and persistent acute blood loss anemia,  post op hypotension requiring  pressors, rapid Afib w/ RVR with acute respiratory failure secondary to acute pulmonary edema and bilateral pleural effusions which improved and now off lasix per cardiology, Echo showed EF 65%, encephalopathy for which HCT (8/6 and 8/14)  negative for acute pathology but showed chronic infarcts.  SLP followed for mild-moderate dyspahgia on puree with honey liquids diet. On 8/14, as per RN note,  had 37 beats of  Vtach,  Ortho team was notified, House Cardiology was not informed and had signed off on 8/12.  Patient was discharged to acute rehab on 8/15.    INTERVAL HPI/OVERNIGHT EVENTS: Feels better this am, was better after bolus yesterday. Able to participate in PT this morning    Vital Signs Last 24 Hrs  T(C): 36.6 (22 Aug 2017 05:35), Max: 36.8 (21 Aug 2017 14:30)  T(F): 97.8 (22 Aug 2017 05:35), Max: 98.3 (21 Aug 2017 14:30)  HR: 102 (22 Aug 2017 05:35) (81 - 102)  BP: 124/77 (22 Aug 2017 05:35) (93/65 - 124/77)  BP(mean): --  RR: 17 (22 Aug 2017 05:35) (17 - 18)  SpO2: 96% (22 Aug 2017 05:35) (96% - 100%)  I&O's Detail    21 Aug 2017 07:01  -  22 Aug 2017 07:00  --------------------------------------------------------  IN:    Oral Fluid: 180 mL    sodium chloride 0.9% with potassium chloride 40 mEq/L: 750 mL  Total IN: 930 mL    OUT:  Total OUT: 0 mL    Total NET: 930 mL      22 Aug 2017 07:01  -  22 Aug 2017 10:15  --------------------------------------------------------  IN:  Total IN: 0 mL    OUT:    Voided: 200 mL  Total OUT: 200 mL    Total NET: -200 mL                                    10.5   7.7   )-----------( 284      ( 21 Aug 2017 08:28 )             32.0     21 Aug 2017 08:28    143    |  107    |  23.0   ----------------------------<  90     3.9     |  23.0   |  0.62     Ca    8.6        21 Aug 2017 08:28  Mg     2.0       21 Aug 2017 08:28    TPro  6.2    /  Alb  3.0    /  TBili  1.7    /  DBili  x      /  AST  35     /  ALT  63     /  AlkPhos  236    21 Aug 2017 08:28      CAPILLARY BLOOD GLUCOSE        LIVER FUNCTIONS - ( 21 Aug 2017 08:28 )  Alb: 3.0 g/dL / Pro: 6.2 g/dL / ALK PHOS: 236 U/L / ALT: 63 U/L / AST: 35 U/L / GGT: x               MEDICATIONS  (STANDING):  aspirin  chewable 81 milliGRAM(s) Oral daily  tamsulosin 0.4 milliGRAM(s) Oral at bedtime  dabigatran 150 milliGRAM(s) Oral every 12 hours  docusate sodium 100 milliGRAM(s) Oral three times a day  senna 2 Tablet(s) Oral at bedtime  chlorhexidine 0.12% Liquid 15 milliLiter(s) Swish and Spit three times a day  pantoprazole   Suspension 40 milliGRAM(s) Oral before breakfast  ascorbic acid 500 milliGRAM(s) Oral two times a day  zinc sulfate 220 milliGRAM(s) Oral daily  multivitamin 1 Tablet(s) Oral daily  BACItracin   Ointment 1 Application(s) Topical two times a day  lidocaine 2% Gel 1 Application(s) Topical three times a day  levothyroxine 50 MICROGram(s) Oral daily  mirtazapine 15 milliGRAM(s) Oral at bedtime  ergocalciferol 38671 Unit(s) Oral every week  folic acid 1 milliGRAM(s) Oral daily  metoprolol succinate ER 25 milliGRAM(s) Oral daily  sodium chloride 0.9% with potassium chloride 40 mEq/L 1000 milliLiter(s) (75 mL/Hr) IV Continuous <Continuous>    MEDICATIONS  (PRN):  traMADol 50 milliGRAM(s) Oral every 4 hours PRN Moderate Pain (4 - 6)  melatonin. 6 milliGRAM(s) Oral at bedtime PRN Insomnia  polyethylene glycol 3350 17 Gram(s) Oral daily PRN Constipation      RADIOLOGY & ADDITIONAL TESTS:    ROS: +fatigue  denies chest pain, SOB, dizziness, lightheadedness, nausea, vomiting, fever, chills    PHYSICAL EXAM:  Constitutional: Comfortable . No acute distress.   HEENT: NC/AT, neck is supple . no JVD.   CNS: A&Ox3. No focal deficits  Lymph Nodes: Cervical : Not palpable.  Respiratory: CTAB  Cardiovascular: S1S2 =irregular  No murmur/rubs or gallop.  Gastrointestinal: Soft non-tender and non distended . +Bowel sounds.   Extremities: No edema.   Psychiatric: Calm and cooperative.  Skin: No skin rash/ulcers visualized to face, hands or feet.

## 2017-08-22 NOTE — PROGRESS NOTE ADULT - SUBJECTIVE AND OBJECTIVE BOX
HISTORY OF PRESENT ILLNESS:  Pt is an 82M with PMHx significant for spinal stenosis s/p multiple spinal surgeries and spinal cord stimulator admitted to [facility] on  for scheduled management of worsening LE pain and neuropathic pain radiating both feet and pain to back, buttocks and thighs s/p exploration of the prior spinal fusion, removal of hardware, removal of spinal stimulator, T11-L1 laminectomy, T4-pelvis spinal fusion with osteotomies on  with Dr. Titus. Hospital course notable for  intraoperative blood loss requiring 2 unit PRBC and persistent acute blood loss anemia,  post op hypotension requiring  pressors, rapid Afib w/ RVR with acute respiratory failure secondary to acute pulmonary edema and bilateral pleural effusions which improved and now off lasix per cardiology, Echo showed EF 65%, encephalopathy for which HCT ( and )  negative for acute pathology but showed chronic infarcts.  SLP followed for mild-moderate dyspahgia on puree with honey liquids diet. On  had 37 beats of Vtach, and persistent leukocytosis. Deemed stable for discharge to acute rehab on 8/15.      TODAY'S SUBJECTIVE & REVIEW OF SYMPTOMS:  Pt c/o pain at 4/10 low back which is non-radiating  Pt reports that he slept well last night  +BM.  Pt c/o persistent SOB on exertion.  Denies dyspnea,  cough or urinary complaints     [   ] Constitutional WNL  [   ] Cardio WNL  [   ] Resp WNL  [ x  ] GI WNL  [   ] Heme WNL  [   ] Endo WNL  [   ] Skin WNL  [   ] MSK WNL  [   ] Neuro WNL  [   ] Cognitive WNL  [ x  ] Psych WNL        PHYSICAL EXAM  Vital Signs Last 24 Hrs  T(C): 36.3 (22 Aug 2017 12:23), Max: 36.6 (22 Aug 2017 05:35)  T(F): 97.4 (22 Aug 2017 12:23), Max: 97.8 (22 Aug 2017 05:35)  HR: 85 (22 Aug 2017 12:23) (84 - 102)  BP: 92/68 (22 Aug 2017 12:23) (92/68 - 124/77)  BP(mean): --  RR: 18 (22 Aug 2017 12:23) (17 - 18)  SpO2: 100% (22 Aug 2017 12:23) (96% - 100%)    General:  HEENT: NCAT  Cardio: Irregular rhythm.  Regular rate  Resp: CTAB  Abdomen: Soft NTND  Neuro: Decreased sensation BLE in stocking distribution  Motor: 4-5/5  Extrem: No edema.  Calves soft and NTTP  Skin: Back incision CDI with no erythema noted  Wounds: No decubiti  Cognitive/Psych: Impaired cognition    FUNCTIONAL PROGRESS:  Gait: 10' mod A parallel bars  ADLs: Bathing max A, UE dress min A, LE dress max A  Transfers: Min A  Bed mobility: Max A      RECENT LABS:                          10.5   7.7   )-----------( 284      ( 21 Aug 2017 08:28 )             32.0     08    143  |  107  |  23.0<H>  ----------------------------<  90  3.9   |  23.0  |  0.62    Ca    8.6      21 Aug 2017 08:28    TPro  6.2<L>  /  Alb  3.0<L>  /  TBili  1.7  /  DBili  x   /  AST  35  /  ALT  63<H>  /  AlkPhos  236<H>          Urinalysis Basic - ( 16 Aug 2017 20:31 )    Color: Annia / Appearance: Clear / S.020 / pH: x  Gluc: x / Ketone: Negative  / Bili: Small / Urobili: 12 mg/dL   Blood: x / Protein: 15 mg/dL / Nitrite: Negative   Leuk Esterase: Trace / RBC: 3-5 /HPF / WBC 0-2   Sq Epi: x / Non Sq Epi: x / Bacteria: x    Urine culture <10,000cfu/mL GNR and GPC    RADIOLOGY/OTHER RESULTS:    Assessment:  Pt is an 83y/o male with spinal stenosis s/p T11-L1 laminectomy, T4-pelvis spinal fusion    Comprehensive rehab: PT/OT/ST  Spinal stenosis-Spinal precautions.  Pain-Tramadol prn  H/O acute respiratory distress- improved; pt oxygenating well on RA although persistent SOB on exertion; requested repeat CXR which was neg and ordered duonebs q6hrs x 3 days.  Appreciate hospitalist consult  Metabolic uiyuhasbgvynke-Mavzsthm-Trak ST.  Caution with excessive pain meds or anxiolytics, fall precautions  Dysphagia-Puree and honey-thickened liquid diet.  IVF for hydration.  Cont ST.  Added Estim.  MBS pending when more functional  Atrial fibrillation-HR controlled.  Cont Toprol XL daily with holding parameters and Pradaxa.  EKG due to c/o "heart fluttering"  Appreciate cardiology consult; nuclear stress test pending for  a.m  Cont metoprolol hold for SBP <90; keep Mg > 2 and K > 4.  Constipation- bowel regimen.   HTN-SBPs was low at   Decreased Toprol XL 50 mg po daily to 25mg daily as per pre-op with improvement.  Hyperlipidemia - c/w statin.   Elevated TSH 5.28-Added Synthroid 50 daily  Elevated LFTs-Discontinued tylenol.  Consider change statin.  AST/ALT improved.  F/U labs  Acute blood loss anemia-f/u CBC  Vitamin D deficiency-Low normal Vit D at 31.  Add Vit D 50,000U weekly  F/U with PMD Dr. Lares and cardiology Dr. Ny as outpt.

## 2017-08-23 PROCEDURE — 93016 CV STRESS TEST SUPVJ ONLY: CPT

## 2017-08-23 PROCEDURE — 99232 SBSQ HOSP IP/OBS MODERATE 35: CPT

## 2017-08-23 PROCEDURE — 99233 SBSQ HOSP IP/OBS HIGH 50: CPT

## 2017-08-23 PROCEDURE — 78452 HT MUSCLE IMAGE SPECT MULT: CPT | Mod: 26

## 2017-08-23 PROCEDURE — 99233 SBSQ HOSP IP/OBS HIGH 50: CPT | Mod: 25

## 2017-08-23 PROCEDURE — 93018 CV STRESS TEST I&R ONLY: CPT

## 2017-08-23 RX ORDER — SODIUM CHLORIDE 9 MG/ML
1000 INJECTION INTRAMUSCULAR; INTRAVENOUS; SUBCUTANEOUS
Qty: 0 | Refills: 0 | Status: DISCONTINUED | OUTPATIENT
Start: 2017-08-23 | End: 2017-08-23

## 2017-08-23 RX ORDER — SODIUM CHLORIDE 9 MG/ML
1000 INJECTION INTRAMUSCULAR; INTRAVENOUS; SUBCUTANEOUS
Qty: 0 | Refills: 0 | Status: DISCONTINUED | OUTPATIENT
Start: 2017-08-23 | End: 2017-08-24

## 2017-08-23 RX ORDER — TRAMADOL HYDROCHLORIDE 50 MG/1
50 TABLET ORAL EVERY 4 HOURS
Qty: 0 | Refills: 0 | Status: DISCONTINUED | OUTPATIENT
Start: 2017-08-23 | End: 2017-08-29

## 2017-08-23 RX ORDER — SODIUM CHLORIDE 9 MG/ML
250 INJECTION INTRAMUSCULAR; INTRAVENOUS; SUBCUTANEOUS ONCE
Qty: 0 | Refills: 0 | Status: COMPLETED | OUTPATIENT
Start: 2017-08-23 | End: 2017-08-23

## 2017-08-23 RX ADMIN — Medication 25 MILLIGRAM(S): at 06:51

## 2017-08-23 RX ADMIN — CHLORHEXIDINE GLUCONATE 15 MILLILITER(S): 213 SOLUTION TOPICAL at 21:29

## 2017-08-23 RX ADMIN — Medication 1 MILLIGRAM(S): at 14:25

## 2017-08-23 RX ADMIN — DABIGATRAN ETEXILATE MESYLATE 150 MILLIGRAM(S): 150 CAPSULE ORAL at 17:08

## 2017-08-23 RX ADMIN — MIRTAZAPINE 15 MILLIGRAM(S): 45 TABLET, ORALLY DISINTEGRATING ORAL at 21:29

## 2017-08-23 RX ADMIN — ZINC SULFATE TAB 220 MG (50 MG ZINC EQUIVALENT) 220 MILLIGRAM(S): 220 (50 ZN) TAB at 14:25

## 2017-08-23 RX ADMIN — Medication 1 APPLICATION(S): at 17:08

## 2017-08-23 RX ADMIN — CHLORHEXIDINE GLUCONATE 15 MILLILITER(S): 213 SOLUTION TOPICAL at 06:52

## 2017-08-23 RX ADMIN — Medication 500 MILLIGRAM(S): at 17:08

## 2017-08-23 RX ADMIN — SODIUM CHLORIDE 50 MILLILITER(S): 9 INJECTION INTRAMUSCULAR; INTRAVENOUS; SUBCUTANEOUS at 15:08

## 2017-08-23 RX ADMIN — Medication 81 MILLIGRAM(S): at 14:25

## 2017-08-23 RX ADMIN — Medication 1 APPLICATION(S): at 06:52

## 2017-08-23 RX ADMIN — Medication 100 MILLIGRAM(S): at 21:29

## 2017-08-23 RX ADMIN — SODIUM CHLORIDE 50 MILLILITER(S): 9 INJECTION INTRAMUSCULAR; INTRAVENOUS; SUBCUTANEOUS at 21:30

## 2017-08-23 RX ADMIN — Medication 500 MILLIGRAM(S): at 06:52

## 2017-08-23 RX ADMIN — LIDOCAINE 1 APPLICATION(S): 4 CREAM TOPICAL at 21:29

## 2017-08-23 RX ADMIN — LIDOCAINE 1 APPLICATION(S): 4 CREAM TOPICAL at 06:51

## 2017-08-23 RX ADMIN — Medication 1 TABLET(S): at 14:25

## 2017-08-23 RX ADMIN — DABIGATRAN ETEXILATE MESYLATE 150 MILLIGRAM(S): 150 CAPSULE ORAL at 06:51

## 2017-08-23 RX ADMIN — Medication 100 MILLIGRAM(S): at 06:52

## 2017-08-23 RX ADMIN — SENNA PLUS 2 TABLET(S): 8.6 TABLET ORAL at 21:29

## 2017-08-23 RX ADMIN — TAMSULOSIN HYDROCHLORIDE 0.4 MILLIGRAM(S): 0.4 CAPSULE ORAL at 21:29

## 2017-08-23 RX ADMIN — CHLORHEXIDINE GLUCONATE 15 MILLILITER(S): 213 SOLUTION TOPICAL at 14:25

## 2017-08-23 RX ADMIN — PANTOPRAZOLE SODIUM 40 MILLIGRAM(S): 20 TABLET, DELAYED RELEASE ORAL at 06:52

## 2017-08-23 RX ADMIN — Medication 50 MICROGRAM(S): at 06:52

## 2017-08-23 RX ADMIN — SODIUM CHLORIDE 500 MILLILITER(S): 9 INJECTION INTRAMUSCULAR; INTRAVENOUS; SUBCUTANEOUS at 11:00

## 2017-08-23 RX ADMIN — TRAMADOL HYDROCHLORIDE 50 MILLIGRAM(S): 50 TABLET ORAL at 14:25

## 2017-08-23 RX ADMIN — Medication 100 MILLIGRAM(S): at 14:25

## 2017-08-23 RX ADMIN — TRAMADOL HYDROCHLORIDE 50 MILLIGRAM(S): 50 TABLET ORAL at 15:00

## 2017-08-23 NOTE — PROGRESS NOTE ADULT - ASSESSMENT
Pt is an 82M with PMHx significant for spinal stenosis s/p multiple spinal surgeries and spinal cord stimulator admitted to [facility] on 8/1 for scheduled management of worsening LE pain and neuropathic pain radiating both feet and pain to back, buttocks and thighs s/p exploration of the prior spinal fusion, removal of hardware, removal of spinal stimulator, T11-L1 laminectomy, T4-pelvis spinal fusion with osteotomies on 8/1 with Dr. Titus. Hospital course notable for  intraoperative blood loss requiring 2 unit PRBC and persistent acute blood loss anemia,  post op hypotension requiring  pressors, rapid Afib w/ RVR with acute respiratory failure secondary to acute pulmonary edema and bilateral pleural effusions which improved and now off lasix per cardiology, Echo showed EF 65%, encephalopathy for which HCT (8/6 and 8/14)  negative for acute pathology but showed chronic infarcts.  SLP followed for mild-moderate dyspahgia on puree with honey liquids diet. On 8/14, as per RN note,  had 37 beats of  Vtach,  Ortho team was notified, House [facility] was not informed and had signed off on 8/12.  Patient was discharged to acute rehab on 8/15.    Problem/Recommendation - 1:  Problem: Hypotension. Recommendation: Orthostatic:   improved after  this am  IV fluids  overnight  shweta stockings, abdominal binder.    Problem/Recommendation - 2:  ·  Problem: Nonsustained ventricular tachycardia.  Recommendation: keep Mag>2, K>4  Cardio input appreciated, for Nuclear stress test in am    Problem/Recommendation - 3:  ·  Problem: Atrial fibrillation.  Recommendation: BB for rate control  Pradaxa.     Problem/Recommendation - 4:  ·  Problem: CAD (coronary artery disease).  Recommendation: ASA/Statin/BB. For Nuclear stress test this am as per Cardio.     Problem/Recommendation - 5:  ·  Problem: Spinal stenosis of lumbosacral region.  Recommendation: s/p exploration of the prior spinal fusion, removal of hardware, removal of spinal stimulator, T11-L1 laminectomy, T4-pelvis spinal fusion with osteotomies on 8/1 with Dr. Titus with complicated post operative coarse  Rehab program.     Problem/Recommendation - 6:  Problem: HLD (hyperlipidemia). Recommendation: statin.    Problem/Recommendation - 7:  Problem: Poor appetite. Recommendation: Dysphagia diet, for MBS later this week, patient is anxious to advance diet   IV hydration from 4pm thru overnight daily  encourage oral intake. Pt is an 82M with PMHx significant for spinal stenosis s/p multiple spinal surgeries and spinal cord stimulator admitted to [facility] on 8/1 for scheduled management of worsening LE pain and neuropathic pain radiating both feet and pain to back, buttocks and thighs s/p exploration of the prior spinal fusion, removal of hardware, removal of spinal stimulator, T11-L1 laminectomy, T4-pelvis spinal fusion with osteotomies on 8/1 with Dr. Titus. Hospital course notable for  intraoperative blood loss requiring 2 unit PRBC and persistent acute blood loss anemia,  post op hypotension requiring  pressors, rapid Afib w/ RVR with acute respiratory failure secondary to acute pulmonary edema and bilateral pleural effusions which improved and now off lasix per cardiology, Echo showed EF 65%, encephalopathy for which HCT (8/6 and 8/14)  negative for acute pathology but showed chronic infarcts.  SLP followed for mild-moderate dyspahgia on puree with honey liquids diet. On 8/14, as per RN note,  had 37 beats of  Vtach,  Ortho team was notified, House Cardiology was not informed and had signed off on 8/12.  Patient was discharged to acute rehab on 8/15.    Problem/Recommendation - 1:  Problem: Hypotension. Recommendation: Orthostatic:  Cardiac Vs Spinal   improved after  this am  IV fluids  overnight  shweta stockings, abdominal binder.    Problem/Recommendation - 2:  ·  Problem: Nonsustained ventricular tachycardia.  Recomendation: keep Mag>2, K>4  Cardio input appreciated, for Nuclear stress test - results awaited    Problem/Recommendation - 3:  ·  Problem: Atrial fibrillation.  Recommendation: BB for rate control  Pradaxa.     Problem/Recommendation - 4:  ·  Problem: CAD (coronary artery disease).  Recommendation: ASA/Statin/BB. For Nuclear stress test this am as per Cardio.     Problem/Recommendation - 5:  ·  Problem: Spinal stenosis of lumbosacral region.  Recommendation: s/p exploration of the prior spinal fusion, removal of hardware, removal of spinal stimulator, T11-L1 laminectomy, T4-pelvis spinal fusion with osteotomies on 8/1 with Dr. Titus with complicated post operative coarse  Rehab program.     Problem/Recommendation - 6:  Problem: HLD (hyperlipidemia). Recommendation: statin.    Problem/Recommendation - 7:  Problem: Poor appetite. Recommendation: Dysphagia diet, for MBS later this week, patient is anxious to advance diet   IV hydration from 4pm thru overnight daily  encourage oral intake.

## 2017-08-23 NOTE — PROGRESS NOTE ADULT - SUBJECTIVE AND OBJECTIVE BOX
CC: Fatigue , palpitations , found to have orthostatic hypotension . S/P spinal surgery    HPI:   Pt is an 82M with PMHx significant for spinal stenosis s/p multiple spinal surgeries and spinal cord stimulator admitted to [facility] on 8/1 for scheduled management of worsening LE pain and neuropathic pain radiating both feet and pain to back, buttocks and thighs s/p exploration of the prior spinal fusion, removal of hardware, removal of spinal stimulator, T11-L1 laminectomy, T4-pelvis spinal fusion with osteotomies on 8/1 with Dr. Titus. Hospital course notable for  intraoperative blood loss requiring 2 unit PRBC and persistent acute blood loss anemia,  post op hypotension requiring  pressors, rapid Afib w/ RVR with acute respiratory failure secondary to acute pulmonary edema and bilateral pleural effusions which improved and now off lasix per cardiology, Echo showed EF 65%, encephalopathy for which HCT (8/6 and 8/14)  negative for acute pathology but showed chronic infarcts.  SLP followed for mild-moderate dyspahgia on puree with honey liquids diet. On 8/14, as per RN note,  had 37 beats of  Vtach,  Ortho team was notified, House Cardiology was not informed and had signed off on 8/12.  Patient was discharged to acute rehab on 8/15.    INTERVAL HPI/OVERNIGHT EVENTS: Received IV fluid overnight, sitting in chair this am and states became dizzy and week, had to return to bed. Manual pressure 94/60. Given IV bolus, NPO for Nuclear stress test today.    Vital Signs Last 24 Hrs  T(C): 36.3 (23 Aug 2017 10:56), Max: 36.8 (22 Aug 2017 20:43)  T(F): 97.3 (23 Aug 2017 10:56), Max: 98.3 (22 Aug 2017 20:43)  HR: 73 (23 Aug 2017 10:56) (73 - 91)  BP: 88/60 (23 Aug 2017 10:56) (88/60 - 123/83)  BP(mean): --  RR: 18 (23 Aug 2017 10:56) (18 - 18)  SpO2: 95% (23 Aug 2017 10:56) (95% - 100%)  I&O's Detail    22 Aug 2017 07:01  -  23 Aug 2017 07:00  --------------------------------------------------------  IN:  Total IN: 0 mL    OUT:    Voided: 800 mL  Total OUT: 800 mL    Total NET: -800 mL          CARDIAC MARKERS ( 22 Aug 2017 18:33 )  x     / 0.01 ng/mL / x     / x     / x      CARDIAC MARKERS ( 22 Aug 2017 10:22 )  x     / 0.01 ng/mL / x     / x     / x                    CAPILLARY BLOOD GLUCOSE              MEDICATIONS  (STANDING):  aspirin  chewable 81 milliGRAM(s) Oral daily  tamsulosin 0.4 milliGRAM(s) Oral at bedtime  dabigatran 150 milliGRAM(s) Oral every 12 hours  docusate sodium 100 milliGRAM(s) Oral three times a day  senna 2 Tablet(s) Oral at bedtime  chlorhexidine 0.12% Liquid 15 milliLiter(s) Swish and Spit three times a day  pantoprazole   Suspension 40 milliGRAM(s) Oral before breakfast  ascorbic acid 500 milliGRAM(s) Oral two times a day  zinc sulfate 220 milliGRAM(s) Oral daily  multivitamin 1 Tablet(s) Oral daily  BACItracin   Ointment 1 Application(s) Topical two times a day  lidocaine 2% Gel 1 Application(s) Topical three times a day  levothyroxine 50 MICROGram(s) Oral daily  mirtazapine 15 milliGRAM(s) Oral at bedtime  ergocalciferol 49392 Unit(s) Oral every week  folic acid 1 milliGRAM(s) Oral daily  metoprolol succinate ER 25 milliGRAM(s) Oral daily  sodium chloride 0.9%. 1000 milliLiter(s) (50 mL/Hr) IV Continuous <Continuous>  sodium chloride 0.9% Bolus 250 milliLiter(s) IV Bolus once    MEDICATIONS  (PRN):  melatonin. 6 milliGRAM(s) Oral at bedtime PRN Insomnia  polyethylene glycol 3350 17 Gram(s) Oral daily PRN Constipation  traMADol 50 milliGRAM(s) Oral every 4 hours PRN Severe Pain (7 - 10)      RADIOLOGY & ADDITIONAL TESTS:    ROS: +fatigue  denies chest pain, SOB, dizziness, lightheadedness, nausea, vomiting, fever, chills    PHYSICAL EXAM:  Constitutional: Weak, fatigued . No acute distress.   HEENT: NC/AT, neck is supple . no JVD.   CNS: A&Ox3. No focal deficits  Lymph Nodes: Cervical : Not palpable.  Respiratory: CTAB  Cardiovascular: S1S2 =irregular  No murmur/rubs or gallop.  Gastrointestinal: Soft non-tender and non distended . +Bowel sounds.   Extremities: No edema.   Psychiatric: Calm and cooperative.  Skin: No skin rash/ulcers visualized to face, hands or feet.

## 2017-08-24 LAB
ALBUMIN SERPL ELPH-MCNC: 2.9 G/DL — LOW (ref 3.3–5.2)
ALP SERPL-CCNC: 197 U/L — HIGH (ref 40–120)
ALT FLD-CCNC: 37 U/L — SIGNIFICANT CHANGE UP
ANION GAP SERPL CALC-SCNC: 12 MMOL/L — SIGNIFICANT CHANGE UP (ref 5–17)
AST SERPL-CCNC: 24 U/L — SIGNIFICANT CHANGE UP
BILIRUB SERPL-MCNC: 1 MG/DL — SIGNIFICANT CHANGE UP (ref 0.4–2)
BUN SERPL-MCNC: 18 MG/DL — SIGNIFICANT CHANGE UP (ref 8–20)
CALCIUM SERPL-MCNC: 8.3 MG/DL — LOW (ref 8.6–10.2)
CHLORIDE SERPL-SCNC: 105 MMOL/L — SIGNIFICANT CHANGE UP (ref 98–107)
CO2 SERPL-SCNC: 22 MMOL/L — SIGNIFICANT CHANGE UP (ref 22–29)
CREAT SERPL-MCNC: 0.64 MG/DL — SIGNIFICANT CHANGE UP (ref 0.5–1.3)
GLUCOSE SERPL-MCNC: 80 MG/DL — SIGNIFICANT CHANGE UP (ref 70–115)
HCT VFR BLD CALC: 30.6 % — LOW (ref 42–52)
HGB BLD-MCNC: 9.8 G/DL — LOW (ref 14–18)
MAGNESIUM SERPL-MCNC: 1.8 MG/DL — SIGNIFICANT CHANGE UP (ref 1.6–2.6)
MCHC RBC-ENTMCNC: 30.7 PG — SIGNIFICANT CHANGE UP (ref 27–31)
MCHC RBC-ENTMCNC: 32 G/DL — SIGNIFICANT CHANGE UP (ref 32–36)
MCV RBC AUTO: 95.9 FL — HIGH (ref 80–94)
PLATELET # BLD AUTO: 274 K/UL — SIGNIFICANT CHANGE UP (ref 150–400)
POTASSIUM SERPL-MCNC: 4.1 MMOL/L — SIGNIFICANT CHANGE UP (ref 3.5–5.3)
POTASSIUM SERPL-SCNC: 4.1 MMOL/L — SIGNIFICANT CHANGE UP (ref 3.5–5.3)
PROT SERPL-MCNC: 5.7 G/DL — LOW (ref 6.6–8.7)
RBC # BLD: 3.19 M/UL — LOW (ref 4.6–6.2)
RBC # FLD: 16.2 % — HIGH (ref 11–15.6)
SODIUM SERPL-SCNC: 139 MMOL/L — SIGNIFICANT CHANGE UP (ref 135–145)
VIT B12 SERPL-MCNC: 1086 PG/ML — HIGH (ref 180–914)
WBC # BLD: 6.3 K/UL — SIGNIFICANT CHANGE UP (ref 4.8–10.8)
WBC # FLD AUTO: 6.3 K/UL — SIGNIFICANT CHANGE UP (ref 4.8–10.8)

## 2017-08-24 PROCEDURE — 93010 ELECTROCARDIOGRAM REPORT: CPT

## 2017-08-24 PROCEDURE — 99233 SBSQ HOSP IP/OBS HIGH 50: CPT

## 2017-08-24 PROCEDURE — 99232 SBSQ HOSP IP/OBS MODERATE 35: CPT

## 2017-08-24 RX ORDER — LANOLIN ALCOHOL/MO/W.PET/CERES
3 CREAM (GRAM) TOPICAL AT BEDTIME
Qty: 0 | Refills: 0 | Status: DISCONTINUED | OUTPATIENT
Start: 2017-08-24 | End: 2017-09-15

## 2017-08-24 RX ORDER — MIDODRINE HYDROCHLORIDE 2.5 MG/1
5 TABLET ORAL ONCE
Qty: 0 | Refills: 0 | Status: COMPLETED | OUTPATIENT
Start: 2017-08-24 | End: 2017-08-24

## 2017-08-24 RX ORDER — LIDOCAINE 4 G/100G
1 CREAM TOPICAL THREE TIMES A DAY
Qty: 0 | Refills: 0 | Status: DISCONTINUED | OUTPATIENT
Start: 2017-08-24 | End: 2017-08-30

## 2017-08-24 RX ORDER — MIDODRINE HYDROCHLORIDE 2.5 MG/1
5 TABLET ORAL EVERY 8 HOURS
Qty: 0 | Refills: 0 | Status: DISCONTINUED | OUTPATIENT
Start: 2017-08-24 | End: 2017-08-25

## 2017-08-24 RX ORDER — SODIUM CHLORIDE 9 MG/ML
250 INJECTION INTRAMUSCULAR; INTRAVENOUS; SUBCUTANEOUS ONCE
Qty: 0 | Refills: 0 | Status: COMPLETED | OUTPATIENT
Start: 2017-08-24 | End: 2017-08-24

## 2017-08-24 RX ORDER — SODIUM CHLORIDE 9 MG/ML
1000 INJECTION INTRAMUSCULAR; INTRAVENOUS; SUBCUTANEOUS
Qty: 0 | Refills: 0 | Status: DISCONTINUED | OUTPATIENT
Start: 2017-08-24 | End: 2017-08-28

## 2017-08-24 RX ADMIN — PANTOPRAZOLE SODIUM 40 MILLIGRAM(S): 20 TABLET, DELAYED RELEASE ORAL at 05:53

## 2017-08-24 RX ADMIN — MIDODRINE HYDROCHLORIDE 5 MILLIGRAM(S): 2.5 TABLET ORAL at 13:29

## 2017-08-24 RX ADMIN — Medication 100 MILLIGRAM(S): at 21:22

## 2017-08-24 RX ADMIN — Medication 25 MILLIGRAM(S): at 05:53

## 2017-08-24 RX ADMIN — SENNA PLUS 2 TABLET(S): 8.6 TABLET ORAL at 21:22

## 2017-08-24 RX ADMIN — CHLORHEXIDINE GLUCONATE 15 MILLILITER(S): 213 SOLUTION TOPICAL at 11:02

## 2017-08-24 RX ADMIN — Medication 81 MILLIGRAM(S): at 11:01

## 2017-08-24 RX ADMIN — MIDODRINE HYDROCHLORIDE 5 MILLIGRAM(S): 2.5 TABLET ORAL at 11:01

## 2017-08-24 RX ADMIN — ZINC SULFATE TAB 220 MG (50 MG ZINC EQUIVALENT) 220 MILLIGRAM(S): 220 (50 ZN) TAB at 11:01

## 2017-08-24 RX ADMIN — Medication 1 MILLIGRAM(S): at 11:02

## 2017-08-24 RX ADMIN — Medication 100 MILLIGRAM(S): at 05:53

## 2017-08-24 RX ADMIN — LIDOCAINE 1 APPLICATION(S): 4 CREAM TOPICAL at 05:52

## 2017-08-24 RX ADMIN — SODIUM CHLORIDE 500 MILLILITER(S): 9 INJECTION INTRAMUSCULAR; INTRAVENOUS; SUBCUTANEOUS at 10:00

## 2017-08-24 RX ADMIN — SODIUM CHLORIDE 50 MILLILITER(S): 9 INJECTION INTRAMUSCULAR; INTRAVENOUS; SUBCUTANEOUS at 11:01

## 2017-08-24 RX ADMIN — Medication 500 MILLIGRAM(S): at 05:53

## 2017-08-24 RX ADMIN — TAMSULOSIN HYDROCHLORIDE 0.4 MILLIGRAM(S): 0.4 CAPSULE ORAL at 21:22

## 2017-08-24 RX ADMIN — DABIGATRAN ETEXILATE MESYLATE 150 MILLIGRAM(S): 150 CAPSULE ORAL at 17:18

## 2017-08-24 RX ADMIN — MIDODRINE HYDROCHLORIDE 5 MILLIGRAM(S): 2.5 TABLET ORAL at 21:22

## 2017-08-24 RX ADMIN — Medication 500 MILLIGRAM(S): at 17:18

## 2017-08-24 RX ADMIN — CHLORHEXIDINE GLUCONATE 15 MILLILITER(S): 213 SOLUTION TOPICAL at 21:22

## 2017-08-24 RX ADMIN — Medication 1 APPLICATION(S): at 05:58

## 2017-08-24 RX ADMIN — Medication 1 TABLET(S): at 11:02

## 2017-08-24 RX ADMIN — Medication 3 MILLIGRAM(S): at 21:23

## 2017-08-24 RX ADMIN — Medication 100 MILLIGRAM(S): at 11:02

## 2017-08-24 RX ADMIN — DABIGATRAN ETEXILATE MESYLATE 150 MILLIGRAM(S): 150 CAPSULE ORAL at 05:53

## 2017-08-24 RX ADMIN — Medication 50 MICROGRAM(S): at 05:53

## 2017-08-24 RX ADMIN — CHLORHEXIDINE GLUCONATE 15 MILLILITER(S): 213 SOLUTION TOPICAL at 05:52

## 2017-08-24 RX ADMIN — Medication 1 APPLICATION(S): at 17:17

## 2017-08-24 NOTE — PROGRESS NOTE ADULT - SUBJECTIVE AND OBJECTIVE BOX
HISTORY OF PRESENT ILLNESS:  Pt is an 82M with PMHx significant for spinal stenosis s/p multiple spinal surgeries and spinal cord stimulator admitted to [facility] on  for scheduled management of worsening LE pain and neuropathic pain radiating both feet and pain to back, buttocks and thighs s/p exploration of the prior spinal fusion, removal of hardware, removal of spinal stimulator, T11-L1 laminectomy, T4-pelvis spinal fusion with osteotomies on  with Dr. Titus. Hospital course notable for  intraoperative blood loss requiring 2 unit PRBC and persistent acute blood loss anemia,  post op hypotension requiring  pressors, rapid Afib w/ RVR with acute respiratory failure secondary to acute pulmonary edema and bilateral pleural effusions which improved and now off lasix per cardiology, Echo showed EF 65%, encephalopathy for which HCT ( and )  negative for acute pathology but showed chronic infarcts.  SLP followed for mild-moderate dyspahgia on puree with honey liquids diet. On  had 37 beats of Vtach, and persistent leukocytosis. Deemed stable for discharge to acute rehab on 8/15.      TODAY'S SUBJECTIVE & REVIEW OF SYMPTOMS:  Pt reports back pain is improved at 1-/10.  There is persistent tingling BLEs.  Slept well.  Decreased appetite.   Pt reports that he didn't sleep well last   +BM.  Pt c/o persistent SOB on exertion.  Denies dyspnea,  cough or urinary complaints      [   ] Constitutional WNL  [   ] Cardio WNL  [   ] Resp WNL  [ x  ] GI WNL  [   ] Heme WNL  [   ] Endo WNL  [   ] Skin WNL  [   ] MSK WNL  [   ] Neuro WNL  [   ] Cognitive WNL  [ x  ] Psych WNL        PHYSICAL EXAM  Vital Signs Last 24 Hrs  T(C): 36.1 (24 Aug 2017 05:56), Max: 36.8 (23 Aug 2017 20:33)  T(F): 97 (24 Aug 2017 05:56), Max: 98.2 (23 Aug 2017 20:33)  HR: 108 (24 Aug 2017 05:56) (84 - 108)  BP: 124/80 (24 Aug 2017 05:56) (110/62 - 124/80)  BP(mean): --  RR: 18 (24 Aug 2017 05:56) (18 - 18)  SpO2: 100% (24 Aug 2017 05:56) (95% - 100%)    General:  HEENT: NCAT  Cardio: Irregular rhythm.  Regular rate  Resp: CTAB  Abdomen: Soft NTND  Neuro: Decreased sensation BLE in stocking distribution  Motor: 4-5/5  Extrem: No edema.  Calves soft and NTTP  Skin: Back incision CDI with no erythema noted  Wounds: No decubiti  Cognitive/Psych: Impaired cognition    FUNCTIONAL PROGRESS:  Gait: 10' mod A parallel bars  ADLs: Bathing max A, UE dress sup, LE dress mod A  Transfers: Min A  Bed mobility: Min A      RECENT LABS:                          10.5   7.7   )-----------( 284      ( 21 Aug 2017 08:28 )             32.0     08-    143  |  107  |  23.0<H>  ----------------------------<  90  3.9   |  23.0  |  0.62    Ca    8.6      21 Aug 2017 08:28    TPro  6.2<L>  /  Alb  3.0<L>  /  TBili  1.7  /  DBili  x   /  AST  35  /  ALT  63<H>  /  AlkPhos  236<H>          Urinalysis Basic - ( 16 Aug 2017 20:31 )    Color: Annia / Appearance: Clear / S.020 / pH: x  Gluc: x / Ketone: Negative  / Bili: Small / Urobili: 12 mg/dL   Blood: x / Protein: 15 mg/dL / Nitrite: Negative   Leuk Esterase: Trace / RBC: 3-5 /HPF / WBC 0-2   Sq Epi: x / Non Sq Epi: x / Bacteria: x    Urine culture <10,000cfu/mL GNR and GPC    RADIOLOGY/OTHER RESULTS:    Assessment:  Pt is an 83y/o male with spinal stenosis s/p T11-L1 laminectomy, T4-pelvis spinal fusion    Comprehensive rehab: PT/OT/ST  Spinal stenosis-Spinal precautions.  Pain-Tramadol prn  H/O acute respiratory distress- improved; pt oxygenating well on RA although persistent SOB on exertion; requested repeat CXR which was neg and ordered duonebs q6hrs x 3 days.  Appreciate hospitalist consult  Metabolic [id number]-Bwzjzcxz-Yacq ST.  Caution with excessive pain meds or anxiolytics, fall precautions  Dysphagia-Puree and honey-thickened liquid diet.  IVF for hydration.  Cont ST.  Added Estim.  MBS pending for   Atrial fibrillation-HR controlled.  Cont Toprol XL daily with holding parameters and Pradaxa.  EKG due to c/o "heart fluttering"  Appreciate cardiology consult; nuclear stress test performed ; neg  Cont metoprolol hold for SBP <90; keep Mg > 2 and K > 4.  Orthostatic today-additional IVFs given  Constipation- bowel regimen.   HTN-SBPs was low at   Decreased Toprol XL 50 mg po daily to 25mg daily as per pre-op.  Orthostatic again this a.m; IVF bolus given  Cont abdominal binder and TEDs when out of bed  Midodrine added and Remeron discontinued by hospitalist  Hyperlipidemia - c/w statin.   Elevated TSH 5.28-Added Synthroid 50 daily  Elevated LFTs-Discontinued tylenol.  Consider change statin.  AST/ALT improved.  F/U labs  Acute blood loss anemia-f/u CBC today  Vitamin D deficiency-Low normal Vit D at 31.  Add Vit D 50,000U weekly  F/U with PMD Dr. Lares and cardiology Dr. Ny as outpt.

## 2017-08-24 NOTE — PROGRESS NOTE ADULT - ASSESSMENT
82 year old male with history of CAD with LAD stent (2014), recent spinal surgery with episode of 34 beats of NSVT during hospitalisation and episode of orthostasis that improved with IV fluids.

## 2017-08-24 NOTE — PROGRESS NOTE ADULT - SUBJECTIVE AND OBJECTIVE BOX
[facility]                                                        Office: [address] [address]                                                       Telephone: [phone]. Fax:[phone]                                                                             PROGRESS NOTE   Reason for follow up: orthostasis and near syncope and non-sustained ventricular tachycardia                             Overnight: No new events.   Update: Had another episode of orthostatic hypotension. started on IV fluids. Stress test done yesterday. No ischemia.     Subjective: "  i had one of those today am"   Complains of: had an episode of dizziness.   Review of symptoms: Cardiac:  No chest pain. No dyspnea. No palpitations. + Dizziness  Respiratory: no cough. No dyspnea   Gastrointestinal: No diarrhea. No abdominal pain. No bleeding.     Past medical history: No updates.   Chronic conditions:  Hypertension: controlled.   	  Vitals:  T(C): 36.3 (08-24-17 @ 10:00), Max: 36.8 (08-23-17 @ 20:33)  HR: 83 (08-24-17 @ 13:30) (83 - 108)  BP: 99/64 (08-24-17 @ 13:30) (88/52 - 124/80)  RR: 18 (08-24-17 @ 10:00) (18 - 18)  SpO2: 97% (08-24-17 @ 10:00) (95% - 100%)  Wt(kg): --  I&O's Summary    23 Aug 2017 07:01  -  24 Aug 2017 07:00  --------------------------------------------------------  IN: 0 mL / OUT: 800 mL / NET: -800 mL          PHYSICAL EXAM:  Appearance: Comfortable. No acute distress  HEENT:  Head and neck: Atraumatic. Normocephalic.  Normal oral mucosa, PERRL, Neck is supple. No JVD, No carotid bruit.   Neurologic: A & O x 3, no focal deficits. EOMI , Cranial nerves are intact.  Lymphatic: No cervical lymphadenopathy  Cardiovascular: Normal S1 S2, No murmur, rubs/gallops. No JVD, No edema  Respiratory: Lungs clear to auscultation  Gastrointestinal:  Soft, Non-tender, + BS  Lower Extremities: No edema  Psychiatry: Patient is calm. No agitation. Mood & affect appropriate  Skin: No rashes/ ecchymoses/cyanosis/ulcers visualized on the face, hands or feet.      CURRENT MEDICATIONS:  tamsulosin 0.4 milliGRAM(s) Oral at bedtime  metoprolol succinate ER 25 milliGRAM(s) Oral daily  midodrine 5 milliGRAM(s) Oral every 8 hours    melatonin  docusate sodium  senna  pantoprazole   Suspension  levothyroxine  aspirin  chewable  dabigatran  chlorhexidine 0.12% Liquid  ascorbic acid  zinc sulfate  multivitamin  BACItracin   Ointment  ergocalciferol  folic acid  sodium chloride 0.9%.      LABS:	 	  CARDIAC MARKERS ( 22 Aug 2017 18:33 )  x     / 0.01 ng/mL / x     / x     / x      p-BNP 22 Aug 2017 18:33: x    , CARDIAC MARKERS ( 22 Aug 2017 10:22 )  x     / 0.01 ng/mL / x     / x     / x      p-BNP 22 Aug 2017 10:22: x                  proBNP:   Lipid Profile:   HgA1c: TSH:     INTERPRETATION OF TELEMETRY: Reviewed by me.   ECG: Reviewed by me. < from: 12 Lead ECG (08.15.17 @ 19:22) >   Atrial fibrillation  Low voltage QRS  Nonspecific ST and T wave abnormality  Abnormal ECG      RADIOLOGY & ADDITIONAL STUDIES:    X-ray:  reviewed by me. < from: Xray Chest 1 View AP/PA. (08.15.17 @ 18:53) >  Trace left pleural effusion. Marked improvement in aeration        ECHO FINDINGS: Date: 89/2017: No effusion. normal LV and RV function.     STRESS  FINDINGS: Date: 2014: normal     CATHETERIZATION FINDINGS:  Date:  2014 VENTRICLES: Global left ventricular function was normal. EF estimatedwas  60 %.  CORONARY VESSELS: The coronary circulation is right dominant.  LM:   --  LM: Normal.  LAD:   --  Proximal LAD: There was a 90 % stenosis. --  Mid LAD: There was a 30 % stenosis.  CX:   --  Circumflex: Normal.  RCA:   --  Proximal RCA: There was a 40 % stenosis.--  Distal RCA: There was a 40 % stenosis.  COMPLICATIONS: There were no complications.  INTERVENTIONAL RECOMMENDATIONS: ASA and Plavix for 1 year

## 2017-08-24 NOTE — PROGRESS NOTE ADULT - SUBJECTIVE AND OBJECTIVE BOX
CC: Fatigue , palpitations , found to have orthostatic hypotension . S/P spinal surgery    HPI:   Pt is an 82M with PMHx significant for spinal stenosis s/p multiple spinal surgeries and spinal cord stimulator admitted to [facility] on 8/1 for scheduled management of worsening LE pain and neuropathic pain radiating both feet and pain to back, buttocks and thighs s/p exploration of the prior spinal fusion, removal of hardware, removal of spinal stimulator, T11-L1 laminectomy, T4-pelvis spinal fusion with osteotomies on 8/1 with Dr. Titus. Hospital course notable for  intraoperative blood loss requiring 2 unit PRBC and persistent acute blood loss anemia,  post op hypotension requiring  pressors, rapid Afib w/ RVR with acute respiratory failure secondary to acute pulmonary edema and bilateral pleural effusions which improved and now off lasix per cardiology, Echo showed EF 65%, encephalopathy for which HCT (8/6 and 8/14)  negative for acute pathology but showed chronic infarcts.  SLP followed for mild-moderate dyspahgia on puree with honey liquids diet. On 8/14, as per RN note,  had 37 beats of  Vtach,  Ortho team was notified, House Cardiology was not informed and had signed off on 8/12.  Patient was discharged to acute rehab on 8/15.    INTERVAL HPI/OVERNIGHT EVENTS: Called to see patient as he became symptomatic during PT. While attempting to stand patient became dizzy, SOB, felt like heart racing, BP 71/58. Returned to bed and placed in trendelenburg, BP 88/60, given IV bolus, EKG showed AFIBB with rate 100-110, no acute changes. Pulse ox 94%. BP now 101/60 sitting upright. Denies chest pain, nausea, vomiting, fever, chills    Vital Signs Last 24 Hrs  T(C): 36.1 (24 Aug 2017 05:56), Max: 36.8 (23 Aug 2017 20:33)  T(F): 97 (24 Aug 2017 05:56), Max: 98.2 (23 Aug 2017 20:33)  HR: 108 (24 Aug 2017 05:56) (73 - 108)  BP: 124/80 (24 Aug 2017 05:56) (88/60 - 124/80)  BP(mean): --  RR: 18 (24 Aug 2017 05:56) (18 - 18)  SpO2: 100% (24 Aug 2017 05:56) (95% - 100%)  I&O's Detail    23 Aug 2017 07:01  -  24 Aug 2017 07:00  --------------------------------------------------------  IN:  Total IN: 0 mL    OUT:    Voided: 800 mL  Total OUT: 800 mL    Total NET: -800 mL          CARDIAC MARKERS ( 22 Aug 2017 18:33 )  x     / 0.01 ng/mL / x     / x     / x                    CAPILLARY BLOOD GLUCOSE              MEDICATIONS  (STANDING):  aspirin  chewable 81 milliGRAM(s) Oral daily  tamsulosin 0.4 milliGRAM(s) Oral at bedtime  dabigatran 150 milliGRAM(s) Oral every 12 hours  docusate sodium 100 milliGRAM(s) Oral three times a day  senna 2 Tablet(s) Oral at bedtime  chlorhexidine 0.12% Liquid 15 milliLiter(s) Swish and Spit three times a day  pantoprazole   Suspension 40 milliGRAM(s) Oral before breakfast  ascorbic acid 500 milliGRAM(s) Oral two times a day  zinc sulfate 220 milliGRAM(s) Oral daily  multivitamin 1 Tablet(s) Oral daily  BACItracin   Ointment 1 Application(s) Topical two times a day  lidocaine 2% Gel 1 Application(s) Topical three times a day  levothyroxine 50 MICROGram(s) Oral daily  ergocalciferol 37884 Unit(s) Oral every week  folic acid 1 milliGRAM(s) Oral daily  metoprolol succinate ER 25 milliGRAM(s) Oral daily  sodium chloride 0.9% Bolus 250 milliLiter(s) IV Bolus once  midodrine 5 milliGRAM(s) Oral every 8 hours  midodrine 5 milliGRAM(s) Oral once  sodium chloride 0.9%. 1000 milliLiter(s) (50 mL/Hr) IV Continuous <Continuous>    MEDICATIONS  (PRN):  melatonin. 6 milliGRAM(s) Oral at bedtime PRN Insomnia  polyethylene glycol 3350 17 Gram(s) Oral daily PRN Constipation  traMADol 50 milliGRAM(s) Oral every 4 hours PRN Severe Pain (7 - 10)      RADIOLOGY & ADDITIONAL TESTS:    PHYSICAL EXAM:  Constitutional: Weak, fatigued . No acute distress.   HEENT: NC/AT, neck is supple . no JVD.   CNS: A&Ox3. No focal deficits  Lymph Nodes: Cervical : Not palpable.  Respiratory: CTAB  Cardiovascular: S1S2 =irregular  No murmur/rubs or gallop.  Gastrointestinal: Soft non-tender and non distended . +Bowel sounds.   Extremities: No edema.   Psychiatric: Calm and cooperative.

## 2017-08-24 NOTE — PROGRESS NOTE ADULT - ASSESSMENT
Pt is an 82M with PMHx significant for spinal stenosis s/p multiple spinal surgeries and spinal cord stimulator admitted to [facility] on 8/1 for scheduled management of worsening LE pain and neuropathic pain radiating both feet and pain to back, buttocks and thighs s/p exploration of the prior spinal fusion, removal of hardware, removal of spinal stimulator, T11-L1 laminectomy, T4-pelvis spinal fusion with osteotomies on 8/1 with Dr. Titus. Hospital course notable for  intraoperative blood loss requiring 2 unit PRBC and persistent acute blood loss anemia,  post op hypotension requiring  pressors, rapid Afib w/ RVR with acute respiratory failure secondary to acute pulmonary edema and bilateral pleural effusions which improved and now off lasix per cardiology, Echo showed EF 65%, encephalopathy for which HCT (8/6 and 8/14)  negative for acute pathology but showed chronic infarcts.  SLP followed for mild-moderate dyspahgia on puree with honey liquids diet. On 8/14, as per RN note,  had 37 beats of  Vtach,  Ortho team was notified, [facility] was not informed and had signed off on 8/12.  Patient was discharged to acute rehab on 8/15.    Problem/Recommendation - 1:  Problem: Hypotension. Recommendation: Orthostatic:  Cardiac Vs Spinal . Stress test normal. Discussed with Dr. Mcgregor, add Midodrine 5 mg TID, dc Remeron as may cause orthostasis and is new to patient, consider dc flomax if orthostasis continues.  improved after  this am, continue IV fluids today  shweta stockings, abdominal binder.    Problem/Recommendation - 2:  ·  Problem: Nonsustained ventricular tachycardia.  Recommendation: keep Mag>2, K>4  Cardio input appreciated, for Nuclear stress test - normal    Problem/Recommendation - 3:  ·  Problem: Atrial fibrillation.  Recommendation: BB needed for rate control  Pradaxa.     Problem/Recommendation - 4:  ·  Problem: CAD (coronary artery disease).  Recommendation: ASA/Statin/BB. For Nuclear stress test negative  as per Cardio.     Problem/Recommendation - 5:  ·  Problem: Spinal stenosis of lumbosacral region.  Recommendation: s/p exploration of the prior spinal fusion, removal of hardware, removal of spinal stimulator, T11-L1 laminectomy, T4-pelvis spinal fusion with osteotomies on 8/1 with Dr. Titus with complicated post operative coarse  Rehab program.     Problem/Recommendation - 6:  Problem: HLD (hyperlipidemia). Recommendation: statin.    Problem/Recommendation - 7:  Problem: Poor appetite. Recommendation: Dysphagia diet, for MBS later this week, patient is anxious to advance diet   IV hydration today  encourage oral intake.    Problem/Recommendation-8  Problem: Insomnia. Recommendation: Add Melatonin as standing dose at HS Pt is an 82M with PMHx significant for spinal stenosis s/p multiple spinal surgeries and spinal cord stimulator admitted to [facility] on 8/1 for scheduled management of worsening LE pain and neuropathic pain radiating both feet and pain to back, buttocks and thighs s/p exploration of the prior spinal fusion, removal of hardware, removal of spinal stimulator, T11-L1 laminectomy, T4-pelvis spinal fusion with osteotomies on 8/1 with Dr. Titus. Hospital course notable for  intraoperative blood loss requiring 2 unit PRBC and persistent acute blood loss anemia,  post op hypotension requiring  pressors, rapid Afib w/ RVR with acute respiratory failure secondary to acute pulmonary edema and bilateral pleural effusions which improved and now off lasix per cardiology, Echo showed EF 65%, encephalopathy for which HCT (8/6 and 8/14)  negative for acute pathology but showed chronic infarcts.  SLP followed for mild-moderate dyspahgia on puree with honey liquids diet. On 8/14, as per RN note,  had 37 beats of  Vtach,  Ortho team was notified, [facility] was not informed and had signed off on 8/12.  Patient was discharged to acute rehab on 8/15.    Problem/Recommendation - 1:  Problem: Hypotension. Recommendation: Orthostatic: Spinal vs deconditioned, venous pooling . Stress test normal. Discussed with Dr. Mcgregor, add Midodrine 5 mg TID, dc Remeron as may cause orthostasis and is new to patient, consider dc flomax if orthostasis continues.  improved after  this am, continue IV fluids today  shweta stockings, abdominal binder.  check cortisol level    Problem/Recommendation - 2:  ·  Problem: Nonsustained ventricular tachycardia.  Recommendation: keep Mag>2, K>4  Cardio input appreciated, for Nuclear stress test - normal    Problem/Recommendation - 3:  ·  Problem: Atrial fibrillation.  Recommendation: BB needed for rate control  Pradaxa.     Problem/Recommendation - 4:  ·  Problem: CAD (coronary artery disease).  Recommendation: ASA/Statin/BB. For Nuclear stress test negative  as per Cardio.     Problem/Recommendation - 5:  ·  Problem: Spinal stenosis of lumbosacral region.  Recommendation: s/p exploration of the prior spinal fusion, removal of hardware, removal of spinal stimulator, T11-L1 laminectomy, T4-pelvis spinal fusion with osteotomies on 8/1 with Dr. Titus with complicated post operative coarse  Rehab program.     Problem/Recommendation - 6:  Problem: HLD (hyperlipidemia). Recommendation: statin.    Problem/Recommendation - 7:  Problem: Poor appetite.+ severe protein calorie malnutrition. Recommendation: Dysphagia diet, for MBS later this week, patient is anxious to advance diet   IV hydration today  encourage oral intake.    Problem/Recommendation-8  Problem: Insomnia. Recommendation: Add Melatonin as standing dose at HS

## 2017-08-25 PROCEDURE — 99232 SBSQ HOSP IP/OBS MODERATE 35: CPT

## 2017-08-25 PROCEDURE — 99233 SBSQ HOSP IP/OBS HIGH 50: CPT

## 2017-08-25 PROCEDURE — 93306 TTE W/DOPPLER COMPLETE: CPT | Mod: 26

## 2017-08-25 RX ORDER — SODIUM CHLORIDE 9 MG/ML
250 INJECTION INTRAMUSCULAR; INTRAVENOUS; SUBCUTANEOUS ONCE
Qty: 0 | Refills: 0 | Status: COMPLETED | OUTPATIENT
Start: 2017-08-25 | End: 2017-08-25

## 2017-08-25 RX ORDER — ATORVASTATIN CALCIUM 80 MG/1
10 TABLET, FILM COATED ORAL AT BEDTIME
Qty: 0 | Refills: 0 | Status: DISCONTINUED | OUTPATIENT
Start: 2017-08-25 | End: 2017-09-15

## 2017-08-25 RX ORDER — MIDODRINE HYDROCHLORIDE 2.5 MG/1
10 TABLET ORAL EVERY 8 HOURS
Qty: 0 | Refills: 0 | Status: DISCONTINUED | OUTPATIENT
Start: 2017-08-25 | End: 2017-09-04

## 2017-08-25 RX ORDER — MAGNESIUM OXIDE 400 MG ORAL TABLET 241.3 MG
400 TABLET ORAL
Qty: 0 | Refills: 0 | Status: DISCONTINUED | OUTPATIENT
Start: 2017-08-25 | End: 2017-09-15

## 2017-08-25 RX ORDER — FERROUS SULFATE 325(65) MG
325 TABLET ORAL DAILY
Qty: 0 | Refills: 0 | Status: DISCONTINUED | OUTPATIENT
Start: 2017-08-25 | End: 2017-09-15

## 2017-08-25 RX ORDER — ATORVASTATIN CALCIUM 80 MG/1
20 TABLET, FILM COATED ORAL AT BEDTIME
Qty: 0 | Refills: 0 | Status: DISCONTINUED | OUTPATIENT
Start: 2017-08-25 | End: 2017-08-25

## 2017-08-25 RX ADMIN — Medication 1 APPLICATION(S): at 17:22

## 2017-08-25 RX ADMIN — Medication 1 MILLIGRAM(S): at 11:36

## 2017-08-25 RX ADMIN — CHLORHEXIDINE GLUCONATE 15 MILLILITER(S): 213 SOLUTION TOPICAL at 05:54

## 2017-08-25 RX ADMIN — TRAMADOL HYDROCHLORIDE 50 MILLIGRAM(S): 50 TABLET ORAL at 07:51

## 2017-08-25 RX ADMIN — SODIUM CHLORIDE 50 MILLILITER(S): 9 INJECTION INTRAMUSCULAR; INTRAVENOUS; SUBCUTANEOUS at 11:13

## 2017-08-25 RX ADMIN — MIDODRINE HYDROCHLORIDE 5 MILLIGRAM(S): 2.5 TABLET ORAL at 05:55

## 2017-08-25 RX ADMIN — Medication 325 MILLIGRAM(S): at 14:29

## 2017-08-25 RX ADMIN — DABIGATRAN ETEXILATE MESYLATE 150 MILLIGRAM(S): 150 CAPSULE ORAL at 17:22

## 2017-08-25 RX ADMIN — Medication 81 MILLIGRAM(S): at 11:35

## 2017-08-25 RX ADMIN — Medication 1 TABLET(S): at 11:36

## 2017-08-25 RX ADMIN — TAMSULOSIN HYDROCHLORIDE 0.4 MILLIGRAM(S): 0.4 CAPSULE ORAL at 21:17

## 2017-08-25 RX ADMIN — ERGOCALCIFEROL 50000 UNIT(S): 1.25 CAPSULE ORAL at 11:36

## 2017-08-25 RX ADMIN — Medication 500 MILLIGRAM(S): at 05:55

## 2017-08-25 RX ADMIN — Medication 1 APPLICATION(S): at 05:54

## 2017-08-25 RX ADMIN — Medication 100 MILLIGRAM(S): at 14:29

## 2017-08-25 RX ADMIN — SODIUM CHLORIDE 250 MILLILITER(S): 9 INJECTION INTRAMUSCULAR; INTRAVENOUS; SUBCUTANEOUS at 11:00

## 2017-08-25 RX ADMIN — MIDODRINE HYDROCHLORIDE 10 MILLIGRAM(S): 2.5 TABLET ORAL at 14:29

## 2017-08-25 RX ADMIN — Medication 25 MILLIGRAM(S): at 05:55

## 2017-08-25 RX ADMIN — Medication 100 MILLIGRAM(S): at 05:55

## 2017-08-25 RX ADMIN — Medication 100 MILLIGRAM(S): at 21:17

## 2017-08-25 RX ADMIN — MAGNESIUM OXIDE 400 MG ORAL TABLET 400 MILLIGRAM(S): 241.3 TABLET ORAL at 17:22

## 2017-08-25 RX ADMIN — SENNA PLUS 2 TABLET(S): 8.6 TABLET ORAL at 21:17

## 2017-08-25 RX ADMIN — MIDODRINE HYDROCHLORIDE 10 MILLIGRAM(S): 2.5 TABLET ORAL at 21:17

## 2017-08-25 RX ADMIN — PANTOPRAZOLE SODIUM 40 MILLIGRAM(S): 20 TABLET, DELAYED RELEASE ORAL at 05:56

## 2017-08-25 RX ADMIN — Medication 500 MILLIGRAM(S): at 17:22

## 2017-08-25 RX ADMIN — ATORVASTATIN CALCIUM 10 MILLIGRAM(S): 80 TABLET, FILM COATED ORAL at 21:20

## 2017-08-25 RX ADMIN — Medication 3 MILLIGRAM(S): at 21:17

## 2017-08-25 RX ADMIN — DABIGATRAN ETEXILATE MESYLATE 150 MILLIGRAM(S): 150 CAPSULE ORAL at 05:56

## 2017-08-25 RX ADMIN — CHLORHEXIDINE GLUCONATE 15 MILLILITER(S): 213 SOLUTION TOPICAL at 21:17

## 2017-08-25 RX ADMIN — Medication 50 MICROGRAM(S): at 05:55

## 2017-08-25 RX ADMIN — TRAMADOL HYDROCHLORIDE 50 MILLIGRAM(S): 50 TABLET ORAL at 08:30

## 2017-08-25 RX ADMIN — CHLORHEXIDINE GLUCONATE 15 MILLILITER(S): 213 SOLUTION TOPICAL at 14:29

## 2017-08-25 RX ADMIN — ZINC SULFATE TAB 220 MG (50 MG ZINC EQUIVALENT) 220 MILLIGRAM(S): 220 (50 ZN) TAB at 11:35

## 2017-08-25 NOTE — PROGRESS NOTE ADULT - ASSESSMENT
Pt is an 82M with PMHx significant for spinal stenosis s/p multiple spinal surgeries and spinal cord stimulator admitted to [facility] on 8/1 for scheduled management of worsening LE pain and neuropathic pain radiating both feet and pain to back, buttocks and thighs s/p exploration of the prior spinal fusion, removal of hardware, removal of spinal stimulator, T11-L1 laminectomy, T4-pelvis spinal fusion with osteotomies on 8/1 with Dr. Titus. Hospital course notable for  intraoperative blood loss requiring 2 unit PRBC and persistent acute blood loss anemia,  post op hypotension requiring  pressors, rapid Afib w/ RVR with acute respiratory failure secondary to acute pulmonary edema and bilateral pleural effusions which improved and now off lasix per cardiology, Echo showed EF 65%, encephalopathy for which HCT (8/6 and 8/14)  negative for acute pathology but showed chronic infarcts.  SLP followed for mild-moderate dyspahgia on puree with honey liquids diet. On 8/14, as per RN note,  had 37 beats of  Vtach,  Ortho team was notified, [facility] was not informed and had signed off on 8/12.  Patient was discharged to acute rehab on 8/15.    Problem/Recommendation - 1:  Problem: Hypotension. Recommendation: Orthostatic: Spinal vs deconditioned, venous pooling . Stress test normal. Discussed with Dr. Mcgregor, increase Midodrine to  10 mg TID, check trop/BNP/repeat labs in am  awaiting Cortisol results, may consider adding Florinef  improved after   continue IV fluids today  shweta stockings, abdominal binder.  encourage po intake  Discussed with rehab attending, to speak with Orthopedic surgeon Dr. Titus regarding orthostasis    Problem/Recommendation - 2:  ·  Problem: Nonsustained ventricular tachycardia.  Recommendation: keep Mag>2, K>4  Cardio input appreciated, for Nuclear stress test - normal    Problem/Recommendation - 3:  ·  Problem: Atrial fibrillation.  Recommendation: BB needed for rate control  Pradaxa.     Problem/Recommendation - 4:  ·  Problem: CAD (coronary artery disease).  Recommendation: ASA/Statin/BB. For Nuclear stress test negative  as per Cardio.     Problem/Recommendation - 5:  ·  Problem: Spinal stenosis of lumbosacral region.  Recommendation: s/p exploration of the prior spinal fusion, removal of hardware, removal of spinal stimulator, T11-L1 laminectomy, T4-pelvis spinal fusion with osteotomies on 8/1 with Dr. Titus with complicated post operative coarse  Rehab program.     Problem/Recommendation - 6:  Problem: HLD (hyperlipidemia). Recommendation: statin. Patient was on [id number] mg    Problem/Recommendation - 7:  Problem: Poor appetite.+ severe protein calorie malnutrition. Recommendation: Dysphagia diet, for MBS next week, patient is anxious to advance diet   IV hydration  encourage oral intake.    Problem/Recommendation-8  Problem: Insomnia. Recommendation: Add Melatonin as standing dose at HS Pt is an 82M with PMHx significant for spinal stenosis s/p multiple spinal surgeries and spinal cord stimulator admitted to [facility] on 8/1 for scheduled management of worsening LE pain and neuropathic pain radiating both feet and pain to back, buttocks and thighs s/p exploration of the prior spinal fusion, removal of hardware, removal of spinal stimulator, T11-L1 laminectomy, T4-pelvis spinal fusion with osteotomies on 8/1 with Dr. Titus. Hospital course notable for  intraoperative blood loss requiring 2 unit PRBC and persistent acute blood loss anemia,  post op hypotension requiring  pressors, rapid Afib w/ RVR with acute respiratory failure secondary to acute pulmonary edema and bilateral pleural effusions which improved and now off lasix per cardiology, Echo showed EF 65%, encephalopathy for which HCT (8/6 and 8/14)  negative for acute pathology but showed chronic infarcts.  SLP followed for mild-moderate dyspahgia on puree with honey liquids diet. On 8/14, as per RN note,  had 37 beats of  Vtach,  Ortho team was notified, [facility] was not informed and had signed off on 8/12.  Patient was discharged to acute rehab on 8/15.    Problem/Recommendation - 1:  Problem: Hypotension. Recommendation: Orthostatic: Spinal vs deconditioned, venous pooling . Stress test normal. Discussed with Dr. Mcgregor, increase Midodrine to  10 mg TID, check trop/BNP/repeat labs in am  awaiting Cortisol results, may consider adding Florinef  improved after   continue IV fluids today  shweta stockings, abdominal binder.  encourage po intake  Discussed with rehab attending, to speak with Orthopedic surgeon Dr. Titus regarding orthostasis 2/2 spinal procedure    Problem/Recommendation - 2:  ·  Problem: Nonsustained ventricular tachycardia.  Recommendation: keep Mag>2, K>4  Cardio input appreciated, for Nuclear stress test - normal    Problem/Recommendation - 3:  ·  Problem: Atrial fibrillation.  Recommendation: BB needed for rate control  Pradaxa.     Problem/Recommendation - 4:  ·  Problem: CAD (coronary artery disease).  Recommendation: ASA/Statin/BB. For Nuclear stress test negative  as per Cardio.     Problem/Recommendation - 5:  ·  Problem: Spinal stenosis of lumbosacral region.  Recommendation: s/p exploration of the prior spinal fusion, removal of hardware, removal of spinal stimulator, T11-L1 laminectomy, T4-pelvis spinal fusion with osteotomies on 8/1 with Dr. Titus with complicated post operative coarse  Rehab program.     Problem/Recommendation - 6:  Problem: HLD (hyperlipidemia). Recommendation: statin. Patient was on [id number] mg    Problem/Recommendation - 7:  Problem: Poor appetite.+ severe protein calorie malnutrition. Recommendation: Dysphagia diet, for MBS next week, patient is anxious to advance diet   IV hydration  encourage oral intake.    Problem/Recommendation-8  Problem: Insomnia. Recommendation: Add Melatonin as standing dose at HS

## 2017-08-25 NOTE — PROGRESS NOTE ADULT - SUBJECTIVE AND OBJECTIVE BOX
[facility]                                                        Office: [address] [address]                                                       Telephone: [phone]. Fax:[phone]                                                                             PROGRESS NOTE   Reason for follow up: orthostasis and near syncope and non-sustained ventricular tachycardia                             Overnight: No new events.   Update: Had another episode of orthostatic hypotension today.  ECG low voltage. Cardiac enzyme negative. patient was complaining of back pain during the peisode. said he felt like he was dying hypotensive. adn pale. typical vasovagal    Subjective: "  i had another one today"   Complains of: had an episode of dizziness.   Review of symptoms: Cardiac:  No chest pain. No dyspnea. No palpitations. + Dizziness  Respiratory: no cough. No dyspnea   Gastrointestinal: No diarrhea. No abdominal pain. No bleeding.     Past medical history: No updates.   Chronic conditions:  Hypertension: controlled.   	  Vitals:  Vital Signs Last 24 Hrs  T(C): 36.4 (08-25-17 @ 11:00), Max: 36.4 (08-25-17 @ 11:00)  T(F): 97.5 (08-25-17 @ 11:00), Max: 97.5 (08-25-17 @ 11:00)  HR: 92 (08-25-17 @ 14:00) (60 - 109)  BP: 102/73 (08-25-17 @ 14:00) (102/73 - 128/86)  BP(mean): --  RR: 18 (08-25-17 @ 11:00) (18 - 18)  SpO2: 98% (08-25-17 @ 11:00) (98% - 100%)        PHYSICAL EXAM:  Appearance: Comfortable. No acute distress  HEENT:  Head and neck: Atraumatic. Normocephalic.  Normal oral mucosa, PERRL, Neck is supple. No JVD, No carotid bruit.   Neurologic: A & O x 3, no focal deficits. EOMI , Cranial nerves are intact. mild tremors bilateraloly  Lymphatic: No cervical lymphadenopathy  Cardiovascular: Normal S1 S2, No murmur, rubs/gallops. No JVD, No edema  Respiratory: Lungs clear to auscultation  Gastrointestinal:  Soft, Non-tender, + BS  Lower Extremities: No edema compareson stockings worn.   Psychiatry: Patient is calm. No agitation. Mood & affect appropriate  Skin: No rashes/ ecchymoses/cyanosis/ulcers visualized on the face, hands or feet.      CURRENT MEDICATIONS:  MEDICATIONS  (STANDING):  tamsulosin 0.4 milliGRAM(s) Oral at bedtime  metoprolol succinate ER 25 milliGRAM(s) Oral daily  midodrine 10 milliGRAM(s) Oral every 8 hours    docusate sodium  senna  pantoprazole   Suspension  aspirin  chewable  dabigatran  chlorhexidine 0.12% Liquid  ascorbic acid  zinc sulfate  multivitamin  BACItracin   Ointment  levothyroxine  ergocalciferol  folic acid  melatonin  sodium chloride 0.9%.  ferrous    sulfate  atorvastatin  magnesium oxide    PRN: polyethylene glycol 3350 PRN  traMADol PRN  lidocaine 2% Gel PRN      LABS:	 	  CA                      9.8    6.3   )-----------( 274      ( 24 Aug 2017 16:53 )             30.6   N=x    ; L=x        24 Aug 2017 16:53    139    |  105    |  18.0   ----------------------------<  80     4.1     |  22.0   |  0.64     Ca    8.3        24 Aug 2017 16:53  Mg     1.8       24 Aug 2017 16:53    TPro  5.7    /  Alb  2.9    /  TBili  1.0    /  DBili  x      /  AST  24     /  ALT  37     /  AlkPhos  197    24 Aug 2017 16:53      Hepatic panel: 24 Aug 2017 16:53  5.7   | 2.9                            1.0   | 1.0  /x                              24    | 37                                /197  \par                                     proBNP:   Lipid Profile:   HgA1c: TSH:     INTERPRETATION OF TELEMETRY: Reviewed by me.   ECG: Reviewed by me. < from: 12 Lead ECG (08.15.17 @ 19:22) >   Atrial fibrillation  Low voltage QRS  Nonspecific ST and T wave abnormality  Abnormal ECG      RADIOLOGY & ADDITIONAL STUDIES:    X-ray:  reviewed by me. < from: Xray Chest 1 View AP/PA. (08.15.17 @ 18:53) >  Trace left pleural effusion. Marked improvement in aeration        ECHO FINDINGS: Date: 89/2017: No effusion. normal LV and RV function.   8/25/2017:  Echo stat bedside. trivial effusion,.IVC normal size. normal respiratory variation   STRESS  FINDINGS: Date: 2014: normal     CATHETERIZATION FINDINGS:  Date:  2014 VENTRICLES: Global left ventricular function was normal. EF estimatedwas  60 %.  CORONARY VESSELS: The coronary circulation is right dominant.  LM:   --  LM: Normal.  LAD:   --  Proximal LAD: There was a 90 % stenosis. --  Mid LAD: There was a 30 % stenosis.  CX:   --  Circumflex: Normal.  RCA:   --  Proximal RCA: There was a 40 % stenosis.--  Distal RCA: There was a 40 % stenosis.  COMPLICATIONS: There were no complications.  INTERVENTIONAL RECOMMENDATIONS: ASA and Plavix for 1 year

## 2017-08-25 NOTE — PROGRESS NOTE ADULT - SUBJECTIVE AND OBJECTIVE BOX
CC: Fatigue , palpitations , found to have orthostatic hypotension . S/P spinal surgery    HPI:   Pt is an 82M with PMHx significant for spinal stenosis s/p multiple spinal surgeries and spinal cord stimulator admitted to [facility] on 8/1 for scheduled management of worsening LE pain and neuropathic pain radiating both feet and pain to back, buttocks and thighs s/p exploration of the prior spinal fusion, removal of hardware, removal of spinal stimulator, T11-L1 laminectomy, T4-pelvis spinal fusion with osteotomies on 8/1 with Dr. Titus. Hospital course notable for  intraoperative blood loss requiring 2 unit PRBC and persistent acute blood loss anemia,  post op hypotension requiring  pressors, rapid Afib w/ RVR with acute respiratory failure secondary to acute pulmonary edema and bilateral pleural effusions which improved and now off lasix per cardiology, Echo showed EF 65%, encephalopathy for which HCT (8/6 and 8/14)  negative for acute pathology but showed chronic infarcts.  SLP followed for mild-moderate dyspahgia on puree with honey liquids diet. On 8/14, as per RN note,  had 37 beats of  Vtach,  Ortho team was notified, House Cardiology was not informed and had signed off on 8/12.  Patient was discharged to acute rehab on 8/15.    INTERVAL HPI/OVERNIGHT EVENTS: While in Speech, c/o extreme fatigue, sitting upright in bed BP 80/50, placed in trendelenburg and given 250 cc NS bolus. , no respiratory distress. Ate better for lunch and sitting upright in bed, was able to participate in therapy after lunch.  Denies chest pain, nausea, vomiting, fever, chills    Vital Signs Last 24 Hrs  T(C): 36.2 (25 Aug 2017 05:48), Max: 36.2 (25 Aug 2017 05:48)  T(F): 97.1 (25 Aug 2017 05:48), Max: 97.1 (25 Aug 2017 05:48)  HR: 109 (25 Aug 2017 05:48) (85 - 109)  BP: 128/86 (25 Aug 2017 05:48) (114/72 - 128/86)  BP(mean): --  RR: 18 (25 Aug 2017 05:48) (18 - 18)  SpO2: 100% (25 Aug 2017 05:48) (100% - 100%)  I&O's Detail                                9.8    6.3   )-----------( 274      ( 24 Aug 2017 16:53 )             30.6     24 Aug 2017 16:53    139    |  105    |  18.0   ----------------------------<  80     4.1     |  22.0   |  0.64     Ca    8.3        24 Aug 2017 16:53  Mg     1.8       24 Aug 2017 16:53    TPro  5.7    /  Alb  2.9    /  TBili  1.0    /  DBili  x      /  AST  24     /  ALT  37     /  AlkPhos  197    24 Aug 2017 16:53      CAPILLARY BLOOD GLUCOSE        LIVER FUNCTIONS - ( 24 Aug 2017 16:53 )  Alb: 2.9 g/dL / Pro: 5.7 g/dL / ALK PHOS: 197 U/L / ALT: 37 U/L / AST: 24 U/L / GGT: x               MEDICATIONS  (STANDING):  aspirin  chewable 81 milliGRAM(s) Oral daily  tamsulosin 0.4 milliGRAM(s) Oral at bedtime  dabigatran 150 milliGRAM(s) Oral every 12 hours  docusate sodium 100 milliGRAM(s) Oral three times a day  senna 2 Tablet(s) Oral at bedtime  chlorhexidine 0.12% Liquid 15 milliLiter(s) Swish and Spit three times a day  pantoprazole   Suspension 40 milliGRAM(s) Oral before breakfast  ascorbic acid 500 milliGRAM(s) Oral two times a day  zinc sulfate 220 milliGRAM(s) Oral daily  multivitamin 1 Tablet(s) Oral daily  BACItracin   Ointment 1 Application(s) Topical two times a day  levothyroxine 50 MICROGram(s) Oral daily  ergocalciferol 80696 Unit(s) Oral every week  folic acid 1 milliGRAM(s) Oral daily  metoprolol succinate ER 25 milliGRAM(s) Oral daily  melatonin 3 milliGRAM(s) Oral at bedtime  sodium chloride 0.9%. 1000 milliLiter(s) (50 mL/Hr) IV Continuous <Continuous>  ferrous    sulfate 325 milliGRAM(s) Oral daily  atorvastatin 10 milliGRAM(s) Oral at bedtime  midodrine 10 milliGRAM(s) Oral every 8 hours  magnesium oxide 400 milliGRAM(s) Oral two times a day with meals    MEDICATIONS  (PRN):  polyethylene glycol 3350 17 Gram(s) Oral daily PRN Constipation  traMADol 50 milliGRAM(s) Oral every 4 hours PRN Severe Pain (7 - 10)  lidocaine 2% Gel 1 Application(s) Topical three times a day PRN pain      RADIOLOGY & ADDITIONAL TESTS:    PHYSICAL EXAM:  Constitutional: Weak, fatigued . No acute distress.   HEENT: NC/AT, neck is supple . no JVD.   CNS: A&Ox3. No focal deficits  Lymph Nodes: Cervical : Not palpable.  Respiratory: CTAB  Cardiovascular: S1S2 =irregular  No murmur/rubs or gallop.  Gastrointestinal: Soft non-tender and non distended . +Bowel sounds.   Extremities: No edema.   Psychiatric: Calm and cooperative.  Skin: Surgical scar back without redness, swelling, drainage

## 2017-08-25 NOTE — PROGRESS NOTE ADULT - SUBJECTIVE AND OBJECTIVE BOX
HISTORY OF PRESENT ILLNESS:  Pt is an 82M with PMHx significant for spinal stenosis s/p multiple spinal surgeries and spinal cord stimulator admitted to [facility] on  for scheduled management of worsening LE pain and neuropathic pain radiating both feet and pain to back, buttocks and thighs s/p exploration of the prior spinal fusion, removal of hardware, removal of spinal stimulator, T11-L1 laminectomy, T4-pelvis spinal fusion with osteotomies on  with Dr. Titus. Hospital course notable for  intraoperative blood loss requiring 2 unit PRBC and persistent acute blood loss anemia,  post op hypotension requiring  pressors, rapid Afib w/ RVR with acute respiratory failure secondary to acute pulmonary edema and bilateral pleural effusions which improved and now off lasix per cardiology, Echo showed EF 65%, encephalopathy for which HCT ( and )  negative for acute pathology but showed chronic infarcts.  SLP followed for mild-moderate dyspahgia on puree with honey liquids diet. On  had 37 beats of Vtach, and persistent leukocytosis. Deemed stable for discharge to acute rehab on 8/15.      TODAY'S SUBJECTIVE & REVIEW OF SYMPTOMS:  Pt c/o generalized pain.  Not feeling well.  +insomnia.   Decreased appetite.    +BM.  Pt c/o persistent SOB on exertion.  Denies dyspnea,  cough or urinary complaints      [   ] Constitutional WNL  [   ] Cardio WNL  [   ] Resp WNL  [ x  ] GI WNL  [   ] Heme WNL  [   ] Endo WNL  [   ] Skin WNL  [   ] MSK WNL  [   ] Neuro WNL  [   ] Cognitive WNL  [ x  ] Psych WNL        PHYSICAL EXAM  Vital Signs Last 24 Hrs  T(C): 36.2 (25 Aug 2017 05:48), Max: 36.3 (24 Aug 2017 10:00)  T(F): 97.1 (25 Aug 2017 05:48), Max: 97.3 (24 Aug 2017 10:00)  HR: 109 (25 Aug 2017 05:48) (83 - 109)  BP: 128/86 (25 Aug 2017 05:48) (88/52 - 128/86)  BP(mean): --  RR: 18 (25 Aug 2017 05:48) (18 - 18)  SpO2: 100% (25 Aug 2017 05:48) (97% - 100%)    General:  NAD  HEENT: NCAT  Cardio: Irregular rhythm.  Regular rate  Resp: CTAB  Abdomen: Soft NTND  Neuro: Decreased sensation BLE in stocking distribution  Motor: 4-5/5  Extrem: No edema.  Calves soft and NTTP  Skin: Back incision CDI with no erythema noted  Wounds: No decubiti  Cognitive/Psych: Impaired cognition    FUNCTIONAL PROGRESS:  Gait: 10' mod A parallel bars  ADLs: Bathing max A, UE dress sup, LE dress mod A  Transfers: Min-mod A  Bed mobility: Min A      RECENT LABS:                                     9.8    6.3   )-----------( 274      ( 24 Aug 2017 16:53 )             30.6         139  |  105  |  18.0  ----------------------------<  80  4.1   |  22.0  |  0.64    Ca    8.3<L>      24 Aug 2017 16:53  Mg     1.8         TPro  5.7<L>  /  Alb  2.9<L>  /  TBili  1.0  /  DBili  x   /  AST  24  /  ALT  37  /  AlkPhos  197<H>        Urinalysis Basic - ( 16 Aug 2017 20:31 )    Color: Annia / Appearance: Clear / S.020 / pH: x  Gluc: x / Ketone: Negative  / Bili: Small / Urobili: 12 mg/dL   Blood: x / Protein: 15 mg/dL / Nitrite: Negative   Leuk Esterase: Trace / RBC: 3-5 /HPF / WBC 0-2   Sq Epi: x / Non Sq Epi: x / Bacteria: x    Urine culture <10,000cfu/mL GNR and GPC    RADIOLOGY/OTHER RESULTS:    Assessment:  Pt is an 83y/o male with spinal stenosis s/p T11-L1 laminectomy, T4-pelvis spinal fusion    Comprehensive rehab: PT/OT/ST  Spinal stenosis-Spinal precautions.  Pain-Tramadol prn  H/O acute respiratory distress- improved; pt oxygenating well on RA although persistent SOB on exertion; requested repeat CXR which was neg and ordered duonebs q6hrs x 3 days.  Appreciate hospitalist consult  Metabolic qkfjnynuskastl-Eoedmwpk-Qtrz ST.  Caution with excessive pain meds or anxiolytics, fall precautions  Dysphagia-Puree and honey-thickened liquid diet.  IVF for hydration.  Cont ST.  Added Estim.  MBS pending for   Atrial fibrillation-HR controlled.  Cont Toprol XL daily with holding parameters and Pradaxa.  EKG due to c/o "heart fluttering"  Appreciate cardiology consult; nuclear stress test performed ; neg  Cont metoprolol hold for SBP <90; keep Mg > 2 and K > 4.  Orthostatic today-additional IVFs given  Constipation- bowel regimen.   HTN-SBPs was low at   Decreased Toprol XL 50 mg po daily to 25mg daily as per pre-op.  Orthostatic hypotension; IVF bolus given  Cont abdominal binder and TEDs when out of bed  Midodrine added and Remeron discontinued by hospitalist  Hyperlipidemia - Added lipitor 20mg.  Check lipid panel    Elevated TSH 5.28-Added Synthroid 50 daily  Elevated LFTs-Discontinued tylenol.  AST/ALT improved; now WNL.   Acute blood loss anemia-stable.  Folic acid and add Fe  Vitamin D deficiency-Low normal Vit D at 31.  Added Vit D 50,000U weekly  F/U with PMD Dr. Lares and cardiology Dr. Ny as outpt.

## 2017-08-26 LAB
ANION GAP SERPL CALC-SCNC: 15 MMOL/L — SIGNIFICANT CHANGE UP (ref 5–17)
BUN SERPL-MCNC: 15 MG/DL — SIGNIFICANT CHANGE UP (ref 8–20)
CALCIUM SERPL-MCNC: 8 MG/DL — LOW (ref 8.6–10.2)
CHLORIDE SERPL-SCNC: 101 MMOL/L — SIGNIFICANT CHANGE UP (ref 98–107)
CHOLEST SERPL-MCNC: 104 MG/DL — LOW (ref 110–199)
CO2 SERPL-SCNC: 21 MMOL/L — LOW (ref 22–29)
CREAT SERPL-MCNC: 0.66 MG/DL — SIGNIFICANT CHANGE UP (ref 0.5–1.3)
GLUCOSE SERPL-MCNC: 122 MG/DL — HIGH (ref 70–115)
HCT VFR BLD CALC: 31.1 % — LOW (ref 42–52)
HDLC SERPL-MCNC: 29 MG/DL — LOW
HGB BLD-MCNC: 10.2 G/DL — LOW (ref 14–18)
LIPID PNL WITH DIRECT LDL SERPL: 62 MG/DL — SIGNIFICANT CHANGE UP
MAGNESIUM SERPL-MCNC: 1.7 MG/DL — SIGNIFICANT CHANGE UP (ref 1.6–2.6)
MCHC RBC-ENTMCNC: 31.4 PG — HIGH (ref 27–31)
MCHC RBC-ENTMCNC: 32.8 G/DL — SIGNIFICANT CHANGE UP (ref 32–36)
MCV RBC AUTO: 95.7 FL — HIGH (ref 80–94)
NT-PROBNP SERPL-SCNC: 3035 PG/ML — HIGH (ref 0–300)
PLATELET # BLD AUTO: 216 K/UL — SIGNIFICANT CHANGE UP (ref 150–400)
POTASSIUM SERPL-MCNC: 4 MMOL/L — SIGNIFICANT CHANGE UP (ref 3.5–5.3)
POTASSIUM SERPL-SCNC: 4 MMOL/L — SIGNIFICANT CHANGE UP (ref 3.5–5.3)
RBC # BLD: 3.25 M/UL — LOW (ref 4.6–6.2)
RBC # FLD: 16.1 % — HIGH (ref 11–15.6)
SODIUM SERPL-SCNC: 137 MMOL/L — SIGNIFICANT CHANGE UP (ref 135–145)
TOTAL CHOLESTEROL/HDL RATIO MEASUREMENT: 4 RATIO — SIGNIFICANT CHANGE UP (ref 3.4–9.6)
TRIGL SERPL-MCNC: 66 MG/DL — SIGNIFICANT CHANGE UP (ref 10–200)
TROPONIN T SERPL-MCNC: 0.02 NG/ML — SIGNIFICANT CHANGE UP (ref 0–0.06)
WBC # BLD: 12.2 K/UL — HIGH (ref 4.8–10.8)
WBC # FLD AUTO: 12.2 K/UL — HIGH (ref 4.8–10.8)

## 2017-08-26 PROCEDURE — 99232 SBSQ HOSP IP/OBS MODERATE 35: CPT

## 2017-08-26 PROCEDURE — 99233 SBSQ HOSP IP/OBS HIGH 50: CPT

## 2017-08-26 RX ADMIN — ATORVASTATIN CALCIUM 10 MILLIGRAM(S): 80 TABLET, FILM COATED ORAL at 21:35

## 2017-08-26 RX ADMIN — Medication 1 MILLIGRAM(S): at 13:13

## 2017-08-26 RX ADMIN — TAMSULOSIN HYDROCHLORIDE 0.4 MILLIGRAM(S): 0.4 CAPSULE ORAL at 21:35

## 2017-08-26 RX ADMIN — Medication 50 MICROGRAM(S): at 05:55

## 2017-08-26 RX ADMIN — Medication 81 MILLIGRAM(S): at 13:13

## 2017-08-26 RX ADMIN — PANTOPRAZOLE SODIUM 40 MILLIGRAM(S): 20 TABLET, DELAYED RELEASE ORAL at 05:55

## 2017-08-26 RX ADMIN — Medication 100 MILLIGRAM(S): at 21:35

## 2017-08-26 RX ADMIN — Medication 500 MILLIGRAM(S): at 05:55

## 2017-08-26 RX ADMIN — Medication 100 MILLIGRAM(S): at 05:55

## 2017-08-26 RX ADMIN — CHLORHEXIDINE GLUCONATE 15 MILLILITER(S): 213 SOLUTION TOPICAL at 05:55

## 2017-08-26 RX ADMIN — DABIGATRAN ETEXILATE MESYLATE 150 MILLIGRAM(S): 150 CAPSULE ORAL at 17:21

## 2017-08-26 RX ADMIN — Medication 1 APPLICATION(S): at 05:56

## 2017-08-26 RX ADMIN — MIDODRINE HYDROCHLORIDE 10 MILLIGRAM(S): 2.5 TABLET ORAL at 21:35

## 2017-08-26 RX ADMIN — ZINC SULFATE TAB 220 MG (50 MG ZINC EQUIVALENT) 220 MILLIGRAM(S): 220 (50 ZN) TAB at 13:14

## 2017-08-26 RX ADMIN — CHLORHEXIDINE GLUCONATE 15 MILLILITER(S): 213 SOLUTION TOPICAL at 21:35

## 2017-08-26 RX ADMIN — Medication 325 MILLIGRAM(S): at 13:17

## 2017-08-26 RX ADMIN — MAGNESIUM OXIDE 400 MG ORAL TABLET 400 MILLIGRAM(S): 241.3 TABLET ORAL at 17:21

## 2017-08-26 RX ADMIN — Medication 1 TABLET(S): at 13:12

## 2017-08-26 RX ADMIN — MIDODRINE HYDROCHLORIDE 10 MILLIGRAM(S): 2.5 TABLET ORAL at 05:55

## 2017-08-26 RX ADMIN — MAGNESIUM OXIDE 400 MG ORAL TABLET 400 MILLIGRAM(S): 241.3 TABLET ORAL at 08:29

## 2017-08-26 RX ADMIN — Medication 100 MILLIGRAM(S): at 13:13

## 2017-08-26 RX ADMIN — Medication 500 MILLIGRAM(S): at 17:22

## 2017-08-26 RX ADMIN — MIDODRINE HYDROCHLORIDE 10 MILLIGRAM(S): 2.5 TABLET ORAL at 13:13

## 2017-08-26 RX ADMIN — DABIGATRAN ETEXILATE MESYLATE 150 MILLIGRAM(S): 150 CAPSULE ORAL at 05:55

## 2017-08-26 RX ADMIN — SENNA PLUS 2 TABLET(S): 8.6 TABLET ORAL at 21:35

## 2017-08-26 RX ADMIN — Medication 3 MILLIGRAM(S): at 21:35

## 2017-08-26 RX ADMIN — CHLORHEXIDINE GLUCONATE 15 MILLILITER(S): 213 SOLUTION TOPICAL at 17:21

## 2017-08-26 NOTE — PROGRESS NOTE ADULT - SUBJECTIVE AND OBJECTIVE BOX
[facility]                                                        Office: [address] [place] [address]                                                       Telephone: [phone]. Fax:[phone]    CARDIOLOGY PROGRESS NOTE   ([facility]) weekend coverage for Dr. Mcgregor    Subjective: Patient seen and examined.  Feels well.  Denies dizziness.   Review of symptoms:   Cardiac : No chest pain. No dyspnea. No palpitations.  Respiratory: No cough. No dyspnea.  Gastrointestinal: No bleeding. No diarrhea.  CNS: No headache      CURRENT MEDICATIONS:  tamsulosin 0.4 milliGRAM(s) Oral at bedtime  metoprolol succinate ER 25 milliGRAM(s) Oral daily  midodrine 10 milliGRAM(s) Oral every 8 hours  traMADol 50 milliGRAM(s) Oral every 4 hours PRN  melatonin 3 milliGRAM(s) Oral at bedtime  docusate sodium 100 milliGRAM(s) Oral three times a day  senna 2 Tablet(s) Oral at bedtime  pantoprazole   Suspension 40 milliGRAM(s) Oral before breakfast  polyethylene glycol 3350 17 Gram(s) Oral daily PRN  levothyroxine 50 MICROGram(s) Oral daily  atorvastatin 10 milliGRAM(s) Oral at bedtime  aspirin  chewable 81 milliGRAM(s) Oral daily  dabigatran 150 milliGRAM(s) Oral every 12 hours  chlorhexidine 0.12% Liquid 15 milliLiter(s) Swish and Spit three times a day  ascorbic acid 500 milliGRAM(s) Oral two times a day  zinc sulfate 220 milliGRAM(s) Oral daily  multivitamin 1 Tablet(s) Oral daily  BACItracin   Ointment 1 Application(s) Topical two times a day  ergocalciferol 19013 Unit(s) Oral every week  folic acid 1 milliGRAM(s) Oral daily  sodium chloride 0.9%. 1000 milliLiter(s) IV Continuous <Continuous>  lidocaine 2% Gel 1 Application(s) Topical three times a day PRN  ferrous    sulfate 325 milliGRAM(s) Oral daily  magnesium oxide 400 milliGRAM(s) Oral two times a day with meals  	  TELEMETRY:   Vitals:  T(C): 36.5 (08-26-17 @ 08:30), Max: 36.9 (08-25-17 @ 19:45)  HR: 85 (08-26-17 @ 08:30) (85 - 104)  BP: 103/72 (08-26-17 @ 08:30) (103/72 - 117/83)  RR: 18 (08-26-17 @ 08:30) (18 - 18)  SpO2: 96% (08-26-17 @ 08:30) (96% - 100%)  Wt(kg): --  I&O's Summary    25 Aug 2017 07:01  -  26 Aug 2017 07:00  --------------------------------------------------------  IN: 0 mL / OUT: 2 mL / NET: -2 mL    PHYSICAL EXAM:  Appearance: Normal	  HEENT:   Normal oral mucosa, PERRL, EOMI	  Lymphatic: No lymphadenopathy  Cardiovascular: Normal S1 S2, No JVD, No murmurs, No edema  Respiratory: Lungs clear to auscultation	  Psychiatry: A & O x 3, Mood & affect appropriate  Gastrointestinal:  Soft, Non-tender, + BS	  Skin: No rashes, No ecchymoses, No cyanosis  Neurologic: Non-focal  Extremities: Normal range of motion, No clubbing, cyanosis or edema  Vascular: Peripheral pulses palpable 2+ bilaterally      DIAGNOSTIC TESTING:               10.2   12.2  )-----------( 216      ( 26 Aug 2017 05:13 )             31.1     08-26    137  |  101  |  15.0  ----------------------------<  122<H>  4.0   |  21.0<L>  |  0.66    Ca    8.0<L>      26 Aug 2017 05:11  Mg     1.7     08-26    TPro  5.7<L>  /  Alb  2.9<L>  /  TBili  1.0  /  DBili  x   /  AST  24  /  ALT  37  /  AlkPhos  197<H>  08-24    BNP: Serum Pro-Brain Natriuretic Peptide: 3035 pg/mL (08-26 @ 05:12)

## 2017-08-26 NOTE — PROGRESS NOTE ADULT - SUBJECTIVE AND OBJECTIVE BOX
HISTORY OF PRESENT ILLNESS:  Pt is an 82M with PMHx significant for spinal stenosis s/p multiple spinal surgeries and spinal cord stimulator admitted to [facility] on  for scheduled management of worsening LE pain and neuropathic pain radiating both feet and pain to back, buttocks and thighs s/p exploration of the prior spinal fusion, removal of hardware, removal of spinal stimulator, T11-L1 laminectomy, T4-pelvis spinal fusion with osteotomies on  with Dr. Titus. Hospital course notable for  intraoperative blood loss requiring 2 unit PRBC and persistent acute blood loss anemia,  post op hypotension requiring  pressors, rapid Afib w/ RVR with acute respiratory failure secondary to acute pulmonary edema and bilateral pleural effusions which improved and now off lasix per cardiology, Echo showed EF 65%, encephalopathy for which HCT ( and )  negative for acute pathology but showed chronic infarcts.  SLP followed for mild-moderate dyspahgia on puree with honey liquids diet. On  had 37 beats of Vtach, and persistent leukocytosis. Deemed stable for discharge to acute rehab on 8/15.      TODAY'S SUBJECTIVE & REVIEW OF SYMPTOMS:  No new complaints, denies dizziness or lightheadedness. Pain controlled.     [   ] Constitutional WNL  [   ] Cardio WNL  [   ] Resp WNL  [ x  ] GI WNL  [   ] Heme WNL  [   ] Endo WNL  [   ] Skin WNL  [   ] MSK WNL  [   ] Neuro WNL  [   ] Cognitive WNL  [ x  ] Psych WNL        PHYSICAL EXAM  Vital Signs Last 24 Hrs  T(C): 36.7 (26 Aug 2017 05:28), Max: 36.9 (25 Aug 2017 19:45)  T(F): 98 (26 Aug 2017 05:28), Max: 98.4 (25 Aug 2017 19:45)  HR: 99 (26 Aug 2017 05:28) (92 - 104)  BP: 109/69 (26 Aug 2017 05:28) (102/73 - 117/83)  RR: 18 (26 Aug 2017 05:28) (18 - 18)  SpO2: 100% (26 Aug 2017 05:28) (96% - 100%)    General:  NAD  HEENT: NCAT  Cardio: Irregular rhythm.  Regular rate  Resp: CTAB  Abdomen: Soft NTND  Neuro: Decreased sensation BLE in stocking distribution  Motor: 4-5/5  Extrem: No edema.  Calves soft and NTTP  Skin: Back incision CDI with no erythema noted  Wounds: No decubiti  Cognitive/Psych: Impaired cognition    FUNCTIONAL PROGRESS:  Gait: 10' mod A parallel bars  ADLs: Bathing max A, UE dress sup, LE dress mod A  Transfers: Min-mod A  Bed mobility: Min A      RECENT LABS:                                     9.8    6.3   )-----------( 274      ( 24 Aug 2017 16:53 )             30.6                           10.2   12.2  )-----------( 216      ( 26 Aug 2017 05:13 )             31.1           139  |  105  |  18.0  ----------------------------<  80  4.1   |  22.0  |  0.64    Ca    8.3<L>      24 Aug 2017 16:53  Mg     1.8         TPro  5.7<L>  /  Alb  2.9<L>  /  TBili  1.0  /  DBili  x   /  AST  24  /  ALT  37  /  AlkPhos  197<H>      137  |  101  |  15.0  ----------------------------<  122<H>  4.0   |  21.0<L>  |  0.66    Ca    8.0<L>      26 Aug 2017 05:11  Mg     1.7         TPro  5.7<L>  /  Alb  2.9<L>  /  TBili  1.0  /  DBili  x   /  AST  24  /  ALT  37  /  AlkPhos  197<H>        Urinalysis Basic - ( 16 Aug 2017 20:31 )    Color: Annia / Appearance: Clear / S.020 / pH: x  Gluc: x / Ketone: Negative  / Bili: Small / Urobili: 12 mg/dL   Blood: x / Protein: 15 mg/dL / Nitrite: Negative   Leuk Esterase: Trace / RBC: 3-5 /HPF / WBC 0-2   Sq Epi: x / Non Sq Epi: x / Bacteria: x    Urine culture <10,000cfu/mL GNR and GPC    RADIOLOGY/OTHER RESULTS:    MEDICATIONS  (STANDING):  aspirin  chewable 81 milliGRAM(s) Oral daily  tamsulosin 0.4 milliGRAM(s) Oral at bedtime  dabigatran 150 milliGRAM(s) Oral every 12 hours  docusate sodium 100 milliGRAM(s) Oral three times a day  senna 2 Tablet(s) Oral at bedtime  chlorhexidine 0.12% Liquid 15 milliLiter(s) Swish and Spit three times a day  pantoprazole   Suspension 40 milliGRAM(s) Oral before breakfast  ascorbic acid 500 milliGRAM(s) Oral two times a day  zinc sulfate 220 milliGRAM(s) Oral daily  multivitamin 1 Tablet(s) Oral daily  BACItracin   Ointment 1 Application(s) Topical two times a day  levothyroxine 50 MICROGram(s) Oral daily  ergocalciferol 64164 Unit(s) Oral every week  folic acid 1 milliGRAM(s) Oral daily  metoprolol succinate ER 25 milliGRAM(s) Oral daily  melatonin 3 milliGRAM(s) Oral at bedtime  sodium chloride 0.9%. 1000 milliLiter(s) (50 mL/Hr) IV Continuous <Continuous>  ferrous    sulfate 325 milliGRAM(s) Oral daily  atorvastatin 10 milliGRAM(s) Oral at bedtime  midodrine 10 milliGRAM(s) Oral every 8 hours  magnesium oxide 400 milliGRAM(s) Oral two times a day with meals    MEDICATIONS  (PRN):  polyethylene glycol 3350 17 Gram(s) Oral daily PRN Constipation  traMADol 50 milliGRAM(s) Oral every 4 hours PRN Severe Pain (7 - 10)  lidocaine 2% Gel 1 Application(s) Topical three times a day PRN pain      Assessment:  Pt is an 81y/o male with spinal stenosis s/p T11-L1 laminectomy, T4-pelvis spinal fusion    Comprehensive rehab: PT/OT/ST  Spinal stenosis-Spinal precautions.  Pain-Tramadol prn  H/O acute respiratory distress- improved; pt oxygenating well on RA although persistent SOB on exertion; requested repeat CXR which was neg and ordered duonebs q6hrs x 3 days.  Appreciate hospitalist consult  Metabolic ranabmmberfnpm-[place] ST.  Caution with excessive pain meds or anxiolytics, fall precautions  Dysphagia-Puree and honey-thickened liquid diet.  IVF for hydration.  Cont ST.  Added Estim.  MBS pending for   Atrial fibrillation-HR controlled.  Cont Toprol XL daily with holding parameters and Pradaxa.  EKG due to c/o "heart fluttering"  Appreciate cardiology consult; nuclear stress test performed ; neg  Cont metoprolol hold for SBP <90; keep Mg > 2 and K > 4.  monitor BP  Constipation- bowel regimen.   HTN-SBPs was low at   Decreased Toprol XL 50 mg po daily to 25mg daily as per pre-op.  Orthostatic hypotension; IVF bolus given  Cont abdominal binder and TEDs when out of bed  Midodrine added and Remeron discontinued by hospitalist, cardiology follow up appreciated.  Hyperlipidemia - Added lipitor 20mg.  Check lipid panel    Elevated TSH 5.28-Added Synthroid 50 daily  Elevated LFTs-Discontinued tylenol.  AST/ALT improved; now WNL.   Acute blood loss anemia-H/H stable.  Folic acid and add Fe, CBC: increased WBC, aftebrile, repeat CBC in am.   Vitamin D deficiency-Low normal Vit D at 31.  Added Vit D 50,000U weekly  F/U with PMD Dr. Lares and cardiology Dr. Ny as outpt.

## 2017-08-26 NOTE — PROGRESS NOTE ADULT - SUBJECTIVE AND OBJECTIVE BOX
CC: Fatigue , palpitations , found to have orthostatic hypotension . S/P spinal surgery    HPI:   Pt is an 82M with PMHx significant for spinal stenosis s/p multiple spinal surgeries and spinal cord stimulator admitted to [facility] on 8/1 for scheduled management of worsening LE pain and neuropathic pain radiating both feet and pain to back, buttocks and thighs s/p exploration of the prior spinal fusion, removal of hardware, removal of spinal stimulator, T11-L1 laminectomy, T4-pelvis spinal fusion with osteotomies on 8/1 with Dr. Titus. Hospital course notable for  intraoperative blood loss requiring 2 unit PRBC and persistent acute blood loss anemia,  post op hypotension requiring  pressors, rapid Afib w/ RVR with acute respiratory failure secondary to acute pulmonary edema and bilateral pleural effusions which improved and now off lasix per cardiology, Echo showed EF 65%, encephalopathy for which HCT (8/6 and 8/14)  negative for acute pathology but showed chronic infarcts.  SLP followed for mild-moderate dyspahgia on puree with honey liquids diet. On 8/14, as per RN note,  had 37 beats of  Vtach,  Ortho team was notified, House Cardiology was not informed and had signed off on 8/12.  Patient was discharged to acute rehab on 8/15.    INTERVAL HPI/OVERNIGHT EVENTS:     PAST MEDICAL & SURGICAL HISTORY:  Spinal stenosis of lumbosacral region  Rotator cuff disorder, right  Arthritis  Inguinal hernia: right  Reflux  CAD (coronary artery disease)  HTN (hypertension)  HLD (hyperlipidemia)  Atrial fibrillation  Peripheral neuropathy  H/O rotator cuff surgery: 12/2015  History of skin surgery: melanoma excision - left cheek/face 2016  Encounter for interrogation of neurostimulator: 12/2015  Back injury: s/p surgery L1-L9 in two separate surgeries 2003 &amp; 2006  S/P angioplasty with stent: RCA 2008 &amp; LAD 2014 cardiac stent placement  S/P knee replacement, bilateral  S/P cataract extraction      MEDICATIONS  (STANDING):  aspirin  chewable 81 milliGRAM(s) Oral daily  tamsulosin 0.4 milliGRAM(s) Oral at bedtime  dabigatran 150 milliGRAM(s) Oral every 12 hours  docusate sodium 100 milliGRAM(s) Oral three times a day  senna 2 Tablet(s) Oral at bedtime  chlorhexidine 0.12% Liquid 15 milliLiter(s) Swish and Spit three times a day  pantoprazole   Suspension 40 milliGRAM(s) Oral before breakfast  ascorbic acid 500 milliGRAM(s) Oral two times a day  zinc sulfate 220 milliGRAM(s) Oral daily  multivitamin 1 Tablet(s) Oral daily  BACItracin   Ointment 1 Application(s) Topical two times a day  levothyroxine 50 MICROGram(s) Oral daily  ergocalciferol 39259 Unit(s) Oral every week  folic acid 1 milliGRAM(s) Oral daily  metoprolol succinate ER 25 milliGRAM(s) Oral daily  melatonin 3 milliGRAM(s) Oral at bedtime  sodium chloride 0.9%. 1000 milliLiter(s) (50 mL/Hr) IV Continuous <Continuous>  ferrous    sulfate 325 milliGRAM(s) Oral daily  atorvastatin 10 milliGRAM(s) Oral at bedtime  midodrine 10 milliGRAM(s) Oral every 8 hours  magnesium oxide 400 milliGRAM(s) Oral two times a day with meals    MEDICATIONS  (PRN):  polyethylene glycol 3350 17 Gram(s) Oral daily PRN Constipation  traMADol 50 milliGRAM(s) Oral every 4 hours PRN Severe Pain (7 - 10)  lidocaine 2% Gel 1 Application(s) Topical three times a day PRN pain      LABS:                          10.2   12.2  )-----------( 216      ( 26 Aug 2017 05:13 )             31.1     08-26    137  |  101  |  15.0  ----------------------------<  122<H>  4.0   |  21.0<L>  |  0.66    Ca    8.0<L>      26 Aug 2017 05:11  Mg     1.7     08-26    TPro  5.7<L>  /  Alb  2.9<L>  /  TBili  1.0  /  DBili  x   /  AST  24  /  ALT  37  /  AlkPhos  197<H>  08-24          RADIOLOGY & ADDITIONAL TESTS:      REVIEW OF SYSTEMS:    CONSTITUTIONAL: No fever, weight loss, or fatigue  EYES: No eye pain, visual disturbances, or discharge  ENMT:  No difficulty hearing, tinnitus, vertigo; No sinus or throat pain  NECK: No pain or stiffness  RESPIRATORY: No cough, wheezing, chills or hemoptysis; No shortness of breath  CARDIOVASCULAR: No chest pain, palpitations, dizziness, or leg swelling  GASTROINTESTINAL: No abdominal or epigastric pain. No nausea, vomiting, or hematemesis; No diarrhea or constipation. No melena or hematochezia.  GENITOURINARY: No dysuria, frequency, hematuria, or incontinence  NEUROLOGICAL: No headaches, memory loss, loss of strength, numbness, or tremors  SKIN: No itching, burning, rashes, or lesions   LYMPH NODES: No enlarged glands  ENDOCRINE: No heat or cold intolerance; No hair loss  MUSCULOSKELETAL:   PSYCHIATRIC: No depression, anxiety, mood swings, or difficulty sleeping  HEME/LYMPH: No easy bruising, or bleeding gums  ALLERGY AND IMMUNOLOGIC: No hives or eczema    Vital Signs Last 24 Hrs  T(C): 36.7 (26 Aug 2017 05:28), Max: 36.9 (25 Aug 2017 19:45)  T(F): 98 (26 Aug 2017 05:28), Max: 98.4 (25 Aug 2017 19:45)  HR: 99 (26 Aug 2017 05:28) (99 - 104)  BP: 109/69 (26 Aug 2017 05:28) (109/69 - 117/83)  BP(mean): --  RR: 18 (26 Aug 2017 05:28) (18 - 18)  SpO2: 100% (26 Aug 2017 05:28) (96% - 100%)  PHYSICAL EXAM:    GENERAL: NAD, well-groomed, well-developed  HEAD:  Atraumatic, Normocephalic  EYES: EOMI, PERRLA, conjunctiva and sclera clear  NECK: Supple, No JVD, Normal thyroid  NERVOUS SYSTEM:  Alert & Oriented X3, no focal deficit  CHEST/LUNG: CTA b/l ,  no  rales, rhonchi, wheezing, or rubs  HEART: Regular rate and rhythm; No murmurs, rubs, or gallops  ABDOMEN: Soft, Nontender, Nondistended; Bowel sounds present  EXTREMITIES:  2+ Peripheral Pulses, No clubbing, cyanosis, or edema  LYMPH: No lymphadenopathy noted  SKIN: No rashes or lesions CC: Fatigue , palpitations , found to have orthostatic hypotension . S/P spinal surgery    HPI:   Pt is an 82M with PMHx significant for spinal stenosis s/p multiple spinal surgeries and spinal cord stimulator admitted to [facility] on 8/1 for scheduled management of worsening LE pain and neuropathic pain radiating both feet and pain to back, buttocks and thighs s/p exploration of the prior spinal fusion, removal of hardware, removal of spinal stimulator, T11-L1 laminectomy, T4-pelvis spinal fusion with osteotomies on 8/1 with Dr. Titus. Hospital course notable for  intraoperative blood loss requiring 2 unit PRBC and persistent acute blood loss anemia,  post op hypotension requiring  pressors, rapid Afib w/ RVR with acute respiratory failure secondary to acute pulmonary edema and bilateral pleural effusions which improved and now off lasix per cardiology, Echo showed EF 65%, encephalopathy for which HCT (8/6 and 8/14)  negative for acute pathology but showed chronic infarcts.  SLP followed for mild-moderate dyspahgia on puree with honey liquids diet. On 8/14, as per RN note,  had 37 beats of  Vtach,  Ortho team was notified, House Cardiology was not informed and had signed off on 8/12.  Patient was discharged to acute rehab on 8/15.    INTERVAL HPI/OVERNIGHT EVENTS:     PAST MEDICAL & SURGICAL HISTORY:  Spinal stenosis of lumbosacral region  Rotator cuff disorder, right  Arthritis  Inguinal hernia: right  Reflux  CAD (coronary artery disease)  HTN (hypertension)  HLD (hyperlipidemia)  Atrial fibrillation  Peripheral neuropathy  H/O rotator cuff surgery: 12/2015  History of skin surgery: melanoma excision - left cheek/face 2016  Encounter for interrogation of neurostimulator: 12/2015  Back injury: s/p surgery L1-L9 in two separate surgeries 2003 &amp; 2006  S/P angioplasty with stent: RCA 2008 &amp; LAD 2014 cardiac stent placement  S/P knee replacement, bilateral  S/P cataract extraction      MEDICATIONS  (STANDING):  aspirin  chewable 81 milliGRAM(s) Oral daily  tamsulosin 0.4 milliGRAM(s) Oral at bedtime  dabigatran 150 milliGRAM(s) Oral every 12 hours  docusate sodium 100 milliGRAM(s) Oral three times a day  senna 2 Tablet(s) Oral at bedtime  chlorhexidine 0.12% Liquid 15 milliLiter(s) Swish and Spit three times a day  pantoprazole   Suspension 40 milliGRAM(s) Oral before breakfast  ascorbic acid 500 milliGRAM(s) Oral two times a day  zinc sulfate 220 milliGRAM(s) Oral daily  multivitamin 1 Tablet(s) Oral daily  BACItracin   Ointment 1 Application(s) Topical two times a day  levothyroxine 50 MICROGram(s) Oral daily  ergocalciferol 37010 Unit(s) Oral every week  folic acid 1 milliGRAM(s) Oral daily  metoprolol succinate ER 25 milliGRAM(s) Oral daily  melatonin 3 milliGRAM(s) Oral at bedtime  sodium chloride 0.9%. 1000 milliLiter(s) (50 mL/Hr) IV Continuous <Continuous>  ferrous    sulfate 325 milliGRAM(s) Oral daily  atorvastatin 10 milliGRAM(s) Oral at bedtime  midodrine 10 milliGRAM(s) Oral every 8 hours  magnesium oxide 400 milliGRAM(s) Oral two times a day with meals    MEDICATIONS  (PRN):  polyethylene glycol 3350 17 Gram(s) Oral daily PRN Constipation  traMADol 50 milliGRAM(s) Oral every 4 hours PRN Severe Pain (7 - 10)  lidocaine 2% Gel 1 Application(s) Topical three times a day PRN pain      LABS:                          10.2   12.2  )-----------( 216      ( 26 Aug 2017 05:13 )             31.1     08-26    137  |  101  |  15.0  ----------------------------<  122<H>  4.0   |  21.0<L>  |  0.66    Ca    8.0<L>      26 Aug 2017 05:11  Mg     1.7     08-26    TPro  5.7<L>  /  Alb  2.9<L>  /  TBili  1.0  /  DBili  x   /  AST  24  /  ALT  37  /  AlkPhos  197<H>  08-24        REVIEW OF SYSTEMS:  Fatigue but feels better , all other systems reviewed and are negative     Vital Signs Last 24 Hrs  T(C): 36.7 (26 Aug 2017 05:28), Max: 36.9 (25 Aug 2017 19:45)  T(F): 98 (26 Aug 2017 05:28), Max: 98.4 (25 Aug 2017 19:45)  HR: 99 (26 Aug 2017 05:28) (99 - 104)  BP: 109/69 (26 Aug 2017 05:28) (109/69 - 117/83)  BP(mean): --  RR: 18 (26 Aug 2017 05:28) (18 - 18)  SpO2: 100% (26 Aug 2017 05:28) (96% - 100%)    PHYSICAL EXAM:    GENERAL: NAD, well-groomed, well-developed  HEAD:  Atraumatic, Normocephalic  EYES: EOMI, PERRLA, conjunctiva and sclera clear  NECK: Supple, No JVD, Normal thyroid  NERVOUS SYSTEM:  Alert & Oriented X3, no focal deficit  CHEST/LUNG: CTA b/l ,  no  rales, rhonchi, wheezing, or rubs  HEART: irregular ; No murmurs, rubs, or gallops  ABDOMEN: Soft, Nontender, Nondistended; Bowel sounds present  EXTREMITIES:  2+ Peripheral Pulses, No clubbing, cyanosis, or edema  LYMPH: No lymphadenopathy noted  SKIN: No rashes or lesions

## 2017-08-26 NOTE — PROGRESS NOTE ADULT - ASSESSMENT
Pt is an 82M with PMHx significant for spinal stenosis s/p multiple spinal surgeries and spinal cord stimulator admitted to [facility] on 8/1 for scheduled management of worsening LE pain and neuropathic pain radiating both feet and pain to back, buttocks and thighs s/p exploration of the prior spinal fusion, removal of hardware, removal of spinal stimulator, T11-L1 laminectomy, T4-pelvis spinal fusion with osteotomies on 8/1 with Dr. Titus. Hospital course notable for  intraoperative blood loss requiring 2 unit PRBC and persistent acute blood loss anemia,  post op hypotension requiring  pressors, rapid Afib w/ RVR with acute respiratory failure secondary to acute pulmonary edema and bilateral pleural effusions which improved and now off lasix per cardiology, Echo showed EF 65%, encephalopathy for which HCT (8/6 and 8/14)  negative for acute pathology but showed chronic infarcts.  SLP followed for mild-moderate dyspahgia on puree with honey liquids diet. On 8/14, as per RN note,  had 37 beats of  Vtach,  Ortho team was notified, House Cardiology was not informed and had signed off on 8/12.  Patient was discharged to acute rehab on 8/15.    Problem/Recommendation - 1:  Problem: Hypotension. Recommendation: Orthostatic: Spinal vs deconditioned, venous pooling . Stress test normal. Discussed with Dr. Mcgregor, increase Midodrine to  10 mg TID, check trop/BNP/repeat labs in am  awaiting Cortisol results, may consider adding Florinef  improved after   continue IV fluids today  shweta stockings, abdominal binder.  encourage po intake  Discussed with rehab attending, to speak with Orthopedic surgeon Dr. Titus regarding orthostasis 2/2 spinal procedure    Problem/Recommendation - 2:  ·  Problem: Nonsustained ventricular tachycardia.  Recommendation: keep Mag>2, K>4  Cardio input appreciated, for Nuclear stress test - normal    Problem/Recommendation - 3:  ·  Problem: Atrial fibrillation.  Recommendation: BB needed for rate control  Pradaxa.     Problem/Recommendation - 4:  ·  Problem: CAD (coronary artery disease).  Recommendation: ASA/Statin/BB. For Nuclear stress test negative  as per Cardio.     Problem/Recommendation - 5:  ·  Problem: Spinal stenosis of lumbosacral region.  Recommendation: s/p exploration of the prior spinal fusion, removal of hardware, removal of spinal stimulator, T11-L1 laminectomy, T4-pelvis spinal fusion with osteotomies on 8/1 with Dr. Titus with complicated post operative coarse  Rehab program.     Problem/Recommendation - 6:  Problem: HLD (hyperlipidemia). Recommendation: statin. Patient was on [id number] mg    Problem/Recommendation - 7:  Problem: Poor appetite.+ severe protein calorie malnutrition. Recommendation: Dysphagia diet, for MBS next week, patient is anxious to advance diet   IV hydration  encourage oral intake.    Problem/Recommendation-8  Problem: Insomnia. Recommendation: Add Melatonin as standing dose at HS Pt is an 82M with PMHx significant for spinal stenosis s/p multiple spinal surgeries and spinal cord stimulator admitted to [facility] on 8/1 for scheduled management of worsening LE pain and neuropathic pain radiating both feet and pain to back, buttocks and thighs s/p exploration of the prior spinal fusion, removal of hardware, removal of spinal stimulator, T11-L1 laminectomy, T4-pelvis spinal fusion with osteotomies on 8/1 with Dr. Titus. Hospital course notable for  intraoperative blood loss requiring 2 unit PRBC and persistent acute blood loss anemia,  post op hypotension requiring  pressors, rapid Afib w/ RVR with acute respiratory failure secondary to acute pulmonary edema and bilateral pleural effusions which improved and now off lasix per cardiology, Echo showed EF 65%, encephalopathy for which HCT (8/6 and 8/14)  negative for acute pathology but showed chronic infarcts.  SLP followed for mild-moderate dyspahgia on puree with honey liquids diet. On 8/14, as per RN note,  had 37 beats of  Vtach,  Ortho team was notified, House Cardiology was not informed and had signed off on 8/12.  Patient was discharged to acute rehab on 8/15.    Problem/Recommendation - 1:  Problem: Hypotension. Recommendation: Orthostatic: Spinal vs deconditioned, venous pooling . Stress test normal. Continue  Midodrine to  10 mg TID,   awaiting Cortisol results, may consider adding Florinef  improved after   continue IV fluids yesterday   shweta stockings, abdominal binder.  encourage po intake  Discussed with rehab attending, to speak with Orthopedic surgeon Dr. Titus regarding orthostasis 2/2 spinal procedure    Problem/Recommendation - 2:  ·  Problem: Nonsustained ventricular tachycardia.  Recommendation: keep Mag>2, K>4  Cardio input appreciated,  Nuclear stress test - normal    Problem/Recommendation - 3:  ·  Problem: Atrial fibrillation.  Recommendation: BB needed for rate control , continue   Pradaxa.     Problem/Recommendation - 4:  ·  Problem: CAD (coronary artery disease).  Recommendation: ASA/Statin/BB.     Problem/Recommendation - 5:  ·  Problem: Spinal stenosis of lumbosacral region.  Recommendation: s/p exploration of the prior spinal fusion, removal of hardware, removal of spinal stimulator, T11-L1 laminectomy, T4-pelvis spinal fusion with osteotomies on 8/1 with Dr. Titus with complicated post operative coarse  Rehab program.     Problem/Recommendation - 6:  Problem: HLD (hyperlipidemia). Recommendation: statin. Continue  statins     Problem/Recommendation - 7:  Problem: Poor appetite.+ severe protein calorie malnutrition. Recommendation: Dysphagia diet, for MBS next week, patient is anxious to advance diet   IV hydration  encourage oral intake.    Problem/Recommendation-8  Problem: Insomnia. Recommendation: Add Melatonin as standing dose at HS    Problem / Plan - 9: Leucocytosis - will check UA / Urine C&S , follow CBC in am

## 2017-08-27 LAB
APPEARANCE UR: CLEAR — SIGNIFICANT CHANGE UP
BILIRUB UR-MCNC: NEGATIVE — SIGNIFICANT CHANGE UP
COD CRY URNS QL: ABNORMAL
COLOR SPEC: ABNORMAL
DIFF PNL FLD: NEGATIVE — SIGNIFICANT CHANGE UP
GLUCOSE UR QL: NEGATIVE MG/DL — SIGNIFICANT CHANGE UP
HCT VFR BLD CALC: 31.4 % — LOW (ref 42–52)
HGB BLD-MCNC: 9.8 G/DL — LOW (ref 14–18)
KETONES UR-MCNC: ABNORMAL
LEUKOCYTE ESTERASE UR-ACNC: ABNORMAL
MCHC RBC-ENTMCNC: 30.3 PG — SIGNIFICANT CHANGE UP (ref 27–31)
MCHC RBC-ENTMCNC: 31.2 G/DL — LOW (ref 32–36)
MCV RBC AUTO: 97.2 FL — HIGH (ref 80–94)
NITRITE UR-MCNC: NEGATIVE — SIGNIFICANT CHANGE UP
PH UR: 5 — SIGNIFICANT CHANGE UP (ref 5–8)
PLATELET # BLD AUTO: 193 K/UL — SIGNIFICANT CHANGE UP (ref 150–400)
PROT UR-MCNC: 15 MG/DL
RBC # BLD: 3.23 M/UL — LOW (ref 4.6–6.2)
RBC # FLD: 15.9 % — HIGH (ref 11–15.6)
RBC CASTS # UR COMP ASSIST: SIGNIFICANT CHANGE UP /HPF (ref 0–4)
SP GR SPEC: 1.02 — SIGNIFICANT CHANGE UP (ref 1.01–1.02)
UROBILINOGEN FLD QL: 4 MG/DL
WBC # BLD: 6 K/UL — SIGNIFICANT CHANGE UP (ref 4.8–10.8)
WBC # FLD AUTO: 6 K/UL — SIGNIFICANT CHANGE UP (ref 4.8–10.8)
WBC UR QL: SIGNIFICANT CHANGE UP

## 2017-08-27 PROCEDURE — 99232 SBSQ HOSP IP/OBS MODERATE 35: CPT | Mod: GC

## 2017-08-27 RX ADMIN — TAMSULOSIN HYDROCHLORIDE 0.4 MILLIGRAM(S): 0.4 CAPSULE ORAL at 21:52

## 2017-08-27 RX ADMIN — Medication 1 MILLIGRAM(S): at 13:30

## 2017-08-27 RX ADMIN — MAGNESIUM OXIDE 400 MG ORAL TABLET 400 MILLIGRAM(S): 241.3 TABLET ORAL at 08:09

## 2017-08-27 RX ADMIN — Medication 1 TABLET(S): at 13:30

## 2017-08-27 RX ADMIN — Medication 1 APPLICATION(S): at 06:07

## 2017-08-27 RX ADMIN — Medication 25 MILLIGRAM(S): at 06:06

## 2017-08-27 RX ADMIN — MIDODRINE HYDROCHLORIDE 10 MILLIGRAM(S): 2.5 TABLET ORAL at 06:06

## 2017-08-27 RX ADMIN — TRAMADOL HYDROCHLORIDE 50 MILLIGRAM(S): 50 TABLET ORAL at 17:24

## 2017-08-27 RX ADMIN — ZINC SULFATE TAB 220 MG (50 MG ZINC EQUIVALENT) 220 MILLIGRAM(S): 220 (50 ZN) TAB at 13:29

## 2017-08-27 RX ADMIN — Medication 100 MILLIGRAM(S): at 06:06

## 2017-08-27 RX ADMIN — DABIGATRAN ETEXILATE MESYLATE 150 MILLIGRAM(S): 150 CAPSULE ORAL at 17:24

## 2017-08-27 RX ADMIN — DABIGATRAN ETEXILATE MESYLATE 150 MILLIGRAM(S): 150 CAPSULE ORAL at 06:07

## 2017-08-27 RX ADMIN — POLYETHYLENE GLYCOL 3350 17 GRAM(S): 17 POWDER, FOR SOLUTION ORAL at 21:52

## 2017-08-27 RX ADMIN — SENNA PLUS 2 TABLET(S): 8.6 TABLET ORAL at 21:52

## 2017-08-27 RX ADMIN — MAGNESIUM OXIDE 400 MG ORAL TABLET 400 MILLIGRAM(S): 241.3 TABLET ORAL at 17:25

## 2017-08-27 RX ADMIN — TRAMADOL HYDROCHLORIDE 50 MILLIGRAM(S): 50 TABLET ORAL at 18:15

## 2017-08-27 RX ADMIN — PANTOPRAZOLE SODIUM 40 MILLIGRAM(S): 20 TABLET, DELAYED RELEASE ORAL at 06:07

## 2017-08-27 RX ADMIN — Medication 100 MILLIGRAM(S): at 13:31

## 2017-08-27 RX ADMIN — Medication 1 APPLICATION(S): at 18:53

## 2017-08-27 RX ADMIN — MIDODRINE HYDROCHLORIDE 10 MILLIGRAM(S): 2.5 TABLET ORAL at 21:52

## 2017-08-27 RX ADMIN — CHLORHEXIDINE GLUCONATE 15 MILLILITER(S): 213 SOLUTION TOPICAL at 17:24

## 2017-08-27 RX ADMIN — MIDODRINE HYDROCHLORIDE 10 MILLIGRAM(S): 2.5 TABLET ORAL at 13:30

## 2017-08-27 RX ADMIN — Medication 325 MILLIGRAM(S): at 13:30

## 2017-08-27 RX ADMIN — Medication 100 MILLIGRAM(S): at 21:52

## 2017-08-27 RX ADMIN — CHLORHEXIDINE GLUCONATE 15 MILLILITER(S): 213 SOLUTION TOPICAL at 06:08

## 2017-08-27 RX ADMIN — Medication 500 MILLIGRAM(S): at 17:25

## 2017-08-27 RX ADMIN — Medication 3 MILLIGRAM(S): at 21:52

## 2017-08-27 RX ADMIN — CHLORHEXIDINE GLUCONATE 15 MILLILITER(S): 213 SOLUTION TOPICAL at 21:52

## 2017-08-27 RX ADMIN — TRAMADOL HYDROCHLORIDE 50 MILLIGRAM(S): 50 TABLET ORAL at 21:52

## 2017-08-27 RX ADMIN — Medication 50 MICROGRAM(S): at 06:07

## 2017-08-27 RX ADMIN — SODIUM CHLORIDE 50 MILLILITER(S): 9 INJECTION INTRAMUSCULAR; INTRAVENOUS; SUBCUTANEOUS at 19:06

## 2017-08-27 RX ADMIN — ATORVASTATIN CALCIUM 10 MILLIGRAM(S): 80 TABLET, FILM COATED ORAL at 21:52

## 2017-08-27 RX ADMIN — TRAMADOL HYDROCHLORIDE 50 MILLIGRAM(S): 50 TABLET ORAL at 22:50

## 2017-08-27 RX ADMIN — Medication 500 MILLIGRAM(S): at 06:06

## 2017-08-27 RX ADMIN — Medication 81 MILLIGRAM(S): at 13:30

## 2017-08-27 RX ADMIN — Medication 1 APPLICATION(S): at 08:05

## 2017-08-27 NOTE — PROGRESS NOTE ADULT - SUBJECTIVE AND OBJECTIVE BOX
HISTORY OF PRESENT ILLNESS:  Pt is an 82M with PMHx significant for spinal stenosis s/p multiple spinal surgeries and spinal cord stimulator admitted to [facility] on  for scheduled management of worsening LE pain and neuropathic pain radiating both feet and pain to back, buttocks and thighs s/p exploration of the prior spinal fusion, removal of hardware, removal of spinal stimulator, T11-L1 laminectomy, T4-pelvis spinal fusion with osteotomies on  with Dr. Titus. Hospital course notable for  intraoperative blood loss requiring 2 unit PRBC and persistent acute blood loss anemia,  post op hypotension requiring  pressors, rapid Afib w/ RVR with acute respiratory failure secondary to acute pulmonary edema and bilateral pleural effusions which improved and now off lasix per cardiology, Echo showed EF 65%, encephalopathy for which HCT ( and )  negative for acute pathology but showed chronic infarcts.  SLP followed for mild-moderate dyspahgia on puree with honey liquids diet. On  had 37 beats of Vtach, and persistent leukocytosis. Deemed stable for discharge to acute rehab on 8/15.      TODAY'S SUBJECTIVE & REVIEW OF SYMPTOMS:  Patient denies lightheadedness, dizziness. No headache, dysuria or frequency. Reports no BM for days, does not want to try anything "stronger," wants to wait for now.      [   ] Constitutional WNL  [   ] Cardio WNL  [   ] Resp WNL  [ x  ] GI WNL  [   ] Heme WNL  [   ] Endo WNL  [   ] Skin WNL  [   ] MSK WNL  [   ] Neuro WNL  [   ] Cognitive WNL  [ x  ] Psych WNL        PHYSICAL EXAM  Vital Signs Last 24 Hrs  T(C): 36.4 (27 Aug 2017 06:00), Max: 36.7 (26 Aug 2017 20:30)  T(F): 97.5 (27 Aug 2017 06:00), Max: 98.1 (26 Aug 2017 20:30)  HR: 104 (27 Aug 2017 06:10) (96 - 104)  BP: 114/73 (27 Aug 2017 06:00) (111/77 - 114/73)  BP(mean): --  RR: 18 (27 Aug 2017 06:00) (18 - 18)  SpO2: 98% (27 Aug 2017 06:00) (95% - 98%)    General:  NAD  HEENT: NCAT  Cardio: Irregular rhythm.  Regular rate  Resp: CTAB  Abdomen: Soft NTND  Neuro: Decreased sensation BLE in stocking distribution  Motor: 4-5/5  Extrem: No edema.  Calves soft and NTTP  Skin: Back incision CDI with no erythema noted  Wounds: No decubiti  Cognitive/Psych: Impaired cognition    FUNCTIONAL PROGRESS:  Gait: 10' mod A parallel bars  ADLs: Bathing max A, UE dress sup, LE dress mod A  Transfers: Min-mod A  Bed mobility: Min A      RECENT LABS:                                          10.2   12.2  )-----------( 216      ( 26 Aug 2017 05:13 )             31.1                             9.8    6.0   )-----------( 193      ( 27 Aug 2017 08:08 )             31.4           139  |  105  |  18.0  ----------------------------<  80  4.1   |  22.0  |  0.64    Ca    8.3<L>      24 Aug 2017 16:53  Mg     1.8         TPro  5.7<L>  /  Alb  2.9<L>  /  TBili  1.0  /  DBili  x   /  AST  24  /  ALT  37  /  AlkPhos  197<H>      137  |  101  |  15.0  ----------------------------<  122<H>  4.0   |  21.0<L>  |  0.66    Ca    8.0<L>      26 Aug 2017 05:11  Mg     1.7         TPro  5.7<L>  /  Alb  2.9<L>  /  TBili  1.0  /  DBili  x   /  AST  24  /  ALT  37  /  AlkPhos  197<H>        Urinalysis Basic - ( 27 Aug 2017 08:05 )    Color: Annia / Appearance: Clear / S.025 / pH: x  Gluc: x / Ketone: Trace  / Bili: Negative / Urobili: 4 mg/dL   Blood: x / Protein: 15 mg/dL / Nitrite: Negative   Leuk Esterase: Trace / RBC: 0-2 /HPF / WBC 0-2   Sq Epi: x / Non Sq Epi: x / Bacteria: x    RADIOLOGY/OTHER RESULTS:    MEDICATIONS  (STANDING):  aspirin  chewable 81 milliGRAM(s) Oral daily  tamsulosin 0.4 milliGRAM(s) Oral at bedtime  dabigatran 150 milliGRAM(s) Oral every 12 hours  docusate sodium 100 milliGRAM(s) Oral three times a day  senna 2 Tablet(s) Oral at bedtime  chlorhexidine 0.12% Liquid 15 milliLiter(s) Swish and Spit three times a day  pantoprazole   Suspension 40 milliGRAM(s) Oral before breakfast  ascorbic acid 500 milliGRAM(s) Oral two times a day  zinc sulfate 220 milliGRAM(s) Oral daily  multivitamin 1 Tablet(s) Oral daily  BACItracin   Ointment 1 Application(s) Topical two times a day  levothyroxine 50 MICROGram(s) Oral daily  ergocalciferol 71935 Unit(s) Oral every week  folic acid 1 milliGRAM(s) Oral daily  metoprolol succinate ER 25 milliGRAM(s) Oral daily  melatonin 3 milliGRAM(s) Oral at bedtime  sodium chloride 0.9%. 1000 milliLiter(s) (50 mL/Hr) IV Continuous <Continuous>  ferrous    sulfate 325 milliGRAM(s) Oral daily  atorvastatin 10 milliGRAM(s) Oral at bedtime  midodrine 10 milliGRAM(s) Oral every 8 hours  magnesium oxide 400 milliGRAM(s) Oral two times a day with meals    MEDICATIONS  (PRN):  polyethylene glycol 3350 17 Gram(s) Oral daily PRN Constipation  traMADol 50 milliGRAM(s) Oral every 4 hours PRN Severe Pain (7 - 10)  lidocaine 2% Gel 1 Application(s) Topical three times a day PRN pain      Assessment:  Pt is an 81y/o male with spinal stenosis s/p T11-L1 laminectomy, T4-pelvis spinal fusion    Comprehensive rehab: PT/OT/ST  Spinal stenosis-Spinal precautions.  Pain-Tramadol prn  H/O acute respiratory distress- improved; pt oxygenating well on RA although persistent SOB on exertion; requested repeat CXR which was neg and ordered duonebs q6hrs x 3 days.  Appreciate hospitalist consult  Metabolic tnrwftpephhket-Mttbogis-Imco ST.  Caution with excessive pain meds or anxiolytics, fall precautions  Dysphagia-Puree and honey-thickened liquid diet.  IVF for hydration.  Cont ST.  Added Estim.  MBS pending for 8/27  Atrial fibrillation-HR controlled.  Cont Toprol XL daily with holding parameters and Pradaxa.  EKG due to c/o "heart fluttering"  Appreciate cardiology consult; nuclear stress test performed ; neg  Cont metoprolol hold for SBP <90; keep Mg > 2 and K > 4.  monitor BP  Constipation- bowel regimen.   HTN-SBPs was low at   Decreased Toprol XL 50 mg po daily to 25mg daily as per pre-op.  Orthostatic hypotension; IVF bolus given  Cont abdominal binder and TEDs when out of bed  Midodrine added and Remeron discontinued by hospitalist, cardiology follow up appreciated.  Hyperlipidemia - Added lipitor 20mg.  Check lipid panel    Elevated TSH 5.28-Added Synthroid 50 daily  Elevated LFTs-Discontinued tylenol.  AST/ALT improved; now WNL.   Acute blood loss anemia-H/H stable.  Folic acid and add Fe, Repeat CBC: WBC improved, UA negative, patient afebrile.   Vitamin D deficiency-Low normal Vit D at 31.  Added Vit D 50,000U weekly  F/U with PMD Dr. Lares and cardiology Dr. Ny as outpt.

## 2017-08-28 LAB
CULTURE RESULTS: SIGNIFICANT CHANGE UP
SPECIMEN SOURCE: SIGNIFICANT CHANGE UP

## 2017-08-28 PROCEDURE — 99232 SBSQ HOSP IP/OBS MODERATE 35: CPT

## 2017-08-28 PROCEDURE — 74230 X-RAY XM SWLNG FUNCJ C+: CPT | Mod: 26

## 2017-08-28 PROCEDURE — 99233 SBSQ HOSP IP/OBS HIGH 50: CPT

## 2017-08-28 RX ORDER — SODIUM CHLORIDE 9 MG/ML
1000 INJECTION INTRAMUSCULAR; INTRAVENOUS; SUBCUTANEOUS
Qty: 0 | Refills: 0 | Status: DISCONTINUED | OUTPATIENT
Start: 2017-08-28 | End: 2017-08-29

## 2017-08-28 RX ADMIN — Medication 100 MILLIGRAM(S): at 21:32

## 2017-08-28 RX ADMIN — CHLORHEXIDINE GLUCONATE 15 MILLILITER(S): 213 SOLUTION TOPICAL at 11:22

## 2017-08-28 RX ADMIN — Medication 325 MILLIGRAM(S): at 11:22

## 2017-08-28 RX ADMIN — TAMSULOSIN HYDROCHLORIDE 0.4 MILLIGRAM(S): 0.4 CAPSULE ORAL at 21:32

## 2017-08-28 RX ADMIN — DABIGATRAN ETEXILATE MESYLATE 150 MILLIGRAM(S): 150 CAPSULE ORAL at 17:10

## 2017-08-28 RX ADMIN — Medication 3 MILLIGRAM(S): at 21:33

## 2017-08-28 RX ADMIN — DABIGATRAN ETEXILATE MESYLATE 150 MILLIGRAM(S): 150 CAPSULE ORAL at 06:15

## 2017-08-28 RX ADMIN — Medication 50 MICROGRAM(S): at 06:15

## 2017-08-28 RX ADMIN — Medication 1 MILLIGRAM(S): at 11:22

## 2017-08-28 RX ADMIN — PANTOPRAZOLE SODIUM 40 MILLIGRAM(S): 20 TABLET, DELAYED RELEASE ORAL at 06:16

## 2017-08-28 RX ADMIN — Medication 1 APPLICATION(S): at 06:15

## 2017-08-28 RX ADMIN — Medication 1 TABLET(S): at 11:22

## 2017-08-28 RX ADMIN — Medication 100 MILLIGRAM(S): at 06:15

## 2017-08-28 RX ADMIN — SODIUM CHLORIDE 100 MILLILITER(S): 9 INJECTION INTRAMUSCULAR; INTRAVENOUS; SUBCUTANEOUS at 21:33

## 2017-08-28 RX ADMIN — MAGNESIUM OXIDE 400 MG ORAL TABLET 400 MILLIGRAM(S): 241.3 TABLET ORAL at 17:10

## 2017-08-28 RX ADMIN — MIDODRINE HYDROCHLORIDE 10 MILLIGRAM(S): 2.5 TABLET ORAL at 21:32

## 2017-08-28 RX ADMIN — CHLORHEXIDINE GLUCONATE 15 MILLILITER(S): 213 SOLUTION TOPICAL at 21:32

## 2017-08-28 RX ADMIN — Medication 81 MILLIGRAM(S): at 11:21

## 2017-08-28 RX ADMIN — Medication 1 APPLICATION(S): at 17:10

## 2017-08-28 RX ADMIN — Medication 25 MILLIGRAM(S): at 06:16

## 2017-08-28 RX ADMIN — Medication 100 MILLIGRAM(S): at 11:21

## 2017-08-28 RX ADMIN — MIDODRINE HYDROCHLORIDE 10 MILLIGRAM(S): 2.5 TABLET ORAL at 06:16

## 2017-08-28 RX ADMIN — SENNA PLUS 2 TABLET(S): 8.6 TABLET ORAL at 21:32

## 2017-08-28 RX ADMIN — Medication 500 MILLIGRAM(S): at 06:15

## 2017-08-28 RX ADMIN — MIDODRINE HYDROCHLORIDE 10 MILLIGRAM(S): 2.5 TABLET ORAL at 13:17

## 2017-08-28 RX ADMIN — CHLORHEXIDINE GLUCONATE 15 MILLILITER(S): 213 SOLUTION TOPICAL at 06:15

## 2017-08-28 RX ADMIN — Medication 500 MILLIGRAM(S): at 17:10

## 2017-08-28 RX ADMIN — ZINC SULFATE TAB 220 MG (50 MG ZINC EQUIVALENT) 220 MILLIGRAM(S): 220 (50 ZN) TAB at 11:22

## 2017-08-28 RX ADMIN — MAGNESIUM OXIDE 400 MG ORAL TABLET 400 MILLIGRAM(S): 241.3 TABLET ORAL at 08:16

## 2017-08-28 RX ADMIN — ATORVASTATIN CALCIUM 10 MILLIGRAM(S): 80 TABLET, FILM COATED ORAL at 21:33

## 2017-08-28 NOTE — PROGRESS NOTE ADULT - SUBJECTIVE AND OBJECTIVE BOX
HISTORY OF PRESENT ILLNESS:  Pt is an 82M with PMHx significant for spinal stenosis s/p multiple spinal surgeries and spinal cord stimulator admitted to [facility] on  for scheduled management of worsening LE pain and neuropathic pain radiating both feet and pain to back, buttocks and thighs s/p exploration of the prior spinal fusion, removal of hardware, removal of spinal stimulator, T11-L1 laminectomy, T4-pelvis spinal fusion with osteotomies on  with Dr. Titus. Hospital course notable for  intraoperative blood loss requiring 2 unit PRBC and persistent acute blood loss anemia,  post op hypotension requiring  pressors, rapid Afib w/ RVR with acute respiratory failure secondary to acute pulmonary edema and bilateral pleural effusions which improved and now off lasix per cardiology, Echo showed EF 65%, encephalopathy for which HCT ( and )  negative for acute pathology but showed chronic infarcts.  SLP followed for mild-moderate dyspahgia on puree with honey liquids diet. On  had 37 beats of Vtach, and persistent leukocytosis. Deemed stable for discharge to acute rehab on 8/15.      TODAY'S SUBJECTIVE & REVIEW OF SYMPTOMS:  Patient denies lightheadedness, dizziness pain, headache, dysuria or frequency. Reports + today     [   ] Constitutional WNL  [   ] Cardio WNL  [ x ] Resp WNL  [ x  ] GI WNL  [   ] Heme WNL  [   ] Endo WNL  [ x  ] Skin WNL  [   ] MSK WNL  [   ] Neuro WNL  [   ] Cognitive WNL  [ x  ] Psych WNL        PHYSICAL EXAM  Vital Signs Last 24 Hrs  T(C): 35.9 (28 Aug 2017 06:03), Max: 36.6 (27 Aug 2017 19:35)  T(F): 96.6 (28 Aug 2017 06:03), Max: 97.9 (27 Aug 2017 19:35)  HR: 103 (28 Aug 2017 06:03) (100 - 103)  BP: 123/88 (28 Aug 2017 06:03) (92/60 - 123/88)  BP(mean): --  RR: 18 (28 Aug 2017 06:03) (18 - 18)  SpO2: 98% (28 Aug 2017 06:03) (98% - 98%)    General:  NAD  HEENT: NCAT  Cardio: Irregular rhythm.  Regular rate  Resp: CTAB  Abdomen: Soft NTND  Neuro: Decreased sensation BLE in stocking distribution  Motor: 4-5/5  Extrem: No edema.  Calves soft and NTTP  Skin: Back incision CDI with no erythema noted  Wounds: No decubiti  Cognitive/Psych: Impaired cognition    FUNCTIONAL PROGRESS:  Gait: 10' mod A parallel bars  ADLs: Bathing max A, UE dress sup, LE dress mod A  Transfers: Min-mod A  Bed mobility: Min A      RECENT LABS:                          9.8    6.0   )-----------( 193      ( 27 Aug 2017 08:08 )             31.4                        137  |  101  |  15.0  ----------------------------<  122<H>  4.0   |  21.0<L>  |  0.66    Ca    8.0<L>      26 Aug 2017 05:11  Mg     1.7         TPro  5.7<L>  /  Alb  2.9<L>  /  TBili  1.0  /  DBili  x   /  AST  24  /  ALT  37  /  AlkPhos  197<H>        Urinalysis Basic - ( 27 Aug 2017 08:05 )    Color: Annia / Appearance: Clear / S.025 / pH: x  Gluc: x / Ketone: Trace  / Bili: Negative / Urobili: 4 mg/dL   Blood: x / Protein: 15 mg/dL / Nitrite: Negative   Leuk Esterase: Trace / RBC: 0-2 /HPF / WBC 0-2   Sq Epi: x / Non Sq Epi: x / Bacteria: x    RADIOLOGY/OTHER RESULTS:    MEDICATIONS  (STANDING):  aspirin  chewable 81 milliGRAM(s) Oral daily  tamsulosin 0.4 milliGRAM(s) Oral at bedtime  dabigatran 150 milliGRAM(s) Oral every 12 hours  docusate sodium 100 milliGRAM(s) Oral three times a day  senna 2 Tablet(s) Oral at bedtime  chlorhexidine 0.12% Liquid 15 milliLiter(s) Swish and Spit three times a day  pantoprazole   Suspension 40 milliGRAM(s) Oral before breakfast  ascorbic acid 500 milliGRAM(s) Oral two times a day  zinc sulfate 220 milliGRAM(s) Oral daily  multivitamin 1 Tablet(s) Oral daily  BACItracin   Ointment 1 Application(s) Topical two times a day  levothyroxine 50 MICROGram(s) Oral daily  ergocalciferol 99978 Unit(s) Oral every week  folic acid 1 milliGRAM(s) Oral daily  metoprolol succinate ER 25 milliGRAM(s) Oral daily  melatonin 3 milliGRAM(s) Oral at bedtime  sodium chloride 0.9%. 1000 milliLiter(s) (50 mL/Hr) IV Continuous <Continuous>  ferrous    sulfate 325 milliGRAM(s) Oral daily  atorvastatin 10 milliGRAM(s) Oral at bedtime  midodrine 10 milliGRAM(s) Oral every 8 hours  magnesium oxide 400 milliGRAM(s) Oral two times a day with meals    MEDICATIONS  (PRN):  polyethylene glycol 3350 17 Gram(s) Oral daily PRN Constipation  traMADol 50 milliGRAM(s) Oral every 4 hours PRN Severe Pain (7 - 10)  lidocaine 2% Gel 1 Application(s) Topical three times a day PRN pain      Assessment:  Pt is an 83y/o male with spinal stenosis s/p T11-L1 laminectomy, T4-pelvis spinal fusion    Comprehensive rehab: PT/OT/ST  Spinal stenosis-Spinal precautions.  Pain-Tramadol prn  H/O acute respiratory distress- improved; pt oxygenating well on RA although persistent SOB on exertion; requested repeat CXR which was neg and ordered duonebs q6hrs x 3 days.  Appreciate hospitalist consult  Metabolic rprhmuimltmhoe-Kwnycydw-Dljl ST.  Caution with excessive pain meds or anxiolytics, fall precautions  Dysphagia-S/P MBS :  Advanced diet to regular from previous puree and honey-thickened liquid diet.  Cont IVF for hydration pm hours until consuming adequate liquids.  Cont ST.  Added Estim.    Atrial fibrillation-HR controlled.  Cont Toprol XL daily with holding parameters and Pradaxa.  EKG due to c/o "heart fluttering"  Appreciate cardiology consult; nuclear stress test performed ; neg  Cont metoprolol hold for SBP <90; keep Mg > 2 and K > 4.  monitor BP  Constipation- bowel regimen.   HTN-SBPs was low at   Decreased Toprol XL 50 mg po daily to 25mg daily as per pre-op.  Orthostatic hypotension; IVF bolus given  Cont abdominal binder and TEDs when out of bed  Midodrine added and Remeron discontinued by hospitalist, cardiology follow up appreciated.  Hyperlipidemia -   Added lipitor 10mg.  Check lipid panel    Elevated TSH 5.28-Added Synthroid 50 daily  Elevated LFTs-Discontinued tylenol.  AST/ALT improved; now WNL.   Acute blood loss anemia-H/H stable.  Folic acid and Fe:   Leukocytosis:  Resolved.  WBC improved, UA negative, patient afebrile.   Vitamin D deficiency-Low normal Vit D at 31.  Added Vit D 50,000U weekly  F/U with PMD Dr. Lares and cardiology Dr. Ny as outpt.

## 2017-08-28 NOTE — PROGRESS NOTE ADULT - SUBJECTIVE AND OBJECTIVE BOX
Patient seen and examined .     CC: Fatigue , palpitations , found to have orthostatic hypotension . S/P spinal surgery    HPI:   Pt is an 82M with PMHx significant for spinal stenosis s/p multiple spinal surgeries and spinal cord stimulator admitted to [facility] on  for scheduled management of worsening LE pain and neuropathic pain radiating both feet and pain to back, buttocks and thighs s/p exploration of the prior spinal fusion, removal of hardware, removal of spinal stimulator, T11-L1 laminectomy, T4-pelvis spinal fusion with osteotomies on  with Dr. Titus. Hospital course notable for  intraoperative blood loss requiring 2 unit PRBC and persistent acute blood loss anemia,  post op hypotension requiring  pressors, rapid Afib w/ RVR with acute respiratory failure secondary to acute pulmonary edema and bilateral pleural effusions which improved and now off lasix per cardiology, Echo showed EF 65%, encephalopathy for which HCT ( and )  negative for acute pathology but showed chronic infarcts.  SLP followed for mild-moderate dysphagia on puree with honey liquids diet. On , as per RN note,  had 37 beats of  Vtach,  Ortho team was notified, [facility] was not informed and had signed off on .  Patient was discharged to acute rehab on 8/15.    INTERVAL HPI/OVERNIGHT EVENT    PAST MEDICAL & SURGICAL HISTORY:  Spinal stenosis of lumbosacral region  Rotator cuff disorder, right  Arthritis  Inguinal hernia: right  Reflux  CAD (coronary artery disease)  HTN (hypertension)  HLD (hyperlipidemia)  Atrial fibrillation  Peripheral neuropathy  H/O rotator cuff surgery: 2015  History of skin surgery: melanoma excision - left cheek/face 2016  Encounter for interrogation of neurostimulator: 2015  Back injury: s/p surgery L1-L9 in two separate surgeries  &amp;   S/P angioplasty with stent: RCA  &amp; LAD  cardiac stent placement  S/P knee replacement, bilateral  S/P cataract extraction      MEDICATIONS  (STANDING):  aspirin  chewable 81 milliGRAM(s) Oral daily  tamsulosin 0.4 milliGRAM(s) Oral at bedtime  dabigatran 150 milliGRAM(s) Oral every 12 hours  docusate sodium 100 milliGRAM(s) Oral three times a day  senna 2 Tablet(s) Oral at bedtime  chlorhexidine 0.12% Liquid 15 milliLiter(s) Swish and Spit three times a day  pantoprazole   Suspension 40 milliGRAM(s) Oral before breakfast  ascorbic acid 500 milliGRAM(s) Oral two times a day  zinc sulfate 220 milliGRAM(s) Oral daily  multivitamin 1 Tablet(s) Oral daily  BACItracin   Ointment 1 Application(s) Topical two times a day  levothyroxine 50 MICROGram(s) Oral daily  ergocalciferol 14358 Unit(s) Oral every week  folic acid 1 milliGRAM(s) Oral daily  metoprolol succinate ER 25 milliGRAM(s) Oral daily  melatonin 3 milliGRAM(s) Oral at bedtime  ferrous    sulfate 325 milliGRAM(s) Oral daily  atorvastatin 10 milliGRAM(s) Oral at bedtime  midodrine 10 milliGRAM(s) Oral every 8 hours  magnesium oxide 400 milliGRAM(s) Oral two times a day with meals  sodium chloride 0.9%. 1000 milliLiter(s) (100 mL/Hr) IV Continuous <Continuous>    MEDICATIONS  (PRN):  polyethylene glycol 3350 17 Gram(s) Oral daily PRN Constipation  traMADol 50 milliGRAM(s) Oral every 4 hours PRN Severe Pain (7 - 10)  lidocaine 2% Gel 1 Application(s) Topical three times a day PRN pain      LABS:                          9.8    6.0   )-----------( 193      ( 27 Aug 2017 08:08 )             31.4             Urinalysis Basic - ( 27 Aug 2017 08:05 )    Color: Annia / Appearance: Clear / S.025 / pH: x  Gluc: x / Ketone: Trace  / Bili: Negative / Urobili: 4 mg/dL   Blood: x / Protein: 15 mg/dL / Nitrite: Negative   Leuk Esterase: Trace / RBC: 0-2 /HPF / WBC 0-2   Sq Epi: x / Non Sq Epi: x / Bacteria: x        RADIOLOGY & ADDITIONAL TESTS:          REVIEW OF SYSTEMS:    CONSTITUTIONAL: No fever, weight loss, or fatigue  EYES: No eye pain, visual disturbances, or discharge  ENMT:  No difficulty hearing, tinnitus, vertigo; No sinus or throat pain  NECK: No pain or stiffness  RESPIRATORY: No cough, wheezing, chills or hemoptysis; No shortness of breath  CARDIOVASCULAR: No chest pain, palpitations, dizziness, or leg swelling  GASTROINTESTINAL: No abdominal or epigastric pain. No nausea, vomiting, or hematemesis; No diarrhea or constipation. No melena or hematochezia.  GENITOURINARY: No dysuria, frequency, hematuria, or incontinence  NEUROLOGICAL: No headaches, memory loss, loss of strength, numbness, or tremors  SKIN: No itching, burning, rashes, or lesions   LYMPH NODES: No enlarged glands  ENDOCRINE: No heat or cold intolerance; No hair loss  MUSCULOSKELETAL:   PSYCHIATRIC: No depression, anxiety, mood swings, or difficulty sleeping  HEME/LYMPH: No easy bruising, or bleeding gums  ALLERGY AND IMMUNOLOGIC: No hives or eczema    Vital Signs Last 24 Hrs  T(C): 35.9 (28 Aug 2017 06:03), Max: 36.6 (27 Aug 2017 19:35)  T(F): 96.6 (28 Aug 2017 06:03), Max: 97.9 (27 Aug 2017 19:35)  HR: 103 (28 Aug 2017 06:03) (94 - 103)  BP: 123/88 (28 Aug 2017 06:03) (92/60 - 123/88)  BP(mean): --  RR: 18 (28 Aug 2017 06:03) (18 - 18)  SpO2: 98% (28 Aug 2017 06:03) (96% - 98%)  PHYSICAL EXAM:    GENERAL: NAD, well-groomed, well-developed  HEAD:  Atraumatic, Normocephalic  EYES: EOMI, PERRLA, conjunctiva and sclera clear  NECK: Supple, No JVD, Normal thyroid  NERVOUS SYSTEM:  Alert & Oriented X3, no focal deficit  CHEST/LUNG: CTA b/l ,  no  rales, rhonchi, wheezing, or rubs  HEART: Regular rate and rhythm; No murmurs, rubs, or gallops  ABDOMEN: Soft, Nontender, Nondistended; Bowel sounds present  EXTREMITIES:  2+ Peripheral Pulses, No clubbing, cyanosis, or edema  LYMPH: No lymphadenopathy noted  SKIN: No rashes or lesions Patient seen and examined . NAD . Had BM  this am , c/o R LE pain , low back pain is well controlled .     CC: Fatigue - now less fatigue , R LE pain , low back pain well controlled     HPI:   Pt is an 82M with PMHx significant for spinal stenosis s/p multiple spinal surgeries and spinal cord stimulator admitted to [facility] on  for scheduled management of worsening LE pain and neuropathic pain radiating both feet and pain to back, buttocks and thighs s/p exploration of the prior spinal fusion, removal of hardware, removal of spinal stimulator, T11-L1 laminectomy, T4-pelvis spinal fusion with osteotomies on  with Dr. Titus. Hospital course notable for  intraoperative blood loss requiring 2 unit PRBC and persistent acute blood loss anemia,  post op hypotension requiring  pressors, rapid Afib w/ RVR with acute respiratory failure secondary to acute pulmonary edema and bilateral pleural effusions which improved and now off lasix per cardiology, Echo showed EF 65%, encephalopathy for which HCT ( and )  negative for acute pathology but showed chronic infarcts.  SLP followed for mild-moderate dysphagia on puree with honey liquids diet. On , as per RN note,  had 37 beats of  Vtach,  Ortho team was notified, [facility] was not informed and had signed off on .  Patient was discharged to acute rehab on 8/15.    INTERVAL HPI/OVERNIGHT EVENT- no overnight events     PAST MEDICAL & SURGICAL HISTORY:  Spinal stenosis of lumbosacral region  Rotator cuff disorder, right  Arthritis  Inguinal hernia: right  Reflux  CAD (coronary artery disease)  HTN (hypertension)  HLD (hyperlipidemia)  Atrial fibrillation  Peripheral neuropathy  H/O rotator cuff surgery: 2015  History of skin surgery: melanoma excision - left cheek/face 2016  Encounter for interrogation of neurostimulator: 2015  Back injury: s/p surgery L1-L9 in two separate surgeries  &amp;   S/P angioplasty with stent: RCA  &amp; LAD  cardiac stent placement  S/P knee replacement, bilateral  S/P cataract extraction      MEDICATIONS  (STANDING):  aspirin  chewable 81 milliGRAM(s) Oral daily  tamsulosin 0.4 milliGRAM(s) Oral at bedtime  dabigatran 150 milliGRAM(s) Oral every 12 hours  docusate sodium 100 milliGRAM(s) Oral three times a day  senna 2 Tablet(s) Oral at bedtime  chlorhexidine 0.12% Liquid 15 milliLiter(s) Swish and Spit three times a day  pantoprazole   Suspension 40 milliGRAM(s) Oral before breakfast  ascorbic acid 500 milliGRAM(s) Oral two times a day  zinc sulfate 220 milliGRAM(s) Oral daily  multivitamin 1 Tablet(s) Oral daily  BACItracin   Ointment 1 Application(s) Topical two times a day  levothyroxine 50 MICROGram(s) Oral daily  ergocalciferol 70022 Unit(s) Oral every week  folic acid 1 milliGRAM(s) Oral daily  metoprolol succinate ER 25 milliGRAM(s) Oral daily  melatonin 3 milliGRAM(s) Oral at bedtime  ferrous    sulfate 325 milliGRAM(s) Oral daily  atorvastatin 10 milliGRAM(s) Oral at bedtime  midodrine 10 milliGRAM(s) Oral every 8 hours  magnesium oxide 400 milliGRAM(s) Oral two times a day with meals  sodium chloride 0.9%. 1000 milliLiter(s) (100 mL/Hr) IV Continuous <Continuous>    MEDICATIONS  (PRN):  polyethylene glycol 3350 17 Gram(s) Oral daily PRN Constipation  traMADol 50 milliGRAM(s) Oral every 4 hours PRN Severe Pain (7 - 10)  lidocaine 2% Gel 1 Application(s) Topical three times a day PRN pain      LABS:                          9.8    6.0   )-----------( 193      ( 27 Aug 2017 08:08 )             31.4             Urinalysis Basic - ( 27 Aug 2017 08:05 )    Color: Annia / Appearance: Clear / S.025 / pH: x  Gluc: x / Ketone: Trace  / Bili: Negative / Urobili: 4 mg/dL   Blood: x / Protein: 15 mg/dL / Nitrite: Negative   Leuk Esterase: Trace / RBC: 0-2 /HPF / WBC 0-2   Sq Epi: x / Non Sq Epi: x / Bacteria: x        REVIEW OF SYSTEMS:  Low back pain , well controlled with pain meds , R LE pain , fatigue but feeling better     Vital Signs Last 24 Hrs  T(C): 35.9 (28 Aug 2017 06:03), Max: 36.6 (27 Aug 2017 19:35)  T(F): 96.6 (28 Aug 2017 06:03), Max: 97.9 (27 Aug 2017 19:35)  HR: 103 (28 Aug 2017 06:03) (94 - 103)  BP: 123/88 (28 Aug 2017 06:03) (92/60 - 123/88)  BP(mean): --  RR: 18 (28 Aug 2017 06:03) (18 - 18)  SpO2: 98% (28 Aug 2017 06:03) (96% - 98%)    PHYSICAL EXAM:    GENERAL: NAD, well-groomed, well-developed  HEAD:  Atraumatic, Normocephalic  EYES: EOMI, PERRLA, conjunctiva and sclera clear  NECK: Supple, No JVD, Normal thyroid  NERVOUS SYSTEM:  Alert & Oriented X3, no focal deficit  CHEST/LUNG: CTA b/l ,  no  rales, rhonchi, wheezing, or rubs  HEART: irregularly irregular ; No murmurs, rubs, or gallops  ABDOMEN: Soft, Nontender, Nondistended; Bowel sounds present  EXTREMITIES:  2+ Peripheral Pulses, No clubbing, cyanosis, or edema  LYMPH: No lymphadenopathy noted  SKIN: No rashes or lesions

## 2017-08-28 NOTE — PROGRESS NOTE ADULT - ASSESSMENT
Pt is an 82M with PMHx significant for spinal stenosis s/p multiple spinal surgeries and spinal cord stimulator admitted to [facility] on 8/1 for scheduled management of worsening LE pain and neuropathic pain radiating both feet and pain to back, buttocks and thighs s/p exploration of the prior spinal fusion, removal of hardware, removal of spinal stimulator, T11-L1 laminectomy, T4-pelvis spinal fusion with osteotomies on 8/1 with Dr. Titus. Hospital course notable for  intraoperative blood loss requiring 2 unit PRBC and persistent acute blood loss anemia,  post op hypotension requiring  pressors, rapid Afib w/ RVR with acute respiratory failure secondary to acute pulmonary edema and bilateral pleural effusions which improved and now off lasix per cardiology, Echo showed EF 65%, encephalopathy for which HCT (8/6 and 8/14)  negative for acute pathology but showed chronic infarcts.  SLP followed for mild-moderate dyspahgia on puree with honey liquids diet. On 8/14, as per RN note,  had 37 beats of  Vtach,  Ortho team was notified, [facility] was not informed and had signed off on 8/12.  Patient was discharged to acute rehab on 8/15.    Problem/Recommendation - 1:  Problem: Hypotension. Recommendation: Orthostatic: Spinal vs deconditioned, venous pooling . Stress test normal. Continue  Midodrine to  10 mg TID,   awaiting Cortisol results, may consider adding Florinef  improved after   continue IV fluids yesterday   shweta stockings, abdominal binder.  encourage po intake  Discussed with rehab attending, to speak with Orthopedic surgeon Dr. Titus regarding orthostasis 2/2 spinal procedure    Problem/Recommendation - 2:  ·  Problem: Nonsustained ventricular tachycardia.  Recommendation: keep Mag>2, K>4  Cardio input appreciated,  Nuclear stress test - normal    Problem/Recommendation - 3:  ·  Problem: Atrial fibrillation.  Recommendation: BB needed for rate control , continue   Pradaxa.     Problem/Recommendation - 4:  ·  Problem: CAD (coronary artery disease).  Recommendation: ASA/Statin/BB.     Problem/Recommendation - 5:  ·  Problem: Spinal stenosis of lumbosacral region.  Recommendation: s/p exploration of the prior spinal fusion, removal of hardware, removal of spinal stimulator, T11-L1 laminectomy, T4-pelvis spinal fusion with osteotomies on 8/1 with Dr. Titus with complicated post operative coarse  Rehab program.     Problem/Recommendation - 6:  Problem: HLD (hyperlipidemia). Recommendation: statin. Continue  statins     Problem/Recommendation - 7:  Problem: Poor appetite.+ severe protein calorie malnutrition. Recommendation: Dysphagia diet, for MBS next week, patient is anxious to advance diet   IV hydration  encourage oral intake.    Problem/Recommendation-8  Problem: Insomnia. Recommendation: Add Melatonin as standing dose at HS    Problem / Plan - 9: Leucocytosis - will check UA / Urine C&S , follow CBC in am Pt is an 82M with PMHx significant for spinal stenosis s/p multiple spinal surgeries and spinal cord stimulator admitted to [facility] on 8/1 for scheduled management of worsening LE pain and neuropathic pain radiating both feet and pain to back, buttocks and thighs s/p exploration of the prior spinal fusion, removal of hardware, removal of spinal stimulator, T11-L1 laminectomy, T4-pelvis spinal fusion with osteotomies on 8/1 with Dr. Titus. Hospital course notable for  intraoperative blood loss requiring 2 unit PRBC and persistent acute blood loss anemia,  post op hypotension requiring  pressors, rapid Afib w/ RVR with acute respiratory failure secondary to acute pulmonary edema and bilateral pleural effusions which improved and now off lasix per cardiology, Echo showed EF 65%, encephalopathy for which HCT (8/6 and 8/14)  negative for acute pathology but showed chronic infarcts.  SLP followed for mild-moderate dyspahgia on puree with honey liquids diet. On 8/14, as per RN note,  had 37 beats of  Vtach,  Ortho team was notified, [facility] was not informed and had signed off on 8/12.  Patient was discharged to acute rehab on 8/15.    Problem/Recommendation - 1:  Problem: Hypotension. Recommendation: Orthostatic: Spinal vs deconditioned, venous pooling . Stress test normal. Continue  Midodrine to  10 mg TID , awaiting Cortisol results , BP now better controlled   shweta stockings, abdominal binder.  encourage po intake , continue iv fluids at night     Problem/Recommendation - 2:  ·  Problem: Nonsustained ventricular tachycardia.  Recommendation: keep Mag>2, K>4  Cardio input appreciated,  Nuclear stress test - normal    Problem/Recommendation - 3:  ·  Problem: Atrial fibrillation.  Recommendation: BB needed for rate control , continue   Pradaxa.     Problem/Recommendation - 4:  ·  Problem: CAD (coronary artery disease).  Recommendation: ASA/Statin/BB.     Problem/Recommendation - 5:  ·  Problem: Spinal stenosis of lumbosacral region.  Recommendation: s/p exploration of the prior spinal fusion, removal of hardware, removal of spinal stimulator, T11-L1 laminectomy, T4-pelvis spinal fusion with osteotomies on 8/1 with Dr. Titus with complicated post operative coarse  Rehab program.     Problem/Recommendation - 6:  Problem: HLD (hyperlipidemia). Recommendation: statin. Continue  statins     Problem/Recommendation - 7:  Problem: Poor appetite.+ severe protein calorie malnutrition. Recommendation: Dysphagia diet,  MBS - today , patient is anxious to advance diet   IV hydration  encourage oral intake.    Problem/Recommendation-8  Problem: Insomnia. Recommendation: Continue  Melatonin as standing dose at HS    Problem / Plan - 9: Leucocytosis - resolved .

## 2017-08-29 PROCEDURE — 99233 SBSQ HOSP IP/OBS HIGH 50: CPT

## 2017-08-29 PROCEDURE — 99232 SBSQ HOSP IP/OBS MODERATE 35: CPT

## 2017-08-29 RX ORDER — CALCIUM CARBONATE 500(1250)
1 TABLET ORAL THREE TIMES A DAY
Qty: 0 | Refills: 0 | Status: DISCONTINUED | OUTPATIENT
Start: 2017-08-29 | End: 2017-09-15

## 2017-08-29 RX ORDER — DOCUSATE SODIUM 100 MG
100 CAPSULE ORAL DAILY
Qty: 0 | Refills: 0 | Status: DISCONTINUED | OUTPATIENT
Start: 2017-08-29 | End: 2017-08-31

## 2017-08-29 RX ORDER — SODIUM CHLORIDE 9 MG/ML
500 INJECTION INTRAMUSCULAR; INTRAVENOUS; SUBCUTANEOUS ONCE
Qty: 0 | Refills: 0 | Status: COMPLETED | OUTPATIENT
Start: 2017-08-29 | End: 2017-08-29

## 2017-08-29 RX ORDER — SENNA PLUS 8.6 MG/1
2 TABLET ORAL AT BEDTIME
Qty: 0 | Refills: 0 | Status: DISCONTINUED | OUTPATIENT
Start: 2017-08-29 | End: 2017-09-06

## 2017-08-29 RX ORDER — SODIUM CHLORIDE 9 MG/ML
1000 INJECTION INTRAMUSCULAR; INTRAVENOUS; SUBCUTANEOUS
Qty: 0 | Refills: 0 | Status: DISCONTINUED | OUTPATIENT
Start: 2017-08-29 | End: 2017-08-31

## 2017-08-29 RX ORDER — TRAMADOL HYDROCHLORIDE 50 MG/1
25 TABLET ORAL EVERY 4 HOURS
Qty: 0 | Refills: 0 | Status: DISCONTINUED | OUTPATIENT
Start: 2017-08-29 | End: 2017-08-30

## 2017-08-29 RX ADMIN — MAGNESIUM OXIDE 400 MG ORAL TABLET 400 MILLIGRAM(S): 241.3 TABLET ORAL at 17:22

## 2017-08-29 RX ADMIN — Medication 1 MILLIGRAM(S): at 11:10

## 2017-08-29 RX ADMIN — Medication 1 TABLET(S): at 14:24

## 2017-08-29 RX ADMIN — Medication 100 MILLIGRAM(S): at 14:24

## 2017-08-29 RX ADMIN — DABIGATRAN ETEXILATE MESYLATE 150 MILLIGRAM(S): 150 CAPSULE ORAL at 05:52

## 2017-08-29 RX ADMIN — CHLORHEXIDINE GLUCONATE 15 MILLILITER(S): 213 SOLUTION TOPICAL at 14:24

## 2017-08-29 RX ADMIN — Medication 1 APPLICATION(S): at 17:22

## 2017-08-29 RX ADMIN — Medication 50 MICROGRAM(S): at 05:52

## 2017-08-29 RX ADMIN — TRAMADOL HYDROCHLORIDE 50 MILLIGRAM(S): 50 TABLET ORAL at 07:50

## 2017-08-29 RX ADMIN — SODIUM CHLORIDE 500 MILLILITER(S): 9 INJECTION INTRAMUSCULAR; INTRAVENOUS; SUBCUTANEOUS at 10:00

## 2017-08-29 RX ADMIN — SODIUM CHLORIDE 100 MILLILITER(S): 9 INJECTION INTRAMUSCULAR; INTRAVENOUS; SUBCUTANEOUS at 17:22

## 2017-08-29 RX ADMIN — Medication 500 MILLIGRAM(S): at 05:53

## 2017-08-29 RX ADMIN — ATORVASTATIN CALCIUM 10 MILLIGRAM(S): 80 TABLET, FILM COATED ORAL at 21:39

## 2017-08-29 RX ADMIN — Medication 1 APPLICATION(S): at 05:51

## 2017-08-29 RX ADMIN — MAGNESIUM OXIDE 400 MG ORAL TABLET 400 MILLIGRAM(S): 241.3 TABLET ORAL at 07:50

## 2017-08-29 RX ADMIN — ZINC SULFATE TAB 220 MG (50 MG ZINC EQUIVALENT) 220 MILLIGRAM(S): 220 (50 ZN) TAB at 11:10

## 2017-08-29 RX ADMIN — MIDODRINE HYDROCHLORIDE 10 MILLIGRAM(S): 2.5 TABLET ORAL at 14:24

## 2017-08-29 RX ADMIN — MIDODRINE HYDROCHLORIDE 10 MILLIGRAM(S): 2.5 TABLET ORAL at 21:39

## 2017-08-29 RX ADMIN — MIDODRINE HYDROCHLORIDE 10 MILLIGRAM(S): 2.5 TABLET ORAL at 05:53

## 2017-08-29 RX ADMIN — TRAMADOL HYDROCHLORIDE 50 MILLIGRAM(S): 50 TABLET ORAL at 08:42

## 2017-08-29 RX ADMIN — Medication 100 MILLIGRAM(S): at 05:53

## 2017-08-29 RX ADMIN — DABIGATRAN ETEXILATE MESYLATE 150 MILLIGRAM(S): 150 CAPSULE ORAL at 17:22

## 2017-08-29 RX ADMIN — Medication 1 TABLET(S): at 11:10

## 2017-08-29 RX ADMIN — Medication 25 MILLIGRAM(S): at 05:53

## 2017-08-29 RX ADMIN — CHLORHEXIDINE GLUCONATE 15 MILLILITER(S): 213 SOLUTION TOPICAL at 21:39

## 2017-08-29 RX ADMIN — Medication 3 MILLIGRAM(S): at 21:40

## 2017-08-29 RX ADMIN — CHLORHEXIDINE GLUCONATE 15 MILLILITER(S): 213 SOLUTION TOPICAL at 05:54

## 2017-08-29 RX ADMIN — Medication 325 MILLIGRAM(S): at 11:10

## 2017-08-29 RX ADMIN — Medication 81 MILLIGRAM(S): at 11:10

## 2017-08-29 RX ADMIN — Medication 500 MILLIGRAM(S): at 17:22

## 2017-08-29 RX ADMIN — PANTOPRAZOLE SODIUM 40 MILLIGRAM(S): 20 TABLET, DELAYED RELEASE ORAL at 05:52

## 2017-08-29 RX ADMIN — TAMSULOSIN HYDROCHLORIDE 0.4 MILLIGRAM(S): 0.4 CAPSULE ORAL at 21:39

## 2017-08-29 NOTE — PROGRESS NOTE ADULT - ASSESSMENT
Pt is an 82M with PMHx significant for spinal stenosis s/p multiple spinal surgeries and spinal cord stimulator admitted to [facility] on 8/1 for scheduled management of worsening LE pain and neuropathic pain radiating both feet and pain to back, buttocks and thighs s/p exploration of the prior spinal fusion, removal of hardware, removal of spinal stimulator, T11-L1 laminectomy, T4-pelvis spinal fusion with osteotomies on 8/1 with Dr. Titus. Hospital course notable for  intraoperative blood loss requiring 2 unit PRBC and persistent acute blood loss anemia,  post op hypotension requiring  pressors, rapid Afib w/ RVR with acute respiratory failure secondary to acute pulmonary edema and bilateral pleural effusions which improved and now off lasix per cardiology, Echo showed EF 65%, encephalopathy for which HCT (8/6 and 8/14)  negative for acute pathology but showed chronic infarcts.  SLP followed for mild-moderate dyspahgia on puree with honey liquids diet. On 8/14, as per RN note,  had 37 beats of  Vtach,  Ortho team was notified, House [facility] was not informed and had signed off on 8/12.  Patient was discharged to acute rehab on 8/15.    Problem/Recommendation - 1:  Problem: Hypotension. Recommendation: Orthostatic: Spinal vs deconditioned, venous pooling . Stress test normal. Continue  Midodrine to  10 mg TID , awaiting Cortisol results , BP now better controlled   shweta stockings, abdominal binder. Today with SBP of 81 , asymptomatic , admits poor PO fluid intake .   encourage po intake , continue iv fluids at night , agree with increasing  duration of ivf  , will monitor . Follow up cortisol level .     Problem/Recommendation - 2:  ·  Problem: Nonsustained ventricular tachycardia.  Recommendation: keep Mag>2, K>4  Cardio input appreciated,  Nuclear stress test - normal    Problem/Recommendation - 3:  ·  Problem: Atrial fibrillation.  Recommendation: BB needed for rate control , continue   Pradaxa.     Problem/Recommendation - 4:  ·  Problem: CAD (coronary artery disease).  Recommendation: ASA/Statin/BB.     Problem/Recommendation - 5:  ·  Problem: Spinal stenosis of lumbosacral region.  Recommendation: s/p exploration of the prior spinal fusion, removal of hardware, removal of spinal stimulator, T11-L1 laminectomy, T4-pelvis spinal fusion with osteotomies on 8/1 with Dr. Titus with complicated post operative coarse  Rehab program.     Problem/Recommendation - 6:  Problem: HLD (hyperlipidemia). Recommendation: statin. Continue  statins     Problem/Recommendation - 7:  Problem: Poor appetite.+ severe protein calorie malnutrition. Recommendation: passed MBS for solid food  advance diet   IV hydration  encourage oral intake.    Problem/Recommendation-8  Problem: Insomnia. Recommendation: Continue  Melatonin Pt is an 82M with PMHx significant for spinal stenosis s/p multiple spinal surgeries and spinal cord stimulator admitted to [facility] on 8/1 for scheduled management of worsening LE pain and neuropathic pain radiating both feet and pain to back, buttocks and thighs s/p exploration of the prior spinal fusion, removal of hardware, removal of spinal stimulator, T11-L1 laminectomy, T4-pelvis spinal fusion with osteotomies on 8/1 with Dr. Titus. Hospital course notable for  intraoperative blood loss requiring 2 unit PRBC and persistent acute blood loss anemia,  post op hypotension requiring  pressors, rapid Afib w/ RVR with acute respiratory failure secondary to acute pulmonary edema and bilateral pleural effusions which improved and now off lasix per cardiology, Echo showed EF 65%, encephalopathy for which HCT (8/6 and 8/14)  negative for acute pathology but showed chronic infarcts.  SLP followed for mild-moderate dyspahgia on puree with honey liquids diet. On 8/14, as per RN note,  had 37 beats of  Vtach,  Ortho team was notified, [facility] was not informed and had signed off on 8/12.  Patient was discharged to acute rehab on 8/15.    Problem/Recommendation - 1:  Problem: Hypotension. Recommendation: Orthostatic: Spinal vs deconditioned, venous pooling . Stress test normal. Continue  Midodrine to  10 mg TID , awaiting Cortisol results , BP now better controlled   shweta stockings, abdominal binder. Today with SBP of 81 , episode of lightheadedness  , admits poor PO fluid intake .   encourage po intake , continue iv fluids at night , agree with increasing  duration of ivf  , will monitor . Follow up cortisol level .     Problem/Recommendation - 2:  ·  Problem: Nonsustained ventricular tachycardia.  Recommendation: keep Mag>2, K>4  Cardio input appreciated,  Nuclear stress test - normal    Problem/Recommendation - 3:  ·  Problem: Atrial fibrillation.  Recommendation: BB needed for rate control , continue   Pradaxa.     Problem/Recommendation - 4:  ·  Problem: CAD (coronary artery disease).  Recommendation: ASA/Statin/BB.     Problem/Recommendation - 5:  ·  Problem: Spinal stenosis of lumbosacral region.  Recommendation: s/p exploration of the prior spinal fusion, removal of hardware, removal of spinal stimulator, T11-L1 laminectomy, T4-pelvis spinal fusion with osteotomies on 8/1 with Dr. Tiuts with complicated post operative coarse  Rehab program.     Problem/Recommendation - 6:  Problem: HLD (hyperlipidemia). Recommendation: statin. Continue  statins     Problem/Recommendation - 7:  Problem: Poor appetite.+ severe protein calorie malnutrition. Recommendation: passed MBS for solid food  advance diet   IV hydration  encourage oral intake.    Problem/Recommendation-8  Problem: Insomnia. Recommendation: Continue  Melatonin    Problem / Plan - 9 : Loose stool - will decrease Colace to bid / change Senna to PRN .

## 2017-08-29 NOTE — PROGRESS NOTE ADULT - SUBJECTIVE AND OBJECTIVE BOX
Patient seen and examined . NAD . Had BM  this am , c/o R LE pain , low back pain is well controlled .     CC: Fatigue - now less fatigue , R LE pain , low back pain well controlled     HPI:   Pt is an 82M with PMHx significant for spinal stenosis s/p multiple spinal surgeries and spinal cord stimulator admitted to [facility] on 8/1 for scheduled management of worsening LE pain and neuropathic pain radiating both feet and pain to back, buttocks and thighs s/p exploration of the prior spinal fusion, removal of hardware, removal of spinal stimulator, T11-L1 laminectomy, T4-pelvis spinal fusion with osteotomies on 8/1 with Dr. Titus. Hospital course notable for  intraoperative blood loss requiring 2 unit PRBC and persistent acute blood loss anemia,  post op hypotension requiring  pressors, rapid Afib w/ RVR with acute respiratory failure secondary to acute pulmonary edema and bilateral pleural effusions which improved and now off lasix per cardiology, Echo showed EF 65%, encephalopathy for which HCT (8/6 and 8/14)  negative for acute pathology but showed chronic infarcts.  SLP followed for mild-moderate dysphagia on puree with honey liquids diet. On 8/14, as per RN note,  had 37 beats of  Vtach,  Ortho team was notified, [facility] was not informed and had signed off on 8/12.  Patient was discharged to acute rehab on 8/15.    INTERVAL HPI/OVERNIGHT EVENT- no overnight events       MEDICATIONS  (STANDING):  aspirin  chewable 81 milliGRAM(s) Oral daily  tamsulosin 0.4 milliGRAM(s) Oral at bedtime  dabigatran 150 milliGRAM(s) Oral every 12 hours  docusate sodium 100 milliGRAM(s) Oral three times a day  senna 2 Tablet(s) Oral at bedtime  chlorhexidine 0.12% Liquid 15 milliLiter(s) Swish and Spit three times a day  pantoprazole   Suspension 40 milliGRAM(s) Oral before breakfast  ascorbic acid 500 milliGRAM(s) Oral two times a day  zinc sulfate 220 milliGRAM(s) Oral daily  multivitamin 1 Tablet(s) Oral daily  BACItracin   Ointment 1 Application(s) Topical two times a day  levothyroxine 50 MICROGram(s) Oral daily  ergocalciferol 40046 Unit(s) Oral every week  folic acid 1 milliGRAM(s) Oral daily  metoprolol succinate ER 25 milliGRAM(s) Oral daily  melatonin 3 milliGRAM(s) Oral at bedtime  ferrous    sulfate 325 milliGRAM(s) Oral daily  atorvastatin 10 milliGRAM(s) Oral at bedtime  midodrine 10 milliGRAM(s) Oral every 8 hours  magnesium oxide 400 milliGRAM(s) Oral two times a day with meals  sodium chloride 0.9% Bolus 500 milliLiter(s) IV Bolus once  sodium chloride 0.9%. 1000 milliLiter(s) (100 mL/Hr) IV Continuous <Continuous>    MEDICATIONS  (PRN):  polyethylene glycol 3350 17 Gram(s) Oral daily PRN Constipation  lidocaine 2% Gel 1 Application(s) Topical three times a day PRN pain  traMADol 25 milliGRAM(s) Oral every 4 hours PRN Severe Pain (7 - 10)  calcium carbonate 500 mG (Tums) Chewable 1 Tablet(s) Chew three times a day PRN Upset Stomach        REVIEW OF SYSTEMS:    CONSTITUTIONAL: No fever, weight loss, or fatigue  EYES: No eye pain, visual disturbances, or discharge  ENMT:  No difficulty hearing, tinnitus, vertigo; No sinus or throat pain  NECK: No pain or stiffness  RESPIRATORY: No cough, wheezing, chills or hemoptysis; No shortness of breath  CARDIOVASCULAR: No chest pain, palpitations, dizziness, or leg swelling  GASTROINTESTINAL: No abdominal or epigastric pain. No nausea, vomiting, or hematemesis; No diarrhea or constipation. No melena or hematochezia.  GENITOURINARY: No dysuria, frequency, hematuria, or incontinence  NEUROLOGICAL: No headaches, memory loss, loss of strength, numbness, or tremors  SKIN: No itching, burning, rashes, or lesions   LYMPH NODES: No enlarged glands  ENDOCRINE: No heat or cold intolerance; No hair loss  MUSCULOSKELETAL:   PSYCHIATRIC: No depression, anxiety, mood swings, or difficulty sleeping  HEME/LYMPH: No easy bruising, or bleeding gums  ALLERGY AND IMMUNOLOGIC: No hives or eczema    Vital Signs Last 24 Hrs  T(C): 36.5 (29 Aug 2017 10:00), Max: 36.8 (28 Aug 2017 20:25)  T(F): 97.7 (29 Aug 2017 10:00), Max: 98.2 (28 Aug 2017 20:25)  HR: 93 (29 Aug 2017 10:15) (84 - 99)  BP: 97/59 (29 Aug 2017 10:15) (81/57 - 133/80)  BP(mean): --  RR: 18 (29 Aug 2017 10:00) (17 - 18)  SpO2: 100% (29 Aug 2017 10:00) (98% - 100%)  PHYSICAL EXAM:    GENERAL: NAD, well-groomed, well-developed  HEAD:  Atraumatic, Normocephalic  EYES: EOMI, PERRLA, conjunctiva and sclera clear  NECK: Supple, No JVD, Normal thyroid  NERVOUS SYSTEM:  Alert & Oriented X3, no focal deficit  CHEST/LUNG: CTA b/l ,  no  rales, rhonchi, wheezing, or rubs  HEART: Regular rate and rhythm; No murmurs, rubs, or gallops  ABDOMEN: Soft, Nontender, Nondistended; Bowel sounds present  EXTREMITIES:  2+ Peripheral Pulses, No clubbing, cyanosis, or edema  LYMPH: No lymphadenopathy noted  SKIN: No rashes or lesions Patient seen and examined . NAD . Had BM  this am- loose  ,  R LE pain /  low back pain is well controlled .     CC: Fatigue - now less fatigue , R LE pain , low back pain well controlled , had episode of lightheadedness this am , now resolved .    HPI:   Pt is an 82M with PMHx significant for spinal stenosis s/p multiple spinal surgeries and spinal cord stimulator admitted to [facility] on 8/1 for scheduled management of worsening LE pain and neuropathic pain radiating both feet and pain to back, buttocks and thighs s/p exploration of the prior spinal fusion, removal of hardware, removal of spinal stimulator, T11-L1 laminectomy, T4-pelvis spinal fusion with osteotomies on 8/1 with Dr. Titus. Hospital course notable for  intraoperative blood loss requiring 2 unit PRBC and persistent acute blood loss anemia,  post op hypotension requiring  pressors, rapid Afib w/ RVR with acute respiratory failure secondary to acute pulmonary edema and bilateral pleural effusions which improved and now off lasix per cardiology, Echo showed EF 65%, encephalopathy for which HCT (8/6 and 8/14)  negative for acute pathology but showed chronic infarcts.  SLP followed for mild-moderate dysphagia on puree with honey liquids diet. On 8/14, as per RN note,  had 37 beats of  Vtach,  Ortho team was notified, [facility] was not informed and had signed off on 8/12.  Patient was discharged to acute rehab on 8/15.    INTERVAL HPI/OVERNIGHT EVENT- no overnight events       MEDICATIONS  (STANDING):  aspirin  chewable 81 milliGRAM(s) Oral daily  tamsulosin 0.4 milliGRAM(s) Oral at bedtime  dabigatran 150 milliGRAM(s) Oral every 12 hours  docusate sodium 100 milliGRAM(s) Oral three times a day  senna 2 Tablet(s) Oral at bedtime  chlorhexidine 0.12% Liquid 15 milliLiter(s) Swish and Spit three times a day  pantoprazole   Suspension 40 milliGRAM(s) Oral before breakfast  ascorbic acid 500 milliGRAM(s) Oral two times a day  zinc sulfate 220 milliGRAM(s) Oral daily  multivitamin 1 Tablet(s) Oral daily  BACItracin   Ointment 1 Application(s) Topical two times a day  levothyroxine 50 MICROGram(s) Oral daily  ergocalciferol 51336 Unit(s) Oral every week  folic acid 1 milliGRAM(s) Oral daily  metoprolol succinate ER 25 milliGRAM(s) Oral daily  melatonin 3 milliGRAM(s) Oral at bedtime  ferrous    sulfate 325 milliGRAM(s) Oral daily  atorvastatin 10 milliGRAM(s) Oral at bedtime  midodrine 10 milliGRAM(s) Oral every 8 hours  magnesium oxide 400 milliGRAM(s) Oral two times a day with meals  sodium chloride 0.9% Bolus 500 milliLiter(s) IV Bolus once  sodium chloride 0.9%. 1000 milliLiter(s) (100 mL/Hr) IV Continuous <Continuous>    MEDICATIONS  (PRN):  polyethylene glycol 3350 17 Gram(s) Oral daily PRN Constipation  lidocaine 2% Gel 1 Application(s) Topical three times a day PRN pain  traMADol 25 milliGRAM(s) Oral every 4 hours PRN Severe Pain (7 - 10)  calcium carbonate 500 mG (Tums) Chewable 1 Tablet(s) Chew three times a day PRN Upset Stomach        REVIEW OF SYSTEMS:    low back pain / R LE pain - well controlled , episode of lightheadedness this am now resolved , episode of loose stool , all other systems reviewed and are negative .    Vital Signs Last 24 Hrs  T(C): 36.5 (29 Aug 2017 10:00), Max: 36.8 (28 Aug 2017 20:25)  T(F): 97.7 (29 Aug 2017 10:00), Max: 98.2 (28 Aug 2017 20:25)  HR: 93 (29 Aug 2017 10:15) (84 - 99)  BP: 97/59 (29 Aug 2017 10:15) (81/57 - 133/80)  BP(mean): --  RR: 18 (29 Aug 2017 10:00) (17 - 18)  SpO2: 100% (29 Aug 2017 10:00) (98% - 100%)    PHYSICAL EXAM:    GENERAL: NAD, well-groomed, well-developed  HEAD:  Atraumatic, Normocephalic  EYES: EOMI, PERRLA, conjunctiva and sclera clear  NECK: Supple, No JVD, Normal thyroid  NERVOUS SYSTEM:  Alert & Oriented X3, no focal deficit  CHEST/LUNG: CTA b/l ,  no  rales, rhonchi, wheezing, or rubs  HEART: irregularly irregular  ; No murmurs, rubs, or gallops  ABDOMEN: Soft, Nontender, Nondistended; Bowel sounds present  EXTREMITIES:  2+ Peripheral Pulses, No clubbing, cyanosis, or edema  LYMPH: No lymphadenopathy noted  SKIN: No rashes or lesions

## 2017-08-29 NOTE — PROGRESS NOTE ADULT - SUBJECTIVE AND OBJECTIVE BOX
HISTORY OF PRESENT ILLNESS:  Pt is an 82M with PMHx significant for spinal stenosis s/p multiple spinal surgeries and spinal cord stimulator admitted to [facility] on  for scheduled management of worsening LE pain and neuropathic pain radiating both feet and pain to back, buttocks and thighs s/p exploration of the prior spinal fusion, removal of hardware, removal of spinal stimulator, T11-L1 laminectomy, T4-pelvis spinal fusion with osteotomies on  with Dr. Titus. Hospital course notable for  intraoperative blood loss requiring 2 unit PRBC and persistent acute blood loss anemia,  post op hypotension requiring  pressors, rapid Afib w/ RVR with acute respiratory failure secondary to acute pulmonary edema and bilateral pleural effusions which improved and now off lasix per cardiology, Echo showed EF 65%, encephalopathy for which HCT ( and )  negative for acute pathology but showed chronic infarcts.  SLP followed for mild-moderate dyspahgia on puree with honey liquids diet. On  had 37 beats of Vtach, and persistent leukocytosis. Deemed stable for discharge to acute rehab on 8/15.      TODAY'S SUBJECTIVE & REVIEW OF SYMPTOMS:  Patient reported dizziness this a.m and was found to be hypotensive.  Pt denies headache, dysuria or frequency. Reports + BM  Pt tolerating regular diet well although reports not drinking enough fluids    [   ] Constitutional WNL  [   ] Cardio WNL  [ x ] Resp WNL  [ x  ] GI WNL  [   ] Heme WNL  [   ] Endo WNL  [ x  ] Skin WNL  [   ] MSK WNL  [   ] Neuro WNL  [   ] Cognitive WNL  [ x  ] Psych WNL        PHYSICAL EXAM  Vital Signs Last 24 Hrs  T(C): 36.5 (29 Aug 2017 10:00), Max: 36.8 (28 Aug 2017 20:25)  T(F): 97.7 (29 Aug 2017 10:00), Max: 98.2 (28 Aug 2017 20:25)  HR: 93 (29 Aug 2017 10:15) (84 - 99)  BP: 97/59 (29 Aug 2017 10:15) (81/57 - 133/80)  BP(mean): --  RR: 18 (29 Aug 2017 10:00) (17 - 18)  SpO2: 100% (29 Aug 2017 10:00) (98% - 100%)    General:  NAD  HEENT: NCAT  Cardio: Irregular rhythm.  Regular rate  Resp: CTAB  Abdomen: Soft NTND  Neuro: Decreased sensation BLE in stocking distribution  Motor: 4-5/5  Extrem: No edema.  Calves soft and NTTP  Skin: Back incision CDI with no erythema noted  Wounds: No decubiti  Cognitive/Psych: Impaired cognition    FUNCTIONAL PROGRESS:  Gait: 10' min-mod A parallel bars  ADLs: Bathing max A, UE dress sup, LE dress mod A  Transfers: Min A  Bed mobility: Min A      RECENT LABS:                          9.8    6.0   )-----------( 193      ( 27 Aug 2017 08:08 )             31.4                        137  |  101  |  15.0  ----------------------------<  122<H>  4.0   |  21.0<L>  |  0.66    Ca    8.0<L>      26 Aug 2017 05:11  Mg     1.7         TPro  5.7<L>  /  Alb  2.9<L>  /  TBili  1.0  /  DBili  x   /  AST  24  /  ALT  37  /  AlkPhos  197<H>        Urinalysis Basic - ( 27 Aug 2017 08:05 )    Color: Annia / Appearance: Clear / S.025 / pH: x  Gluc: x / Ketone: Trace  / Bili: Negative / Urobili: 4 mg/dL   Blood: x / Protein: 15 mg/dL / Nitrite: Negative   Leuk Esterase: Trace / RBC: 0-2 /HPF / WBC 0-2   Sq Epi: x / Non Sq Epi: x / Bacteria: x    RADIOLOGY/OTHER RESULTS:    MEDICATIONS  (STANDING):  aspirin  chewable 81 milliGRAM(s) Oral daily  tamsulosin 0.4 milliGRAM(s) Oral at bedtime  dabigatran 150 milliGRAM(s) Oral every 12 hours  docusate sodium 100 milliGRAM(s) Oral three times a day  senna 2 Tablet(s) Oral at bedtime  chlorhexidine 0.12% Liquid 15 milliLiter(s) Swish and Spit three times a day  pantoprazole   Suspension 40 milliGRAM(s) Oral before breakfast  ascorbic acid 500 milliGRAM(s) Oral two times a day  zinc sulfate 220 milliGRAM(s) Oral daily  multivitamin 1 Tablet(s) Oral daily  BACItracin   Ointment 1 Application(s) Topical two times a day  levothyroxine 50 MICROGram(s) Oral daily  ergocalciferol 29546 Unit(s) Oral every week  folic acid 1 milliGRAM(s) Oral daily  metoprolol succinate ER 25 milliGRAM(s) Oral daily  melatonin 3 milliGRAM(s) Oral at bedtime  sodium chloride 0.9%. 1000 milliLiter(s) (50 mL/Hr) IV Continuous <Continuous>  ferrous    sulfate 325 milliGRAM(s) Oral daily  atorvastatin 10 milliGRAM(s) Oral at bedtime  midodrine 10 milliGRAM(s) Oral every 8 hours  magnesium oxide 400 milliGRAM(s) Oral two times a day with meals    MEDICATIONS  (PRN):  polyethylene glycol 3350 17 Gram(s) Oral daily PRN Constipation  traMADol 50 milliGRAM(s) Oral every 4 hours PRN Severe Pain (7 - 10)  lidocaine 2% Gel 1 Application(s) Topical three times a day PRN pain      Assessment:  Pt is an 81y/o male with spinal stenosis s/p T11-L1 laminectomy, T4-pelvis spinal fusion    Comprehensive rehab: PT/OT/ST  Spinal stenosis-Spinal precautions.  Pain-Tramadol prn  H/O acute respiratory distress- improved; pt oxygenating well on RA although persistent SOB on exertion; requested repeat CXR which was neg and ordered duonebs q6hrs x 3 days.  Appreciate hospitalist consult  Metabolic auruiprahvkhwo-Yyidrjhf-Bipz ST.  Caution with excessive pain meds or anxiolytics, fall precautions  Dysphagia-S/P MBS :  Advanced diet to regular from previous puree and honey-thickened liquid diet.  Cont IVF for hydration pm hours until consuming adequate liquids-extend from 4pm to 6am due to pt not drinking adequate amounts.  Cont ST.  Added Estim.    Atrial fibrillation-HR controlled.  Cont Toprol XL daily with holding parameters and Pradaxa.  EKG due to c/o "heart fluttering"  Appreciate cardiology consult; nuclear stress test performed ; neg  Cont metoprolol hold for SBP <90; keep Mg > 2 and K > 4.  monitor BP  Constipation- bowel regimen.   HTN-SBPs was low at   Decreased Toprol XL 50 mg po daily to 25mg daily as per pre-op.  Orthostatic hypotension; IVF bolus given again today  Cont abdominal binder and TEDs when out of bed  Midodrine added-increased to 10mg tid and Remeron discontinued by hospitalist, cardiology follow up appreciated.  Hyperlipidemia -   Added lipitor 10mg.  Check lipid panel    Elevated TSH 5.28-Added Synthroid 50 daily  Elevated LFTs-Discontinued tylenol.  AST/ALT improved; now WNL.   Acute blood loss anemia-H/H stable.  Folic acid and Fe:   Leukocytosis:  Resolved.  WBC improved, UA negative, patient afebrile.   Vitamin D deficiency-Low normal Vit D at 31.  Added Vit D 50,000U weekly  F/U with PMD Dr. Lares and cardiology Dr. Ny as outpt.

## 2017-08-30 PROCEDURE — 99233 SBSQ HOSP IP/OBS HIGH 50: CPT

## 2017-08-30 PROCEDURE — 99232 SBSQ HOSP IP/OBS MODERATE 35: CPT

## 2017-08-30 RX ORDER — METOPROLOL TARTRATE 50 MG
25 TABLET ORAL ONCE
Qty: 0 | Refills: 0 | Status: COMPLETED | OUTPATIENT
Start: 2017-08-30 | End: 2017-08-30

## 2017-08-30 RX ORDER — METOPROLOL TARTRATE 50 MG
50 TABLET ORAL DAILY
Qty: 0 | Refills: 0 | Status: DISCONTINUED | OUTPATIENT
Start: 2017-08-31 | End: 2017-09-15

## 2017-08-30 RX ORDER — FLUDROCORTISONE ACETATE 0.1 MG/1
0.1 TABLET ORAL DAILY
Qty: 0 | Refills: 0 | Status: DISCONTINUED | OUTPATIENT
Start: 2017-08-30 | End: 2017-09-04

## 2017-08-30 RX ORDER — SODIUM CHLORIDE 9 MG/ML
500 INJECTION INTRAMUSCULAR; INTRAVENOUS; SUBCUTANEOUS ONCE
Qty: 0 | Refills: 0 | Status: COMPLETED | OUTPATIENT
Start: 2017-08-30 | End: 2017-08-30

## 2017-08-30 RX ORDER — ACETAMINOPHEN 500 MG
650 TABLET ORAL EVERY 6 HOURS
Qty: 0 | Refills: 0 | Status: DISCONTINUED | OUTPATIENT
Start: 2017-08-30 | End: 2017-09-15

## 2017-08-30 RX ADMIN — CHLORHEXIDINE GLUCONATE 15 MILLILITER(S): 213 SOLUTION TOPICAL at 13:21

## 2017-08-30 RX ADMIN — PANTOPRAZOLE SODIUM 40 MILLIGRAM(S): 20 TABLET, DELAYED RELEASE ORAL at 06:01

## 2017-08-30 RX ADMIN — Medication 1 TABLET(S): at 11:55

## 2017-08-30 RX ADMIN — MAGNESIUM OXIDE 400 MG ORAL TABLET 400 MILLIGRAM(S): 241.3 TABLET ORAL at 17:42

## 2017-08-30 RX ADMIN — DABIGATRAN ETEXILATE MESYLATE 150 MILLIGRAM(S): 150 CAPSULE ORAL at 17:42

## 2017-08-30 RX ADMIN — Medication 1 MILLIGRAM(S): at 11:49

## 2017-08-30 RX ADMIN — MAGNESIUM OXIDE 400 MG ORAL TABLET 400 MILLIGRAM(S): 241.3 TABLET ORAL at 07:43

## 2017-08-30 RX ADMIN — Medication 1 APPLICATION(S): at 06:01

## 2017-08-30 RX ADMIN — FLUDROCORTISONE ACETATE 0.1 MILLIGRAM(S): 0.1 TABLET ORAL at 11:49

## 2017-08-30 RX ADMIN — Medication 1 TABLET(S): at 17:47

## 2017-08-30 RX ADMIN — MIDODRINE HYDROCHLORIDE 10 MILLIGRAM(S): 2.5 TABLET ORAL at 13:22

## 2017-08-30 RX ADMIN — Medication 25 MILLIGRAM(S): at 06:00

## 2017-08-30 RX ADMIN — ZINC SULFATE TAB 220 MG (50 MG ZINC EQUIVALENT) 220 MILLIGRAM(S): 220 (50 ZN) TAB at 11:49

## 2017-08-30 RX ADMIN — Medication 100 MILLIGRAM(S): at 11:49

## 2017-08-30 RX ADMIN — Medication 25 MILLIGRAM(S): at 17:42

## 2017-08-30 RX ADMIN — Medication 500 MILLIGRAM(S): at 17:42

## 2017-08-30 RX ADMIN — Medication 1 TABLET(S): at 11:49

## 2017-08-30 RX ADMIN — MIDODRINE HYDROCHLORIDE 10 MILLIGRAM(S): 2.5 TABLET ORAL at 21:29

## 2017-08-30 RX ADMIN — Medication 500 MILLIGRAM(S): at 06:00

## 2017-08-30 RX ADMIN — Medication 81 MILLIGRAM(S): at 11:49

## 2017-08-30 RX ADMIN — TAMSULOSIN HYDROCHLORIDE 0.4 MILLIGRAM(S): 0.4 CAPSULE ORAL at 21:29

## 2017-08-30 RX ADMIN — TRAMADOL HYDROCHLORIDE 25 MILLIGRAM(S): 50 TABLET ORAL at 08:22

## 2017-08-30 RX ADMIN — TRAMADOL HYDROCHLORIDE 25 MILLIGRAM(S): 50 TABLET ORAL at 09:00

## 2017-08-30 RX ADMIN — DABIGATRAN ETEXILATE MESYLATE 150 MILLIGRAM(S): 150 CAPSULE ORAL at 06:00

## 2017-08-30 RX ADMIN — Medication 3 MILLIGRAM(S): at 21:29

## 2017-08-30 RX ADMIN — SODIUM CHLORIDE 100 MILLILITER(S): 9 INJECTION INTRAMUSCULAR; INTRAVENOUS; SUBCUTANEOUS at 17:42

## 2017-08-30 RX ADMIN — ATORVASTATIN CALCIUM 10 MILLIGRAM(S): 80 TABLET, FILM COATED ORAL at 21:29

## 2017-08-30 RX ADMIN — CHLORHEXIDINE GLUCONATE 15 MILLILITER(S): 213 SOLUTION TOPICAL at 21:29

## 2017-08-30 RX ADMIN — CHLORHEXIDINE GLUCONATE 15 MILLILITER(S): 213 SOLUTION TOPICAL at 06:01

## 2017-08-30 RX ADMIN — Medication 325 MILLIGRAM(S): at 11:49

## 2017-08-30 RX ADMIN — MIDODRINE HYDROCHLORIDE 10 MILLIGRAM(S): 2.5 TABLET ORAL at 06:00

## 2017-08-30 RX ADMIN — Medication 50 MICROGRAM(S): at 06:00

## 2017-08-30 RX ADMIN — SODIUM CHLORIDE 500 MILLILITER(S): 9 INJECTION INTRAMUSCULAR; INTRAVENOUS; SUBCUTANEOUS at 11:00

## 2017-08-30 RX ADMIN — Medication 1 APPLICATION(S): at 17:44

## 2017-08-30 NOTE — PROGRESS NOTE ADULT - SUBJECTIVE AND OBJECTIVE BOX
Patient seen and examined . NAD . Had BM  this am- loose  ,  R LE pain /  low back pain is well controlled .     CC: Fatigue - now less fatigue , R LE pain , low back pain well controlled , had episode of lightheadedness this am , now resolved .    PAST MEDICAL & SURGICAL HISTORY:  Spinal stenosis of lumbosacral region  Rotator cuff disorder, right  Arthritis  Inguinal hernia: right  Reflux  CAD (coronary artery disease)  HTN (hypertension)  HLD (hyperlipidemia)  Atrial fibrillation  Peripheral neuropathy  H/O rotator cuff surgery: 12/2015  History of skin surgery: melanoma excision - left cheek/face 2016  Encounter for interrogation of neurostimulator: 12/2015  Back injury: s/p surgery L1-L9 in two separate surgeries 2003 &amp; 2006  S/P angioplasty with stent: RCA 2008 &amp; LAD 2014 cardiac stent placement  S/P knee replacement, bilateral  S/P cataract extraction      MEDICATIONS  (STANDING):  aspirin  chewable 81 milliGRAM(s) Oral daily  tamsulosin 0.4 milliGRAM(s) Oral at bedtime  dabigatran 150 milliGRAM(s) Oral every 12 hours  chlorhexidine 0.12% Liquid 15 milliLiter(s) Swish and Spit three times a day  pantoprazole   Suspension 40 milliGRAM(s) Oral before breakfast  ascorbic acid 500 milliGRAM(s) Oral two times a day  multivitamin 1 Tablet(s) Oral daily  BACItracin   Ointment 1 Application(s) Topical two times a day  levothyroxine 50 MICROGram(s) Oral daily  ergocalciferol 29381 Unit(s) Oral every week  folic acid 1 milliGRAM(s) Oral daily  metoprolol succinate ER 25 milliGRAM(s) Oral daily  melatonin 3 milliGRAM(s) Oral at bedtime  ferrous    sulfate 325 milliGRAM(s) Oral daily  atorvastatin 10 milliGRAM(s) Oral at bedtime  midodrine 10 milliGRAM(s) Oral every 8 hours  magnesium oxide 400 milliGRAM(s) Oral two times a day with meals  sodium chloride 0.9%. 1000 milliLiter(s) (100 mL/Hr) IV Continuous <Continuous>  docusate sodium 100 milliGRAM(s) Oral daily  sodium chloride 0.9% Bolus 500 milliLiter(s) IV Bolus once  fludroCORTISONE 0.1 milliGRAM(s) Oral daily    MEDICATIONS  (PRN):  polyethylene glycol 3350 17 Gram(s) Oral daily PRN Constipation  calcium carbonate 500 mG (Tums) Chewable 1 Tablet(s) Chew three times a day PRN Upset Stomach  senna 2 Tablet(s) Oral at bedtime PRN if no BM for 3 days  acetaminophen   Tablet. 650 milliGRAM(s) Oral every 6 hours PRN Severe Pain (7 - 10)      LABS:                  RADIOLOGY & ADDITIONAL TESTS:      REVIEW OF SYSTEMS:    CONSTITUTIONAL: No fever, weight loss, or fatigue  EYES: No eye pain, visual disturbances, or discharge  ENMT:  No difficulty hearing, tinnitus, vertigo; No sinus or throat pain  NECK: No pain or stiffness  RESPIRATORY: No cough, wheezing, chills or hemoptysis; No shortness of breath  CARDIOVASCULAR: No chest pain, palpitations, dizziness, or leg swelling  GASTROINTESTINAL: No abdominal or epigastric pain. No nausea, vomiting, or hematemesis; No diarrhea or constipation. No melena or hematochezia.  GENITOURINARY: No dysuria, frequency, hematuria, or incontinence  NEUROLOGICAL: No headaches, memory loss, loss of strength, numbness, or tremors  SKIN: No itching, burning, rashes, or lesions   LYMPH NODES: No enlarged glands  ENDOCRINE: No heat or cold intolerance; No hair loss  MUSCULOSKELETAL:   PSYCHIATRIC: No depression, anxiety, mood swings, or difficulty sleeping  HEME/LYMPH: No easy bruising, or bleeding gums  ALLERGY AND IMMUNOLOGIC: No hives or eczema    Vital Signs Last 24 Hrs  T(C): 36.8 (30 Aug 2017 05:28), Max: 36.8 (29 Aug 2017 20:52)  T(F): 98.2 (30 Aug 2017 05:28), Max: 98.3 (29 Aug 2017 20:52)  HR: 101 (30 Aug 2017 05:28) (80 - 101)  BP: 140/94 (30 Aug 2017 05:28) (125/82 - 140/94)  BP(mean): --  RR: 18 (30 Aug 2017 05:28) (18 - 18)  SpO2: 97% (30 Aug 2017 05:28) (97% - 97%)  PHYSICAL EXAM:    GENERAL: NAD, well-groomed, well-developed  HEAD:  Atraumatic, Normocephalic  EYES: EOMI, PERRLA, conjunctiva and sclera clear  NECK: Supple, No JVD, Normal thyroid  NERVOUS SYSTEM:  Alert & Oriented X3, no focal deficit  CHEST/LUNG: CTA b/l ,  no  rales, rhonchi, wheezing, or rubs  HEART: Regular rate and rhythm; No murmurs, rubs, or gallops  ABDOMEN: Soft, Nontender, Nondistended; Bowel sounds present  EXTREMITIES:  2+ Peripheral Pulses, No clubbing, cyanosis, or edema  LYMPH: No lymphadenopathy noted  SKIN: No rashes or lesions Patient seen and examined . NAD . Had episode of lightheadedness during physical therapy . Now laying in the bed , getting bolus of NS . No complaints . No BM since yesterday .    CC:  had episode of lightheadedness this am  during physical therapy , now resolved .        MEDICATIONS  (STANDING):  aspirin  chewable 81 milliGRAM(s) Oral daily  tamsulosin 0.4 milliGRAM(s) Oral at bedtime  dabigatran 150 milliGRAM(s) Oral every 12 hours  chlorhexidine 0.12% Liquid 15 milliLiter(s) Swish and Spit three times a day  pantoprazole   Suspension 40 milliGRAM(s) Oral before breakfast  ascorbic acid 500 milliGRAM(s) Oral two times a day  multivitamin 1 Tablet(s) Oral daily  BACItracin   Ointment 1 Application(s) Topical two times a day  levothyroxine 50 MICROGram(s) Oral daily  ergocalciferol 11628 Unit(s) Oral every week  folic acid 1 milliGRAM(s) Oral daily  metoprolol succinate ER 25 milliGRAM(s) Oral daily  melatonin 3 milliGRAM(s) Oral at bedtime  ferrous    sulfate 325 milliGRAM(s) Oral daily  atorvastatin 10 milliGRAM(s) Oral at bedtime  midodrine 10 milliGRAM(s) Oral every 8 hours  magnesium oxide 400 milliGRAM(s) Oral two times a day with meals  sodium chloride 0.9%. 1000 milliLiter(s) (100 mL/Hr) IV Continuous <Continuous>  docusate sodium 100 milliGRAM(s) Oral daily  sodium chloride 0.9% Bolus 500 milliLiter(s) IV Bolus once  fludroCORTISONE 0.1 milliGRAM(s) Oral daily    MEDICATIONS  (PRN):  polyethylene glycol 3350 17 Gram(s) Oral daily PRN Constipation  calcium carbonate 500 mG (Tums) Chewable 1 Tablet(s) Chew three times a day PRN Upset Stomach  senna 2 Tablet(s) Oral at bedtime PRN if no BM for 3 days  acetaminophen   Tablet. 650 milliGRAM(s) Oral every 6 hours PRN Severe Pain (7 - 10)          REVIEW OF SYSTEMS:    Lightheadedness episode during physical therapy , now resolved , all other systems reviewed and are negative   Vital Signs Last 24 Hrs  T(C): 36.8 (30 Aug 2017 05:28), Max: 36.8 (29 Aug 2017 20:52)  T(F): 98.2 (30 Aug 2017 05:28), Max: 98.3 (29 Aug 2017 20:52)  HR: 101 (30 Aug 2017 05:28) (80 - 101)  BP: 140/94 (30 Aug 2017 05:28) (125/82 - 140/94)  BP(mean): --  RR: 18 (30 Aug 2017 05:28) (18 - 18)  SpO2: 97% (30 Aug 2017 05:28) (97% - 97%)    PHYSICAL EXAM:    GENERAL: NAD, well-groomed, well-developed  HEAD:  Atraumatic, Normocephalic  EYES: EOMI, PERRLA, conjunctiva and sclera clear  NECK: Supple, No JVD, Normal thyroid  NERVOUS SYSTEM:  Alert & Oriented X3, no focal deficit  CHEST/LUNG: CTA b/l ,  no  rales, rhonchi, wheezing, or rubs  HEART: irregularly irregular ; No murmurs, rubs, or gallops  ABDOMEN: Soft, Nontender, Nondistended; Bowel sounds present  EXTREMITIES:  2+ Peripheral Pulses, No clubbing, cyanosis, or edema  LYMPH: No lymphadenopathy noted  SKIN: No rashes or lesions

## 2017-08-30 NOTE — PROGRESS NOTE ADULT - SUBJECTIVE AND OBJECTIVE BOX
[place] [facility]                                                        Office: [address] [address]                                                       Telephone: [phone]. Fax:[phone]                                                                             PROGRESS NOTE   Reason for follow up: orthostasis and near syncope and non-sustained ventricular tachycardia                             Overnight: No new events.   Update: Had another episode of orthostatic hypotension .intermittent symptoms,. worse during rehab. started on florinef.    Subjective: "  i am getting better   Complains of: intermittent dizziens and orthostasis.   Review of symptoms: Cardiac:  No chest pain. No dyspnea. No palpitations. + Dizziness  Respiratory: no cough. No dyspnea   Gastrointestinal: No diarrhea. No abdominal pain. No bleeding.     Past medical history: No updates.   Chronic conditions:  Hypertension: controlled.   	  Vitals:  Vital Signs Last 24 Hrs  T(C): 36.8 (08-30-17 @ 20:47), Max: 36.8 (08-30-17 @ 05:28)  T(F): 98.3 (08-30-17 @ 20:47), Max: 98.3 (08-30-17 @ 20:47)  HR: 93 (08-30-17 @ 20:47) (89 - 101)  BP: 134/85 (08-30-17 @ 20:47) (109/72 - 140/94)  BP(mean): --  RR: 18 (08-30-17 @ 20:47) (18 - 18)  SpO2: 97% (08-30-17 @ 20:47) (97% - 99%)      PHYSICAL EXAM:  Appearance: Comfortable. No acute distress  HEENT:  Head and neck: Atraumatic. Normocephalic.  Normal oral mucosa, PERRL, Neck is supple. No JVD, No carotid bruit.   Neurologic: A & O x 3, no focal deficits. EOMI , Cranial nerves are intact. mild tremors bilaterally  Lymphatic: No cervical lymphadenopathy  Cardiovascular: Normal S1 S2 , irregular heart rate. , 2/ 6 systolic murmur., no rubs/gallops. No JVD, No edema  Respiratory: Lungs clear to auscultation  Gastrointestinal:  Soft, Non-tender, + BS  Lower Extremities: No edema compareson stockings worn.   Psychiatry: Patient is calm. No agitation. Mood & affect appropriate  Skin: No rashes/ ecchymoses/cyanosis/ulcers visualized on the face, hands or feet.      CURRENT MEDICATIONS:  MEDICATIONS  (STANDING):  tamsulosin 0.4 milliGRAM(s) Oral at bedtime  midodrine 10 milliGRAM(s) Oral every 8 hours    pantoprazole   Suspension  docusate sodium  aspirin  chewable  dabigatran  chlorhexidine 0.12% Liquid  ascorbic acid  multivitamin  BACItracin   Ointment  levothyroxine  ergocalciferol  folic acid  melatonin  ferrous    sulfate  atorvastatin  magnesium oxide  sodium chloride 0.9%.  fludroCORTISONE    PRN: polyethylene glycol 3350 PRN  calcium carbonate 500 mG (Tums) Chewable PRN  senna PRN  acetaminophen   Tablet. PRN        LABS:	 	  CA                                              /197  \par                                     proBNP:   Lipid Profile:   HgA1c: TSH:     INTERPRETATION OF TELEMETRY: Reviewed by me.   ECG: Reviewed by me. < from: 12 Lead ECG (08.15.17 @ 19:22) >   Atrial fibrillation  Low voltage QRS  Nonspecific ST and T wave abnormality  Abnormal ECG      RADIOLOGY & ADDITIONAL STUDIES:    X-ray:  reviewed by me. < from: Xray Chest 1 View AP/PA. (08.15.17 @ 18:53) >  Trace left pleural effusion. Marked improvement in aeration        ECHO FINDINGS: Date: 89/2017: No effusion. normal LV and RV function.   8/25/2017:  Echo stat bedside. trivial effusion,.IVC normal size. normal respiratory variation   STRESS  FINDINGS: Date: 2014: normal     CATHETERIZATION FINDINGS:  Date:  2014 VENTRICLES: Global left ventricular function was normal. EF estimatedwas  60 %.  CORONARY VESSELS: The coronary circulation is right dominant.  LM:   --  LM: Normal.  LAD:   --  Proximal LAD: There was a 90 % stenosis. --  Mid LAD: There was a 30 % stenosis.  CX:   --  Circumflex: Normal.  RCA:   --  Proximal RCA: There was a 40 % stenosis.--  Distal RCA: There was a 40 % stenosis.  COMPLICATIONS: There were no complications.  INTERVENTIONAL RECOMMENDATIONS: ASA and Plavix for 1 year

## 2017-08-30 NOTE — PROGRESS NOTE ADULT - SUBJECTIVE AND OBJECTIVE BOX
HISTORY OF PRESENT ILLNESS:  Pt is an 82M with PMHx significant for spinal stenosis s/p multiple spinal surgeries and spinal cord stimulator admitted to [facility] on  for scheduled management of worsening LE pain and neuropathic pain radiating both feet and pain to back, buttocks and thighs s/p exploration of the prior spinal fusion, removal of hardware, removal of spinal stimulator, T11-L1 laminectomy, T4-pelvis spinal fusion with osteotomies on  with Dr. Titus. Hospital course notable for  intraoperative blood loss requiring 2 unit PRBC and persistent acute blood loss anemia,  post op hypotension requiring  pressors, rapid Afib w/ RVR with acute respiratory failure secondary to acute pulmonary edema and bilateral pleural effusions which improved and now off lasix per cardiology, Echo showed EF 65%, encephalopathy for which HCT ( and )  negative for acute pathology but showed chronic infarcts.  SLP followed for mild-moderate dyspahgia on puree with honey liquids diet. On  had 37 beats of Vtach, and persistent leukocytosis. Deemed stable for discharge to acute rehab on 8/15.      TODAY'S SUBJECTIVE & REVIEW OF SYMPTOMS:  Patient reported dizziness and light-headness this a.m and was found to be hypotensive at 89/59.  Pt denies headache, dysuria or frequency. Reports + BM yesterday Pt tolerating regular diet although reports not drinking enough fluids and decreased appetite    [   ] Constitutional WNL  [   ] Cardio WNL  [ x ] Resp WNL  [ x  ] GI WNL  [   ] Heme WNL  [   ] Endo WNL  [ x  ] Skin WNL  [   ] MSK WNL  [   ] Neuro WNL  [   ] Cognitive WNL  [ x  ] Psych WNL        PHYSICAL EXAM  Vital Signs Last 24 Hrs  T(C): 36.8 (30 Aug 2017 05:28), Max: 36.8 (29 Aug 2017 20:52)  T(F): 98.2 (30 Aug 2017 05:28), Max: 98.3 (29 Aug 2017 20:52)  HR: 101 (30 Aug 2017 05:28) (80 - 101)  BP: 140/94 (30 Aug 2017 05:28) (125/82 - 140/94)  BP(mean): --  RR: 18 (30 Aug 2017 05:28) (18 - 18)  SpO2: 97% (30 Aug 2017 05:28) (97% - 97%)    General:  NAD  HEENT: NCAT  Cardio: Irregular rhythm.  Regular rate  Resp: CTAB  Abdomen: Soft NTND  Neuro: Decreased sensation BLE in stocking distribution  Motor: 4-5/5  Extrem: No edema.  Calves soft and NTTP  Skin: Back incision CDI with no erythema noted  Wounds: No decubiti  Cognitive/Psych: Impaired cognition    FUNCTIONAL PROGRESS:  Gait: 10' mod A RW  ADLs: Bathing max A, UE dress sup, LE dress min A  Transfers: Min A  Bed mobility: Min A      RECENT LABS:                          9.8    6.0   )-----------( 193      ( 27 Aug 2017 08:08 )             31.4                    08-    137  |  101  |  15.0  ----------------------------<  122<H>  4.0   |  21.0<L>  |  0.66    Ca    8.0<L>      26 Aug 2017 05:11  Mg     1.7         TPro  5.7<L>  /  Alb  2.9<L>  /  TBili  1.0  /  DBili  x   /  AST  24  /  ALT  37  /  AlkPhos  197<H>  08-24      Urinalysis Basic - ( 27 Aug 2017 08:05 )    Color: Annia / Appearance: Clear / S.025 / pH: x  Gluc: x / Ketone: Trace  / Bili: Negative / Urobili: 4 mg/dL   Blood: x / Protein: 15 mg/dL / Nitrite: Negative   Leuk Esterase: Trace / RBC: 0-2 /HPF / WBC 0-2   Sq Epi: x / Non Sq Epi: x / Bacteria: x    RADIOLOGY/OTHER RESULTS:    MEDICATIONS  (STANDING):  aspirin  chewable 81 milliGRAM(s) Oral daily  tamsulosin 0.4 milliGRAM(s) Oral at bedtime  dabigatran 150 milliGRAM(s) Oral every 12 hours  docusate sodium 100 milliGRAM(s) Oral three times a day  senna 2 Tablet(s) Oral at bedtime  chlorhexidine 0.12% Liquid 15 milliLiter(s) Swish and Spit three times a day  pantoprazole   Suspension 40 milliGRAM(s) Oral before breakfast  ascorbic acid 500 milliGRAM(s) Oral two times a day  zinc sulfate 220 milliGRAM(s) Oral daily  multivitamin 1 Tablet(s) Oral daily  BACItracin   Ointment 1 Application(s) Topical two times a day  levothyroxine 50 MICROGram(s) Oral daily  ergocalciferol 38952 Unit(s) Oral every week  folic acid 1 milliGRAM(s) Oral daily  metoprolol succinate ER 25 milliGRAM(s) Oral daily  melatonin 3 milliGRAM(s) Oral at bedtime  sodium chloride 0.9%. 1000 milliLiter(s) (50 mL/Hr) IV Continuous <Continuous>  ferrous    sulfate 325 milliGRAM(s) Oral daily  atorvastatin 10 milliGRAM(s) Oral at bedtime  midodrine 10 milliGRAM(s) Oral every 8 hours  magnesium oxide 400 milliGRAM(s) Oral two times a day with meals    MEDICATIONS  (PRN):  polyethylene glycol 3350 17 Gram(s) Oral daily PRN Constipation  traMADol 50 milliGRAM(s) Oral every 4 hours PRN Severe Pain (7 - 10)  lidocaine 2% Gel 1 Application(s) Topical three times a day PRN pain      Assessment:  Pt is an 81y/o male with spinal stenosis s/p T11-L1 laminectomy, T4-pelvis spinal fusion    Comprehensive rehab: PT/OT/ST  Spinal stenosis-Spinal precautions.  Pain-Tramadol prn  H/O acute respiratory distress- improved; pt oxygenating well on RA although persistent SOB on exertion; requested repeat CXR which was neg and ordered duonebs q6hrs x 3 days.  Appreciate hospitalist consult  Metabolic kyrlzucnpjwdli-Husdfpze-Brvh ST.  Caution with excessive pain meds or anxiolytics, fall precautions  Dysphagia-Added Estim  S/P MBS :  Advanced diet to regular from previous puree and honey-thickened liquid diet.  Cont IVF for hydration pm hours until consuming adequate liquids-extend from 4pm to 6am due to pt not drinking adequate amounts.  Cont ST.  Atrial fibrillation-HR controlled.  Cont Toprol XL daily with holding parameters and Pradaxa.  EKG due to c/o "heart fluttering"  Appreciate cardiology consult; nuclear stress test performed ; neg  Cont metoprolol hold for SBP <90; keep Mg > 2 and K > 4.  monitor BP  Constipation- bowel regimen.   HTN-SBPs was low at   Decreased Toprol XL 50 mg po daily to 25mg daily as per pre-op.  Orthostatic hypotension; IVF bolus given again today  Cont abdominal binder and TEDs when out of bed  Midodrine added-increased to 10mg tid and Remeron discontinued by hospitalist, cardiology follow up appreciated.  Add Florinef as per cardiology.  Will d/c Tramadol and resume tylenol prn  Hyperlipidemia -   Added lipitor 10mg.    Elevated TSH 5.28-Added Synthroid 50 daily  Elevated LFTs-Discontinued tylenol.  AST/ALT improved; now WNL.  Resume tylenol prn for pain.  Monitor CMP  Acute blood loss anemia-H/H stable.  Folic acid and Fe  Leukocytosis:  Resolved.  WBC improved, UA negative, patient afebrile.   Vitamin D deficiency-Low normal Vit D at 31.  Added Vit D 50,000U weekly  F/U with PMD Dr. Lares and cardiology Dr. Ny as outpt.

## 2017-08-30 NOTE — PROGRESS NOTE ADULT - ASSESSMENT
Pt is an 82M with PMHx significant for spinal stenosis s/p multiple spinal surgeries and spinal cord stimulator admitted to [facility] on 8/1 for scheduled management of worsening LE pain and neuropathic pain radiating both feet and pain to back, buttocks and thighs s/p exploration of the prior spinal fusion, removal of hardware, removal of spinal stimulator, T11-L1 laminectomy, T4-pelvis spinal fusion with osteotomies on 8/1 with Dr. Titus. Hospital course notable for  intraoperative blood loss requiring 2 unit PRBC and persistent acute blood loss anemia,  post op hypotension requiring  pressors, rapid Afib w/ RVR with acute respiratory failure secondary to acute pulmonary edema and bilateral pleural effusions which improved and now off lasix per cardiology, Echo showed EF 65%, encephalopathy for which HCT (8/6 and 8/14)  negative for acute pathology but showed chronic infarcts.  SLP followed for mild-moderate dyspahgia on puree with honey liquids diet. On 8/14, as per RN note,  had 37 beats of  Vtach,  Ortho team was notified, [facility] was not informed and had signed off on 8/12.  Patient was discharged to acute rehab on 8/15.    Problem/Recommendation - 1:  Problem: Hypotension. Recommendation: Orthostatic: Spinal vs deconditioned, venous pooling . Stress test normal. Continue  Midodrine to  10 mg TID , awaiting Cortisol results , BP now better controlled   shweta stockings, abdominal binder. Today with SBP of 81 , episode of lightheadedness  , admits poor PO fluid intake .   encourage po intake , continue iv fluids at night , agree with increasing  duration of ivf  , will monitor . Follow up cortisol level .     Problem/Recommendation - 2:  ·  Problem: Nonsustained ventricular tachycardia.  Recommendation: keep Mag>2, K>4  Cardio input appreciated,  Nuclear stress test - normal    Problem/Recommendation - 3:  ·  Problem: Atrial fibrillation.  Recommendation: BB needed for rate control , continue   Pradaxa.     Problem/Recommendation - 4:  ·  Problem: CAD (coronary artery disease).  Recommendation: ASA/Statin/BB.     Problem/Recommendation - 5:  ·  Problem: Spinal stenosis of lumbosacral region.  Recommendation: s/p exploration of the prior spinal fusion, removal of hardware, removal of spinal stimulator, T11-L1 laminectomy, T4-pelvis spinal fusion with osteotomies on 8/1 with Dr. Titus with complicated post operative coarse  Rehab program.     Problem/Recommendation - 6:  Problem: HLD (hyperlipidemia). Recommendation: statin. Continue  statins     Problem/Recommendation - 7:  Problem: Poor appetite.+ severe protein calorie malnutrition. Recommendation: passed MBS for solid food  advance diet   IV hydration  encourage oral intake.    Problem/Recommendation-8  Problem: Insomnia. Recommendation: Continue  Melatonin    Problem / Plan - 9 : Loose stool - will decrease Colace to bid / change Senna to PRN . Pt is an 82M with PMHx significant for spinal stenosis s/p multiple spinal surgeries and spinal cord stimulator admitted to [facility] on 8/1 for scheduled management of worsening LE pain and neuropathic pain radiating both feet and pain to back, buttocks and thighs s/p exploration of the prior spinal fusion, removal of hardware, removal of spinal stimulator, T11-L1 laminectomy, T4-pelvis spinal fusion with osteotomies on 8/1 with Dr. Titus. Hospital course notable for  intraoperative blood loss requiring 2 unit PRBC and persistent acute blood loss anemia,  post op hypotension requiring  pressors, rapid Afib w/ RVR with acute respiratory failure secondary to acute pulmonary edema and bilateral pleural effusions which improved and now off lasix per cardiology, Echo showed EF 65%, encephalopathy for which HCT (8/6 and 8/14)  negative for acute pathology but showed chronic infarcts.  SLP followed for mild-moderate dyspahgia on puree with honey liquids diet. On 8/14, as per RN note,  had 37 beats of  Vtach,  Ortho team was notified, [facility] was not informed and had signed off on 8/12.  Patient was discharged to acute rehab on 8/15.    Problem/Recommendation - 1:  Problem: Hypotension. Recommendation: Orthostatic: Spinal vs deconditioned, venous pooling . Stress test normal. Continue  Midodrine to  10 mg TID , awaiting Cortisol results , BP now better controlled   shweta stockings, abdominal binder. Today with SBP of 81 , episode of lightheadedness  , admits poor PO fluid intake .   encourage po intake , continue iv fluids at night , agree with increasing  duration of ivf  , will monitor . Follow up cortisol level .   Started on Florinef . If orthostasis persist will consider MRI of spine to r/o spinal cause .    Problem/Recommendation - 2:  ·  Problem: Nonsustained ventricular tachycardia.  Recommendation: keep Mag>2, K>4  Cardio input appreciated,  Nuclear stress test - normal    Problem/Recommendation - 3:  ·  Problem: Atrial fibrillation.  Recommendation: BB needed for rate control , continue   Pradaxa.     Problem/Recommendation - 4:  ·  Problem: CAD (coronary artery disease).  Recommendation: ASA/Statin/BB.     Problem/Recommendation - 5:  ·  Problem: Spinal stenosis of lumbosacral region.  Recommendation: s/p exploration of the prior spinal fusion, removal of hardware, removal of spinal stimulator, T11-L1 laminectomy, T4-pelvis spinal fusion with osteotomies on 8/1 with Dr. Titus with complicated post operative coarse  Rehab program.     Problem/Recommendation - 6:  Problem: HLD (hyperlipidemia). Recommendation: statin. Continue  statins     Problem/Recommendation - 7:  Problem: Poor appetite.+ severe protein calorie malnutrition. Recommendation: passed MBS for solid food , diet advanced    IV hydration  encourage oral intake.    Problem/Recommendation-8  Problem: Insomnia. Recommendation: Continue  Melatonin    Problem / Plan - 9 : Loose stool - will decrease Colace to bid / change Senna to PRN . Now with out loose stools since yesterday

## 2017-08-31 PROCEDURE — 99232 SBSQ HOSP IP/OBS MODERATE 35: CPT

## 2017-08-31 RX ORDER — NYSTATIN CREAM 100000 [USP'U]/G
1 CREAM TOPICAL
Qty: 0 | Refills: 0 | Status: DISCONTINUED | OUTPATIENT
Start: 2017-08-31 | End: 2017-09-15

## 2017-08-31 RX ORDER — DOCUSATE SODIUM 100 MG
100 CAPSULE ORAL DAILY
Qty: 0 | Refills: 0 | Status: DISCONTINUED | OUTPATIENT
Start: 2017-08-31 | End: 2017-09-13

## 2017-08-31 RX ADMIN — Medication 81 MILLIGRAM(S): at 11:44

## 2017-08-31 RX ADMIN — Medication 3 MILLIGRAM(S): at 22:07

## 2017-08-31 RX ADMIN — CHLORHEXIDINE GLUCONATE 15 MILLILITER(S): 213 SOLUTION TOPICAL at 14:50

## 2017-08-31 RX ADMIN — CHLORHEXIDINE GLUCONATE 15 MILLILITER(S): 213 SOLUTION TOPICAL at 05:03

## 2017-08-31 RX ADMIN — Medication 1 TABLET(S): at 11:44

## 2017-08-31 RX ADMIN — MIDODRINE HYDROCHLORIDE 10 MILLIGRAM(S): 2.5 TABLET ORAL at 22:07

## 2017-08-31 RX ADMIN — TAMSULOSIN HYDROCHLORIDE 0.4 MILLIGRAM(S): 0.4 CAPSULE ORAL at 22:08

## 2017-08-31 RX ADMIN — Medication 1 TABLET(S): at 17:08

## 2017-08-31 RX ADMIN — ATORVASTATIN CALCIUM 10 MILLIGRAM(S): 80 TABLET, FILM COATED ORAL at 22:08

## 2017-08-31 RX ADMIN — MIDODRINE HYDROCHLORIDE 10 MILLIGRAM(S): 2.5 TABLET ORAL at 05:03

## 2017-08-31 RX ADMIN — NYSTATIN CREAM 1 APPLICATION(S): 100000 CREAM TOPICAL at 22:08

## 2017-08-31 RX ADMIN — Medication 500 MILLIGRAM(S): at 05:04

## 2017-08-31 RX ADMIN — CHLORHEXIDINE GLUCONATE 15 MILLILITER(S): 213 SOLUTION TOPICAL at 22:08

## 2017-08-31 RX ADMIN — FLUDROCORTISONE ACETATE 0.1 MILLIGRAM(S): 0.1 TABLET ORAL at 05:03

## 2017-08-31 RX ADMIN — DABIGATRAN ETEXILATE MESYLATE 150 MILLIGRAM(S): 150 CAPSULE ORAL at 05:04

## 2017-08-31 RX ADMIN — Medication 1 TABLET(S): at 05:32

## 2017-08-31 RX ADMIN — Medication 325 MILLIGRAM(S): at 11:44

## 2017-08-31 RX ADMIN — MAGNESIUM OXIDE 400 MG ORAL TABLET 400 MILLIGRAM(S): 241.3 TABLET ORAL at 08:01

## 2017-08-31 RX ADMIN — Medication 1 MILLIGRAM(S): at 11:44

## 2017-08-31 RX ADMIN — PANTOPRAZOLE SODIUM 40 MILLIGRAM(S): 20 TABLET, DELAYED RELEASE ORAL at 05:04

## 2017-08-31 RX ADMIN — Medication 100 MILLIGRAM(S): at 11:44

## 2017-08-31 RX ADMIN — Medication 50 MICROGRAM(S): at 05:04

## 2017-08-31 RX ADMIN — Medication 1 APPLICATION(S): at 18:30

## 2017-08-31 RX ADMIN — MAGNESIUM OXIDE 400 MG ORAL TABLET 400 MILLIGRAM(S): 241.3 TABLET ORAL at 17:05

## 2017-08-31 RX ADMIN — MIDODRINE HYDROCHLORIDE 10 MILLIGRAM(S): 2.5 TABLET ORAL at 14:51

## 2017-08-31 RX ADMIN — DABIGATRAN ETEXILATE MESYLATE 150 MILLIGRAM(S): 150 CAPSULE ORAL at 17:05

## 2017-08-31 RX ADMIN — Medication 1 APPLICATION(S): at 05:04

## 2017-08-31 RX ADMIN — Medication 500 MILLIGRAM(S): at 17:05

## 2017-08-31 NOTE — PROGRESS NOTE ADULT - SUBJECTIVE AND OBJECTIVE BOX
HISTORY OF PRESENT ILLNESS:  Pt is an 82M with PMHx significant for spinal stenosis s/p multiple spinal surgeries and spinal cord stimulator admitted to [facility] on  for scheduled management of worsening LE pain and neuropathic pain radiating both feet and pain to back, buttocks and thighs s/p exploration of the prior spinal fusion, removal of hardware, removal of spinal stimulator, T11-L1 laminectomy, T4-pelvis spinal fusion with osteotomies on  with Dr. Titus. Hospital course notable for  intraoperative blood loss requiring 2 unit PRBC and persistent acute blood loss anemia,  post op hypotension requiring  pressors, rapid Afib w/ RVR with acute respiratory failure secondary to acute pulmonary edema and bilateral pleural effusions which improved and now off lasix per cardiology, Echo showed EF 65%, encephalopathy for which HCT ( and )  negative for acute pathology but showed chronic infarcts.  SLP followed for mild-moderate dyspahgia on puree with honey liquids diet. On  had 37 beats of Vtach, and persistent leukocytosis. Deemed stable for discharge to acute rehab on 8/15.      TODAY'S SUBJECTIVE & REVIEW OF SYMPTOMS:  Patient denied dizziness and light-headedness this a.m   Pt denies back pain, headache, dysuria or frequency. Reports + BM today formed although slight diarrhea last night.  Pt tolerating regular diet although reports not drinking enough fluids and decreased appetite    [   ] Constitutional WNL  [   ] Cardio WNL  [ x ] Resp WNL  [ x  ] GI WNL  [   ] Heme WNL  [   ] Endo WNL  [ x  ] Skin WNL  [   ] MSK WNL  [   ] Neuro WNL  [   ] Cognitive WNL  [ x  ] Psych WNL        PHYSICAL EXAM  Vital Signs Last 24 Hrs  T(C): 35.6 (31 Aug 2017 05:02), Max: 36.8 (30 Aug 2017 20:47)  T(F): 96 (31 Aug 2017 05:02), Max: 98.3 (30 Aug 2017 20:47)  HR: 91 (31 Aug 2017 05:02) (89 - 93)  BP: 137/83 (31 Aug 2017 05:02) (134/85 - 138/83)  BP(mean): --  RR: 18 (31 Aug 2017 05:02) (18 - 18)  SpO2: 95% (31 Aug 2017 05:02) (95% - 97%)    General:  NAD  HEENT: NCAT  Cardio: Irregular rhythm.  Regular rate  Resp: CTAB  Abdomen: Soft NTND  Neuro: Decreased sensation BLE in stocking distribution  Motor: 4-5/5  Extrem: No edema.  Calves soft and NTTP  Skin: Back incision CDI with no erythema noted  Erythematous rash perineal region  Wounds: No decubiti  Cognitive/Psych: Impaired cognition    FUNCTIONAL PROGRESS:  Gait: 20' min A RW  ADLs: Bathing mod A, UE dress sup, LE dress min A  Transfers: Min A  Bed mobility: Mod A      RECENT LABS:                          9.8    6.0   )-----------( 193      ( 27 Aug 2017 08:08 )             31.4                    08-    137  |  101  |  15.0  ----------------------------<  122<H>  4.0   |  21.0<L>  |  0.66    Ca    8.0<L>      26 Aug 2017 05:11  Mg     1.7         TPro  5.7<L>  /  Alb  2.9<L>  /  TBili  1.0  /  DBili  x   /  AST  24  /  ALT  37  /  AlkPhos  197<H>  08-24      Urinalysis Basic - ( 27 Aug 2017 08:05 )    Color: Annia / Appearance: Clear / S.025 / pH: x  Gluc: x / Ketone: Trace  / Bili: Negative / Urobili: 4 mg/dL   Blood: x / Protein: 15 mg/dL / Nitrite: Negative   Leuk Esterase: Trace / RBC: 0-2 /HPF / WBC 0-2   Sq Epi: x / Non Sq Epi: x / Bacteria: x    RADIOLOGY/OTHER RESULTS:    MEDICATIONS  (STANDING):  aspirin  chewable 81 milliGRAM(s) Oral daily  tamsulosin 0.4 milliGRAM(s) Oral at bedtime  dabigatran 150 milliGRAM(s) Oral every 12 hours  docusate sodium 100 milliGRAM(s) Oral three times a day  senna 2 Tablet(s) Oral at bedtime  chlorhexidine 0.12% Liquid 15 milliLiter(s) Swish and Spit three times a day  pantoprazole   Suspension 40 milliGRAM(s) Oral before breakfast  ascorbic acid 500 milliGRAM(s) Oral two times a day  zinc sulfate 220 milliGRAM(s) Oral daily  multivitamin 1 Tablet(s) Oral daily  BACItracin   Ointment 1 Application(s) Topical two times a day  levothyroxine 50 MICROGram(s) Oral daily  ergocalciferol 36764 Unit(s) Oral every week  folic acid 1 milliGRAM(s) Oral daily  metoprolol succinate ER 25 milliGRAM(s) Oral daily  melatonin 3 milliGRAM(s) Oral at bedtime  sodium chloride 0.9%. 1000 milliLiter(s) (50 mL/Hr) IV Continuous <Continuous>  ferrous    sulfate 325 milliGRAM(s) Oral daily  atorvastatin 10 milliGRAM(s) Oral at bedtime  midodrine 10 milliGRAM(s) Oral every 8 hours  magnesium oxide 400 milliGRAM(s) Oral two times a day with meals    MEDICATIONS  (PRN):  polyethylene glycol 3350 17 Gram(s) Oral daily PRN Constipation  traMADol 50 milliGRAM(s) Oral every 4 hours PRN Severe Pain (7 - 10)  lidocaine 2% Gel 1 Application(s) Topical three times a day PRN pain      Assessment:  Pt is an 83y/o male with spinal stenosis s/p T11-L1 laminectomy, T4-pelvis spinal fusion    Comprehensive rehab: PT/OT/ST  Spinal stenosis-Spinal precautions.  Pain-Tramadol prn  H/O acute respiratory distress- improved; pt oxygenating well on RA although persistent SOB on exertion; requested repeat CXR which was neg and ordered duonebs q6hrs x 3 days.  Appreciate hospitalist consult  Metabolic iouxlgohpktklz-Knqzzljm-Dtnm ST.  Caution with excessive pain meds or anxiolytics, fall precautions  Dysphagia-Added Estim  S/P MBS :  Advanced diet to regular from previous puree and honey-thickened liquid diet.  D/C IVF for hydration pm hours due to qhs urinary incontinence.  Encourage po liquid intake.  Cont ST.  Atrial fibrillation-HR controlled.  Cont Toprol XL daily with holding parameters and Pradaxa.  EKG due to c/o "heart fluttering"  Appreciate cardiology consult; nuclear stress test performed ; neg  Metoprolol increased- hold for SBP <90; keep Mg > 2 and K > 4.  monitor BP  Constipation- bowel regimen.   HTN-SBPs was low at   Orthostatic hypotension; IVF boluses given  Cont abdominal binder and TEDs when out of bed  Midodrine added-increased to 10mg tid and Remeron discontinued by hospitalist, cardiology follow up appreciated.  Added Florinef  as per cardiology.  Discontinued Tramadol and resumed tylenol prn for pain management  Hyperlipidemia -   Added lipitor 10mg.    Elevated TSH 5.28-Added Synthroid 50 daily  Elevated LFTs-Discontinued tylenol.  AST/ALT improved; now WNL.  Resumed tylenol prn for pain.  Monitor CMP  Acute blood loss anemia-H/H stable.  Folic acid and Fe  Leukocytosis:  Resolved.  WBC improved, UA negative, patient afebrile.   Vitamin D deficiency-Low normal Vit D at 31.  Added Vit D 50,000U weekly  Bowel regimen-Changed Colace to prn due to episode of bowel incontinence  F/U with PMD Dr. Lares and cardiology Dr. Ny as outpt.

## 2017-09-01 PROCEDURE — 99232 SBSQ HOSP IP/OBS MODERATE 35: CPT

## 2017-09-01 RX ORDER — PANTOPRAZOLE SODIUM 20 MG/1
40 TABLET, DELAYED RELEASE ORAL
Qty: 0 | Refills: 0 | Status: DISCONTINUED | OUTPATIENT
Start: 2017-09-01 | End: 2017-09-15

## 2017-09-01 RX ADMIN — ATORVASTATIN CALCIUM 10 MILLIGRAM(S): 80 TABLET, FILM COATED ORAL at 21:28

## 2017-09-01 RX ADMIN — FLUDROCORTISONE ACETATE 0.1 MILLIGRAM(S): 0.1 TABLET ORAL at 05:46

## 2017-09-01 RX ADMIN — MAGNESIUM OXIDE 400 MG ORAL TABLET 400 MILLIGRAM(S): 241.3 TABLET ORAL at 17:30

## 2017-09-01 RX ADMIN — CHLORHEXIDINE GLUCONATE 15 MILLILITER(S): 213 SOLUTION TOPICAL at 05:47

## 2017-09-01 RX ADMIN — ERGOCALCIFEROL 50000 UNIT(S): 1.25 CAPSULE ORAL at 11:56

## 2017-09-01 RX ADMIN — DABIGATRAN ETEXILATE MESYLATE 150 MILLIGRAM(S): 150 CAPSULE ORAL at 05:45

## 2017-09-01 RX ADMIN — Medication 1 TABLET(S): at 17:34

## 2017-09-01 RX ADMIN — MIDODRINE HYDROCHLORIDE 10 MILLIGRAM(S): 2.5 TABLET ORAL at 14:43

## 2017-09-01 RX ADMIN — Medication 500 MILLIGRAM(S): at 05:46

## 2017-09-01 RX ADMIN — Medication 1 MILLIGRAM(S): at 11:57

## 2017-09-01 RX ADMIN — Medication 50 MILLIGRAM(S): at 05:45

## 2017-09-01 RX ADMIN — Medication 1 TABLET(S): at 08:17

## 2017-09-01 RX ADMIN — MIDODRINE HYDROCHLORIDE 10 MILLIGRAM(S): 2.5 TABLET ORAL at 21:28

## 2017-09-01 RX ADMIN — NYSTATIN CREAM 1 APPLICATION(S): 100000 CREAM TOPICAL at 17:31

## 2017-09-01 RX ADMIN — Medication 1 TABLET(S): at 11:56

## 2017-09-01 RX ADMIN — Medication 1 APPLICATION(S): at 05:47

## 2017-09-01 RX ADMIN — Medication 500 MILLIGRAM(S): at 17:30

## 2017-09-01 RX ADMIN — Medication 1 APPLICATION(S): at 19:05

## 2017-09-01 RX ADMIN — Medication 325 MILLIGRAM(S): at 11:57

## 2017-09-01 RX ADMIN — MIDODRINE HYDROCHLORIDE 10 MILLIGRAM(S): 2.5 TABLET ORAL at 05:46

## 2017-09-01 RX ADMIN — MAGNESIUM OXIDE 400 MG ORAL TABLET 400 MILLIGRAM(S): 241.3 TABLET ORAL at 08:17

## 2017-09-01 RX ADMIN — Medication 3 MILLIGRAM(S): at 21:28

## 2017-09-01 RX ADMIN — PANTOPRAZOLE SODIUM 40 MILLIGRAM(S): 20 TABLET, DELAYED RELEASE ORAL at 08:27

## 2017-09-01 RX ADMIN — Medication 81 MILLIGRAM(S): at 11:56

## 2017-09-01 RX ADMIN — DABIGATRAN ETEXILATE MESYLATE 150 MILLIGRAM(S): 150 CAPSULE ORAL at 17:30

## 2017-09-01 RX ADMIN — TAMSULOSIN HYDROCHLORIDE 0.4 MILLIGRAM(S): 0.4 CAPSULE ORAL at 21:28

## 2017-09-01 RX ADMIN — Medication 50 MICROGRAM(S): at 05:45

## 2017-09-01 NOTE — PROGRESS NOTE ADULT - SUBJECTIVE AND OBJECTIVE BOX
HISTORY OF PRESENT ILLNESS:  Pt is an 82M with PMHx significant for spinal stenosis s/p multiple spinal surgeries and spinal cord stimulator admitted to [facility] on  for scheduled management of worsening LE pain and neuropathic pain radiating both feet and pain to back, buttocks and thighs s/p exploration of the prior spinal fusion, removal of hardware, removal of spinal stimulator, T11-L1 laminectomy, T4-pelvis spinal fusion with osteotomies on  with Dr. Titus. Hospital course notable for  intraoperative blood loss requiring 2 unit PRBC and persistent acute blood loss anemia,  post op hypotension requiring  pressors, rapid Afib w/ RVR with acute respiratory failure secondary to acute pulmonary edema and bilateral pleural effusions which improved and now off lasix per cardiology, Echo showed EF 65%, encephalopathy for which HCT ( and )  negative for acute pathology but showed chronic infarcts.  SLP followed for mild-moderate dyspahgia on puree with honey liquids diet. On  had 37 beats of Vtach, and persistent leukocytosis. Deemed stable for discharge to acute rehab on 8/15.      TODAY'S SUBJECTIVE & REVIEW OF SYMPTOMS:  Patient reports feeling well; denied dizziness and light-headedness this a.m   Pt denies back pain, headache, dysuria or frequency.  +urinary incontinence at night more frequently than PTA.   Reports no BM today.  Pt tolerating regular diet     [   ] Constitutional WNL  [   ] Cardio WNL  [ x ] Resp WNL  [ x  ] GI WNL  [   ] Heme WNL  [   ] Endo WNL  [ x  ] Skin WNL  [   ] MSK WNL  [   ] Neuro WNL  [   ] Cognitive WNL  [ x  ] Psych WNL        PHYSICAL EXAM  Vital Signs Last 24 Hrs  T(C): 36.4 (01 Sep 2017 05:56), Max: 37 (31 Aug 2017 11:00)  T(F): 97.6 (01 Sep 2017 05:56), Max: 98.6 (31 Aug 2017 11:00)  HR: 86 (01 Sep 2017 05:56) (84 - 98)  BP: 130/72 (01 Sep 2017 05:56) (110/73 - 147/91)  BP(mean): --  RR: 18 (01 Sep 2017 05:56) (17 - 18)  SpO2: 97% (01 Sep 2017 05:56) (97% - 99%)    General:  NAD  HEENT: NCAT  Cardio: Irregular rhythm.  Regular rate  Resp: CTAB  Abdomen: Soft NTND  Neuro: Decreased sensation BLE in stocking distribution  Motor: 4-5/5  Extrem: No edema.  Calves soft and NTTP  Skin: Back incision CDI with no erythema noted  Erythematous rash perineal region  Wounds: No decubiti  Cognitive/Psych: Impaired cognition    FUNCTIONAL PROGRESS:  Gait: 20' min A RW  ADLs: Bathing mod A, UE dress sup, LE dress min A  Transfers: Min A  Bed mobility: Mod A      RECENT LABS:                          9.8    6.0   )-----------( 193      ( 27 Aug 2017 08:08 )             31.4                    08-    137  |  101  |  15.0  ----------------------------<  122<H>  4.0   |  21.0<L>  |  0.66    Ca    8.0<L>      26 Aug 2017 05:11  Mg     1.7         TPro  5.7<L>  /  Alb  2.9<L>  /  TBili  1.0  /  DBili  x   /  AST  24  /  ALT  37  /  AlkPhos  197<H>  08-24      Urinalysis Basic - ( 27 Aug 2017 08:05 )    Color: Annia / Appearance: Clear / S.025 / pH: x  Gluc: x / Ketone: Trace  / Bili: Negative / Urobili: 4 mg/dL   Blood: x / Protein: 15 mg/dL / Nitrite: Negative   Leuk Esterase: Trace / RBC: 0-2 /HPF / WBC 0-2   Sq Epi: x / Non Sq Epi: x / Bacteria: x    RADIOLOGY/OTHER RESULTS:    MEDICATIONS  (STANDING):  aspirin  chewable 81 milliGRAM(s) Oral daily  tamsulosin 0.4 milliGRAM(s) Oral at bedtime  dabigatran 150 milliGRAM(s) Oral every 12 hours  docusate sodium 100 milliGRAM(s) Oral three times a day  senna 2 Tablet(s) Oral at bedtime  chlorhexidine 0.12% Liquid 15 milliLiter(s) Swish and Spit three times a day  pantoprazole   Suspension 40 milliGRAM(s) Oral before breakfast  ascorbic acid 500 milliGRAM(s) Oral two times a day  zinc sulfate 220 milliGRAM(s) Oral daily  multivitamin 1 Tablet(s) Oral daily  BACItracin   Ointment 1 Application(s) Topical two times a day  levothyroxine 50 MICROGram(s) Oral daily  ergocalciferol 77608 Unit(s) Oral every week  folic acid 1 milliGRAM(s) Oral daily  metoprolol succinate ER 25 milliGRAM(s) Oral daily  melatonin 3 milliGRAM(s) Oral at bedtime  sodium chloride 0.9%. 1000 milliLiter(s) (50 mL/Hr) IV Continuous <Continuous>  ferrous    sulfate 325 milliGRAM(s) Oral daily  atorvastatin 10 milliGRAM(s) Oral at bedtime  midodrine 10 milliGRAM(s) Oral every 8 hours  magnesium oxide 400 milliGRAM(s) Oral two times a day with meals    MEDICATIONS  (PRN):  polyethylene glycol 3350 17 Gram(s) Oral daily PRN Constipation  traMADol 50 milliGRAM(s) Oral every 4 hours PRN Severe Pain (7 - 10)  lidocaine 2% Gel 1 Application(s) Topical three times a day PRN pain      Assessment:  Pt is an 81y/o male with spinal stenosis s/p T11-L1 laminectomy, T4-pelvis spinal fusion    Comprehensive rehab: PT/OT/ST  Spinal stenosis-Spinal precautions.  Pain-Tramadol prn  H/O acute respiratory distress- improved; pt oxygenating well on RA with less SOB on exertion; CXR  neg and ordered duonebs q6hrs x 3 days.  Appreciate hospitalist consult  Metabolic pgsorxjtssbosr-Fjrgaogu-Qery ST.  Caution with excessive pain meds or anxiolytics, fall precautions  Dysphagia-Added Estim  S/P MBS :  Advanced diet to regular from previous puree and honey-thickened liquid diet.  D/C IVF for hydration pm hours due to qhs urinary incontinence.  Encourage po liquid intake.  Cont ST.  Atrial fibrillation-HR controlled.  Cont Toprol XL daily with holding parameters and Pradaxa.  EKG due to c/o "heart fluttering"  Appreciate cardiology consult; nuclear stress test performed ; neg  Metoprolol increased- hold for SBP <90; keep Mg > 2 and K > 4.  monitor BP  Constipation- bowel regimen.   HTN-SBPs was low at   Orthostatic hypotension; IVF boluses given  Cont abdominal binder and TEDs when out of bed  Midodrine added-increased to 10mg tid and Remeron discontinued by hospitalist, cardiology follow up appreciated.  Added Florinef  as per cardiology.  Discontinued Tramadol due to pain improved and possible contribution to light-headedness and resumed tylenol prn for pain management  Hyperlipidemia -   Added lipitor 10mg.    Elevated TSH 5.28-Added Synthroid 50 daily  Elevated LFTs-Discontinued tylenol.  AST/ALT improved; now WNL.  Resumed tylenol prn for pain.  F/U CMP in a.m  Acute blood loss anemia-H/H stable.  Cont Folic acid and Fe.  F/U CBC in a.m  Leukocytosis:  Resolved.  WBC improved, UA negative, patient afebrile.   Vitamin D deficiency-Low normal Vit D at 31.  Added Vit D 50,000U weekly  Bowel regimen-Changed Colace to prn due to episode of bowel incontinence  Urinary incontinence-Urine culture  <10,000cfu/ml GNR  Check bladder scan x 1 this pm  F/U with PMD Dr. Lares and cardiology Dr. Ny as outpt.

## 2017-09-02 LAB
ALBUMIN SERPL ELPH-MCNC: 2.8 G/DL — LOW (ref 3.3–5.2)
ALP SERPL-CCNC: 155 U/L — HIGH (ref 40–120)
ALT FLD-CCNC: 21 U/L — SIGNIFICANT CHANGE UP
ANION GAP SERPL CALC-SCNC: 12 MMOL/L — SIGNIFICANT CHANGE UP (ref 5–17)
AST SERPL-CCNC: 20 U/L — SIGNIFICANT CHANGE UP
BILIRUB SERPL-MCNC: 0.6 MG/DL — SIGNIFICANT CHANGE UP (ref 0.4–2)
BUN SERPL-MCNC: 10 MG/DL — SIGNIFICANT CHANGE UP (ref 8–20)
CALCIUM SERPL-MCNC: 8.1 MG/DL — LOW (ref 8.6–10.2)
CHLORIDE SERPL-SCNC: 104 MMOL/L — SIGNIFICANT CHANGE UP (ref 98–107)
CO2 SERPL-SCNC: 24 MMOL/L — SIGNIFICANT CHANGE UP (ref 22–29)
CREAT SERPL-MCNC: 0.67 MG/DL — SIGNIFICANT CHANGE UP (ref 0.5–1.3)
GLUCOSE SERPL-MCNC: 89 MG/DL — SIGNIFICANT CHANGE UP (ref 70–115)
HCT VFR BLD CALC: 30.6 % — LOW (ref 42–52)
HGB BLD-MCNC: 9.6 G/DL — LOW (ref 14–18)
MAGNESIUM SERPL-MCNC: 1.9 MG/DL — SIGNIFICANT CHANGE UP (ref 1.6–2.6)
MCHC RBC-ENTMCNC: 29.9 PG — SIGNIFICANT CHANGE UP (ref 27–31)
MCHC RBC-ENTMCNC: 31.4 G/DL — LOW (ref 32–36)
MCV RBC AUTO: 95.3 FL — HIGH (ref 80–94)
PLATELET # BLD AUTO: 229 K/UL — SIGNIFICANT CHANGE UP (ref 150–400)
POTASSIUM SERPL-MCNC: 3.8 MMOL/L — SIGNIFICANT CHANGE UP (ref 3.5–5.3)
POTASSIUM SERPL-SCNC: 3.8 MMOL/L — SIGNIFICANT CHANGE UP (ref 3.5–5.3)
PREALB SERPL-MCNC: 12 MG/DL — LOW (ref 18–38)
PROT SERPL-MCNC: 5.6 G/DL — LOW (ref 6.6–8.7)
RBC # BLD: 3.21 M/UL — LOW (ref 4.6–6.2)
RBC # FLD: 15.7 % — HIGH (ref 11–15.6)
SODIUM SERPL-SCNC: 140 MMOL/L — SIGNIFICANT CHANGE UP (ref 135–145)
WBC # BLD: 6.1 K/UL — SIGNIFICANT CHANGE UP (ref 4.8–10.8)
WBC # FLD AUTO: 6.1 K/UL — SIGNIFICANT CHANGE UP (ref 4.8–10.8)

## 2017-09-02 PROCEDURE — 99232 SBSQ HOSP IP/OBS MODERATE 35: CPT

## 2017-09-02 PROCEDURE — 99233 SBSQ HOSP IP/OBS HIGH 50: CPT

## 2017-09-02 RX ORDER — SODIUM CHLORIDE 9 MG/ML
500 INJECTION INTRAMUSCULAR; INTRAVENOUS; SUBCUTANEOUS
Qty: 0 | Refills: 0 | Status: COMPLETED | OUTPATIENT
Start: 2017-09-02 | End: 2017-09-02

## 2017-09-02 RX ADMIN — Medication 3 MILLIGRAM(S): at 22:24

## 2017-09-02 RX ADMIN — MIDODRINE HYDROCHLORIDE 10 MILLIGRAM(S): 2.5 TABLET ORAL at 22:24

## 2017-09-02 RX ADMIN — ATORVASTATIN CALCIUM 10 MILLIGRAM(S): 80 TABLET, FILM COATED ORAL at 22:24

## 2017-09-02 RX ADMIN — FLUDROCORTISONE ACETATE 0.1 MILLIGRAM(S): 0.1 TABLET ORAL at 05:38

## 2017-09-02 RX ADMIN — Medication 500 MILLIGRAM(S): at 17:49

## 2017-09-02 RX ADMIN — MAGNESIUM OXIDE 400 MG ORAL TABLET 400 MILLIGRAM(S): 241.3 TABLET ORAL at 17:49

## 2017-09-02 RX ADMIN — SODIUM CHLORIDE 500 MILLILITER(S): 9 INJECTION INTRAMUSCULAR; INTRAVENOUS; SUBCUTANEOUS at 14:28

## 2017-09-02 RX ADMIN — Medication 50 MILLIGRAM(S): at 05:38

## 2017-09-02 RX ADMIN — Medication 1 APPLICATION(S): at 17:49

## 2017-09-02 RX ADMIN — MIDODRINE HYDROCHLORIDE 10 MILLIGRAM(S): 2.5 TABLET ORAL at 15:30

## 2017-09-02 RX ADMIN — PANTOPRAZOLE SODIUM 40 MILLIGRAM(S): 20 TABLET, DELAYED RELEASE ORAL at 05:38

## 2017-09-02 RX ADMIN — Medication 1 APPLICATION(S): at 05:38

## 2017-09-02 RX ADMIN — DABIGATRAN ETEXILATE MESYLATE 150 MILLIGRAM(S): 150 CAPSULE ORAL at 05:38

## 2017-09-02 RX ADMIN — Medication 1 MILLIGRAM(S): at 15:30

## 2017-09-02 RX ADMIN — Medication 500 MILLIGRAM(S): at 05:38

## 2017-09-02 RX ADMIN — NYSTATIN CREAM 1 APPLICATION(S): 100000 CREAM TOPICAL at 05:39

## 2017-09-02 RX ADMIN — Medication 1 TABLET(S): at 15:31

## 2017-09-02 RX ADMIN — MAGNESIUM OXIDE 400 MG ORAL TABLET 400 MILLIGRAM(S): 241.3 TABLET ORAL at 08:19

## 2017-09-02 RX ADMIN — DABIGATRAN ETEXILATE MESYLATE 150 MILLIGRAM(S): 150 CAPSULE ORAL at 17:49

## 2017-09-02 RX ADMIN — MIDODRINE HYDROCHLORIDE 10 MILLIGRAM(S): 2.5 TABLET ORAL at 05:38

## 2017-09-02 RX ADMIN — Medication 81 MILLIGRAM(S): at 15:32

## 2017-09-02 RX ADMIN — NYSTATIN CREAM 1 APPLICATION(S): 100000 CREAM TOPICAL at 17:50

## 2017-09-02 RX ADMIN — TAMSULOSIN HYDROCHLORIDE 0.4 MILLIGRAM(S): 0.4 CAPSULE ORAL at 22:24

## 2017-09-02 RX ADMIN — Medication 325 MILLIGRAM(S): at 15:31

## 2017-09-02 RX ADMIN — Medication 50 MICROGRAM(S): at 05:38

## 2017-09-02 NOTE — PROGRESS NOTE ADULT - ASSESSMENT
Pt is an 82M with PMHx significant for spinal stenosis s/p multiple spinal surgeries and spinal cord stimulator admitted to [facility] on 8/1 for scheduled management of worsening LE pain and neuropathic pain radiating both feet and pain to back, buttocks and thighs s/p exploration of the prior spinal fusion, removal of hardware, removal of spinal stimulator, T11-L1 laminectomy, T4-pelvis spinal fusion with osteotomies on 8/1 with Dr. Titus. Hospital course notable for  intraoperative blood loss requiring 2 unit PRBC and persistent acute blood loss anemia,  post op hypotension requiring  pressors, rapid Afib w/ RVR with acute respiratory failure secondary to acute pulmonary edema and bilateral pleural effusions which improved and now off lasix per cardiology, Echo showed EF 65%, encephalopathy for which HCT (8/6 and 8/14)  negative for acute pathology but showed chronic infarcts. On 8/14, as per RN note,  had 37 beats of  Vtach,  Ortho team was notified, [facility] was not informed and had signed off on 8/12.  Patient was discharged to acute rehab on 8/15.    Problem/Recommendation - 1:  Problem: Hypotension. Recommendation: Orthostatic: deconditioned, venous pooling, poor nutrition . Stress test normal. Continue  Midodrine to  10 mg TID , ct shweta stockings, abdominal binder.   Today with episode of lightheadedness  , admits poor PO  intake .   encourage po intake, agree with daily iv fluid bolus in am prior to therapy  called labs for cortisol levels       Problem/Recommendation - 2:  ·  Problem: Nonsustained ventricular tachycardia.  Recommendation: keep Mag>2, K>4  Cardio input appreciated,  Nuclear stress test - normal    Problem/Recommendation - 3:  ·  Problem: Atrial fibrillation.  Recommendation: BB needed for rate control , continue   Pradaxa.     Problem/Recommendation - 4:  ·  Problem: CAD (coronary artery disease).  Recommendation: ASA/Statin/BB.     Problem/Recommendation - 5:  ·  Problem: Spinal stenosis of lumbosacral region.  Recommendation: s/p exploration of the prior spinal fusion, removal of hardware, removal of spinal stimulator, T11-L1 laminectomy, T4-pelvis spinal fusion with osteotomies on 8/1 with Dr. iTtus with complicated post operative coarse  Rehab program.     Problem/Recommendation - 6:  Problem: HLD (hyperlipidemia). Recommendation: statin. Continue  statins     Problem/Recommendation - 7:  Problem: Poor appetite.+ severe protein calorie malnutrition. Recommendation: encourage oral intake.    Problem/Recommendation-8  Problem: Insomnia. Recommendation: Continue  Melatonin

## 2017-09-02 NOTE — PROGRESS NOTE ADULT - SUBJECTIVE AND OBJECTIVE BOX
HISTORY OF PRESENT ILLNESS:  Pt is an 82M with PMHx significant for spinal stenosis s/p multiple spinal surgeries and spinal cord stimulator admitted to [facility] on  for scheduled management of worsening LE pain and neuropathic pain radiating both feet and pain to back, buttocks and thighs s/p exploration of the prior spinal fusion, removal of hardware, removal of spinal stimulator, T11-L1 laminectomy, T4-pelvis spinal fusion with osteotomies on  with Dr. Titus. Hospital course notable for  intraoperative blood loss requiring 2 unit PRBC and persistent acute blood loss anemia,  post op hypotension requiring  pressors, rapid Afib w/ RVR with acute respiratory failure secondary to acute pulmonary edema and bilateral pleural effusions which improved and now off lasix per cardiology, Echo showed EF 65%, encephalopathy for which HCT ( and )  negative for acute pathology but showed chronic infarcts.  SLP followed for mild-moderate dyspahgia on puree with honey liquids diet. On  had 37 beats of Vtach, and persistent leukocytosis. Deemed stable for discharge to acute rehab on 8/15.      TODAY'S SUBJECTIVE & REVIEW OF SYMPTOMS:  Patient reports feeling well; denied dizziness and light-headedness this a.m   Pt denies back pain, headache, dysuria or frequency.  +urinary incontinence at night more frequently than PTA.   +BM yesterday.  Pt tolerating regular diet     [   ] Constitutional WNL  [   ] Cardio WNL  [ x ] Resp WNL  [ x  ] GI WNL  [   ] Heme WNL  [   ] Endo WNL  [ x  ] Skin WNL  [   ] MSK WNL  [   ] Neuro WNL  [   ] Cognitive WNL  [ x  ] Psych WNL        PHYSICAL EXAM  Vital Signs Last 24 Hrs  T(C): 35.7 (02 Sep 2017 05:35), Max: 36.9 (01 Sep 2017 19:56)  T(F): 96.2 (02 Sep 2017 05:35), Max: 98.4 (01 Sep 2017 19:56)  HR: 97 (02 Sep 2017 05:35) (58 - 97)  BP: 133/80 (02 Sep 2017 05:35) (93/66 - 133/80)  BP(mean): --  RR: 18 (02 Sep 2017 05:35) (18 - 18)  SpO2: 95% (02 Sep 2017 05:35) (95% - 97%)    General:  NAD  HEENT: NCAT  Cardio: Irregular rhythm.  Regular rate  Resp: CTAB  Abdomen: Soft NTND  Neuro: Decreased sensation BLE in stocking distribution  Motor: 4-5/5  Extrem: No edema.  Calves soft and NTTP  Skin: Back incision CDI with no erythema noted  Erythematous rash perineal region  Wounds: No decubiti  Cognitive/Psych: Impaired cognition    FUNCTIONAL PROGRESS:  Gait: 20' min A RW  ADLs: Bathing mod A, UE dress sup, LE dress min A  Transfers: Min A  Bed mobility: Mod A      RECENT LABS:                          9.6    6.1   )-----------( 229      ( 02 Sep 2017 08:00 )             30.6   09    140  |  104  |  10.0  ----------------------------<  89  3.8   |  24.0  |  0.67    Ca    8.1<L>      02 Sep 2017 08:00  Mg     1.9         TPro  5.6<L>  /  Alb  2.8<L>  /  TBili  0.6  /  DBili  x   /  AST  20  /  ALT  21  /  AlkPhos  155<H>      9/2 Prealbumin 12    Urinalysis Basic - ( 27 Aug 2017 08:05 )    Color: Annia / Appearance: Clear / S.025 / pH: x  Gluc: x / Ketone: Trace  / Bili: Negative / Urobili: 4 mg/dL   Blood: x / Protein: 15 mg/dL / Nitrite: Negative   Leuk Esterase: Trace / RBC: 0-2 /HPF / WBC 0-2   Sq Epi: x / Non Sq Epi: x / Bacteria: x    RADIOLOGY/OTHER RESULTS:    MEDICATIONS  (STANDING):  aspirin  chewable 81 milliGRAM(s) Oral daily  tamsulosin 0.4 milliGRAM(s) Oral at bedtime  dabigatran 150 milliGRAM(s) Oral every 12 hours  docusate sodium 100 milliGRAM(s) Oral three times a day  senna 2 Tablet(s) Oral at bedtime  chlorhexidine 0.12% Liquid 15 milliLiter(s) Swish and Spit three times a day  pantoprazole   Suspension 40 milliGRAM(s) Oral before breakfast  ascorbic acid 500 milliGRAM(s) Oral two times a day  zinc sulfate 220 milliGRAM(s) Oral daily  multivitamin 1 Tablet(s) Oral daily  BACItracin   Ointment 1 Application(s) Topical two times a day  levothyroxine 50 MICROGram(s) Oral daily  ergocalciferol 84020 Unit(s) Oral every week  folic acid 1 milliGRAM(s) Oral daily  metoprolol succinate ER 25 milliGRAM(s) Oral daily  melatonin 3 milliGRAM(s) Oral at bedtime  sodium chloride 0.9%. 1000 milliLiter(s) (50 mL/Hr) IV Continuous <Continuous>  ferrous    sulfate 325 milliGRAM(s) Oral daily  atorvastatin 10 milliGRAM(s) Oral at bedtime  midodrine 10 milliGRAM(s) Oral every 8 hours  magnesium oxide 400 milliGRAM(s) Oral two times a day with meals    MEDICATIONS  (PRN):  polyethylene glycol 3350 17 Gram(s) Oral daily PRN Constipation  traMADol 50 milliGRAM(s) Oral every 4 hours PRN Severe Pain (7 - 10)  lidocaine 2% Gel 1 Application(s) Topical three times a day PRN pain      Assessment:  Pt is an 81y/o male with spinal stenosis s/p T11-L1 laminectomy, T4-pelvis spinal fusion    Comprehensive rehab: PT/OT/ST  Spinal stenosis-Spinal precautions.  Pain-Tramadol prn  H/O acute respiratory distress- improved; pt oxygenating well on RA with less SOB on exertion; CXR  neg and ordered duonebs q6hrs x 3 days.  Appreciate hospitalist consult  Metabolic yicthwtrzrsuhj-Zqkceryp-Taac ST.  Caution with excessive pain meds or anxiolytics, fall precautions  Dysphagia-Added Estim  S/P MBS :  Advanced diet to regular from previous puree and honey-thickened liquid diet.  D/C IVF for hydration pm hours due to qhs urinary incontinence.  Encourage po liquid intake.  Cont ST.  Atrial fibrillation-HR controlled.  Cont Toprol XL daily with holding parameters and Pradaxa.  EKG due to c/o "heart fluttering"  Appreciate cardiology consult; nuclear stress test performed ; neg  Metoprolol increased- hold for SBP <90; keep Mg > 2 and K > 4.  monitor BP  Constipation- bowel regimen.   HTN-SBPs was low at   Orthostatic hypotension; IVF boluses given  Cont abdominal binder and TEDs when out of bed  Midodrine added-increased to 10mg tid and Remeron discontinued by hospitalist, cardiology follow up appreciated.  Added Florinef  as per cardiology.  Discontinued Tramadol due to pain improved and possible contribution to light-headedness and resumed tylenol prn for pain management  Hyperlipidemia -   Added lipitor 10mg.    Elevated TSH 5.28-Added Synthroid 50 daily  Elevated LFTs-Discontinued tylenol.  AST/ALT improved; now WNL.  Resumed tylenol prn for pain.  F/U CMP with improved LFTs  Acute blood loss anemia-H/H stable.  Cont Folic acid and Fe.    Leukocytosis:  Resolved.  WBC improved, UA negative, patient afebrile.   Vitamin D deficiency-Low normal Vit D at 31.  Added Vit D 50,000U weekly  Bowel regimen-Changed Colace to prn due to episode of bowel incontinence  Urinary incontinence-Urine culture  <10,000cfu/ml GNR    F/U with PMD Dr. Lares and cardiology Dr. Ny as outpt.

## 2017-09-02 NOTE — PROGRESS NOTE ADULT - SUBJECTIVE AND OBJECTIVE BOX
KEILA VAUGHAN    [id number]    82y      Male    CC: Had episode of lightheadedness during physical therapy . Now laying in the bed , getting bolus of NS . No complaints   Has been very well last week since the addition of midodrine and florinef per wife    INTERVAL HPI/OVERNIGHT EVENTS: no acute events    REVIEW OF SYSTEMS:    CONSTITUTIONAL: No fever  RESPIRATORY: No shortness of breath  CARDIOVASCULAR: No chest pain, palpitations        Vital Signs Last 24 Hrs  T(C): 35.7 (02 Sep 2017 05:35), Max: 36.9 (01 Sep 2017 19:56)  T(F): 96.2 (02 Sep 2017 05:35), Max: 98.4 (01 Sep 2017 19:56)  HR: 97 (02 Sep 2017 05:35) (58 - 97)  BP: 133/80 (02 Sep 2017 05:35) (114/76 - 133/80)  BP(mean): --  RR: 18 (02 Sep 2017 05:35) (18 - 18)  SpO2: 95% (02 Sep 2017 05:35) (95% - 97%)    PHYSICAL EXAM:    GENERAL: NAD, well-groomed  HEENT: PERRL, +EOMI  CHEST/LUNG: Clear to percussion bilaterally; No wheezing  HEART: S1S2+, Regular rate and rhythm; No murmurs, rubs, or gallops  ABDOMEN: abdominal binder and brace+  EXTREMITIES: compression stocking+ , No clubbing, cyanosis, or edema  SKIN: No rashes or lesions  NEURO: AAOX3        09-01 @ 07:01 - 09-02 @ 07:00  --------------------------------------------------------  IN: 0 mL / OUT: 820 mL / NET: -820 mL    09-02 @ 07:01  -  09-02 @ 13:31  --------------------------------------------------------  IN: 0 mL / OUT: 1 mL / NET: -1 mL        LABS:                        9.6    6.1   )-----------( 229      ( 02 Sep 2017 08:00 )             30.6     09-02    140  |  104  |  10.0  ----------------------------<  89  3.8   |  24.0  |  0.67    Ca    8.1<L>      02 Sep 2017 08:00  Mg     1.9     09-02    TPro  5.6<L>  /  Alb  2.8<L>  /  TBili  0.6  /  DBili  x   /  AST  20  /  ALT  21  /  AlkPhos  155<H>  09-02            MEDICATIONS  (STANDING):  aspirin  chewable 81 milliGRAM(s) Oral daily  tamsulosin 0.4 milliGRAM(s) Oral at bedtime  dabigatran 150 milliGRAM(s) Oral every 12 hours  ascorbic acid 500 milliGRAM(s) Oral two times a day  multivitamin 1 Tablet(s) Oral daily  BACItracin   Ointment 1 Application(s) Topical two times a day  levothyroxine 50 MICROGram(s) Oral daily  ergocalciferol 06813 Unit(s) Oral every week  folic acid 1 milliGRAM(s) Oral daily  melatonin 3 milliGRAM(s) Oral at bedtime  ferrous    sulfate 325 milliGRAM(s) Oral daily  atorvastatin 10 milliGRAM(s) Oral at bedtime  midodrine 10 milliGRAM(s) Oral every 8 hours  magnesium oxide 400 milliGRAM(s) Oral two times a day with meals  fludroCORTISONE 0.1 milliGRAM(s) Oral daily  metoprolol succinate ER 50 milliGRAM(s) Oral daily  nystatin Ointment 1 Application(s) Topical two times a day  pantoprazole    Tablet 40 milliGRAM(s) Oral before breakfast  sodium chloride 0.9% Bolus 500 milliLiter(s) IV Bolus <User Schedule>    MEDICATIONS  (PRN):  polyethylene glycol 3350 17 Gram(s) Oral daily PRN Constipation  calcium carbonate 500 mG (Tums) Chewable 1 Tablet(s) Chew three times a day PRN Upset Stomach  senna 2 Tablet(s) Oral at bedtime PRN if no BM for 3 days  acetaminophen   Tablet. 650 milliGRAM(s) Oral every 6 hours PRN Severe Pain (7 - 10)  docusate sodium 100 milliGRAM(s) Oral daily PRN Constipation      RADIOLOGY & ADDITIONAL TESTS:

## 2017-09-03 LAB
CORTICOSTEROID BINDING GLOBULIN RESULT: 2.4 MG/DL — SIGNIFICANT CHANGE UP
CORTIS F/TOTAL MFR SERPL: 5.6 % — SIGNIFICANT CHANGE UP
CORTIS SERPL-MCNC: 11 UG/DL — SIGNIFICANT CHANGE UP
CORTISOL, FREE RESULT: 0.61 UG/DL — SIGNIFICANT CHANGE UP

## 2017-09-03 PROCEDURE — 99233 SBSQ HOSP IP/OBS HIGH 50: CPT

## 2017-09-03 PROCEDURE — 99232 SBSQ HOSP IP/OBS MODERATE 35: CPT

## 2017-09-03 RX ADMIN — NYSTATIN CREAM 1 APPLICATION(S): 100000 CREAM TOPICAL at 18:56

## 2017-09-03 RX ADMIN — MIDODRINE HYDROCHLORIDE 10 MILLIGRAM(S): 2.5 TABLET ORAL at 06:19

## 2017-09-03 RX ADMIN — Medication 3 MILLIGRAM(S): at 22:00

## 2017-09-03 RX ADMIN — MIDODRINE HYDROCHLORIDE 10 MILLIGRAM(S): 2.5 TABLET ORAL at 22:00

## 2017-09-03 RX ADMIN — Medication 50 MICROGRAM(S): at 06:19

## 2017-09-03 RX ADMIN — ATORVASTATIN CALCIUM 10 MILLIGRAM(S): 80 TABLET, FILM COATED ORAL at 22:00

## 2017-09-03 RX ADMIN — DABIGATRAN ETEXILATE MESYLATE 150 MILLIGRAM(S): 150 CAPSULE ORAL at 06:19

## 2017-09-03 RX ADMIN — FLUDROCORTISONE ACETATE 0.1 MILLIGRAM(S): 0.1 TABLET ORAL at 06:19

## 2017-09-03 RX ADMIN — Medication 1 TABLET(S): at 13:49

## 2017-09-03 RX ADMIN — Medication 81 MILLIGRAM(S): at 13:50

## 2017-09-03 RX ADMIN — DABIGATRAN ETEXILATE MESYLATE 150 MILLIGRAM(S): 150 CAPSULE ORAL at 18:56

## 2017-09-03 RX ADMIN — Medication 1 APPLICATION(S): at 06:20

## 2017-09-03 RX ADMIN — Medication 500 MILLIGRAM(S): at 18:56

## 2017-09-03 RX ADMIN — MIDODRINE HYDROCHLORIDE 10 MILLIGRAM(S): 2.5 TABLET ORAL at 18:55

## 2017-09-03 RX ADMIN — Medication 1 MILLIGRAM(S): at 13:49

## 2017-09-03 RX ADMIN — NYSTATIN CREAM 1 APPLICATION(S): 100000 CREAM TOPICAL at 06:18

## 2017-09-03 RX ADMIN — Medication 50 MILLIGRAM(S): at 06:21

## 2017-09-03 RX ADMIN — MAGNESIUM OXIDE 400 MG ORAL TABLET 400 MILLIGRAM(S): 241.3 TABLET ORAL at 18:56

## 2017-09-03 RX ADMIN — TAMSULOSIN HYDROCHLORIDE 0.4 MILLIGRAM(S): 0.4 CAPSULE ORAL at 21:59

## 2017-09-03 RX ADMIN — Medication 325 MILLIGRAM(S): at 13:49

## 2017-09-03 RX ADMIN — MAGNESIUM OXIDE 400 MG ORAL TABLET 400 MILLIGRAM(S): 241.3 TABLET ORAL at 09:05

## 2017-09-03 RX ADMIN — Medication 500 MILLIGRAM(S): at 06:19

## 2017-09-03 RX ADMIN — PANTOPRAZOLE SODIUM 40 MILLIGRAM(S): 20 TABLET, DELAYED RELEASE ORAL at 06:20

## 2017-09-03 NOTE — PROGRESS NOTE ADULT - ASSESSMENT
82M with PMHx significant for spinal stenosis s/p multiple spinal surgeries and spinal cord stimulator admitted to [facility] on 8/1 for scheduled management of worsening LE pain and neuropathic pain radiating both feet and pain to back, buttocks and thighs s/p exploration of the prior spinal fusion, removal of hardware, removal of spinal stimulator, T11-L1 laminectomy, T4-pelvis spinal fusion with osteotomies on 8/1 with Dr. Titus. Hospital course notable for  intraoperative blood loss requiring 2 unit PRBC and persistent acute blood loss anemia,  post op hypotension requiring  pressors, rapid Afib w/ RVR with acute respiratory failure secondary to acute pulmonary edema and bilateral pleural effusions which improved and now off lasix per cardiology, Echo showed EF 65%, encephalopathy for which HCT (8/6 and 8/14)  negative for acute pathology but showed chronic infarcts. On 8/14, as per RN note,  had 37 beats of  Vtach,  Ortho team was notified, [facility] was not informed and had signed off on 8/12.  Patient was discharged to acute rehab on 8/15.    Problem/Recommendation - 1:  Problem: Hypotension. Recommendation: Orthostatic: deconditioned, venous pooling, poor nutrition . Stress test normal. Continue  Midodrine to  10 mg TID , ct shweta stockings, abdominal binder.  encourage po intake, agree with daily iv fluid bolus in am prior to therapy  f/u cortisol levels       Problem/Recommendation - 2:  ·  Problem: Nonsustained ventricular tachycardia.  Recommendation: keep Mag>2, K>4  Cardio input appreciated,  Nuclear stress test - normal    Problem/Recommendation - 3:  ·  Problem: Atrial fibrillation.  Recommendation: BB needed for rate control , continue   Pradaxa.     Problem/Recommendation - 4:  ·  Problem: CAD (coronary artery disease).  Recommendation: ASA/Statin/BB.     Problem/Recommendation - 5:  ·  Problem: Spinal stenosis of lumbosacral region.  Recommendation: s/p exploration of the prior spinal fusion, removal of hardware, removal of spinal stimulator, T11-L1 laminectomy, T4-pelvis spinal fusion with osteotomies on 8/1 with Dr. Titus with complicated post operative coarse  Rehab program.     Problem/Recommendation - 6:  Problem: HLD (hyperlipidemia). Recommendation: statin. Continue  statins     Problem/Recommendation - 7:  Problem: Poor appetite.+ severe protein calorie malnutrition. Recommendation: encourage oral intake.    Problem/Recommendation-8  Problem: Insomnia. Recommendation: Continue  Melatonin

## 2017-09-03 NOTE — PROGRESS NOTE ADULT - SUBJECTIVE AND OBJECTIVE BOX
KEILA VAUGHAN    [id number]    82y      Male    CC: feels ok, did not feel light headed today.    INTERVAL HPI/OVERNIGHT EVENTS: no acute events    REVIEW OF SYSTEMS:    CONSTITUTIONAL: No fever  RESPIRATORY: No shortness of breath  CARDIOVASCULAR: No chest pain, palpitations      Vital Signs Last 24 Hrs  T(C): 36.7 (03 Sep 2017 05:39), Max: 36.7 (03 Sep 2017 05:39)  T(F): 98 (03 Sep 2017 05:39), Max: 98 (03 Sep 2017 05:39)  HR: 83 (03 Sep 2017 05:39) (83 - 94)  BP: 134/88 (03 Sep 2017 05:39) (134/88 - 142/83)  BP(mean): --  RR: 18 (03 Sep 2017 05:39) (17 - 18)  SpO2: 95% (03 Sep 2017 05:39) (95% - 98%)      PHYSICAL EXAM:    GENERAL: NAD, well-groomed  HEENT: PERRL, +EOMI  CHEST/LUNG: Clear to percussion bilaterally; No wheezing  HEART: S1S2+, Regular rate and rhythm; No murmurs, rubs, or gallops  EXTREMITIES: compression stocking+ , No clubbing, cyanosis, or edema  SKIN: No rashes or lesions  NEURO: AAOX3            09-02 @ 07:01  -  09-03 @ 07:00  --------------------------------------------------------  IN: 0 mL / OUT: 401 mL / NET: -401 mL        LABS:                        9.6    6.1   )-----------( 229      ( 02 Sep 2017 08:00 )             30.6     09-02    140  |  104  |  10.0  ----------------------------<  89  3.8   |  24.0  |  0.67    Ca    8.1<L>      02 Sep 2017 08:00  Mg     1.9     09-02    TPro  5.6<L>  /  Alb  2.8<L>  /  TBili  0.6  /  DBili  x   /  AST  20  /  ALT  21  /  AlkPhos  155<H>  09-02            MEDICATIONS  (STANDING):  aspirin  chewable 81 milliGRAM(s) Oral daily  tamsulosin 0.4 milliGRAM(s) Oral at bedtime  dabigatran 150 milliGRAM(s) Oral every 12 hours  ascorbic acid 500 milliGRAM(s) Oral two times a day  multivitamin 1 Tablet(s) Oral daily  BACItracin   Ointment 1 Application(s) Topical two times a day  levothyroxine 50 MICROGram(s) Oral daily  ergocalciferol 96298 Unit(s) Oral every week  folic acid 1 milliGRAM(s) Oral daily  melatonin 3 milliGRAM(s) Oral at bedtime  ferrous    sulfate 325 milliGRAM(s) Oral daily  atorvastatin 10 milliGRAM(s) Oral at bedtime  midodrine 10 milliGRAM(s) Oral every 8 hours  magnesium oxide 400 milliGRAM(s) Oral two times a day with meals  fludroCORTISONE 0.1 milliGRAM(s) Oral daily  metoprolol succinate ER 50 milliGRAM(s) Oral daily  nystatin Ointment 1 Application(s) Topical two times a day  pantoprazole    Tablet 40 milliGRAM(s) Oral before breakfast    MEDICATIONS  (PRN):  polyethylene glycol 3350 17 Gram(s) Oral daily PRN Constipation  calcium carbonate 500 mG (Tums) Chewable 1 Tablet(s) Chew three times a day PRN Upset Stomach  senna 2 Tablet(s) Oral at bedtime PRN if no BM for 3 days  acetaminophen   Tablet. 650 milliGRAM(s) Oral every 6 hours PRN Severe Pain (7 - 10)  docusate sodium 100 milliGRAM(s) Oral daily PRN Constipation      RADIOLOGY & ADDITIONAL TESTS:

## 2017-09-03 NOTE — PROGRESS NOTE ADULT - SUBJECTIVE AND OBJECTIVE BOX
HISTORY OF PRESENT ILLNESS:  Pt is an 82M with PMHx significant for spinal stenosis s/p multiple spinal surgeries and spinal cord stimulator admitted to [facility] on  for scheduled management of worsening LE pain and neuropathic pain radiating both feet and pain to back, buttocks and thighs s/p exploration of the prior spinal fusion, removal of hardware, removal of spinal stimulator, T11-L1 laminectomy, T4-pelvis spinal fusion with osteotomies on  with Dr. Titus. Hospital course notable for  intraoperative blood loss requiring 2 unit PRBC and persistent acute blood loss anemia,  post op hypotension requiring  pressors, rapid Afib w/ RVR with acute respiratory failure secondary to acute pulmonary edema and bilateral pleural effusions which improved and now off lasix per cardiology, Echo showed EF 65%, encephalopathy for which HCT ( and )  negative for acute pathology but showed chronic infarcts.  SLP followed for mild-moderate dyspahgia on puree with honey liquids diet. On  had 37 beats of Vtach, and persistent leukocytosis. Deemed stable for discharge to acute rehab on 8/15.      TODAY'S SUBJECTIVE & REVIEW OF SYMPTOMS:  Patient reports feeling well; denied dizziness and light-headedness this a.m   Slept well.  Pt denies back pain, headache, dysuria or frequency.  +urinary incontinence at night more frequently than PTA.   +BM.  Pt tolerating regular diet     [   ] Constitutional WNL  [   ] Cardio WNL  [ x ] Resp WNL  [ x  ] GI WNL  [   ] Heme WNL  [   ] Endo WNL  [ x  ] Skin WNL  [   ] MSK WNL  [   ] Neuro WNL  [   ] Cognitive WNL  [ x  ] Psych WNL        PHYSICAL EXAM  Vital Signs Last 24 Hrs  T(C): 36.7 (03 Sep 2017 05:39), Max: 36.7 (03 Sep 2017 05:39)  T(F): 98 (03 Sep 2017 05:39), Max: 98 (03 Sep 2017 05:39)  HR: 83 (03 Sep 2017 05:39) (82 - 94)  BP: 134/88 (03 Sep 2017 05:39) (116/58 - 142/83)  BP(mean): --  RR: 18 (03 Sep 2017 05:39) (17 - 20)  SpO2: 95% (03 Sep 2017 05:39) (95% - 99%)    General:  NAD  HEENT: NCAT  Cardio: Irregular rhythm.  Regular rate  Resp: CTAB  Abdomen: Soft NTND  Neuro: Decreased sensation BLE in stocking distribution  Motor: 4-5/5  Extrem: No edema.  Calves soft and NTTP  Skin: Back incision CDI with no erythema noted  Erythematous rash perineal region  Wounds: No decubiti  Cognitive/Psych: Impaired cognition    FUNCTIONAL PROGRESS:  Gait: 20' min A RW  ADLs: Bathing mod A, UE dress sup, LE dress min A  Transfers: Min A  Bed mobility: Mod A      RECENT LABS:                          9.6    6.1   )-----------( 229      ( 02 Sep 2017 08:00 )             30.6       140  |  104  |  10.0  ----------------------------<  89  3.8   |  24.0  |  0.67    Ca    8.1<L>      02 Sep 2017 08:00  Mg     1.9         TPro  5.6<L>  /  Alb  2.8<L>  /  TBili  0.6  /  DBili  x   /  AST  20  /  ALT  21  /  AlkPhos  155<H>      9/ Prealbumin 12    Urinalysis Basic - ( 27 Aug 2017 08:05 )    Color: Annia / Appearance: Clear / S.025 / pH: x  Gluc: x / Ketone: Trace  / Bili: Negative / Urobili: 4 mg/dL   Blood: x / Protein: 15 mg/dL / Nitrite: Negative   Leuk Esterase: Trace / RBC: 0-2 /HPF / WBC 0-2   Sq Epi: x / Non Sq Epi: x / Bacteria: x    RADIOLOGY/OTHER RESULTS:    MEDICATIONS  (STANDING):  aspirin  chewable 81 milliGRAM(s) Oral daily  tamsulosin 0.4 milliGRAM(s) Oral at bedtime  dabigatran 150 milliGRAM(s) Oral every 12 hours  docusate sodium 100 milliGRAM(s) Oral three times a day  senna 2 Tablet(s) Oral at bedtime  chlorhexidine 0.12% Liquid 15 milliLiter(s) Swish and Spit three times a day  pantoprazole   Suspension 40 milliGRAM(s) Oral before breakfast  ascorbic acid 500 milliGRAM(s) Oral two times a day  zinc sulfate 220 milliGRAM(s) Oral daily  multivitamin 1 Tablet(s) Oral daily  BACItracin   Ointment 1 Application(s) Topical two times a day  levothyroxine 50 MICROGram(s) Oral daily  ergocalciferol 19027 Unit(s) Oral every week  folic acid 1 milliGRAM(s) Oral daily  metoprolol succinate ER 25 milliGRAM(s) Oral daily  melatonin 3 milliGRAM(s) Oral at bedtime  sodium chloride 0.9%. 1000 milliLiter(s) (50 mL/Hr) IV Continuous <Continuous>  ferrous    sulfate 325 milliGRAM(s) Oral daily  atorvastatin 10 milliGRAM(s) Oral at bedtime  midodrine 10 milliGRAM(s) Oral every 8 hours  magnesium oxide 400 milliGRAM(s) Oral two times a day with meals    MEDICATIONS  (PRN):  polyethylene glycol 3350 17 Gram(s) Oral daily PRN Constipation  traMADol 50 milliGRAM(s) Oral every 4 hours PRN Severe Pain (7 - 10)  lidocaine 2% Gel 1 Application(s) Topical three times a day PRN pain      Assessment:  Pt is an 81y/o male with spinal stenosis s/p T11-L1 laminectomy, T4-pelvis spinal fusion    Comprehensive rehab: PT/OT/ST  Spinal stenosis-Spinal precautions.  Pain-Tylenol prn  H/O acute respiratory distress- improved; pt oxygenating well on RA with less SOB on exertion; CXR  neg and ordered duonebs q6hrs x 3 days.  Appreciate hospitalist consult  Metabolic [id number]-Ocarvjmq-Ylos ST.  Caution with excessive pain meds or anxiolytics, fall precautions  Dysphagia-Added Estim  S/P MBS :  Advanced diet to regular from previous puree and honey-thickened liquid diet.  Cont IVF for hydration qam bolus Encourage po liquid intake.  Cont ST.  Atrial fibrillation-HR controlled.  Cont Toprol XL daily with holding parameters and Pradaxa.  EKG due to c/o "heart fluttering"  Appreciate cardiology consult; nuclear stress test performed ; neg  Metoprolol increased- hold for SBP <90; keep Mg > 2 and K > 4.  monitor BP  Constipation- bowel regimen.   HTN-SBPs was low at   Orthostatic hypotension; IVF boluses given  Cont abdominal binder and TEDs when out of bed  Midodrine added-increased to 10mg tid and Remeron discontinued by hospitalist, cardiology follow up appreciated.  Added Florinef  as per cardiology.  Discontinued Tramadol due to pain improved and possible contribution to light-headedness and resumed tylenol prn for pain management  Hyperlipidemia -   Added lipitor 10mg.    Elevated TSH 5.28-Added Synthroid 50 daily  Elevated LFTs-Discontinued tylenol.  AST/ALT improved; now WNL.  Resumed tylenol prn for pain.  F/U CMP with improved LFTs  Acute blood loss anemia-H/H stable.  Cont Folic acid and Fe.    Leukocytosis:  Resolved.  WBC improved, UA negative, patient afebrile.   Vitamin D deficiency-Low normal Vit D at 31.  Added Vit D 50,000U weekly  Bowel regimen-Changed Colace to prn due to episode of bowel incontinence  Urinary incontinence-Urine culture  <10,000cfu/ml GNR    F/U with PMD Dr. Lares and cardiology Dr. Ny as outpt.

## 2017-09-04 PROCEDURE — 99232 SBSQ HOSP IP/OBS MODERATE 35: CPT

## 2017-09-04 RX ORDER — MIDODRINE HYDROCHLORIDE 2.5 MG/1
5 TABLET ORAL THREE TIMES A DAY
Qty: 0 | Refills: 0 | Status: DISCONTINUED | OUTPATIENT
Start: 2017-09-04 | End: 2017-09-04

## 2017-09-04 RX ORDER — FLUDROCORTISONE ACETATE 0.1 MG/1
0.1 TABLET ORAL DAILY
Qty: 0 | Refills: 0 | Status: DISCONTINUED | OUTPATIENT
Start: 2017-09-04 | End: 2017-09-13

## 2017-09-04 RX ORDER — MIDODRINE HYDROCHLORIDE 2.5 MG/1
5 TABLET ORAL THREE TIMES A DAY
Qty: 0 | Refills: 0 | Status: DISCONTINUED | OUTPATIENT
Start: 2017-09-04 | End: 2017-09-05

## 2017-09-04 RX ADMIN — DABIGATRAN ETEXILATE MESYLATE 150 MILLIGRAM(S): 150 CAPSULE ORAL at 17:45

## 2017-09-04 RX ADMIN — Medication 50 MILLIGRAM(S): at 06:03

## 2017-09-04 RX ADMIN — Medication 500 MILLIGRAM(S): at 17:44

## 2017-09-04 RX ADMIN — Medication 50 MICROGRAM(S): at 06:02

## 2017-09-04 RX ADMIN — NYSTATIN CREAM 1 APPLICATION(S): 100000 CREAM TOPICAL at 17:44

## 2017-09-04 RX ADMIN — Medication 1 APPLICATION(S): at 05:58

## 2017-09-04 RX ADMIN — Medication 81 MILLIGRAM(S): at 11:31

## 2017-09-04 RX ADMIN — MIDODRINE HYDROCHLORIDE 10 MILLIGRAM(S): 2.5 TABLET ORAL at 06:03

## 2017-09-04 RX ADMIN — ATORVASTATIN CALCIUM 10 MILLIGRAM(S): 80 TABLET, FILM COATED ORAL at 22:03

## 2017-09-04 RX ADMIN — Medication 650 MILLIGRAM(S): at 18:35

## 2017-09-04 RX ADMIN — MAGNESIUM OXIDE 400 MG ORAL TABLET 400 MILLIGRAM(S): 241.3 TABLET ORAL at 17:44

## 2017-09-04 RX ADMIN — TAMSULOSIN HYDROCHLORIDE 0.4 MILLIGRAM(S): 0.4 CAPSULE ORAL at 22:03

## 2017-09-04 RX ADMIN — DABIGATRAN ETEXILATE MESYLATE 150 MILLIGRAM(S): 150 CAPSULE ORAL at 06:02

## 2017-09-04 RX ADMIN — Medication 1 MILLIGRAM(S): at 11:31

## 2017-09-04 RX ADMIN — Medication 325 MILLIGRAM(S): at 11:31

## 2017-09-04 RX ADMIN — Medication 650 MILLIGRAM(S): at 17:54

## 2017-09-04 RX ADMIN — Medication 3 MILLIGRAM(S): at 22:03

## 2017-09-04 RX ADMIN — FLUDROCORTISONE ACETATE 0.1 MILLIGRAM(S): 0.1 TABLET ORAL at 06:03

## 2017-09-04 RX ADMIN — MAGNESIUM OXIDE 400 MG ORAL TABLET 400 MILLIGRAM(S): 241.3 TABLET ORAL at 07:48

## 2017-09-04 RX ADMIN — Medication 1 APPLICATION(S): at 17:45

## 2017-09-04 RX ADMIN — MIDODRINE HYDROCHLORIDE 5 MILLIGRAM(S): 2.5 TABLET ORAL at 14:00

## 2017-09-04 RX ADMIN — PANTOPRAZOLE SODIUM 40 MILLIGRAM(S): 20 TABLET, DELAYED RELEASE ORAL at 06:05

## 2017-09-04 RX ADMIN — NYSTATIN CREAM 1 APPLICATION(S): 100000 CREAM TOPICAL at 06:04

## 2017-09-04 RX ADMIN — Medication 500 MILLIGRAM(S): at 06:02

## 2017-09-04 RX ADMIN — Medication 1 TABLET(S): at 11:31

## 2017-09-04 NOTE — PROGRESS NOTE ADULT - SUBJECTIVE AND OBJECTIVE BOX
KEILA VAUGHAN    [id number]    82y      Male      CC: feels ok, did not feel light headed today. BP higher    INTERVAL HPI/OVERNIGHT EVENTS: no acute events    REVIEW OF SYSTEMS:      CARDIOVASCULAR: No chest pain, palpitations    Vital Signs Last 24 Hrs  T(C): 36.1 (04 Sep 2017 05:23), Max: 36.9 (03 Sep 2017 21:45)  T(F): 97 (04 Sep 2017 05:23), Max: 98.5 (03 Sep 2017 21:45)  HR: 79 (04 Sep 2017 05:23) (78 - 79)  BP: 152/87 (04 Sep 2017 05:23) (121/72 - 152/87)  BP(mean): --  RR: 18 (04 Sep 2017 05:23) (18 - 20)  SpO2: 98% (04 Sep 2017 05:23) (96% - 99%)      PHYSICAL EXAM:    GENERAL: NAD, well-groomed  HEENT: PERRL, +EOMI  EXTREMITIES: compression stocking+ , No clubbing, cyanosis, or edema  NEURO: AAOX3          09-03 @ 07:01  -  09-04 @ 07:00  --------------------------------------------------------  IN: 0 mL / OUT: 601 mL / NET: -601 mL        LABS:                  MEDICATIONS  : reviewed     RADIOLOGY & ADDITIONAL TESTS:

## 2017-09-04 NOTE — PROGRESS NOTE ADULT - ASSESSMENT
82M with PMHx significant for spinal stenosis s/p multiple spinal surgeries and spinal cord stimulator admitted to [facility] on 8/1 for scheduled management of worsening LE pain and neuropathic pain radiating both feet and pain to back, buttocks and thighs s/p exploration of the prior spinal fusion, removal of hardware, removal of spinal stimulator, T11-L1 laminectomy, T4-pelvis spinal fusion with osteotomies on 8/1 with Dr. Titus. Hospital course notable for  intraoperative blood loss requiring 2 unit PRBC and persistent acute blood loss anemia,  post op hypotension requiring  pressors, rapid Afib w/ RVR with acute respiratory failure secondary to acute pulmonary edema and bilateral pleural effusions which improved and now off lasix per cardiology, Echo showed EF 65%, encephalopathy for which HCT (8/6 and 8/14)  negative for acute pathology but showed chronic infarcts. On 8/14, as per RN note,  had 37 beats of  Vtach,  Ortho team was notified, [facility] was not informed and had signed off on 8/12.  Patient was discharged to acute rehab on 8/15.    Problem/Recommendation - 1:  Problem: Hypotension. Recommendation: Orthostatic: deconditioned, venous pooling, poor nutrition . improved now  May titrate midodrine down to 5mg tid if BP is persistently high   ct florinef for now  ct to encourage good po intake and activity    Problem/Recommendation - 2:  ·  Problem: Afib/ Nonsustained ventricular tachycardia/CAD.  stable on current meds      Problem/Recommendation - 3:  ·  Problem: Spinal stenosis of lumbosacral region.  Recommendation: s/p exploration of the prior spinal fusion, removal of hardware, removal of spinal stimulator, T11-L1 laminectomy, T4-pelvis spinal fusion with osteotomies on 8/1 with Dr. Titus with complicated post operative coarse  Rehab program.       Problem/Recommendation - 4  severe protein calorie malnutrition. Recommendation: encourage oral intake.

## 2017-09-04 NOTE — PROGRESS NOTE ADULT - SUBJECTIVE AND OBJECTIVE BOX
HISTORY OF PRESENT ILLNESS:  Pt is an 82M with PMHx significant for spinal stenosis s/p multiple spinal surgeries and spinal cord stimulator admitted to [facility] on  for scheduled management of worsening LE pain and neuropathic pain radiating both feet and pain to back, buttocks and thighs s/p exploration of the prior spinal fusion, removal of hardware, removal of spinal stimulator, T11-L1 laminectomy, T4-pelvis spinal fusion with osteotomies on  with Dr. Titus. Hospital course notable for  intraoperative blood loss requiring 2 unit PRBC and persistent acute blood loss anemia,  post op hypotension requiring  pressors, rapid Afib w/ RVR with acute respiratory failure secondary to acute pulmonary edema and bilateral pleural effusions which improved and now off lasix per cardiology, Echo showed EF 65%, encephalopathy for which HCT ( and )  negative for acute pathology but showed chronic infarcts.  SLP followed for mild-moderate dyspahgia on puree with honey liquids diet. On  had 37 beats of Vtach, and persistent leukocytosis. Deemed stable for discharge to acute rehab on 8/15.      TODAY'S SUBJECTIVE & REVIEW OF SYMPTOMS:  Patient reports feeling well; denied dizziness and light-headedness this a.m   Slept well.  Pt denies back pain, headache, dysuria or frequency.  +urinary incontinence at night more frequently than PTA.   +BM.  Pt tolerating regular diet     [   ] Constitutional WNL  [   ] Cardio WNL  [ x ] Resp WNL  [ x  ] GI WNL  [   ] Heme WNL  [   ] Endo WNL  [ x  ] Skin WNL  [   ] MSK WNL  [   ] Neuro WNL  [   ] Cognitive WNL  [ x  ] Psych WNL        PHYSICAL EXAM  Vital Signs Last 24 Hrs  T(C): 36.1 (04 Sep 2017 05:23), Max: 36.9 (03 Sep 2017 21:45)  T(F): 97 (04 Sep 2017 05:23), Max: 98.5 (03 Sep 2017 21:45)  HR: 79 (04 Sep 2017 05:23) (78 - 79)  BP: 152/87 (04 Sep 2017 05:23) (121/72 - 152/87)  BP(mean): --  RR: 18 (04 Sep 2017 05:23) (18 - 20)  SpO2: 98% (04 Sep 2017 05:23) (96% - 99%)    General:  NAD  HEENT: NCAT  Cardio: Irregular rhythm.  Regular rate  Resp: CTAB  Abdomen: Soft NTND  Neuro: Decreased sensation BLE in stocking distribution  Motor: 4-5/5  Extrem: No edema.  Calves soft and NTTP  Skin: Back incision CDI with no erythema noted  Erythematous rash perineal region  Wounds: No decubiti  Cognitive/Psych: Impaired cognition    FUNCTIONAL PROGRESS:  Gait: 20' min A RW  ADLs: Bathing mod A, UE dress sup, LE dress min A  Transfers: Min A  Bed mobility: Mod A      RECENT LABS:                          9.6    6.1   )-----------( 229      ( 02 Sep 2017 08:00 )             30.6       140  |  104  |  10.0  ----------------------------<  89  3.8   |  24.0  |  0.67    Ca    8.1<L>      02 Sep 2017 08:00  Mg     1.9         TPro  5.6<L>  /  Alb  2.8<L>  /  TBili  0.6  /  DBili  x   /  AST  20  /  ALT  21  /  AlkPhos  155<H>      9/ Prealbumin 12    Urinalysis Basic - ( 27 Aug 2017 08:05 )    Color: Annia / Appearance: Clear / S.025 / pH: x  Gluc: x / Ketone: Trace  / Bili: Negative / Urobili: 4 mg/dL   Blood: x / Protein: 15 mg/dL / Nitrite: Negative   Leuk Esterase: Trace / RBC: 0-2 /HPF / WBC 0-2   Sq Epi: x / Non Sq Epi: x / Bacteria: x    RADIOLOGY/OTHER RESULTS:    MEDICATIONS  (STANDING):  aspirin  chewable 81 milliGRAM(s) Oral daily  tamsulosin 0.4 milliGRAM(s) Oral at bedtime  dabigatran 150 milliGRAM(s) Oral every 12 hours  docusate sodium 100 milliGRAM(s) Oral three times a day  senna 2 Tablet(s) Oral at bedtime  chlorhexidine 0.12% Liquid 15 milliLiter(s) Swish and Spit three times a day  pantoprazole   Suspension 40 milliGRAM(s) Oral before breakfast  ascorbic acid 500 milliGRAM(s) Oral two times a day  zinc sulfate 220 milliGRAM(s) Oral daily  multivitamin 1 Tablet(s) Oral daily  BACItracin   Ointment 1 Application(s) Topical two times a day  levothyroxine 50 MICROGram(s) Oral daily  ergocalciferol 20658 Unit(s) Oral every week  folic acid 1 milliGRAM(s) Oral daily  metoprolol succinate ER 25 milliGRAM(s) Oral daily  melatonin 3 milliGRAM(s) Oral at bedtime  sodium chloride 0.9%. 1000 milliLiter(s) (50 mL/Hr) IV Continuous <Continuous>  ferrous    sulfate 325 milliGRAM(s) Oral daily  atorvastatin 10 milliGRAM(s) Oral at bedtime  midodrine 10 milliGRAM(s) Oral every 8 hours  magnesium oxide 400 milliGRAM(s) Oral two times a day with meals    MEDICATIONS  (PRN):  polyethylene glycol 3350 17 Gram(s) Oral daily PRN Constipation  traMADol 50 milliGRAM(s) Oral every 4 hours PRN Severe Pain (7 - 10)  lidocaine 2% Gel 1 Application(s) Topical three times a day PRN pain      Assessment:  Pt is an 81y/o male with spinal stenosis s/p T11-L1 laminectomy, T4-pelvis spinal fusion    Comprehensive rehab: PT/OT/ST  Spinal stenosis-Spinal precautions.  Pain-Tylenol prn  H/O acute respiratory distress- improved; pt oxygenating well on RA with less SOB on exertion; CXR  neg and ordered duonebs q6hrs x 3 days.  Appreciate hospitalist consult  Metabolic lckuqrbnxeqfyf-Nnwksivl-Sbvs ST.  Caution with excessive pain meds or anxiolytics, fall precautions  Dysphagia-Added Estim  S/P MBS :  Advanced diet to regular from previous puree and honey-thickened liquid diet.  Cont IVF for hydration qam bolus Encourage po liquid intake.  Cont ST.  Atrial fibrillation-HR controlled.  Cont Toprol XL daily with holding parameters and Pradaxa.  EKG due to c/o "heart fluttering"  Appreciate cardiology consult; nuclear stress test performed ; neg  Metoprolol increased- hold for SBP <90; keep Mg > 2 and K > 4.  monitor BP  Constipation- bowel regimen.   HTN-SBPs was low at   Orthostatic hypotension; IVF boluses given  Cont abdominal binder and TEDs when out of bed  Midodrine added-increased to 10mg tid and Remeron discontinued by hospitalist, cardiology follow up appreciated.  Added Florinef  as per cardiology.  Discontinued Tramadol due to pain improved and possible contribution to light-headedness and resumed tylenol prn for pain management.  Cortisol levels WNL and -d/c Florinef   Hyperlipidemia -   Added lipitor 10mg.    Elevated TSH 5.28-Added Synthroid 50 daily  Elevated LFTs-Discontinued tylenol.  AST/ALT improved; now WNL.  Resumed tylenol prn for pain.  F/U CMP with improved LFTs  Acute blood loss anemia-H/H stable.  Cont Folic acid and Fe.    Leukocytosis:  Resolved.  WBC improved, UA negative, patient afebrile.   Vitamin D deficiency-Low normal Vit D at 31.  Added Vit D 50,000U weekly  Bowel regimen-Changed Colace to prn due to episode of bowel incontinence  Urinary incontinence-Urine culture  <10,000cfu/ml GNR    F/U with PMD Dr. Lares and cardiology Dr. Ny as outpt. HISTORY OF PRESENT ILLNESS:  Pt is an 82M with PMHx significant for spinal stenosis s/p multiple spinal surgeries and spinal cord stimulator admitted to [facility] on  for scheduled management of worsening LE pain and neuropathic pain radiating both feet and pain to back, buttocks and thighs s/p exploration of the prior spinal fusion, removal of hardware, removal of spinal stimulator, T11-L1 laminectomy, T4-pelvis spinal fusion with osteotomies on  with Dr. Titus. Hospital course notable for  intraoperative blood loss requiring 2 unit PRBC and persistent acute blood loss anemia,  post op hypotension requiring  pressors, rapid Afib w/ RVR with acute respiratory failure secondary to acute pulmonary edema and bilateral pleural effusions which improved and now off lasix per cardiology, Echo showed EF 65%, encephalopathy for which HCT ( and )  negative for acute pathology but showed chronic infarcts.  SLP followed for mild-moderate dyspahgia on puree with honey liquids diet. On  had 37 beats of Vtach, and persistent leukocytosis. Deemed stable for discharge to acute rehab on 8/15.      TODAY'S SUBJECTIVE & REVIEW OF SYMPTOMS:  Patient reports feeling well; denied dizziness and light-headedness this a.m   Slept well.  Pt denies back pain, headache, dysuria or frequency.  +urinary incontinence at night more frequently than PTA.   +BM.  Pt tolerating regular diet     [   ] Constitutional WNL  [   ] Cardio WNL  [ x ] Resp WNL  [ x  ] GI WNL  [   ] Heme WNL  [   ] Endo WNL  [ x  ] Skin WNL  [   ] MSK WNL  [   ] Neuro WNL  [   ] Cognitive WNL  [ x  ] Psych WNL        PHYSICAL EXAM  Vital Signs Last 24 Hrs  T(C): 36.1 (04 Sep 2017 05:23), Max: 36.9 (03 Sep 2017 21:45)  T(F): 97 (04 Sep 2017 05:23), Max: 98.5 (03 Sep 2017 21:45)  HR: 79 (04 Sep 2017 05:23) (78 - 79)  BP: 152/87 (04 Sep 2017 05:23) (121/72 - 152/87)  BP(mean): --  RR: 18 (04 Sep 2017 05:23) (18 - 20)  SpO2: 98% (04 Sep 2017 05:23) (96% - 99%)    General:  NAD  HEENT: NCAT  Cardio: Irregular rhythm.  Regular rate  Resp: CTAB  Abdomen: Soft NTND  Neuro: Decreased sensation BLE in stocking distribution  Motor: 4-5/5  Extrem: No edema.  Calves soft and NTTP  Skin: Back incision CDI with no erythema noted  Erythematous rash perineal region  Wounds: No decubiti  Cognitive/Psych: Impaired cognition    FUNCTIONAL PROGRESS:  Gait: 20' min A RW  ADLs: Bathing mod A, UE dress sup, LE dress min A  Transfers: Min A  Bed mobility: Mod A      RECENT LABS:                          9.6    6.1   )-----------( 229      ( 02 Sep 2017 08:00 )             30.6       140  |  104  |  10.0  ----------------------------<  89  3.8   |  24.0  |  0.67    Ca    8.1<L>      02 Sep 2017 08:00  Mg     1.9         TPro  5.6<L>  /  Alb  2.8<L>  /  TBili  0.6  /  DBili  x   /  AST  20  /  ALT  21  /  AlkPhos  155<H>      9/ Prealbumin 12    Urinalysis Basic - ( 27 Aug 2017 08:05 )    Color: Annia / Appearance: Clear / S.025 / pH: x  Gluc: x / Ketone: Trace  / Bili: Negative / Urobili: 4 mg/dL   Blood: x / Protein: 15 mg/dL / Nitrite: Negative   Leuk Esterase: Trace / RBC: 0-2 /HPF / WBC 0-2   Sq Epi: x / Non Sq Epi: x / Bacteria: x    RADIOLOGY/OTHER RESULTS:    MEDICATIONS  (STANDING):  aspirin  chewable 81 milliGRAM(s) Oral daily  tamsulosin 0.4 milliGRAM(s) Oral at bedtime  dabigatran 150 milliGRAM(s) Oral every 12 hours  docusate sodium 100 milliGRAM(s) Oral three times a day  senna 2 Tablet(s) Oral at bedtime  chlorhexidine 0.12% Liquid 15 milliLiter(s) Swish and Spit three times a day  pantoprazole   Suspension 40 milliGRAM(s) Oral before breakfast  ascorbic acid 500 milliGRAM(s) Oral two times a day  zinc sulfate 220 milliGRAM(s) Oral daily  multivitamin 1 Tablet(s) Oral daily  BACItracin   Ointment 1 Application(s) Topical two times a day  levothyroxine 50 MICROGram(s) Oral daily  ergocalciferol 82486 Unit(s) Oral every week  folic acid 1 milliGRAM(s) Oral daily  metoprolol succinate ER 25 milliGRAM(s) Oral daily  melatonin 3 milliGRAM(s) Oral at bedtime  sodium chloride 0.9%. 1000 milliLiter(s) (50 mL/Hr) IV Continuous <Continuous>  ferrous    sulfate 325 milliGRAM(s) Oral daily  atorvastatin 10 milliGRAM(s) Oral at bedtime  midodrine 10 milliGRAM(s) Oral every 8 hours  magnesium oxide 400 milliGRAM(s) Oral two times a day with meals    MEDICATIONS  (PRN):  polyethylene glycol 3350 17 Gram(s) Oral daily PRN Constipation  traMADol 50 milliGRAM(s) Oral every 4 hours PRN Severe Pain (7 - 10)  lidocaine 2% Gel 1 Application(s) Topical three times a day PRN pain      Assessment:  Pt is an 83y/o male with spinal stenosis s/p T11-L1 laminectomy, T4-pelvis spinal fusion    Comprehensive rehab: PT/OT/ST  Spinal stenosis-Spinal precautions.  Pain-Tylenol prn  H/O acute respiratory distress- improved; pt oxygenating well on RA with less SOB on exertion; CXR  neg and ordered duonebs q6hrs x 3 days.  Appreciate hospitalist consult  Metabolic lhdxrwmhpoaswg-Nljtgbuj-Pttg ST.  Caution with excessive pain meds or anxiolytics, fall precautions  Dysphagia-Added Estim  S/P MBS :  Advanced diet to regular from previous puree and honey-thickened liquid diet.  Cont IVF for hydration qam bolus Encourage po liquid intake.  Cont ST.  Atrial fibrillation-HR controlled.  Cont Toprol XL daily with holding parameters and Pradaxa.  EKG due to c/o "heart fluttering"  Appreciate cardiology consult; nuclear stress test performed ; neg  Metoprolol increased- hold for SBP <90; keep Mg > 2 and K > 4.  monitor BP  Constipation- bowel regimen.   HTN-SBPs was low at   Orthostatic hypotension; IVF boluses given  Cont abdominal binder and TEDs when out of bed  Midodrine added-increased to 10mg tid and Remeron discontinued by hospitalist, cardiology follow up appreciated.  Added Florinef  as per cardiology.  Discontinued Tramadol due to pain improved and possible contribution to light-headedness and resumed tylenol prn for pain management.  Cortisol levels WNL and ; d/w hospitalist-decrease Midodrine from 10tid to 5tid  Hyperlipidemia -   Added lipitor 10mg.    Elevated TSH 5.28-Added Synthroid 50 daily  Elevated LFTs-Discontinued tylenol.  AST/ALT improved; now WNL.  Resumed tylenol prn for pain.  F/U CMP with improved LFTs  Acute blood loss anemia-H/H stable.  Cont Folic acid and Fe.    Leukocytosis:  Resolved.  WBC improved, UA negative, patient afebrile.   Vitamin D deficiency-Low normal Vit D at 31.  Added Vit D 50,000U weekly  Bowel regimen-Changed Colace to prn due to episode of bowel incontinence  Urinary incontinence-Urine culture  <10,000cfu/ml GNR    F/U with PMD Dr. Lares and cardiology Dr. Ny as outpt.

## 2017-09-05 DIAGNOSIS — I48.0 PAROXYSMAL ATRIAL FIBRILLATION: ICD-10-CM

## 2017-09-05 PROCEDURE — 99232 SBSQ HOSP IP/OBS MODERATE 35: CPT

## 2017-09-05 PROCEDURE — 99233 SBSQ HOSP IP/OBS HIGH 50: CPT

## 2017-09-05 RX ORDER — MIDODRINE HYDROCHLORIDE 2.5 MG/1
5 TABLET ORAL
Qty: 0 | Refills: 0 | Status: DISCONTINUED | OUTPATIENT
Start: 2017-09-05 | End: 2017-09-06

## 2017-09-05 RX ORDER — MIDODRINE HYDROCHLORIDE 2.5 MG/1
5 TABLET ORAL
Qty: 0 | Refills: 0 | Status: DISCONTINUED | OUTPATIENT
Start: 2017-09-05 | End: 2017-09-05

## 2017-09-05 RX ADMIN — MAGNESIUM OXIDE 400 MG ORAL TABLET 400 MILLIGRAM(S): 241.3 TABLET ORAL at 16:58

## 2017-09-05 RX ADMIN — Medication 1 TABLET(S): at 16:59

## 2017-09-05 RX ADMIN — Medication 1 TABLET(S): at 11:44

## 2017-09-05 RX ADMIN — Medication 50 MILLIGRAM(S): at 05:48

## 2017-09-05 RX ADMIN — NYSTATIN CREAM 1 APPLICATION(S): 100000 CREAM TOPICAL at 05:48

## 2017-09-05 RX ADMIN — Medication 1 APPLICATION(S): at 05:48

## 2017-09-05 RX ADMIN — Medication 500 MILLIGRAM(S): at 05:48

## 2017-09-05 RX ADMIN — Medication 650 MILLIGRAM(S): at 20:32

## 2017-09-05 RX ADMIN — Medication 50 MICROGRAM(S): at 05:48

## 2017-09-05 RX ADMIN — Medication 81 MILLIGRAM(S): at 11:44

## 2017-09-05 RX ADMIN — ATORVASTATIN CALCIUM 10 MILLIGRAM(S): 80 TABLET, FILM COATED ORAL at 22:05

## 2017-09-05 RX ADMIN — DABIGATRAN ETEXILATE MESYLATE 150 MILLIGRAM(S): 150 CAPSULE ORAL at 17:16

## 2017-09-05 RX ADMIN — MAGNESIUM OXIDE 400 MG ORAL TABLET 400 MILLIGRAM(S): 241.3 TABLET ORAL at 08:16

## 2017-09-05 RX ADMIN — FLUDROCORTISONE ACETATE 0.1 MILLIGRAM(S): 0.1 TABLET ORAL at 05:48

## 2017-09-05 RX ADMIN — Medication 500 MILLIGRAM(S): at 17:16

## 2017-09-05 RX ADMIN — Medication 3 MILLIGRAM(S): at 22:05

## 2017-09-05 RX ADMIN — Medication 1 MILLIGRAM(S): at 11:43

## 2017-09-05 RX ADMIN — TAMSULOSIN HYDROCHLORIDE 0.4 MILLIGRAM(S): 0.4 CAPSULE ORAL at 22:05

## 2017-09-05 RX ADMIN — Medication 325 MILLIGRAM(S): at 11:43

## 2017-09-05 RX ADMIN — Medication 1 APPLICATION(S): at 18:00

## 2017-09-05 RX ADMIN — PANTOPRAZOLE SODIUM 40 MILLIGRAM(S): 20 TABLET, DELAYED RELEASE ORAL at 05:48

## 2017-09-05 RX ADMIN — NYSTATIN CREAM 1 APPLICATION(S): 100000 CREAM TOPICAL at 18:01

## 2017-09-05 RX ADMIN — Medication 650 MILLIGRAM(S): at 21:00

## 2017-09-05 RX ADMIN — DABIGATRAN ETEXILATE MESYLATE 150 MILLIGRAM(S): 150 CAPSULE ORAL at 05:48

## 2017-09-05 NOTE — PROGRESS NOTE ADULT - PROBLEM SELECTOR PROBLEM 5
Prophylactic measure
Other hyperlipidemia
Prophylactic measure

## 2017-09-05 NOTE — PROGRESS NOTE ADULT - SUBJECTIVE AND OBJECTIVE BOX
[place] [facility]                                                        Office: [address] [address]                                                       Telephone: [phone]. Fax:[phone]                                                                             PROGRESS NOTE   Reason for follow up: orthostasis and near syncope and non-sustained ventricular tachycardia                             Overnight: No new events.   Update:  continues to have intermittent dizziness and orthostasis.   Wife at bedside.   Subjective: "  i am Ok "   Complains of: intermittent dizziens and orthostasis.   Review of symptoms: Cardiac:  No chest pain. No dyspnea. No palpitations. + Dizziness  Respiratory: no cough. No dyspnea   Gastrointestinal: No diarrhea. No abdominal pain. No bleeding.     Past medical history: No updates.   Chronic conditions:  Hypertension: controlled.   	  Vitals:  Vital Signs Last 24 Hrs  T(C): 36.7 (09-05-17 @ 19:46), Max: 37 (09-05-17 @ 17:04)  T(F): 98.1 (09-05-17 @ 19:46), Max: 98.6 (09-05-17 @ 17:04)  HR: 82 (09-05-17 @ 19:46) (74 - 86)  BP: 131/82 (09-05-17 @ 19:46) (131/82 - 151/95)  BP(mean): --  RR: 18 (09-05-17 @ 19:46) (18 - 18)  SpO2: 97% (09-05-17 @ 19:46) (95% - 97%)      PHYSICAL EXAM:  Appearance: Comfortable. No acute distress  HEENT:  Head and neck: Atraumatic. Normocephalic.  Normal oral mucosa, PERRL, Neck is supple. No JVD, No carotid bruit.   Neurologic: A & O x 3, no focal deficits. EOMI , Cranial nerves are intact. mild tremors bilaterally  Lymphatic: No cervical lymphadenopathy  Cardiovascular: Normal S1 S2 , irregular heart rate. , 2/ 6 systolic murmur., no rubs/gallops. No JVD, No edema  Respiratory: Lungs clear to auscultation  Gastrointestinal:  Soft, Non-tender, + BS  Lower Extremities: No edema compareson stockings worn.   Psychiatry: Patient is calm. No agitation. Mood & affect appropriate  Skin: No rashes/ ecchymoses/cyanosis/ulcers visualized on the face, hands or feet.      CURRENT MEDICATIONS:  MEDICATIONS  (STANDING):  tamsulosin 0.4 milliGRAM(s) Oral at bedtime  metoprolol succinate ER 50 milliGRAM(s) Oral daily  midodrine 5 milliGRAM(s) Oral two times a day    pantoprazole    Tablet  aspirin  chewable  dabigatran  ascorbic acid  multivitamin  BACItracin   Ointment  levothyroxine  ergocalciferol  folic acid  melatonin  ferrous    sulfate  atorvastatin  magnesium oxide  nystatin Ointment  fludroCORTISONE    PRN: polyethylene glycol 3350 PRN  calcium carbonate 500 mG (Tums) Chewable PRN  senna PRN  acetaminophen   Tablet. PRN  docusate sodium PRN        LABS:	 	  not available                        proBNP:   Lipid Profile:   HgA1c: TSH:     INTERPRETATION OF TELEMETRY: Reviewed by me. not available   ECG: Reviewed by me. < from: 12 Lead ECG (08.15.17 @ 19:22) >   Atrial fibrillation  Low voltage QRS  Nonspecific ST and T wave abnormality  Abnormal ECG      RADIOLOGY & ADDITIONAL STUDIES:    X-ray:  reviewed by me. < from: Xray Chest 1 View AP/PA. (08.15.17 @ 18:53) >  Trace left pleural effusion. Marked improvement in aeration        ECHO FINDINGS: Date: 89/2017: No effusion. normal LV and RV function.   8/25/2017:  Echo stat bedside. trivial effusion,.IVC normal size. normal respiratory variation   STRESS  FINDINGS: Date: 2014: normal     CATHETERIZATION FINDINGS:  Date:  2014 VENTRICLES: Global left ventricular function was normal. EF estimatedwas  60 %.  CORONARY VESSELS: The coronary circulation is right dominant.  LM:   --  LM: Normal.  LAD:   --  Proximal LAD: There was a 90 % stenosis. --  Mid LAD: There was a 30 % stenosis.  CX:   --  Circumflex: Normal.  RCA:   --  Proximal RCA: There was a 40 % stenosis.--  Distal RCA: There was a 40 % stenosis.  COMPLICATIONS: There were no complications.  INTERVENTIONAL RECOMMENDATIONS: ASA and Plavix for 1 year

## 2017-09-05 NOTE — PROGRESS NOTE ADULT - ASSESSMENT
82M with PMHx significant for spinal stenosis s/p multiple spinal surgeries and spinal cord stimulator admitted to [facility] on 8/1 for scheduled management of worsening LE pain and neuropathic pain radiating both feet and pain to back, buttocks and thighs s/p exploration of the prior spinal fusion, removal of hardware, removal of spinal stimulator, T11-L1 laminectomy, T4-pelvis spinal fusion with osteotomies on 8/1 with Dr. Titus. Hospital course notable for  intraoperative blood loss requiring 2 unit PRBC and persistent acute blood loss anemia,  post op hypotension requiring  pressors, rapid Afib w/ RVR with acute respiratory failure secondary to acute pulmonary edema and bilateral pleural effusions which improved and now off lasix per cardiology, Echo showed EF 65%, encephalopathy for which HCT (8/6 and 8/14)  negative for acute pathology but showed chronic infarcts. On 8/14, as per RN note,  had 37 beats of  Vtach,  Ortho team was notified, [facility] was not informed and had signed off on 8/12.  Patient was discharged to acute rehab on 8/15.    Problem/Recommendation - 1:  Problem: Hypotension - Orthostatic: deconditioned, venous pooling, poor nutrition . improved now  May titrate midodrine down to 5mg tid if BP is persistently high and then DC in 1-2 days  ct florinef for now for 1 week  ct to encourage good po intake and activity    Problem/Recommendation - 2:  ·  Problem: Afib/ Nonsustained ventricular tachycardia/CAD.  stable on current meds      Problem/Recommendation - 3:  ·  Problem: Spinal stenosis of lumbosacral region.  Recommendation: s/p exploration of the prior spinal fusion, removal of hardware, removal of spinal stimulator, T11-L1 laminectomy, T4-pelvis spinal fusion with osteotomies on 8/1 with Dr. Titus with complicated post operative coarse  Rehab program.

## 2017-09-05 NOTE — PROGRESS NOTE ADULT - PROBLEM SELECTOR PLAN 2
ostial LAD s/p stent in the past. currently no chest pain or dyspnea.   stress test no significant ischemia.   ct metoprolol hold for SBP <90; Mg > 2 and Potassium > 4.  no further cardiac work up is needed.
ostial LAD s/p stent in the past. currently no chest pain or dyspnea.   stress test no significant ischemia.   ct metoprolol hold for SBP <90; Mg > 2 and Potassium > 4.  no further cardiac work up is needed.
ostial LAD s/p stent in the past. currently no chest pain or dyspnea.   stress test no significant ischemia.   inrease metoprolol succinate to 25 Q12. hold for SBP <90; Mg > 2 and Potassium > 4.  no further cardiac work up is needed.
ostial LAD s/p stent in the past. currently no chest pain or dyspnea.   stress test no significant ischemia.   no further cardiac work up is needed.
ostial LAD s/p stent in the past. currently no chest pain or dyspnea.   stress test no significant ischemia.   ct metoprolol hold for SBP <90; Mg > 2 and Potassium > 4.  no further cardiac work up is needed.

## 2017-09-05 NOTE — PROGRESS NOTE ADULT - SUBJECTIVE AND OBJECTIVE BOX
HISTORY OF PRESENT ILLNESS:  Pt is an 82M with PMHx significant for spinal stenosis s/p multiple spinal surgeries and spinal cord stimulator admitted to [facility] on  for scheduled management of worsening LE pain and neuropathic pain radiating both feet and pain to back, buttocks and thighs s/p exploration of the prior spinal fusion, removal of hardware, removal of spinal stimulator, T11-L1 laminectomy, T4-pelvis spinal fusion with osteotomies on  with Dr. Titus. Hospital course notable for  intraoperative blood loss requiring 2 unit PRBC and persistent acute blood loss anemia,  post op hypotension requiring  pressors, rapid Afib w/ RVR with acute respiratory failure secondary to acute pulmonary edema and bilateral pleural effusions which improved and now off lasix per cardiology, Echo showed EF 65%, encephalopathy for which HCT ( and )  negative for acute pathology but showed chronic infarcts.  SLP followed for mild-moderate dyspahgia on puree with honey liquids diet. On  had 37 beats of Vtach, and persistent leukocytosis. Deemed stable for discharge to acute rehab on 8/15.      TODAY'S SUBJECTIVE & REVIEW OF SYMPTOMS:  Patient reports feeling well; denied dizziness and light-headedness this a.m   Slept well.  Pt denies back pain, headache, dysuria or frequency.  Denied any urinary incontinence last night    +BM.  Pt tolerating regular diet although admits to not drinking enough    [   ] Constitutional WNL  [   ] Cardio WNL  [ x ] Resp WNL  [ x  ] GI WNL  [   ] Heme WNL  [   ] Endo WNL  [ x  ] Skin WNL  [   ] MSK WNL  [   ] Neuro WNL  [   ] Cognitive WNL  [ x  ] Psych WNL        PHYSICAL EXAM  Vital Signs Last 24 Hrs  T(C): 36.9 (05 Sep 2017 05:22), Max: 36.9 (05 Sep 2017 05:22)  T(F): 98.4 (05 Sep 2017 05:22), Max: 98.4 (05 Sep 2017 05:22)  HR: 86 (05 Sep 2017 05:22) (72 - 86)  BP: 151/95 (05 Sep 2017 05:22) (117/60 - 151/95)  BP(mean): --  RR: 18 (05 Sep 2017 05:22) (18 - 18)  SpO2: 95% (05 Sep 2017 05:22) (95% - 97%)    General:  NAD  HEENT: NCAT  Cardio: Irregular rhythm.  Regular rate  Resp: CTAB  Abdomen: Soft NTND  Neuro: Decreased sensation BLE in stocking distribution  Motor: 4-5/5  Extrem: No edema.  Calves soft and NTTP  Skin: Back incision CDI with no erythema noted  Erythematous rash perineal region  Wounds: No decubiti  Cognitive/Psych: Impaired cognition    FUNCTIONAL PROGRESS:  Gait: 55' min/mod A RW  ADLs: Bathing mod A, UE dress sup, LE dress min A  Transfers: Min A  Bed mobility: Min A      RECENT LABS:                          9.6    6.1   )-----------( 229      ( 02 Sep 2017 08:00 )             30.6       140  |  104  |  10.0  ----------------------------<  89  3.8   |  24.0  |  0.67    Ca    8.1<L>      02 Sep 2017 08:00  Mg     1.9         TPro  5.6<L>  /  Alb  2.8<L>  /  TBili  0.6  /  DBili  x   /  AST  20  /  ALT  21  /  AlkPhos  155<H>      9/2 Prealbumin 12    Urinalysis Basic - ( 27 Aug 2017 08:05 )    Color: Annia / Appearance: Clear / S.025 / pH: x  Gluc: x / Ketone: Trace  / Bili: Negative / Urobili: 4 mg/dL   Blood: x / Protein: 15 mg/dL / Nitrite: Negative   Leuk Esterase: Trace / RBC: 0-2 /HPF / WBC 0-2   Sq Epi: x / Non Sq Epi: x / Bacteria: x    RADIOLOGY/OTHER RESULTS:    MEDICATIONS  (STANDING):  aspirin  chewable 81 milliGRAM(s) Oral daily  tamsulosin 0.4 milliGRAM(s) Oral at bedtime  dabigatran 150 milliGRAM(s) Oral every 12 hours  docusate sodium 100 milliGRAM(s) Oral three times a day  senna 2 Tablet(s) Oral at bedtime  chlorhexidine 0.12% Liquid 15 milliLiter(s) Swish and Spit three times a day  pantoprazole   Suspension 40 milliGRAM(s) Oral before breakfast  ascorbic acid 500 milliGRAM(s) Oral two times a day  zinc sulfate 220 milliGRAM(s) Oral daily  multivitamin 1 Tablet(s) Oral daily  BACItracin   Ointment 1 Application(s) Topical two times a day  levothyroxine 50 MICROGram(s) Oral daily  ergocalciferol 67864 Unit(s) Oral every week  folic acid 1 milliGRAM(s) Oral daily  metoprolol succinate ER 25 milliGRAM(s) Oral daily  melatonin 3 milliGRAM(s) Oral at bedtime  sodium chloride 0.9%. 1000 milliLiter(s) (50 mL/Hr) IV Continuous <Continuous>  ferrous    sulfate 325 milliGRAM(s) Oral daily  atorvastatin 10 milliGRAM(s) Oral at bedtime  midodrine 10 milliGRAM(s) Oral every 8 hours  magnesium oxide 400 milliGRAM(s) Oral two times a day with meals    MEDICATIONS  (PRN):  polyethylene glycol 3350 17 Gram(s) Oral daily PRN Constipation  traMADol 50 milliGRAM(s) Oral every 4 hours PRN Severe Pain (7 - 10)  lidocaine 2% Gel 1 Application(s) Topical three times a day PRN pain      Assessment:  Pt is an 83y/o male with spinal stenosis s/p T11-L1 laminectomy, T4-pelvis spinal fusion    Comprehensive rehab: PT/OT/ST  Spinal stenosis-Spinal precautions.  Pain-Tylenol prn  H/O acute respiratory distress- improved; pt oxygenating well on RA with less SOB on exertion; CXR  neg and ordered duonebs q6hrs x 3 days.  Appreciate hospitalist consult  Metabolic tdbicfzyyplncd-Amajtsct-Qgti ST.  Caution with excessive pain meds or anxiolytics, fall precautions  Dysphagia-Added Estim  S/P MBS :  Advanced diet to regular from previous puree and honey-thickened liquid diet.  Cont IVF for hydration qam bolus Encourage po liquid intake.  Cont ST.  Atrial fibrillation-HR controlled.  Cont Toprol XL daily with holding parameters and Pradaxa.  EKG due to c/o "heart fluttering"  Appreciate cardiology consult; nuclear stress test performed ; neg  Metoprolol increased- hold for SBP <90; keep Mg > 2 and K > 4.  monitor BP  Constipation- bowel regimen.   HTN-SBPs was low at   Orthostatic hypotension; IVF boluses given  Cont abdominal binder and TEDs when out of bed  Midodrine added-increased to 10mg tid and Remeron discontinued by hospitalist, cardiology follow up appreciated.  Added Florinef  as per cardiology.  Discontinued Tramadol due to pain improved and possible contribution to light-headedness and resumed tylenol prn for pain management.  Cortisol levels WNL and ; d/w hospitalist-decrease Midodrine from 10tid to 5tid .  Decrease Midodrine to 5 bid  due to elevated BPs  Hyperlipidemia -   Added lipitor 10mg.    Elevated TSH 5.28-Added Synthroid 50 daily  Elevated LFTs-Discontinued tylenol.  AST/ALT improved; now WNL.  Resumed tylenol prn for pain.  F/U CMP with improved LFTs  Acute blood loss anemia-H/H stable.  Cont Folic acid and Fe.    Leukocytosis:  Resolved.  WBC improved, UA negative, patient afebrile.   Vitamin D deficiency-Low normal Vit D at 31.  Added Vit D 50,000U weekly  Bowel regimen-Changed Colace to prn due to episode of bowel incontinence  Urinary incontinence-Urine culture  <10,000cfu/ml GNR    F/U with PMD Dr. Lares and cardiology Dr. Ny as outpt.

## 2017-09-05 NOTE — PROGRESS NOTE ADULT - PROBLEM SELECTOR PROBLEM 3
Coronary artery disease involving native coronary artery of native heart without angina pectoris

## 2017-09-05 NOTE — PROGRESS NOTE ADULT - SUBJECTIVE AND OBJECTIVE BOX
KEILA VAUGHAN    [id number]    82y      Male      CC: feels ok     INTERVAL HPI/OVERNIGHT EVENTS: no acute events , no light headed/dizzy        Vital Signs Last 24 Hrs  T(C): 36.9 (05 Sep 2017 05:22), Max: 36.9 (05 Sep 2017 05:22)  T(F): 98.4 (05 Sep 2017 05:22), Max: 98.4 (05 Sep 2017 05:22)  HR: 86 (05 Sep 2017 05:22) (72 - 86)  BP: 151/95 (05 Sep 2017 05:22) (117/60 - 151/95)  BP(mean): --  RR: 18 (05 Sep 2017 05:22) (18 - 18)  SpO2: 95% (05 Sep 2017 05:22) (95% - 97%)      PHYSICAL EXAM:    GENERAL: NAD, well-groomed  HEENT: PERRL, +EOMI  EXTREMITIES: compression stocking+   NEURO: AAOX3      MEDICATIONS: Reviewed

## 2017-09-05 NOTE — PROGRESS NOTE ADULT - PROBLEM SELECTOR PROBLEM 1
Orthostatic hypotension

## 2017-09-05 NOTE — PROGRESS NOTE ADULT - PROBLEM SELECTOR PLAN 1
no ischemia on stress test.  Stat echo: no effusion.   off c remeron.  increase midodrine to 10 mg TID. add florinef tomorrow if needed.   change flomax dose to night time  compressions stockings. ct metoprolol for now.    IV fluids
no ischemia on stress test.  Stat echo: no effusion.   off c remeron.  symptoms improved with midodrine 10 mg tid, continue  compressions stockings. ct metoprolol for now.    IV fluids
no ischemia on stress test.  echo: no effusion.   off remeron.  symptoms improved with midodrine  5 mg tid, continue  compressions stockings. ct metoprolol for now. ct toprol 50 daily.   ct florinef.
no ischemia on stress test.  echo: no effusion.   off remeron.  symptoms improved with midodrine 10 mg tid, continue  compressions stockings. ct metoprolol for now.  increase toprol to 25 Q12 for tachyccardia.  change to night dose toprol 50 daily.   ct florinef.
no ischemia on stress test.   D/C MEDICATIONS CAUSING ORTHOSTATSIS. D./c remeron.   change flomax dose to night time  compressions stockings. ct metoprolol for now.    IV fluids. if symptoms persist then consider midodrine 2.5 mg TID

## 2017-09-05 NOTE — PROGRESS NOTE ADULT - PROBLEM SELECTOR PROBLEM 4
Other hyperlipidemia
Paroxysmal atrial fibrillation

## 2017-09-05 NOTE — PROGRESS NOTE ADULT - PROBLEM SELECTOR PLAN 3
asa and statins ct beta-blocker

## 2017-09-05 NOTE — PROGRESS NOTE ADULT - PROBLEM SELECTOR PROBLEM 2
Nonsustained ventricular tachycardia

## 2017-09-06 PROCEDURE — 99232 SBSQ HOSP IP/OBS MODERATE 35: CPT

## 2017-09-06 RX ORDER — SENNA PLUS 8.6 MG/1
2 TABLET ORAL AT BEDTIME
Qty: 0 | Refills: 0 | Status: DISCONTINUED | OUTPATIENT
Start: 2017-09-06 | End: 2017-09-15

## 2017-09-06 RX ORDER — MIDODRINE HYDROCHLORIDE 2.5 MG/1
2.5 TABLET ORAL
Qty: 0 | Refills: 0 | Status: DISCONTINUED | OUTPATIENT
Start: 2017-09-06 | End: 2017-09-06

## 2017-09-06 RX ADMIN — NYSTATIN CREAM 1 APPLICATION(S): 100000 CREAM TOPICAL at 06:13

## 2017-09-06 RX ADMIN — NYSTATIN CREAM 1 APPLICATION(S): 100000 CREAM TOPICAL at 16:48

## 2017-09-06 RX ADMIN — TAMSULOSIN HYDROCHLORIDE 0.4 MILLIGRAM(S): 0.4 CAPSULE ORAL at 21:25

## 2017-09-06 RX ADMIN — Medication 50 MICROGRAM(S): at 06:12

## 2017-09-06 RX ADMIN — POLYETHYLENE GLYCOL 3350 17 GRAM(S): 17 POWDER, FOR SOLUTION ORAL at 06:23

## 2017-09-06 RX ADMIN — FLUDROCORTISONE ACETATE 0.1 MILLIGRAM(S): 0.1 TABLET ORAL at 06:12

## 2017-09-06 RX ADMIN — ATORVASTATIN CALCIUM 10 MILLIGRAM(S): 80 TABLET, FILM COATED ORAL at 21:25

## 2017-09-06 RX ADMIN — PANTOPRAZOLE SODIUM 40 MILLIGRAM(S): 20 TABLET, DELAYED RELEASE ORAL at 06:12

## 2017-09-06 RX ADMIN — DABIGATRAN ETEXILATE MESYLATE 150 MILLIGRAM(S): 150 CAPSULE ORAL at 16:49

## 2017-09-06 RX ADMIN — DABIGATRAN ETEXILATE MESYLATE 150 MILLIGRAM(S): 150 CAPSULE ORAL at 06:12

## 2017-09-06 RX ADMIN — MAGNESIUM OXIDE 400 MG ORAL TABLET 400 MILLIGRAM(S): 241.3 TABLET ORAL at 16:48

## 2017-09-06 RX ADMIN — Medication 1 MILLIGRAM(S): at 12:01

## 2017-09-06 RX ADMIN — Medication 50 MILLIGRAM(S): at 06:12

## 2017-09-06 RX ADMIN — Medication 81 MILLIGRAM(S): at 12:00

## 2017-09-06 RX ADMIN — Medication 500 MILLIGRAM(S): at 16:49

## 2017-09-06 RX ADMIN — Medication 3 MILLIGRAM(S): at 21:25

## 2017-09-06 RX ADMIN — Medication 1 TABLET(S): at 12:01

## 2017-09-06 RX ADMIN — MAGNESIUM OXIDE 400 MG ORAL TABLET 400 MILLIGRAM(S): 241.3 TABLET ORAL at 08:36

## 2017-09-06 RX ADMIN — Medication 650 MILLIGRAM(S): at 04:19

## 2017-09-06 RX ADMIN — SENNA PLUS 2 TABLET(S): 8.6 TABLET ORAL at 21:25

## 2017-09-06 RX ADMIN — Medication 1 APPLICATION(S): at 06:12

## 2017-09-06 RX ADMIN — Medication 500 MILLIGRAM(S): at 06:11

## 2017-09-06 RX ADMIN — Medication 1 APPLICATION(S): at 16:49

## 2017-09-06 RX ADMIN — Medication 325 MILLIGRAM(S): at 12:01

## 2017-09-06 NOTE — PROGRESS NOTE ADULT - SUBJECTIVE AND OBJECTIVE BOX
HISTORY OF PRESENT ILLNESS:  Pt is an 82M with PMHx significant for spinal stenosis s/p multiple spinal surgeries and spinal cord stimulator admitted to [facility] on  for scheduled management of worsening LE pain and neuropathic pain radiating both feet and pain to back, buttocks and thighs s/p exploration of the prior spinal fusion, removal of hardware, removal of spinal stimulator, T11-L1 laminectomy, T4-pelvis spinal fusion with osteotomies on  with Dr. Titus. Hospital course notable for  intraoperative blood loss requiring 2 unit PRBC and persistent acute blood loss anemia,  post op hypotension requiring  pressors, rapid Afib w/ RVR with acute respiratory failure secondary to acute pulmonary edema and bilateral pleural effusions which improved and now off lasix per cardiology, Echo showed EF 65%, encephalopathy for which HCT ( and )  negative for acute pathology but showed chronic infarcts.  SLP followed for mild-moderate dyspahgia on puree with honey liquids diet. On  had 37 beats of Vtach, and persistent leukocytosis. Deemed stable for discharge to acute rehab on 8/15.      TODAY'S SUBJECTIVE & REVIEW OF SYMPTOMS:  Patient reports feeling well; denied dizziness and light-headedness again this a.m   Slept well.  Pt denies back pain, headache, dysuria or frequency.  Denied any urinary incontinence last night    Pt c/o constipation   Pt tolerating regular diet although admits to not drinking enough    [   ] Constitutional WNL  [   ] Cardio WNL  [ x ] Resp WNL  [ x  ] GI WNL  [   ] Heme WNL  [   ] Endo WNL  [ x  ] Skin WNL  [   ] MSK WNL  [   ] Neuro WNL  [   ] Cognitive WNL  [ x  ] Psych WNL        PHYSICAL EXAM  Vital Signs Last 24 Hrs  T(C): 36.4 (06 Sep 2017 05:06), Max: 37 (05 Sep 2017 17:04)  T(F): 97.6 (06 Sep 2017 05:06), Max: 98.6 (05 Sep 2017 17:04)  HR: 87 (06 Sep 2017 05:06) (74 - 87)  BP: 159/92 (06 Sep 2017 05:06) (131/82 - 159/92)  BP(mean): --  RR: 19 (06 Sep 2017 05:06) (18 - 19)  SpO2: 97% (06 Sep 2017 05:06) (97% - 97%)    General:  NAD  HEENT: NCAT  Cardio: Irregular rhythm.  Regular rate  Resp: CTAB  Abdomen: Soft NTND  Neuro: Decreased sensation BLE in stocking distribution  Motor: 4-5/5  Extrem: No edema.  Calves soft and NTTP  Skin: Back incision CDI with no erythema noted  Erythematous rash perineal region  Wounds: No decubiti  Cognitive/Psych: Impaired cognition    FUNCTIONAL PROGRESS:  Gait: 50' min A RW  ADLs: Bathing mod A, UE dress sup, LE dress min A  Transfers: Min A  Bed mobility: Min A      RECENT LABS:                          9.6    6.1   )-----------( 229      ( 02 Sep 2017 08:00 )             30.6       140  |  104  |  10.0  ----------------------------<  89  3.8   |  24.0  |  0.67    Ca    8.1<L>      02 Sep 2017 08:00  Mg     1.9         TPro  5.6<L>  /  Alb  2.8<L>  /  TBili  0.6  /  DBili  x   /  AST  20  /  ALT  21  /  AlkPhos  155<H>  09    9/2 Prealbumin 12    Urinalysis Basic - ( 27 Aug 2017 08:05 )    Color: Annia / Appearance: Clear / S.025 / pH: x  Gluc: x / Ketone: Trace  / Bili: Negative / Urobili: 4 mg/dL   Blood: x / Protein: 15 mg/dL / Nitrite: Negative   Leuk Esterase: Trace / RBC: 0-2 /HPF / WBC 0-2   Sq Epi: x / Non Sq Epi: x / Bacteria: x    RADIOLOGY/OTHER RESULTS:    MEDICATIONS  (STANDING):  aspirin  chewable 81 milliGRAM(s) Oral daily  tamsulosin 0.4 milliGRAM(s) Oral at bedtime  dabigatran 150 milliGRAM(s) Oral every 12 hours  docusate sodium 100 milliGRAM(s) Oral three times a day  senna 2 Tablet(s) Oral at bedtime  chlorhexidine 0.12% Liquid 15 milliLiter(s) Swish and Spit three times a day  pantoprazole   Suspension 40 milliGRAM(s) Oral before breakfast  ascorbic acid 500 milliGRAM(s) Oral two times a day  zinc sulfate 220 milliGRAM(s) Oral daily  multivitamin 1 Tablet(s) Oral daily  BACItracin   Ointment 1 Application(s) Topical two times a day  levothyroxine 50 MICROGram(s) Oral daily  ergocalciferol 11828 Unit(s) Oral every week  folic acid 1 milliGRAM(s) Oral daily  metoprolol succinate ER 25 milliGRAM(s) Oral daily  melatonin 3 milliGRAM(s) Oral at bedtime  sodium chloride 0.9%. 1000 milliLiter(s) (50 mL/Hr) IV Continuous <Continuous>  ferrous    sulfate 325 milliGRAM(s) Oral daily  atorvastatin 10 milliGRAM(s) Oral at bedtime  midodrine 10 milliGRAM(s) Oral every 8 hours  magnesium oxide 400 milliGRAM(s) Oral two times a day with meals    MEDICATIONS  (PRN):  polyethylene glycol 3350 17 Gram(s) Oral daily PRN Constipation  traMADol 50 milliGRAM(s) Oral every 4 hours PRN Severe Pain (7 - 10)  lidocaine 2% Gel 1 Application(s) Topical three times a day PRN pain      Assessment:  Pt is an 83y/o male with spinal stenosis s/p T11-L1 laminectomy, T4-pelvis spinal fusion    Comprehensive rehab: PT/OT/ST  Spinal stenosis-Spinal precautions.  Pain-Tylenol prn  H/O acute respiratory distress- improved; pt oxygenating well on RA with less SOB on exertion; CXR  neg and ordered duonebs q6hrs x 3 days.  Appreciate hospitalist consult  Metabolic uvfbpoucfwlpis-Meghnzjm-Rnpa ST.  Caution with excessive pain meds or anxiolytics, fall precautions  Dysphagia-Resolved.  Added Estim  S/P MBS :  Advanced diet to regular from previous puree and honey-thickened liquid diet.  Cont IVF for hydration qam bolus Encourage po liquid intake.  Cont ST.  Atrial fibrillation-HR controlled.  Cont Toprol XL daily with holding parameters and Pradaxa.  EKG due to c/o "heart fluttering"  Appreciate cardiology consult; nuclear stress test performed ; neg  Metoprolol increased- hold for SBP <90; keep Mg > 2 and K > 4.  monitor BP  Constipation- bowel regimen.   HTN-SBPs was low at   Orthostatic hypotension; IVF boluses given  Cont abdominal binder and TEDs when out of bed  Midodrine added-increased to 10mg tid and Remeron discontinued by hospitalist, cardiology follow up appreciated.  Added Florinef  as per cardiology.  Discontinued Tramadol due to pain improved and possible contribution to light-headedness and resumed tylenol prn for pain management.  Cortisol levels WNL and ; d/w hospitalist-decrease Midodrine from 10tid to 5tid .  Decreased Midodrine to 5 bid  due to elevated BPs; has not been given due to elevated BPs.  D/C Midodrine  and continue Florinef for now; consider d/c florinef  Hyperlipidemia -   Added lipitor 10mg.    Elevated TSH 5.28-Added Synthroid 50 daily  Elevated LFTs-Discontinued tylenol.  AST/ALT improved; now WNL.  Resumed tylenol prn for pain.  F/U CMP with improved LFTs  Acute blood loss anemia-H/H stable.  Cont Folic acid and Fe.    Leukocytosis:  Resolved.  WBC improved, UA negative, patient afebrile.   Vitamin D deficiency-Low normal Vit D at 31.  Added Vit D 50,000U weekly  Bowel regimen-Changed Colace to prn due to episode of bowel incontinence  Urinary incontinence-Urine culture  <10,000cfu/ml GNR    F/U with PMD Dr. Lares and cardiology Dr. Ny as outpt.

## 2017-09-07 PROCEDURE — 99232 SBSQ HOSP IP/OBS MODERATE 35: CPT

## 2017-09-07 PROCEDURE — 99233 SBSQ HOSP IP/OBS HIGH 50: CPT

## 2017-09-07 RX ORDER — MIDODRINE HYDROCHLORIDE 2.5 MG/1
2.5 TABLET ORAL
Qty: 0 | Refills: 0 | Status: DISCONTINUED | OUTPATIENT
Start: 2017-09-07 | End: 2017-09-08

## 2017-09-07 RX ADMIN — Medication 650 MILLIGRAM(S): at 23:25

## 2017-09-07 RX ADMIN — PANTOPRAZOLE SODIUM 40 MILLIGRAM(S): 20 TABLET, DELAYED RELEASE ORAL at 05:29

## 2017-09-07 RX ADMIN — MAGNESIUM OXIDE 400 MG ORAL TABLET 400 MILLIGRAM(S): 241.3 TABLET ORAL at 17:24

## 2017-09-07 RX ADMIN — Medication 100 MILLIGRAM(S): at 11:41

## 2017-09-07 RX ADMIN — Medication 500 MILLIGRAM(S): at 05:30

## 2017-09-07 RX ADMIN — Medication 500 MILLIGRAM(S): at 17:24

## 2017-09-07 RX ADMIN — Medication 325 MILLIGRAM(S): at 11:41

## 2017-09-07 RX ADMIN — Medication 1 TABLET(S): at 11:42

## 2017-09-07 RX ADMIN — Medication 1 MILLIGRAM(S): at 11:41

## 2017-09-07 RX ADMIN — Medication 1 APPLICATION(S): at 17:24

## 2017-09-07 RX ADMIN — MIDODRINE HYDROCHLORIDE 2.5 MILLIGRAM(S): 2.5 TABLET ORAL at 17:25

## 2017-09-07 RX ADMIN — FLUDROCORTISONE ACETATE 0.1 MILLIGRAM(S): 0.1 TABLET ORAL at 05:21

## 2017-09-07 RX ADMIN — DABIGATRAN ETEXILATE MESYLATE 150 MILLIGRAM(S): 150 CAPSULE ORAL at 18:18

## 2017-09-07 RX ADMIN — DABIGATRAN ETEXILATE MESYLATE 150 MILLIGRAM(S): 150 CAPSULE ORAL at 05:21

## 2017-09-07 RX ADMIN — Medication 650 MILLIGRAM(S): at 21:54

## 2017-09-07 RX ADMIN — Medication 50 MICROGRAM(S): at 05:21

## 2017-09-07 RX ADMIN — Medication 100 MILLIGRAM(S): at 21:55

## 2017-09-07 RX ADMIN — POLYETHYLENE GLYCOL 3350 17 GRAM(S): 17 POWDER, FOR SOLUTION ORAL at 11:41

## 2017-09-07 RX ADMIN — Medication 1 APPLICATION(S): at 05:30

## 2017-09-07 RX ADMIN — ATORVASTATIN CALCIUM 10 MILLIGRAM(S): 80 TABLET, FILM COATED ORAL at 21:55

## 2017-09-07 RX ADMIN — Medication 3 MILLIGRAM(S): at 21:55

## 2017-09-07 RX ADMIN — MAGNESIUM OXIDE 400 MG ORAL TABLET 400 MILLIGRAM(S): 241.3 TABLET ORAL at 08:39

## 2017-09-07 RX ADMIN — Medication 81 MILLIGRAM(S): at 11:41

## 2017-09-07 RX ADMIN — NYSTATIN CREAM 1 APPLICATION(S): 100000 CREAM TOPICAL at 05:22

## 2017-09-07 RX ADMIN — NYSTATIN CREAM 1 APPLICATION(S): 100000 CREAM TOPICAL at 17:25

## 2017-09-07 RX ADMIN — SENNA PLUS 2 TABLET(S): 8.6 TABLET ORAL at 21:56

## 2017-09-07 RX ADMIN — Medication 650 MILLIGRAM(S): at 14:25

## 2017-09-07 RX ADMIN — TAMSULOSIN HYDROCHLORIDE 0.4 MILLIGRAM(S): 0.4 CAPSULE ORAL at 21:55

## 2017-09-07 RX ADMIN — Medication 50 MILLIGRAM(S): at 05:21

## 2017-09-07 NOTE — PROGRESS NOTE ADULT - SUBJECTIVE AND OBJECTIVE BOX
CC: F/u Orthostatic hypotension    HPI: 82M with PMHx significant for spinal stenosis s/p multiple spinal surgeries and spinal cord stimulator admitted to [facility] on 8/1 for scheduled management of worsening LE pain and neuropathic pain radiating both feet and pain to back, buttocks and thighs s/p exploration of the prior spinal fusion, removal of hardware, removal of spinal stimulator, T11-L1 laminectomy, T4-pelvis spinal fusion with osteotomies on 8/1 with Dr. Titus. Hospital course notable for  intraoperative blood loss requiring 2 unit PRBC and persistent acute blood loss anemia,  post op hypotension requiring  pressors, rapid Afib w/ RVR with acute respiratory failure secondary to acute pulmonary edema and bilateral pleural effusions which improved and now off lasix per cardiology, Echo showed EF 65%, encephalopathy for which Head CT (8/6 and 8/14)  negative for acute pathology but showed chronic infarcts.  Patient was discharged to acute rehab on 8/15.      INTERVAL HPI/OVERNIGHT EVENTS: Patient seen and examined.  Patient had episode during therapy this am of hypotension SBP 80s.  Patient states that he felt dizzy with blurred vision.  Patient states that the dizziness slightly improved.  Patient also complaining of abdominal pain secondary to constipation.  Patient had BM yesterday.  Patient denies any SOB, CP, nausea, vomiting, dysuria.  Other ROS reviewed and are negative.    Vital Signs Last 24 Hrs  T(C): 36.8 (07 Sep 2017 05:41), Max: 37.1 (06 Sep 2017 20:52)  T(F): 98.3 (07 Sep 2017 05:41), Max: 98.8 (06 Sep 2017 20:52)  HR: 89 (07 Sep 2017 05:41) (78 - 89)  BP: 138/85 (07 Sep 2017 05:41) (109/76 - 138/85)  BP(mean): --  RR: 18 (07 Sep 2017 05:41) (17 - 18)  SpO2: 95% (07 Sep 2017 05:41) (95% - 97%)  I&O's Detail    06 Sep 2017 07:01  -  07 Sep 2017 07:00  --------------------------------------------------------  IN:  Total IN: 0 mL    OUT:    Voided: 600 mL  Total OUT: 600 mL    Total NET: -600 mL    PHYSICAL EXAM:  GENERAL: NAD, well-groomed, well-developed  NECK: Supple, No JVD, Normal thyroid  NERVOUS SYSTEM:  Alert & Oriented X3, Good concentration; Motor Strength 5/5 B/L upper and lower extremities  CHEST/LUNG: Clear to auscultation bilaterally; No rales, rhonchi, wheezing, or rubs  HEART: Regular rate and rhythm; No murmurs, rubs, or gallops  ABDOMEN: Soft, Nontender, Nondistended; Bowel sounds present  EXTREMITIES:  2+ Peripheral Pulses, No clubbing, cyanosis, or edema  SKIN: No rashes or lesions        MEDICATIONS  (STANDING):  aspirin  chewable 81 milliGRAM(s) Oral daily  tamsulosin 0.4 milliGRAM(s) Oral at bedtime  dabigatran 150 milliGRAM(s) Oral every 12 hours  ascorbic acid 500 milliGRAM(s) Oral two times a day  multivitamin 1 Tablet(s) Oral daily  BACItracin   Ointment 1 Application(s) Topical two times a day  levothyroxine 50 MICROGram(s) Oral daily  ergocalciferol 32336 Unit(s) Oral every week  folic acid 1 milliGRAM(s) Oral daily  melatonin 3 milliGRAM(s) Oral at bedtime  ferrous    sulfate 325 milliGRAM(s) Oral daily  atorvastatin 10 milliGRAM(s) Oral at bedtime  magnesium oxide 400 milliGRAM(s) Oral two times a day with meals  metoprolol succinate ER 50 milliGRAM(s) Oral daily  nystatin Ointment 1 Application(s) Topical two times a day  pantoprazole    Tablet 40 milliGRAM(s) Oral before breakfast  fludroCORTISONE 0.1 milliGRAM(s) Oral daily  senna 2 Tablet(s) Oral at bedtime  midodrine 2.5 milliGRAM(s) Oral two times a day    MEDICATIONS  (PRN):  polyethylene glycol 3350 17 Gram(s) Oral daily PRN Constipation  calcium carbonate 500 mG (Tums) Chewable 1 Tablet(s) Chew three times a day PRN Upset Stomach  acetaminophen   Tablet. 650 milliGRAM(s) Oral every 6 hours PRN Severe Pain (7 - 10)  docusate sodium 100 milliGRAM(s) Oral daily PRN Constipation  bisacodyl 5 milliGRAM(s) Oral every 12 hours PRN Constipation CC: F/u Orthostatic hypotension    INTERVAL HPI/OVERNIGHT EVENTS: Patient seen and examined.  Patient had episode during therapy this am of hypotension SBP 80s.  Patient states that he felt dizzy with blurred vision.  Patient states that the dizziness slightly improved.  Patient also complaining of abdominal pain secondary to constipation.  Patient had BM yesterday.        Vital Signs Last 24 Hrs  T(C): 36.8 (07 Sep 2017 05:41), Max: 37.1 (06 Sep 2017 20:52)  T(F): 98.3 (07 Sep 2017 05:41), Max: 98.8 (06 Sep 2017 20:52)  HR: 89 (07 Sep 2017 05:41) (78 - 89)  BP: 138/85 (07 Sep 2017 05:41) (109/76 - 138/85)  BP(mean): --  RR: 18 (07 Sep 2017 05:41) (17 - 18)  SpO2: 95% (07 Sep 2017 05:41) (95% - 97%)  I&O's Detail    06 Sep 2017 07:01  -  07 Sep 2017 07:00  --------------------------------------------------------  IN:  Total IN: 0 mL    OUT:    Voided: 600 mL  Total OUT: 600 mL    Total NET: -600 mL    PHYSICAL EXAM:  GENERAL: NAD  NECK: Supple  CHEST/LUNG: Clear to auscultation bilaterally; No rales, rhonchi, wheezing, or rubs  EXTREMITIES:  No clubbing, cyanosis, or edema          MEDICATIONS : reviewed

## 2017-09-07 NOTE — PROGRESS NOTE ADULT - SUBJECTIVE AND OBJECTIVE BOX
HISTORY OF PRESENT ILLNESS:  Pt is an 82M with PMHx significant for spinal stenosis s/p multiple spinal surgeries and spinal cord stimulator admitted to [facility] on  for scheduled management of worsening LE pain and neuropathic pain radiating both feet and pain to back, buttocks and thighs s/p exploration of the prior spinal fusion, removal of hardware, removal of spinal stimulator, T11-L1 laminectomy, T4-pelvis spinal fusion with osteotomies on  with Dr. Titus. Hospital course notable for  intraoperative blood loss requiring 2 unit PRBC and persistent acute blood loss anemia,  post op hypotension requiring  pressors, rapid Afib w/ RVR with acute respiratory failure secondary to acute pulmonary edema and bilateral pleural effusions which improved and now off lasix per cardiology, Echo showed EF 65%, encephalopathy for which HCT ( and )  negative for acute pathology but showed chronic infarcts.  SLP followed for mild-moderate dyspahgia on puree with honey liquids diet. On  had 37 beats of Vtach, and persistent leukocytosis. Deemed stable for discharge to acute rehab on 8/15.      TODAY'S SUBJECTIVE & REVIEW OF SYMPTOMS:  Chart reviewed and patient seen at bedside. He had previously gone to the BR with NA and no issues. Denies any dizziness or lightheadedness while seated. However during OT session with dizziness and orthostatic BP dop with SBP 80S. Improved after placement of TEDS and abdominal binder      [   ] Constitutional WNL  [   ] Cardio WNL  [ x ] Resp WNL  [ x  ] GI WNL  [   ] Heme WNL  [   ] Endo WNL  [ x  ] Skin WNL  [   ] MSK WNL  [   ] Neuro WNL  [   ] Cognitive WNL  [ x  ] Psych WNL        Vital Signs Last 24 Hrs  T(C): 36.8 (07 Sep 2017 05:41), Max: 37.1 (06 Sep 2017 20:52)  T(F): 98.3 (07 Sep 2017 05:41), Max: 98.8 (06 Sep 2017 20:52)  HR: 89 (07 Sep 2017 05:41) (78 - 89)  BP: 138/85 (07 Sep 2017 05:41) (109/76 - 138/85)  BP(mean): --  RR: 18 (07 Sep 2017 05:41) (17 - 18)  SpO2: 95% (07 Sep 2017 05:41) (95% - 97%)      General:  NAD  HEENT: NCAT  Cardio: Irregular rhythm.   Resp: CTAB  Abdomen: Soft NTND  Neuro: Decreased sensation BLE   Motor: 4-5/5  Extrem: No edema.  Calves soft   Skin: Back incision CDI with no erythema noted  Erythematous rash perineal region  Wounds: No decubiti  Cognitive/Psych: Impaired cognition    FUNCTIONAL PROGRESS:  Gait: min A RW  ADLs: Bathing mod A, UE dress sup, LE dress min A  Transfers: Min A  Bed mobility: Min A      RECENT LABS:                          9.6    6.1   )-----------( 229      ( 02 Sep 2017 08:00 )             30.6   09    140  |  104  |  10.0  ----------------------------<  89  3.8   |  24.0  |  0.67    Ca    8.1<L>      02 Sep 2017 08:00  Mg     1.9         TPro  5.6<L>  /  Alb  2.8<L>  /  TBili  0.6  /  DBili  x   /  AST  20  /  ALT  21  /  AlkPhos  155<H>      9/ Prealbumin 12    Urinalysis Basic - ( 27 Aug 2017 08:05 )    Color: Annia / Appearance: Clear / S.025 / pH: x  Gluc: x / Ketone: Trace  / Bili: Negative / Urobili: 4 mg/dL   Blood: x / Protein: 15 mg/dL / Nitrite: Negative   Leuk Esterase: Trace / RBC: 0-2 /HPF / WBC 0-2   Sq Epi: x / Non Sq Epi: x / Bacteria: x    RADIOLOGY/OTHER RESULTS:    MEDICATIONS  (STANDING):  aspirin  chewable 81 milliGRAM(s) Oral daily  tamsulosin 0.4 milliGRAM(s) Oral at bedtime  dabigatran 150 milliGRAM(s) Oral every 12 hours  ascorbic acid 500 milliGRAM(s) Oral two times a day  multivitamin 1 Tablet(s) Oral daily  BACItracin   Ointment 1 Application(s) Topical two times a day  levothyroxine 50 MICROGram(s) Oral daily  ergocalciferol 99699 Unit(s) Oral every week  folic acid 1 milliGRAM(s) Oral daily  melatonin 3 milliGRAM(s) Oral at bedtime  ferrous    sulfate 325 milliGRAM(s) Oral daily  atorvastatin 10 milliGRAM(s) Oral at bedtime  magnesium oxide 400 milliGRAM(s) Oral two times a day with meals  metoprolol succinate ER 50 milliGRAM(s) Oral daily  nystatin Ointment 1 Application(s) Topical two times a day  pantoprazole    Tablet 40 milliGRAM(s) Oral before breakfast  fludroCORTISONE 0.1 milliGRAM(s) Oral daily  senna 2 Tablet(s) Oral at bedtime  midodrine 2.5 milliGRAM(s) Oral two times a day    MEDICATIONS  (PRN):  polyethylene glycol 3350 17 Gram(s) Oral daily PRN Constipation  calcium carbonate 500 mG (Tums) Chewable 1 Tablet(s) Chew three times a day PRN Upset Stomach  acetaminophen   Tablet. 650 milliGRAM(s) Oral every 6 hours PRN Severe Pain (7 - 10)  docusate sodium 100 milliGRAM(s) Oral daily PRN Constipation  bisacodyl 5 milliGRAM(s) Oral every 12 hours PRN Constipation        Assessment:  Pt is an 81y/o male with spinal stenosis s/p T11-L1 laminectomy, T4-pelvis spinal fusion    Comprehensive rehab: PT/OT/ST    Pain-Tylenol prn    H/O acute respiratory distress- improved; pt oxygenating well on RA     Metabolic khdoiqdpaaxkvv-Szuaxvly-Ljxx ST.  Caution with excessive pain meds or anxiolytics.    Dysphagia-Resolved.  Currently on regular diet    Atrial fibrillation-HR controlled.  Cont Toprol XL daily with holding parameters and Pradaxa.  EKG due to c/o "heart fluttering"  Appreciate cardiology consult; nuclear stress test performed ;  neg  Metoprolol increased- hold for SBP <90; keep Mg > 2 and K > 4.  monitor BP    HTN- with episodes of orthostatic changes. BP much improved without orthostatic changes since florinef added to regimen. Midodrine was being held and hence d/c yesterday. will reinstate low dose. Midodrine 2.5mg bid. d/w medicine.     Hyperlipidemia - lipitor 10mg.    Elevated TSH:on Synthroid 50 daily    Elevated LFTs-Discontinued tylenol.  AST/ALT improved; now WNL.  Resumed tylenol prn for pain.  F/U CMP with improved LFTs    Acute blood loss anemia-H/H stable.  Cont Folic acid and Fe.      Leukocytosis:  Resolved.  WBC improved, UA negative, patient afebrile.     Vitamin D deficiency-Low normal Vit D at 31.  Added Vit D 50,000U weekly    Bowel regimen-Colace and dulcolax prn HISTORY OF PRESENT ILLNESS:  Pt is an 82M with PMHx significant for spinal stenosis s/p multiple spinal surgeries and spinal cord stimulator admitted to [facility] on  for scheduled management of worsening LE pain and neuropathic pain radiating both feet and pain to back, buttocks and thighs s/p exploration of the prior spinal fusion, removal of hardware, removal of spinal stimulator, T11-L1 laminectomy, T4-pelvis spinal fusion with osteotomies on  with Dr. Titus. Hospital course notable for  intraoperative blood loss requiring 2 unit PRBC and persistent acute blood loss anemia,  post op hypotension requiring  pressors, rapid Afib w/ RVR with acute respiratory failure secondary to acute pulmonary edema and bilateral pleural effusions which improved and now off lasix per cardiology, Echo showed EF 65%, encephalopathy for which HCT ( and )  negative for acute pathology but showed chronic infarcts.  SLP followed for mild-moderate dyspahgia on puree with honey liquids diet. On  had 37 beats of Vtach, and persistent leukocytosis. Deemed stable for discharge to acute rehab on 8/15.      TODAY'S SUBJECTIVE & REVIEW OF SYMPTOMS:  Chart reviewed and patient seen at bedside. He had previously gone to the BR with NA and no issues. Denies any dizziness or lightheadedness while seated. However during OT session with dizziness and orthostatic BP dop with SBP 80S. Improved after placement of TEDS and abdominal binder      [   ] Constitutional WNL  [   ] Cardio WNL  [ x ] Resp WNL  [ x  ] GI WNL  [   ] Heme WNL  [   ] Endo WNL  [ x  ] Skin WNL  [   ] MSK WNL  [   ] Neuro WNL  [   ] Cognitive WNL  [ x  ] Psych WNL        Vital Signs Last 24 Hrs  T(C): 36.8 (07 Sep 2017 05:41), Max: 37.1 (06 Sep 2017 20:52)  T(F): 98.3 (07 Sep 2017 05:41), Max: 98.8 (06 Sep 2017 20:52)  HR: 89 (07 Sep 2017 05:41) (78 - 89)  BP: 138/85 (07 Sep 2017 05:41) (109/76 - 138/85)  BP(mean): --  RR: 18 (07 Sep 2017 05:41) (17 - 18)  SpO2: 95% (07 Sep 2017 05:41) (95% - 97%)      General:  NAD  HEENT: NCAT  Cardio: Irregular rhythm.   Resp: CTAB  Abdomen: Soft NTND  Neuro: Decreased sensation BLE   Motor: 4-5/5  Extrem: No edema.  Calves soft   Skin: Back incision CDI with no erythema noted  Erythematous rash perineal region  Wounds: No decubiti  Cognitive/Psych: Impaired cognition    FUNCTIONAL PROGRESS:  Gait: min A RW  ADLs: Bathing mod A, UE dress sup, LE dress min A  Transfers: Min A  Bed mobility: Min A      RECENT LABS:                          9.6    6.1   )-----------( 229      ( 02 Sep 2017 08:00 )             30.6   09    140  |  104  |  10.0  ----------------------------<  89  3.8   |  24.0  |  0.67    Ca    8.1<L>      02 Sep 2017 08:00  Mg     1.9         TPro  5.6<L>  /  Alb  2.8<L>  /  TBili  0.6  /  DBili  x   /  AST  20  /  ALT  21  /  AlkPhos  155<H>      9/ Prealbumin 12    Urinalysis Basic - ( 27 Aug 2017 08:05 )    Color: Annia / Appearance: Clear / S.025 / pH: x  Gluc: x / Ketone: Trace  / Bili: Negative / Urobili: 4 mg/dL   Blood: x / Protein: 15 mg/dL / Nitrite: Negative   Leuk Esterase: Trace / RBC: 0-2 /HPF / WBC 0-2   Sq Epi: x / Non Sq Epi: x / Bacteria: x    RADIOLOGY/OTHER RESULTS:    MEDICATIONS  (STANDING):  aspirin  chewable 81 milliGRAM(s) Oral daily  tamsulosin 0.4 milliGRAM(s) Oral at bedtime  dabigatran 150 milliGRAM(s) Oral every 12 hours  ascorbic acid 500 milliGRAM(s) Oral two times a day  multivitamin 1 Tablet(s) Oral daily  BACItracin   Ointment 1 Application(s) Topical two times a day  levothyroxine 50 MICROGram(s) Oral daily  ergocalciferol 72658 Unit(s) Oral every week  folic acid 1 milliGRAM(s) Oral daily  melatonin 3 milliGRAM(s) Oral at bedtime  ferrous    sulfate 325 milliGRAM(s) Oral daily  atorvastatin 10 milliGRAM(s) Oral at bedtime  magnesium oxide 400 milliGRAM(s) Oral two times a day with meals  metoprolol succinate ER 50 milliGRAM(s) Oral daily  nystatin Ointment 1 Application(s) Topical two times a day  pantoprazole    Tablet 40 milliGRAM(s) Oral before breakfast  fludroCORTISONE 0.1 milliGRAM(s) Oral daily  senna 2 Tablet(s) Oral at bedtime  midodrine 2.5 milliGRAM(s) Oral two times a day    MEDICATIONS  (PRN):  polyethylene glycol 3350 17 Gram(s) Oral daily PRN Constipation  calcium carbonate 500 mG (Tums) Chewable 1 Tablet(s) Chew three times a day PRN Upset Stomach  acetaminophen   Tablet. 650 milliGRAM(s) Oral every 6 hours PRN Severe Pain (7 - 10)  docusate sodium 100 milliGRAM(s) Oral daily PRN Constipation  bisacodyl 5 milliGRAM(s) Oral every 12 hours PRN Constipation        Assessment:  Pt is an 81y/o male with spinal stenosis s/p T11-L1 laminectomy, T4-pelvis spinal fusion    Comprehensive rehab: PT/OT/ST    Pain-Tylenol prn    H/O acute respiratory distress- improved; pt oxygenating well on RA     Metabolic hrwcvhkgstfyzn-Thjuwygz-Svut ST.  Caution with excessive pain meds or anxiolytics.    Dysphagia-Resolved.  Currently on regular diet    Atrial fibrillation-HR controlled.  Cont Toprol XL daily with holding parameters and Pradaxa.  EKG due to c/o "heart fluttering"  Appreciate cardiology consult; nuclear stress test performed ;  neg  Metoprolol increased- hold for SBP <90; keep Mg > 2 and K > 4.  monitor BP    HTN- with episodes of orthostatic changes. BP much improved without orthostatic changes since florinef added to regimen. Midodrine was being held and hence d/c yesterday. will reinstate low dose. Midodrine 2.5mg bid. d/w medicine.     Hyperlipidemia - lipitor 10mg.    Elevated TSH:on Synthroid 50 daily    Elevated LFTs-Discontinued tylenol.  AST/ALT improved; now WNL.  Resumed tylenol prn for pain.  F/U CMP with improved LFTs    Acute blood loss anemia-H/H stable.  Cont Folic acid and Fe.      Leukocytosis:  Resolved.  WBC improved, UA negative, patient afebrile.     Vitamin D deficiency-Low normal Vit D at 31.  Added Vit D 50,000U weekly    Bowel regimen-Colace and dulcolax prn    DVT prophylaxis:on dabigatran

## 2017-09-07 NOTE — PROGRESS NOTE ADULT - ATTENDING COMMENTS
Thank you for allowing me to participate in care of your patient.   Please call as needed.
pt examined at bedside.   dry cough - tessalon perle prn  Agree with rest of above
Await Nuclear stress results  need to improve Po feeds - Ensure
Thank you for allowing me to participate in care of this patient.   Please call as needed.
Thank you for allowing me to participate in care of your patient.   Please call as needed.

## 2017-09-07 NOTE — PROGRESS NOTE ADULT - ASSESSMENT
82M with PMHx significant for spinal stenosis s/p multiple spinal surgeries and spinal cord stimulator admitted to [facility] on 8/1 for scheduled management of worsening LE pain and neuropathic pain radiating both feet and pain to back, buttocks and thighs s/p exploration of the prior spinal fusion, removal of hardware, removal of spinal stimulator, T11-L1 laminectomy, T4-pelvis spinal fusion with osteotomies on 8/1 with Dr. Titus. Hospital course notable for  intraoperative blood loss requiring 2 unit PRBC and persistent acute blood loss anemia,  post op hypotension requiring  pressors, rapid Afib w/ RVR with acute respiratory failure secondary to acute pulmonary edema and bilateral pleural effusions which improved and now off lasix per cardiology, Echo showed EF 65%, encephalopathy for which HCT (8/6 and 8/14)  negative for acute pathology but showed chronic infarcts. On 8/14, as per RN note,  had 37 beats of  Vtach,  Ortho team was notified, [facility] was not informed and had signed off on 8/12.  Patient was discharged to acute rehab on 8/15.    Problem/Recommendation - 1:  Problem: Hypotension - Orthostatic: deconditioned, venous pooling, poor nutrition .  Midodrine restarted today at 2.5mg PO BID  ct florinef for now   ct to encourage good po intake and activity    Problem/Recommendation - 2:  ·  Problem: Afib/ Nonsustained ventricular tachycardia/CAD.  stable on current meds      Problem/Recommendation - 3:  ·  Problem: Spinal stenosis of lumbosacral region.  Recommendation: s/p exploration of the prior spinal fusion, removal of hardware, removal of spinal stimulator, T11-L1 laminectomy, T4-pelvis spinal fusion with osteotomies on 8/1 with Dr. Titus with complicated post operative course  Rehab program.       Problem/Recommendation - 4:  - Problem: Constipation.  Recommendation: Bowel regimen.

## 2017-09-08 LAB
ANION GAP SERPL CALC-SCNC: 11 MMOL/L — SIGNIFICANT CHANGE UP (ref 5–17)
BUN SERPL-MCNC: 11 MG/DL — SIGNIFICANT CHANGE UP (ref 8–20)
CALCIUM SERPL-MCNC: 8.4 MG/DL — LOW (ref 8.6–10.2)
CHLORIDE SERPL-SCNC: 102 MMOL/L — SIGNIFICANT CHANGE UP (ref 98–107)
CO2 SERPL-SCNC: 26 MMOL/L — SIGNIFICANT CHANGE UP (ref 22–29)
CREAT SERPL-MCNC: 0.62 MG/DL — SIGNIFICANT CHANGE UP (ref 0.5–1.3)
GLUCOSE SERPL-MCNC: 95 MG/DL — SIGNIFICANT CHANGE UP (ref 70–115)
HCT VFR BLD CALC: 33 % — LOW (ref 42–52)
HGB BLD-MCNC: 10.3 G/DL — LOW (ref 14–18)
MAGNESIUM SERPL-MCNC: 2 MG/DL — SIGNIFICANT CHANGE UP (ref 1.6–2.6)
MCHC RBC-ENTMCNC: 29.6 PG — SIGNIFICANT CHANGE UP (ref 27–31)
MCHC RBC-ENTMCNC: 31.2 G/DL — LOW (ref 32–36)
MCV RBC AUTO: 94.8 FL — HIGH (ref 80–94)
PLATELET # BLD AUTO: 263 K/UL — SIGNIFICANT CHANGE UP (ref 150–400)
POTASSIUM SERPL-MCNC: 4 MMOL/L — SIGNIFICANT CHANGE UP (ref 3.5–5.3)
POTASSIUM SERPL-SCNC: 4 MMOL/L — SIGNIFICANT CHANGE UP (ref 3.5–5.3)
RBC # BLD: 3.48 M/UL — LOW (ref 4.6–6.2)
RBC # FLD: 16.1 % — HIGH (ref 11–15.6)
SODIUM SERPL-SCNC: 139 MMOL/L — SIGNIFICANT CHANGE UP (ref 135–145)
WBC # BLD: 5.9 K/UL — SIGNIFICANT CHANGE UP (ref 4.8–10.8)
WBC # FLD AUTO: 5.9 K/UL — SIGNIFICANT CHANGE UP (ref 4.8–10.8)

## 2017-09-08 PROCEDURE — 99232 SBSQ HOSP IP/OBS MODERATE 35: CPT

## 2017-09-08 PROCEDURE — 99233 SBSQ HOSP IP/OBS HIGH 50: CPT

## 2017-09-08 RX ORDER — MIDODRINE HYDROCHLORIDE 2.5 MG/1
5 TABLET ORAL THREE TIMES A DAY
Qty: 0 | Refills: 0 | Status: DISCONTINUED | OUTPATIENT
Start: 2017-09-08 | End: 2017-09-08

## 2017-09-08 RX ORDER — MIDODRINE HYDROCHLORIDE 2.5 MG/1
2.5 TABLET ORAL THREE TIMES A DAY
Qty: 0 | Refills: 0 | Status: DISCONTINUED | OUTPATIENT
Start: 2017-09-08 | End: 2017-09-15

## 2017-09-08 RX ORDER — MIDODRINE HYDROCHLORIDE 2.5 MG/1
2.5 TABLET ORAL ONCE
Qty: 0 | Refills: 0 | Status: COMPLETED | OUTPATIENT
Start: 2017-09-08 | End: 2017-09-08

## 2017-09-08 RX ADMIN — NYSTATIN CREAM 1 APPLICATION(S): 100000 CREAM TOPICAL at 17:25

## 2017-09-08 RX ADMIN — NYSTATIN CREAM 1 APPLICATION(S): 100000 CREAM TOPICAL at 06:25

## 2017-09-08 RX ADMIN — Medication 1 APPLICATION(S): at 17:20

## 2017-09-08 RX ADMIN — Medication 5 MILLIGRAM(S): at 21:40

## 2017-09-08 RX ADMIN — Medication 500 MILLIGRAM(S): at 06:25

## 2017-09-08 RX ADMIN — ATORVASTATIN CALCIUM 10 MILLIGRAM(S): 80 TABLET, FILM COATED ORAL at 21:40

## 2017-09-08 RX ADMIN — PANTOPRAZOLE SODIUM 40 MILLIGRAM(S): 20 TABLET, DELAYED RELEASE ORAL at 06:24

## 2017-09-08 RX ADMIN — Medication 650 MILLIGRAM(S): at 06:52

## 2017-09-08 RX ADMIN — Medication 650 MILLIGRAM(S): at 17:27

## 2017-09-08 RX ADMIN — MIDODRINE HYDROCHLORIDE 5 MILLIGRAM(S): 2.5 TABLET ORAL at 15:44

## 2017-09-08 RX ADMIN — Medication 325 MILLIGRAM(S): at 11:52

## 2017-09-08 RX ADMIN — MAGNESIUM OXIDE 400 MG ORAL TABLET 400 MILLIGRAM(S): 241.3 TABLET ORAL at 08:43

## 2017-09-08 RX ADMIN — POLYETHYLENE GLYCOL 3350 17 GRAM(S): 17 POWDER, FOR SOLUTION ORAL at 11:52

## 2017-09-08 RX ADMIN — ERGOCALCIFEROL 50000 UNIT(S): 1.25 CAPSULE ORAL at 11:52

## 2017-09-08 RX ADMIN — Medication 1 MILLIGRAM(S): at 11:52

## 2017-09-08 RX ADMIN — Medication 50 MICROGRAM(S): at 06:24

## 2017-09-08 RX ADMIN — DABIGATRAN ETEXILATE MESYLATE 150 MILLIGRAM(S): 150 CAPSULE ORAL at 06:24

## 2017-09-08 RX ADMIN — Medication 100 MILLIGRAM(S): at 06:52

## 2017-09-08 RX ADMIN — Medication 1 TABLET(S): at 11:52

## 2017-09-08 RX ADMIN — MIDODRINE HYDROCHLORIDE 2.5 MILLIGRAM(S): 2.5 TABLET ORAL at 21:40

## 2017-09-08 RX ADMIN — SENNA PLUS 2 TABLET(S): 8.6 TABLET ORAL at 21:40

## 2017-09-08 RX ADMIN — TAMSULOSIN HYDROCHLORIDE 0.4 MILLIGRAM(S): 0.4 CAPSULE ORAL at 21:40

## 2017-09-08 RX ADMIN — Medication 500 MILLIGRAM(S): at 17:21

## 2017-09-08 RX ADMIN — Medication 10 MILLIGRAM(S): at 15:26

## 2017-09-08 RX ADMIN — FLUDROCORTISONE ACETATE 0.1 MILLIGRAM(S): 0.1 TABLET ORAL at 06:24

## 2017-09-08 RX ADMIN — MIDODRINE HYDROCHLORIDE 2.5 MILLIGRAM(S): 2.5 TABLET ORAL at 11:51

## 2017-09-08 RX ADMIN — DABIGATRAN ETEXILATE MESYLATE 150 MILLIGRAM(S): 150 CAPSULE ORAL at 17:20

## 2017-09-08 RX ADMIN — MAGNESIUM OXIDE 400 MG ORAL TABLET 400 MILLIGRAM(S): 241.3 TABLET ORAL at 17:24

## 2017-09-08 RX ADMIN — Medication 50 MILLIGRAM(S): at 06:24

## 2017-09-08 RX ADMIN — Medication 1 APPLICATION(S): at 06:25

## 2017-09-08 RX ADMIN — Medication 81 MILLIGRAM(S): at 11:52

## 2017-09-08 NOTE — PROGRESS NOTE ADULT - ASSESSMENT
82M with PMHx significant for spinal stenosis s/p multiple spinal surgeries and spinal cord stimulator admitted to [facility] on 8/1 for scheduled management of worsening LE pain and neuropathic pain radiating both feet and pain to back, buttocks and thighs s/p exploration of the prior spinal fusion, removal of hardware, removal of spinal stimulator, T11-L1 laminectomy, T4-pelvis spinal fusion with osteotomies on 8/1 with Dr. Titus. Hospital course notable for  intraoperative blood loss requiring 2 unit PRBC and persistent acute blood loss anemia,  post op hypotension requiring  pressors, rapid Afib w/ RVR with acute respiratory failure secondary to acute pulmonary edema and bilateral pleural effusions which improved and now off lasix per cardiology, Echo showed EF 65%, encephalopathy for which HCT (8/6 and 8/14)  negative for acute pathology but showed chronic infarcts. On 8/14, as per RN note,  had 37 beats of  Vtach,  Ortho team was notified, [facility] was not informed and had signed off on 8/12.  Patient was discharged to acute rehab on 8/15.    Problem/Recommendation - 1:  Problem: Hypotension - Orthostatic: deconditioned, venous pooling, poor nutrition .  Midodrine restarted yesterday, will be increased to 2.5mg PO TID.  ct florinef for now, ct to encourage good po intake and activity    Problem/Recommendation - 2:  ·  Problem: Afib/ Nonsustained ventricular tachycardia/CAD.  stable on current meds      Problem/Recommendation - 3:  ·  Problem: Spinal stenosis of lumbosacral region.  Recommendation: s/p exploration of the prior spinal fusion, removal of hardware, removal of spinal stimulator, T11-L1 laminectomy, T4-pelvis spinal fusion with osteotomies on 8/1 with Dr. Titus with complicated post operative course  Rehab program.       Problem/Recommendation - 4:  - Problem: Constipation.  Recommendation: Bowel regimen.

## 2017-09-08 NOTE — PROGRESS NOTE ADULT - SUBJECTIVE AND OBJECTIVE BOX
CC: F/u Orthostatic Hypotension      INTERVAL HPI/OVERNIGHT EVENTS: Patient seen and examined.  Patient had episode of hypotension this am with SBP 80s during therapy.  Midodrine held this am secondary to .  Patient complained of lightheadedness.  Patient denies any headache, SOB, CP, abdominal pain, nausea, vomiting, dysuria.  Other ROS reviewed and are negative.    Vital Signs Last 24 Hrs  T(C): 35.9 (08 Sep 2017 06:00), Max: 36.8 (07 Sep 2017 19:50)  T(F): 96.6 (08 Sep 2017 06:00), Max: 98.2 (07 Sep 2017 19:50)  HR: 83 (08 Sep 2017 06:00) (75 - 83)  BP: 149/97 (08 Sep 2017 06:00) (109/62 - 149/97)  BP(mean): --  RR: 18 (08 Sep 2017 06:00) (18 - 18)  SpO2: 95% (08 Sep 2017 06:00) (95% - 98%)  I&O's Detail      PHYSICAL EXAM:  GENERAL: NAD, well-groomed, well-developed  NECK: Supple  NERVOUS SYSTEM:  Alert & Oriented X3, Good concentration; Motor Strength 5/5 B/L upper and lower extremities  CHEST/LUNG: Clear to auscultation bilaterally; No rales, rhonchi, wheezing, or rubs  HEART: Regular rate and rhythm; No murmurs, rubs, or gallops  ABDOMEN: Soft, Nontender, Nondistended; Bowel sounds present  EXTREMITIES:  2+ Peripheral Pulses, No clubbing, cyanosis, or edema  SKIN: No rashes or lesions                            10.3   5.9   )-----------( 263      ( 08 Sep 2017 07:21 )             33.0     08 Sep 2017 07:21    139    |  102    |  11.0   ----------------------------<  95     4.0     |  26.0   |  0.62     Ca    8.4        08 Sep 2017 07:21  Mg     2.0       08 Sep 2017 07:21            MEDICATIONS  (STANDING):  aspirin  chewable 81 milliGRAM(s) Oral daily  tamsulosin 0.4 milliGRAM(s) Oral at bedtime  dabigatran 150 milliGRAM(s) Oral every 12 hours  ascorbic acid 500 milliGRAM(s) Oral two times a day  multivitamin 1 Tablet(s) Oral daily  BACItracin   Ointment 1 Application(s) Topical two times a day  levothyroxine 50 MICROGram(s) Oral daily  ergocalciferol 56571 Unit(s) Oral every week  folic acid 1 milliGRAM(s) Oral daily  melatonin 3 milliGRAM(s) Oral at bedtime  ferrous    sulfate 325 milliGRAM(s) Oral daily  atorvastatin 10 milliGRAM(s) Oral at bedtime  magnesium oxide 400 milliGRAM(s) Oral two times a day with meals  metoprolol succinate ER 50 milliGRAM(s) Oral daily  nystatin Ointment 1 Application(s) Topical two times a day  pantoprazole    Tablet 40 milliGRAM(s) Oral before breakfast  fludroCORTISONE 0.1 milliGRAM(s) Oral daily  senna 2 Tablet(s) Oral at bedtime  midodrine 5 milliGRAM(s) Oral three times a day  midodrine 2.5 milliGRAM(s) Oral once    MEDICATIONS  (PRN):  polyethylene glycol 3350 17 Gram(s) Oral daily PRN Constipation  calcium carbonate 500 mG (Tums) Chewable 1 Tablet(s) Chew three times a day PRN Upset Stomach  acetaminophen   Tablet. 650 milliGRAM(s) Oral every 6 hours PRN Severe Pain (7 - 10)  docusate sodium 100 milliGRAM(s) Oral daily PRN Constipation  bisacodyl 5 milliGRAM(s) Oral every 12 hours PRN Constipation  benzonatate 100 milliGRAM(s) Oral four times a day PRN Cough

## 2017-09-08 NOTE — PROGRESS NOTE ADULT - NSHPATTENDINGPLANDISCUSS_GEN_ALL_CORE
pt
pt
pt, rehab physician
pt, wife, rehab attending
wife
pt, NP
pt, wife, rehab physician, NP, cardiology
pt,NP, [facility] attending
pt. rehab physician

## 2017-09-09 PROCEDURE — 99232 SBSQ HOSP IP/OBS MODERATE 35: CPT

## 2017-09-09 RX ADMIN — Medication 100 MILLIGRAM(S): at 17:01

## 2017-09-09 RX ADMIN — ATORVASTATIN CALCIUM 10 MILLIGRAM(S): 80 TABLET, FILM COATED ORAL at 21:26

## 2017-09-09 RX ADMIN — SENNA PLUS 2 TABLET(S): 8.6 TABLET ORAL at 21:26

## 2017-09-09 RX ADMIN — Medication 500 MILLIGRAM(S): at 06:00

## 2017-09-09 RX ADMIN — FLUDROCORTISONE ACETATE 0.1 MILLIGRAM(S): 0.1 TABLET ORAL at 06:00

## 2017-09-09 RX ADMIN — Medication 81 MILLIGRAM(S): at 11:40

## 2017-09-09 RX ADMIN — Medication 50 MICROGRAM(S): at 06:00

## 2017-09-09 RX ADMIN — Medication 50 MILLIGRAM(S): at 06:00

## 2017-09-09 RX ADMIN — Medication 1 APPLICATION(S): at 17:00

## 2017-09-09 RX ADMIN — Medication 1 TABLET(S): at 11:40

## 2017-09-09 RX ADMIN — Medication 1 APPLICATION(S): at 06:00

## 2017-09-09 RX ADMIN — MIDODRINE HYDROCHLORIDE 2.5 MILLIGRAM(S): 2.5 TABLET ORAL at 06:00

## 2017-09-09 RX ADMIN — NYSTATIN CREAM 1 APPLICATION(S): 100000 CREAM TOPICAL at 17:00

## 2017-09-09 RX ADMIN — PANTOPRAZOLE SODIUM 40 MILLIGRAM(S): 20 TABLET, DELAYED RELEASE ORAL at 06:00

## 2017-09-09 RX ADMIN — TAMSULOSIN HYDROCHLORIDE 0.4 MILLIGRAM(S): 0.4 CAPSULE ORAL at 21:26

## 2017-09-09 RX ADMIN — Medication 500 MILLIGRAM(S): at 17:00

## 2017-09-09 RX ADMIN — MIDODRINE HYDROCHLORIDE 2.5 MILLIGRAM(S): 2.5 TABLET ORAL at 14:06

## 2017-09-09 RX ADMIN — DABIGATRAN ETEXILATE MESYLATE 150 MILLIGRAM(S): 150 CAPSULE ORAL at 06:00

## 2017-09-09 RX ADMIN — MAGNESIUM OXIDE 400 MG ORAL TABLET 400 MILLIGRAM(S): 241.3 TABLET ORAL at 06:00

## 2017-09-09 RX ADMIN — NYSTATIN CREAM 1 APPLICATION(S): 100000 CREAM TOPICAL at 06:00

## 2017-09-09 RX ADMIN — MIDODRINE HYDROCHLORIDE 2.5 MILLIGRAM(S): 2.5 TABLET ORAL at 21:26

## 2017-09-09 RX ADMIN — MAGNESIUM OXIDE 400 MG ORAL TABLET 400 MILLIGRAM(S): 241.3 TABLET ORAL at 17:00

## 2017-09-09 RX ADMIN — Medication 325 MILLIGRAM(S): at 11:40

## 2017-09-09 RX ADMIN — Medication 1 MILLIGRAM(S): at 11:40

## 2017-09-09 RX ADMIN — DABIGATRAN ETEXILATE MESYLATE 150 MILLIGRAM(S): 150 CAPSULE ORAL at 17:00

## 2017-09-09 RX ADMIN — Medication 3 MILLIGRAM(S): at 21:26

## 2017-09-09 NOTE — PROGRESS NOTE ADULT - SUBJECTIVE AND OBJECTIVE BOX
No overnight events. Patient feels ok, no dizziness. HR is improved.   Denies pain in the back, no numbness.  slept well last night.   ooking to improve and go home this week.     REVIEW OF SYSTEMS  Constitutional - No fever,  No fatigue  Neurological - No headaches, No memory loss, +loss of strength, No numbness, No tremors  Skin - No rashes, No lesions   Musculoskeletal - No joint pain, No joint swelling, No muscle pain  Psychiatric - No depression, No anxiety    VITALS  T(C): 36.8 (09-08-17 @ 12:00), Max: 36.8 (09-08-17 @ 12:00)  HR: 80 (09-08-17 @ 12:00) (80 - 80)  BP: 102/60 (09-08-17 @ 15:00) (100/60 - 102/60)  RR: 16 (09-08-17 @ 12:00) (16 - 16)  SpO2: 96% (09-08-17 @ 12:00) (96% - 96%)  Wt(kg): --    MEDICATIONS   aspirin  chewable 81 milliGRAM(s) daily  tamsulosin 0.4 milliGRAM(s) at bedtime  dabigatran 150 milliGRAM(s) every 12 hours  ascorbic acid 500 milliGRAM(s) two times a day  multivitamin 1 Tablet(s) daily  BACItracin   Ointment 1 Application(s) two times a day  levothyroxine 50 MICROGram(s) daily  polyethylene glycol 3350 17 Gram(s) daily PRN  ergocalciferol 38204 Unit(s) every week  folic acid 1 milliGRAM(s) daily  melatonin 3 milliGRAM(s) at bedtime  ferrous    sulfate 325 milliGRAM(s) daily  atorvastatin 10 milliGRAM(s) at bedtime  magnesium oxide 400 milliGRAM(s) two times a day with meals  calcium carbonate 500 mG (Tums) Chewable 1 Tablet(s) three times a day PRN  acetaminophen   Tablet. 650 milliGRAM(s) every 6 hours PRN  metoprolol succinate ER 50 milliGRAM(s) daily  docusate sodium 100 milliGRAM(s) daily PRN  nystatin Ointment 1 Application(s) two times a day  pantoprazole    Tablet 40 milliGRAM(s) before breakfast  fludroCORTISONE 0.1 milliGRAM(s) daily  senna 2 Tablet(s) at bedtime  bisacodyl 5 milliGRAM(s) every 12 hours PRN  benzonatate 100 milliGRAM(s) four times a day PRN  midodrine 2.5 milliGRAM(s) three times a day      RECENT LABS/IMAGING  CBC Full  -  ( 08 Sep 2017 07:21 )  WBC Count : 5.9 K/uL  Hemoglobin : 10.3 g/dL  Hematocrit : 33.0 %  Platelet Count - Automated : 263 K/uL  Mean Cell Volume : 94.8 fl  Mean Cell Hemoglobin : 29.6 pg  Mean Cell Hemoglobin Concentration : 31.2 g/dL  Auto Neutrophil # : x  Auto Lymphocyte # : x  Auto Monocyte # : x  Auto Eosinophil # : x  Auto Basophil # : x  Auto Neutrophil % : x  Auto Lymphocyte % : x  Auto Monocyte % : x  Auto Eosinophil % : x  Auto Basophil % : x    09-08    139  |  102  |  11.0  ----------------------------<  95  4.0   |  26.0  |  0.62    Ca    8.4<L>      08 Sep 2017 07:21  Mg     2.0     09-08    ---------  PHYSICAL EXAM  Constitutional - NAD, Comfortable  Pulm - Breathing comfortably, No wheezing  Abd - Soft, NTND  Extremities - No C/C/E, No calf tenderness  Neurologic Exam -                    Cognitive - AAOx3     Communication - Fluent     Sensory - Intact to LT  Psychiatric - Mood WNL, Affect WNL    ASSESSMENT/PLAN  82y Male with functional deficits  CAD - ASA  Orthostasis - Midodrine, Florinef  Pain - Tylenol PRN  Sleep - Melatonin  DVT PPX/AFIB - Pradaxa    Continue 3hrs a day of comprehensive rehab program.

## 2017-09-10 PROCEDURE — 99232 SBSQ HOSP IP/OBS MODERATE 35: CPT

## 2017-09-10 RX ADMIN — FLUDROCORTISONE ACETATE 0.1 MILLIGRAM(S): 0.1 TABLET ORAL at 05:43

## 2017-09-10 RX ADMIN — Medication 325 MILLIGRAM(S): at 11:28

## 2017-09-10 RX ADMIN — MAGNESIUM OXIDE 400 MG ORAL TABLET 400 MILLIGRAM(S): 241.3 TABLET ORAL at 17:48

## 2017-09-10 RX ADMIN — DABIGATRAN ETEXILATE MESYLATE 150 MILLIGRAM(S): 150 CAPSULE ORAL at 05:43

## 2017-09-10 RX ADMIN — Medication 1 APPLICATION(S): at 17:48

## 2017-09-10 RX ADMIN — Medication 500 MILLIGRAM(S): at 05:43

## 2017-09-10 RX ADMIN — NYSTATIN CREAM 1 APPLICATION(S): 100000 CREAM TOPICAL at 05:44

## 2017-09-10 RX ADMIN — TAMSULOSIN HYDROCHLORIDE 0.4 MILLIGRAM(S): 0.4 CAPSULE ORAL at 22:09

## 2017-09-10 RX ADMIN — Medication 3 MILLIGRAM(S): at 22:09

## 2017-09-10 RX ADMIN — Medication 100 MILLIGRAM(S): at 22:09

## 2017-09-10 RX ADMIN — ATORVASTATIN CALCIUM 10 MILLIGRAM(S): 80 TABLET, FILM COATED ORAL at 22:09

## 2017-09-10 RX ADMIN — Medication 81 MILLIGRAM(S): at 11:28

## 2017-09-10 RX ADMIN — Medication 1 APPLICATION(S): at 05:43

## 2017-09-10 RX ADMIN — Medication 50 MICROGRAM(S): at 05:43

## 2017-09-10 RX ADMIN — MAGNESIUM OXIDE 400 MG ORAL TABLET 400 MILLIGRAM(S): 241.3 TABLET ORAL at 07:33

## 2017-09-10 RX ADMIN — SENNA PLUS 2 TABLET(S): 8.6 TABLET ORAL at 22:09

## 2017-09-10 RX ADMIN — MIDODRINE HYDROCHLORIDE 2.5 MILLIGRAM(S): 2.5 TABLET ORAL at 07:07

## 2017-09-10 RX ADMIN — Medication 50 MILLIGRAM(S): at 05:43

## 2017-09-10 RX ADMIN — Medication 500 MILLIGRAM(S): at 17:48

## 2017-09-10 RX ADMIN — Medication 1 TABLET(S): at 22:09

## 2017-09-10 RX ADMIN — MIDODRINE HYDROCHLORIDE 2.5 MILLIGRAM(S): 2.5 TABLET ORAL at 22:09

## 2017-09-10 RX ADMIN — Medication 1 MILLIGRAM(S): at 11:28

## 2017-09-10 RX ADMIN — MIDODRINE HYDROCHLORIDE 2.5 MILLIGRAM(S): 2.5 TABLET ORAL at 13:49

## 2017-09-10 RX ADMIN — PANTOPRAZOLE SODIUM 40 MILLIGRAM(S): 20 TABLET, DELAYED RELEASE ORAL at 05:43

## 2017-09-10 RX ADMIN — Medication 100 MILLIGRAM(S): at 05:43

## 2017-09-10 RX ADMIN — NYSTATIN CREAM 1 APPLICATION(S): 100000 CREAM TOPICAL at 17:48

## 2017-09-10 RX ADMIN — Medication 1 TABLET(S): at 07:07

## 2017-09-10 RX ADMIN — DABIGATRAN ETEXILATE MESYLATE 150 MILLIGRAM(S): 150 CAPSULE ORAL at 17:48

## 2017-09-10 RX ADMIN — Medication 1 TABLET(S): at 11:28

## 2017-09-10 NOTE — PROGRESS NOTE ADULT - SUBJECTIVE AND OBJECTIVE BOX
No overnight events. Slept well. Denies pain.  No numbness. he worked with therapy yesterday and did not have his BP drop.   Feeling overall improved and making progress.     REVIEW OF SYSTEMS  Constitutional - No fever,  No fatigue  Neurological - No headaches, No loss of strength, No numbness, No tremors  Skin - No rashes, No lesions   Musculoskeletal - No joint pain, No joint swelling, No muscle pain  Psychiatric - No depression, No anxiety    VITALS  T(C): 36.6 (09-10-17 @ 04:37), Max: 37 (09-09-17 @ 19:51)  HR: 84 (09-10-17 @ 04:37) (78 - 84)  BP: 148/84 (09-10-17 @ 04:37) (118/68 - 148/84)  RR: 19 (09-10-17 @ 04:37) (18 - 19)  SpO2: 95% (09-10-17 @ 04:37) (95% - 98%)  Wt(kg): --    MEDICATIONS   aspirin  chewable 81 milliGRAM(s) daily  tamsulosin 0.4 milliGRAM(s) at bedtime  dabigatran 150 milliGRAM(s) every 12 hours  ascorbic acid 500 milliGRAM(s) two times a day  multivitamin 1 Tablet(s) daily  BACItracin   Ointment 1 Application(s) two times a day  levothyroxine 50 MICROGram(s) daily  polyethylene glycol 3350 17 Gram(s) daily PRN  ergocalciferol 68440 Unit(s) every week  folic acid 1 milliGRAM(s) daily  melatonin 3 milliGRAM(s) at bedtime  ferrous    sulfate 325 milliGRAM(s) daily  atorvastatin 10 milliGRAM(s) at bedtime  magnesium oxide 400 milliGRAM(s) two times a day with meals  calcium carbonate 500 mG (Tums) Chewable 1 Tablet(s) three times a day PRN  acetaminophen   Tablet. 650 milliGRAM(s) every 6 hours PRN  metoprolol succinate ER 50 milliGRAM(s) daily  docusate sodium 100 milliGRAM(s) daily PRN  nystatin Ointment 1 Application(s) two times a day  pantoprazole    Tablet 40 milliGRAM(s) before breakfast  fludroCORTISONE 0.1 milliGRAM(s) daily  senna 2 Tablet(s) at bedtime  bisacodyl 5 milliGRAM(s) every 12 hours PRN  benzonatate 100 milliGRAM(s) four times a day PRN  midodrine 2.5 milliGRAM(s) three times a day    -------  PHYSICAL EXAM  Constitutional - NAD, Comfortable  Pulm - Breathing comfortably, No wheezing  Abd - Soft, NTND  Extremities - No C/C/E, No calf tenderness  Neurologic Exam -                    Cognitive - AAOx3     Communication - Fluent     Sensory - Intact to LT  Psychiatric - Mood WNL, Affect WNL    ASSESSMENT/PLAN  82y Male with functional deficits  CAD - ASA, Lipitor  HTN - Toprol  Orthostasis - Midodrine, Florinef  Pain - Tylenol PRN  Sleep - Melatonin  DVT PPX/AFIB - Pradaxa    Continue 3hrs a day of comprehensive rehab program.

## 2017-09-11 LAB
ALBUMIN SERPL ELPH-MCNC: 2.5 G/DL — LOW (ref 3.3–5.2)
ALP SERPL-CCNC: 144 U/L — HIGH (ref 40–120)
ALT FLD-CCNC: 10 U/L — SIGNIFICANT CHANGE UP
ANION GAP SERPL CALC-SCNC: 11 MMOL/L — SIGNIFICANT CHANGE UP (ref 5–17)
AST SERPL-CCNC: 15 U/L — SIGNIFICANT CHANGE UP
BILIRUB SERPL-MCNC: 0.6 MG/DL — SIGNIFICANT CHANGE UP (ref 0.4–2)
BUN SERPL-MCNC: 13 MG/DL — SIGNIFICANT CHANGE UP (ref 8–20)
CALCIUM SERPL-MCNC: 8.1 MG/DL — LOW (ref 8.6–10.2)
CHLORIDE SERPL-SCNC: 103 MMOL/L — SIGNIFICANT CHANGE UP (ref 98–107)
CO2 SERPL-SCNC: 27 MMOL/L — SIGNIFICANT CHANGE UP (ref 22–29)
CREAT SERPL-MCNC: 0.77 MG/DL — SIGNIFICANT CHANGE UP (ref 0.5–1.3)
GLUCOSE SERPL-MCNC: 86 MG/DL — SIGNIFICANT CHANGE UP (ref 70–115)
HCT VFR BLD CALC: 31 % — LOW (ref 42–52)
HGB BLD-MCNC: 9.8 G/DL — LOW (ref 14–18)
MCHC RBC-ENTMCNC: 30.2 PG — SIGNIFICANT CHANGE UP (ref 27–31)
MCHC RBC-ENTMCNC: 31.6 G/DL — LOW (ref 32–36)
MCV RBC AUTO: 95.7 FL — HIGH (ref 80–94)
PLATELET # BLD AUTO: 236 K/UL — SIGNIFICANT CHANGE UP (ref 150–400)
POTASSIUM SERPL-MCNC: 3.7 MMOL/L — SIGNIFICANT CHANGE UP (ref 3.5–5.3)
POTASSIUM SERPL-SCNC: 3.7 MMOL/L — SIGNIFICANT CHANGE UP (ref 3.5–5.3)
PREALB SERPL-MCNC: 13 MG/DL — LOW (ref 18–38)
PROT SERPL-MCNC: 5.7 G/DL — LOW (ref 6.6–8.7)
RBC # BLD: 3.24 M/UL — LOW (ref 4.6–6.2)
RBC # FLD: 16.2 % — HIGH (ref 11–15.6)
SODIUM SERPL-SCNC: 141 MMOL/L — SIGNIFICANT CHANGE UP (ref 135–145)
WBC # BLD: 5.1 K/UL — SIGNIFICANT CHANGE UP (ref 4.8–10.8)
WBC # FLD AUTO: 5.1 K/UL — SIGNIFICANT CHANGE UP (ref 4.8–10.8)

## 2017-09-11 PROCEDURE — 99232 SBSQ HOSP IP/OBS MODERATE 35: CPT

## 2017-09-11 RX ADMIN — PANTOPRAZOLE SODIUM 40 MILLIGRAM(S): 20 TABLET, DELAYED RELEASE ORAL at 05:40

## 2017-09-11 RX ADMIN — Medication 1 TABLET(S): at 13:14

## 2017-09-11 RX ADMIN — Medication 500 MILLIGRAM(S): at 05:40

## 2017-09-11 RX ADMIN — Medication 50 MILLIGRAM(S): at 05:40

## 2017-09-11 RX ADMIN — FLUDROCORTISONE ACETATE 0.1 MILLIGRAM(S): 0.1 TABLET ORAL at 05:40

## 2017-09-11 RX ADMIN — Medication 500 MILLIGRAM(S): at 18:14

## 2017-09-11 RX ADMIN — Medication 1 TABLET(S): at 18:14

## 2017-09-11 RX ADMIN — NYSTATIN CREAM 1 APPLICATION(S): 100000 CREAM TOPICAL at 18:13

## 2017-09-11 RX ADMIN — MIDODRINE HYDROCHLORIDE 2.5 MILLIGRAM(S): 2.5 TABLET ORAL at 13:14

## 2017-09-11 RX ADMIN — ATORVASTATIN CALCIUM 10 MILLIGRAM(S): 80 TABLET, FILM COATED ORAL at 21:54

## 2017-09-11 RX ADMIN — Medication 50 MICROGRAM(S): at 05:40

## 2017-09-11 RX ADMIN — MIDODRINE HYDROCHLORIDE 2.5 MILLIGRAM(S): 2.5 TABLET ORAL at 05:40

## 2017-09-11 RX ADMIN — MAGNESIUM OXIDE 400 MG ORAL TABLET 400 MILLIGRAM(S): 241.3 TABLET ORAL at 08:55

## 2017-09-11 RX ADMIN — Medication 1 APPLICATION(S): at 18:13

## 2017-09-11 RX ADMIN — NYSTATIN CREAM 1 APPLICATION(S): 100000 CREAM TOPICAL at 05:41

## 2017-09-11 RX ADMIN — Medication 1 TABLET(S): at 05:40

## 2017-09-11 RX ADMIN — DABIGATRAN ETEXILATE MESYLATE 150 MILLIGRAM(S): 150 CAPSULE ORAL at 05:41

## 2017-09-11 RX ADMIN — MIDODRINE HYDROCHLORIDE 2.5 MILLIGRAM(S): 2.5 TABLET ORAL at 21:56

## 2017-09-11 RX ADMIN — Medication 3 MILLIGRAM(S): at 21:54

## 2017-09-11 RX ADMIN — Medication 1 MILLIGRAM(S): at 13:14

## 2017-09-11 RX ADMIN — Medication 1 APPLICATION(S): at 05:40

## 2017-09-11 RX ADMIN — Medication 100 MILLIGRAM(S): at 18:14

## 2017-09-11 RX ADMIN — SENNA PLUS 2 TABLET(S): 8.6 TABLET ORAL at 21:54

## 2017-09-11 RX ADMIN — Medication 325 MILLIGRAM(S): at 13:14

## 2017-09-11 RX ADMIN — Medication 81 MILLIGRAM(S): at 13:14

## 2017-09-11 RX ADMIN — MAGNESIUM OXIDE 400 MG ORAL TABLET 400 MILLIGRAM(S): 241.3 TABLET ORAL at 18:14

## 2017-09-11 RX ADMIN — DABIGATRAN ETEXILATE MESYLATE 150 MILLIGRAM(S): 150 CAPSULE ORAL at 18:14

## 2017-09-11 RX ADMIN — TAMSULOSIN HYDROCHLORIDE 0.4 MILLIGRAM(S): 0.4 CAPSULE ORAL at 21:54

## 2017-09-11 RX ADMIN — Medication 100 MILLIGRAM(S): at 05:40

## 2017-09-11 NOTE — PROGRESS NOTE ADULT - SUBJECTIVE AND OBJECTIVE BOX
Subjective and Objective:   HISTORY OF PRESENT ILLNESS:  Pt is an 82M with PMHx significant for spinal stenosis s/p multiple spinal surgeries and spinal cord stimulator admitted to [facility] on 8/1 for scheduled management of worsening LE pain and neuropathic pain radiating both feet and pain to back, buttocks and thighs s/p exploration of the prior spinal fusion, removal of hardware, removal of spinal stimulator, T11-L1 laminectomy, T4-pelvis spinal fusion with osteotomies on 8/1 with Dr. Titus. Hospital course notable for  intraoperative blood loss requiring 2 unit PRBC and persistent acute blood loss anemia,  post op hypotension requiring  pressors, rapid Afib w/ RVR with acute respiratory failure secondary to acute pulmonary edema and bilateral pleural effusions which improved and now off lasix per cardiology, Echo showed EF 65%, encephalopathy for which HCT (8/6 and 8/14)  negative for acute pathology but showed chronic infarcts.  SLP followed for mild-moderate dyspahgia on puree with honey liquids diet. On 8/14 had 37 beats of Vtach, and persistent leukocytosis. Deemed stable for discharge to acute rehab on 8/15.    No overnight events. Slept well. Denies pain, numbness or weakness.  +BM yesterday.  Denies dysuria, persistent urinary incontinence at night as per pre-admission    REVIEW OF SYSTEMS  Constitutional - No fever,  No fatigue  Neurological - No headaches, No loss of strength, No numbness, No tremors  Skin - No rashes, No lesions   Musculoskeletal - No joint pain, No joint swelling, No muscle pain  Psychiatric - No depression, No anxiety    Vital Signs Last 24 Hrs  T(C): 36.4 (11 Sep 2017 05:48), Max: 36.8 (10 Sep 2017 19:50)  T(F): 97.5 (11 Sep 2017 05:48), Max: 98.3 (10 Sep 2017 19:50)  HR: 88 (11 Sep 2017 05:48) (82 - 88)  BP: 128/65 (11 Sep 2017 05:48) (109/67 - 128/65)  BP(mean): --  RR: 18 (11 Sep 2017 05:48) (18 - 18)  SpO2: 96% (11 Sep 2017 05:48) (96% - 96%)    MEDICATIONS   aspirin  chewable 81 milliGRAM(s) daily  tamsulosin 0.4 milliGRAM(s) at bedtime  dabigatran 150 milliGRAM(s) every 12 hours  ascorbic acid 500 milliGRAM(s) two times a day  multivitamin 1 Tablet(s) daily  BACItracin   Ointment 1 Application(s) two times a day  levothyroxine 50 MICROGram(s) daily  polyethylene glycol 3350 17 Gram(s) daily PRN  ergocalciferol 61071 Unit(s) every week  folic acid 1 milliGRAM(s) daily  melatonin 3 milliGRAM(s) at bedtime  ferrous    sulfate 325 milliGRAM(s) daily  atorvastatin 10 milliGRAM(s) at bedtime  magnesium oxide 400 milliGRAM(s) two times a day with meals  calcium carbonate 500 mG (Tums) Chewable 1 Tablet(s) three times a day PRN  acetaminophen   Tablet. 650 milliGRAM(s) every 6 hours PRN  metoprolol succinate ER 50 milliGRAM(s) daily  docusate sodium 100 milliGRAM(s) daily PRN  nystatin Ointment 1 Application(s) two times a day  pantoprazole    Tablet 40 milliGRAM(s) before breakfast  fludroCORTISONE 0.1 milliGRAM(s) daily  senna 2 Tablet(s) at bedtime  bisacodyl 5 milliGRAM(s) every 12 hours PRN  benzonatate 100 milliGRAM(s) four times a day PRN  midodrine 2.5 milliGRAM(s) three times a day    -------  PHYSICAL EXAM  Constitutional - NAD, Comfortable  Pulm - CTAB  Abd - Soft, NTND  Extremities - No C/C/E, No calf tenderness  Back-Incision CDI with no erythema or edema noted  Neurologic Exam -                    Cognitive - AAOx3     Communication - Fluent     Sensory - Intact to LT  Psychiatric - Mood WNL, Affect WNL    FUNCTIONAL PROGRESS:  Gait: 50' min A RW  ADLs: Bathing mod A, UE dress sup, LE dress min A  Transfers: Min A  Bed mobility: Min A    ASSESSMENT/PLAN  Assessment:  Pt is an 83y/o male with spinal stenosis s/p T11-L1 laminectomy, T4-pelvis spinal fusion    Comprehensive rehab: PT/OT/ST  Spinal stenosis-Spinal precautions.  Pain-Tylenol prn  H/O acute respiratory distress- improved; pt oxygenating well on RA with less SOB on exertion; CXR  neg and ordered duonebs q6hrs x 3 days.  Appreciate hospitalist consult  Metabolic [id number]-Dgqvpaao-Sjjp ST.  Caution with excessive pain meds or anxiolytics, fall precautions  Dysphagia-Resolved.  Added Estim  S/P MBS 8/28:  Advanced diet to regular from previous puree and honey-thickened liquid diet.  Cont ST.  Atrial fibrillation-HR controlled.  Cont Toprol XL daily with holding parameters and Pradaxa.  EKG due to c/o "heart fluttering"  Appreciate cardiology consult; nuclear stress test performed 8/23; neg  Metoprolol increased- hold for SBP <90; keep Mg > 2 and K > 4.  monitor BP  Constipation- bowel regimen.   HTN-SBPs was low at   Orthostatic hypotension; IVF boluses given  Cont abdominal binder and TEDs when out of bed  Midodrine added-increased to 10mg tid and Remeron discontinued by hospitalist, cardiology follow up appreciated.  Added Florinef 8/30 as per cardiology.  Discontinued Tramadol due to pain improved and possible contribution to light-headedness and resumed tylenol prn for pain management.  Cortisol levels WNL.  Midodrine resumed due to persistent hypotension.   Continue Florinef for now  Hyperlipidemia -   Added lipitor 10mg.    Elevated TSH 5.28-Added Synthroid 50 daily  Elevated LFTs-Discontinued tylenol.  AST/ALT improved; now WNL.  Resumed tylenol prn for pain.  F/U CMP with improved LFTs  Acute blood loss anemia-H/H stable.  Cont Folic acid and Fe.    Leukocytosis:  Resolved.  WBC improved, UA negative, patient afebrile.   Vitamin D deficiency-Low normal Vit D at 31.  Added Vit D 50,000U weekly  Bowel regimen-Changed Colace to prn due to episode of bowel incontinence  Urinary incontinence-Urine culture 8/25 <10,000cfu/ml GNR    F/U with PMD Dr. Lares and cardiology Dr. Ny as outpt.

## 2017-09-11 NOTE — PROGRESS NOTE ADULT - ASSESSMENT
82M with PMHx significant for spinal stenosis s/p multiple spinal surgeries and spinal cord stimulator admitted to [facility] on 8/1 for scheduled management of worsening LE pain and neuropathic pain radiating both feet and pain to back, buttocks and thighs s/p exploration of the prior spinal fusion, removal of hardware, removal of spinal stimulator, T11-L1 laminectomy, T4-pelvis spinal fusion with osteotomies on 8/1 with Dr. Titus. Hospital course notable for  intraoperative blood loss requiring 2 unit PRBC and persistent acute blood loss anemia,  post op hypotension requiring  pressors, rapid Afib w/ RVR with acute respiratory failure secondary to acute pulmonary edema and bilateral pleural effusions which improved and now off lasix per cardiology, Echo showed EF 65%, encephalopathy for which HCT (8/6 and 8/14)  negative for acute pathology but showed chronic infarcts. On 8/14, as per RN note,  had 37 beats of  Vtach,  Ortho team was notified, [facility] was not informed and had signed off on 8/12.  Patient was discharged to acute rehab on 8/15.    Problem/Recommendation - 1:  Problem: Hypotension - Orthostatic: deconditioned, venous pooling, poor nutrition .  Continue Midodrine 2.5mg PO TID.  ct florinef for now, ct to encourage good po intake and activity    Problem/Recommendation - 2:  ·  Problem: Afib/ Nonsustained ventricular tachycardia/CAD.  stable on current meds      Problem/Recommendation - 3:  ·  Problem: Spinal stenosis of lumbosacral region.  Recommendation: s/p exploration of the prior spinal fusion, removal of hardware, removal of spinal stimulator, T11-L1 laminectomy, T4-pelvis spinal fusion with osteotomies on 8/1 with Dr. Titus with complicated post operative course  Rehab program.       Problem/Recommendation - 4:  - Problem: Constipation.  Recommendation: Bowel regimen.

## 2017-09-11 NOTE — PROGRESS NOTE ADULT - SUBJECTIVE AND OBJECTIVE BOX
CC: F/u Orthostatic hypotension    HPI: 82M with PMHx significant for spinal stenosis s/p multiple spinal surgeries and spinal cord stimulator admitted to [facility] on 8/1 for scheduled management of worsening LE pain and neuropathic pain radiating both feet and pain to back, buttocks and thighs s/p exploration of the prior spinal fusion, removal of hardware, removal of spinal stimulator, T11-L1 laminectomy, T4-pelvis spinal fusion with osteotomies on 8/1 with Dr. Titus. Hospital course notable for  intraoperative blood loss requiring 2 unit PRBC and persistent acute blood loss anemia,  post op hypotension requiring  pressors, rapid Afib w/ RVR with acute respiratory failure secondary to acute pulmonary edema and bilateral pleural effusions which improved and now off lasix per cardiology, Echo showed EF 65%, encephalopathy for which HCT (8/6 and 8/14)  negative for acute pathology but showed chronic infarcts. On 8/14, as per RN note,  had 37 beats of  Vtach,  Ortho team was notified, [facility] was not informed and had signed off on 8/12.  Patient was discharged to acute rehab on 8/15.    INTERVAL HPI/OVERNIGHT EVENTS: Patient seen and examined sitting in the wheelchair.  Patient denies any headache, dizziness, SOB, CP, abdominal pain, nausea, vomiting, dysuria.  Other ROS reviewed and are negative.    Vital Signs Last 24 Hrs  T(C): 36.4 (11 Sep 2017 05:48), Max: 36.8 (10 Sep 2017 19:50)  T(F): 97.5 (11 Sep 2017 05:48), Max: 98.3 (10 Sep 2017 19:50)  HR: 88 (11 Sep 2017 05:48) (85 - 88)  BP: 128/65 (11 Sep 2017 05:48) (118/68 - 128/65)  BP(mean): --  RR: 18 (11 Sep 2017 05:48) (18 - 18)  SpO2: 96% (11 Sep 2017 05:48) (96% - 96%)  I&O's Detail    10 Sep 2017 07:01  -  11 Sep 2017 07:00  --------------------------------------------------------  IN:  Total IN: 0 mL    OUT:    Voided: 600 mL  Total OUT: 600 mL    Total NET: -600 mL        PHYSICAL EXAM:  GENERAL: NAD, well-groomed, well-developed  NERVOUS SYSTEM:  Alert & Oriented X3, Good concentration; Motor Strength 5/5 B/L upper and lower extremities  CHEST/LUNG: Clear to auscultation bilaterally; No rales, rhonchi, wheezing, or rubs  HEART: Regular rate and rhythm; No murmurs, rubs, or gallops  ABDOMEN: Soft, Nontender, Nondistended; Bowel sounds present  EXTREMITIES:  2+ Peripheral Pulses, No clubbing, cyanosis, or edema                                9.8    5.1   )-----------( 236      ( 11 Sep 2017 07:50 )             31.0     11 Sep 2017 07:50    141    |  103    |  13.0   ----------------------------<  86     3.7     |  27.0   |  0.77     Ca    8.1        11 Sep 2017 07:50    TPro  5.7    /  Alb  2.5    /  TBili  0.6    /  DBili  x      /  AST  15     /  ALT  10     /  AlkPhos  144    11 Sep 2017 07:50        LIVER FUNCTIONS - ( 11 Sep 2017 07:50 )  Alb: 2.5 g/dL / Pro: 5.7 g/dL / ALK PHOS: 144 U/L / ALT: 10 U/L / AST: 15 U/L / GGT: x               MEDICATIONS  (STANDING):  aspirin  chewable 81 milliGRAM(s) Oral daily  tamsulosin 0.4 milliGRAM(s) Oral at bedtime  dabigatran 150 milliGRAM(s) Oral every 12 hours  ascorbic acid 500 milliGRAM(s) Oral two times a day  multivitamin 1 Tablet(s) Oral daily  BACItracin   Ointment 1 Application(s) Topical two times a day  levothyroxine 50 MICROGram(s) Oral daily  ergocalciferol 10950 Unit(s) Oral every week  folic acid 1 milliGRAM(s) Oral daily  melatonin 3 milliGRAM(s) Oral at bedtime  ferrous    sulfate 325 milliGRAM(s) Oral daily  atorvastatin 10 milliGRAM(s) Oral at bedtime  magnesium oxide 400 milliGRAM(s) Oral two times a day with meals  metoprolol succinate ER 50 milliGRAM(s) Oral daily  nystatin Ointment 1 Application(s) Topical two times a day  pantoprazole    Tablet 40 milliGRAM(s) Oral before breakfast  fludroCORTISONE 0.1 milliGRAM(s) Oral daily  senna 2 Tablet(s) Oral at bedtime  midodrine 2.5 milliGRAM(s) Oral three times a day    MEDICATIONS  (PRN):  polyethylene glycol 3350 17 Gram(s) Oral daily PRN Constipation  calcium carbonate 500 mG (Tums) Chewable 1 Tablet(s) Chew three times a day PRN Upset Stomach  acetaminophen   Tablet. 650 milliGRAM(s) Oral every 6 hours PRN Severe Pain (7 - 10)  docusate sodium 100 milliGRAM(s) Oral daily PRN Constipation  bisacodyl 5 milliGRAM(s) Oral every 12 hours PRN Constipation  benzonatate 100 milliGRAM(s) Oral four times a day PRN Cough

## 2017-09-12 PROCEDURE — 99233 SBSQ HOSP IP/OBS HIGH 50: CPT

## 2017-09-12 PROCEDURE — 99232 SBSQ HOSP IP/OBS MODERATE 35: CPT

## 2017-09-12 RX ADMIN — Medication 1 TABLET(S): at 13:05

## 2017-09-12 RX ADMIN — NYSTATIN CREAM 1 APPLICATION(S): 100000 CREAM TOPICAL at 05:32

## 2017-09-12 RX ADMIN — TAMSULOSIN HYDROCHLORIDE 0.4 MILLIGRAM(S): 0.4 CAPSULE ORAL at 21:25

## 2017-09-12 RX ADMIN — MAGNESIUM OXIDE 400 MG ORAL TABLET 400 MILLIGRAM(S): 241.3 TABLET ORAL at 17:17

## 2017-09-12 RX ADMIN — ATORVASTATIN CALCIUM 10 MILLIGRAM(S): 80 TABLET, FILM COATED ORAL at 21:25

## 2017-09-12 RX ADMIN — Medication 500 MILLIGRAM(S): at 17:17

## 2017-09-12 RX ADMIN — MAGNESIUM OXIDE 400 MG ORAL TABLET 400 MILLIGRAM(S): 241.3 TABLET ORAL at 08:48

## 2017-09-12 RX ADMIN — Medication 325 MILLIGRAM(S): at 13:04

## 2017-09-12 RX ADMIN — Medication 3 MILLIGRAM(S): at 21:25

## 2017-09-12 RX ADMIN — Medication 50 MILLIGRAM(S): at 05:32

## 2017-09-12 RX ADMIN — MIDODRINE HYDROCHLORIDE 2.5 MILLIGRAM(S): 2.5 TABLET ORAL at 13:05

## 2017-09-12 RX ADMIN — Medication 1 APPLICATION(S): at 05:33

## 2017-09-12 RX ADMIN — PANTOPRAZOLE SODIUM 40 MILLIGRAM(S): 20 TABLET, DELAYED RELEASE ORAL at 05:33

## 2017-09-12 RX ADMIN — MIDODRINE HYDROCHLORIDE 2.5 MILLIGRAM(S): 2.5 TABLET ORAL at 05:33

## 2017-09-12 RX ADMIN — NYSTATIN CREAM 1 APPLICATION(S): 100000 CREAM TOPICAL at 17:17

## 2017-09-12 RX ADMIN — Medication 50 MICROGRAM(S): at 05:32

## 2017-09-12 RX ADMIN — MIDODRINE HYDROCHLORIDE 2.5 MILLIGRAM(S): 2.5 TABLET ORAL at 21:25

## 2017-09-12 RX ADMIN — DABIGATRAN ETEXILATE MESYLATE 150 MILLIGRAM(S): 150 CAPSULE ORAL at 05:32

## 2017-09-12 RX ADMIN — FLUDROCORTISONE ACETATE 0.1 MILLIGRAM(S): 0.1 TABLET ORAL at 05:33

## 2017-09-12 RX ADMIN — Medication 1 APPLICATION(S): at 17:17

## 2017-09-12 RX ADMIN — DABIGATRAN ETEXILATE MESYLATE 150 MILLIGRAM(S): 150 CAPSULE ORAL at 17:17

## 2017-09-12 RX ADMIN — Medication 500 MILLIGRAM(S): at 05:32

## 2017-09-12 RX ADMIN — Medication 81 MILLIGRAM(S): at 13:04

## 2017-09-12 RX ADMIN — Medication 1 MILLIGRAM(S): at 13:04

## 2017-09-12 NOTE — PROGRESS NOTE ADULT - SUBJECTIVE AND OBJECTIVE BOX
Subjective and Objective:   HISTORY OF PRESENT ILLNESS:  Pt is an 82M with PMHx significant for spinal stenosis s/p multiple spinal surgeries and spinal cord stimulator admitted to [facility] on 8/1 for scheduled management of worsening LE pain and neuropathic pain radiating both feet and pain to back, buttocks and thighs s/p exploration of the prior spinal fusion, removal of hardware, removal of spinal stimulator, T11-L1 laminectomy, T4-pelvis spinal fusion with osteotomies on 8/1 with Dr. Titus. Hospital course notable for  intraoperative blood loss requiring 2 unit PRBC and persistent acute blood loss anemia,  post op hypotension requiring  pressors, rapid Afib w/ RVR with acute respiratory failure secondary to acute pulmonary edema and bilateral pleural effusions which improved and now off lasix per cardiology, Echo showed EF 65%, encephalopathy for which HCT (8/6 and 8/14)  negative for acute pathology but showed chronic infarcts.  SLP followed for mild-moderate dyspahgia on puree with honey liquids diet. On 8/14 had 37 beats of Vtach, and persistent leukocytosis. Deemed stable for discharge to acute rehab on 8/15.    No overnight events. Slept well. Denies pain, numbness or weakness.  +BM.  Denies dysuria, persistent urinary incontinence at night as per pre-admission    REVIEW OF SYSTEMS  Constitutional - No fever,  No fatigue  Neurological - No headaches, No loss of strength, No numbness, No tremors  Skin - No rashes, No lesions   Musculoskeletal - No joint pain, No joint swelling, No muscle pain  Psychiatric - No depression, No anxiety    Vital Signs Last 24 Hrs  T(C): 36.4 (12 Sep 2017 05:02), Max: 36.7 (11 Sep 2017 13:42)  T(F): 97.6 (12 Sep 2017 05:02), Max: 98.1 (11 Sep 2017 13:42)  HR: 70 (12 Sep 2017 05:02) (70 - 82)  BP: 120/68 (12 Sep 2017 05:02) (120/68 - 126/62)  BP(mean): --  RR: 16 (12 Sep 2017 05:02) (16 - 20)  SpO2: 97% (12 Sep 2017 05:02) (97% - 99%)    MEDICATIONS   aspirin  chewable 81 milliGRAM(s) daily  tamsulosin 0.4 milliGRAM(s) at bedtime  dabigatran 150 milliGRAM(s) every 12 hours  ascorbic acid 500 milliGRAM(s) two times a day  multivitamin 1 Tablet(s) daily  BACItracin   Ointment 1 Application(s) two times a day  levothyroxine 50 MICROGram(s) daily  polyethylene glycol 3350 17 Gram(s) daily PRN  ergocalciferol 57128 Unit(s) every week  folic acid 1 milliGRAM(s) daily  melatonin 3 milliGRAM(s) at bedtime  ferrous    sulfate 325 milliGRAM(s) daily  atorvastatin 10 milliGRAM(s) at bedtime  magnesium oxide 400 milliGRAM(s) two times a day with meals  calcium carbonate 500 mG (Tums) Chewable 1 Tablet(s) three times a day PRN  acetaminophen   Tablet. 650 milliGRAM(s) every 6 hours PRN  metoprolol succinate ER 50 milliGRAM(s) daily  docusate sodium 100 milliGRAM(s) daily PRN  nystatin Ointment 1 Application(s) two times a day  pantoprazole    Tablet 40 milliGRAM(s) before breakfast  fludroCORTISONE 0.1 milliGRAM(s) daily  senna 2 Tablet(s) at bedtime  bisacodyl 5 milliGRAM(s) every 12 hours PRN  benzonatate 100 milliGRAM(s) four times a day PRN  midodrine 2.5 milliGRAM(s) three times a day    -------  PHYSICAL EXAM  Constitutional - NAD, Comfortable  Pulm - CTAB  Abd - Soft, NTND  Extremities - No C/C/E, No calf tenderness  Back-Incision CDI with no erythema or edema noted  Neurologic Exam -                    Cognitive - AAOx3     Communication - Fluent     Sensory - Intact to LT  Psychiatric - Mood WNL, Affect WNL    FUNCTIONAL PROGRESS:  Gait: 40-50' min A RW  ADLs: Bathing mod A, UE dress sup, LE dress min A  Transfers: Supervision arms length  Bed mobility: Min A    ASSESSMENT/PLAN  Assessment:  Pt is an 81y/o male with spinal stenosis s/p T11-L1 laminectomy, T4-pelvis spinal fusion    Comprehensive rehab: PT/OT/ST  Spinal stenosis-Spinal precautions.  Pain-Tylenol prn  H/O acute respiratory distress- improved; pt oxygenating well on RA with less SOB on exertion; CXR  neg and ordered duonebs q6hrs x 3 days.  Appreciate hospitalist consult  Metabolic [id number]-Mfjgfijm-Cwbf ST.  Caution with excessive pain meds or anxiolytics, fall precautions  Dysphagia-Resolved.  S/P MBS 8/28:  Advanced diet to regular from previous puree and honey-thickened liquid diet.  ST discontinued 9/1-cognition noted to be at premorbid level.  Atrial fibrillation-HR controlled.  Cont Toprol XL daily with holding parameters and Pradaxa.  EKG due to c/o "heart fluttering"  Appreciate cardiology consult; nuclear stress test performed 8/23; neg  Metoprolol increased- hold for SBP <90; keep Mg > 2 and K > 4.  monitor BP  Constipation- bowel regimen.   HTN-SBPs was low at   Orthostatic hypotension; IVF boluses given  Cont abdominal binder and TEDs when out of bed  Midodrine added-increased to 10mg tid and Remeron discontinued by hospitalist, cardiology follow up appreciated.  Added Florinef 8/30 as per cardiology.  Discontinued Tramadol due to pain improved and possible contribution to light-headedness and resumed tylenol prn for pain management.  Cortisol levels WNL.  Midodrine resumed due to persistent hypotension.   Continue Florinef for now  Hyperlipidemia -   Added lipitor 10mg.    Elevated TSH 5.28-Added Synthroid 50 daily  Elevated LFTs-Discontinued tylenol.  AST/ALT improved; now WNL.  Resumed tylenol prn for pain.  F/U CMP with improved LFTs  Acute blood loss anemia-H/H stable.  Cont Folic acid and Fe.    Leukocytosis:  Resolved.  WBC improved, UA negative, patient afebrile.   Vitamin D deficiency-Low normal Vit D at 31.  Added Vit D 50,000U weekly  Bowel regimen-Changed Colace to prn due to episode of bowel incontinence  Urinary incontinence-Urine culture 8/25 <10,000cfu/ml GNR    F/U with PMD Dr. Lares and cardiology Dr. Ny as outpt.

## 2017-09-12 NOTE — PROGRESS NOTE ADULT - ASSESSMENT
82M with PMHx significant for spinal stenosis s/p multiple spinal surgeries and spinal cord stimulator admitted to [facility] on 8/1 for scheduled management of worsening LE pain and neuropathic pain radiating both feet and pain to back, buttocks and thighs s/p exploration of the prior spinal fusion, removal of hardware, removal of spinal stimulator, T11-L1 laminectomy, T4-pelvis spinal fusion with osteotomies on 8/1 with Dr. Titus. Hospital course notable for  intraoperative blood loss requiring 2 unit PRBC and persistent acute blood loss anemia,  post op hypotension requiring  pressors, rapid Afib w/ RVR with acute respiratory failure secondary to acute pulmonary edema and bilateral pleural effusions which improved and now off lasix per cardiology, Echo showed EF 65%, encephalopathy for which HCT (8/6 and 8/14)  negative for acute pathology but showed chronic infarcts. On 8/14, as per RN note,  had 37 beats of  Vtach,  Ortho team was notified, [facility] was not informed and had signed off on 8/12.  Patient was discharged to acute rehab on 8/15.    Problem/Recommendation - 1:  Problem: Hypotension - Orthostatic: deconditioned, venous pooling, poor nutrition .  Continue Midodrine 2.5mg PO TID.  ct florinef for now, ct to encourage good po intake and activity    Problem/Recommendation - 2:  ·  Problem: Afib/ Nonsustained ventricular tachycardia/CAD.  stable on current meds      Problem/Recommendation - 3:  ·  Problem: Spinal stenosis of lumbosacral region.  Recommendation: s/p exploration of the prior spinal fusion, removal of hardware, removal of spinal stimulator, T11-L1 laminectomy, T4-pelvis spinal fusion with osteotomies on 8/1 with Dr. Titus with complicated post operative course  Rehab program.       Problem/Recommendation - 4:  - Problem: Constipation.  Recommendation: Bowel regimen.

## 2017-09-12 NOTE — PROGRESS NOTE ADULT - SUBJECTIVE AND OBJECTIVE BOX
CC: F/u Orthostatic hypotension    HPI: 82M with PMHx significant for spinal stenosis s/p multiple spinal surgeries and spinal cord stimulator admitted to [facility] on 8/1 for scheduled management of worsening LE pain and neuropathic pain radiating both feet and pain to back, buttocks and thighs s/p exploration of the prior spinal fusion, removal of hardware, removal of spinal stimulator, T11-L1 laminectomy, T4-pelvis spinal fusion with osteotomies on 8/1 with Dr. Titus. Hospital course notable for  intraoperative blood loss requiring 2 unit PRBC and persistent acute blood loss anemia,  post op hypotension requiring  pressors, rapid Afib w/ RVR with acute respiratory failure secondary to acute pulmonary edema and bilateral pleural effusions which improved and now off lasix per cardiology, Echo showed EF 65%, encephalopathy for which HCT (8/6 and 8/14)  negative for acute pathology but showed chronic infarcts. On 8/14, as per RN note,  had 37 beats of  Vtach,  Ortho team was notified, House Cardiology was not informed and had signed off on 8/12.  Patient was discharged to acute rehab on 8/15.    INTERVAL HPI/OVERNIGHT EVENTS: Patient seen and examined sitting in the wheelchair, just finished therapy.  Patient denies any headache, dizziness, SOB, CP, abdominal pain, nausea, vomiting.  Per therapist in the room, patient had episode yesterday of hypotension systolic in the 80s during therapy.  No further episodes.  Other ROS reviewed and are negative.    Vital Signs Last 24 Hrs  T(C): 36.4 (12 Sep 2017 05:02), Max: 36.7 (11 Sep 2017 13:42)  T(F): 97.6 (12 Sep 2017 05:02), Max: 98.1 (11 Sep 2017 13:42)  HR: 70 (12 Sep 2017 05:02) (70 - 82)  BP: 120/68 (12 Sep 2017 05:02) (120/68 - 126/62)  BP(mean): --  RR: 16 (12 Sep 2017 05:02) (16 - 20)  SpO2: 97% (12 Sep 2017 05:02) (97% - 99%)  I&O's Detail    11 Sep 2017 07:01  -  12 Sep 2017 07:00  --------------------------------------------------------  IN:  Total IN: 0 mL    OUT:    Voided: 800 mL  Total OUT: 800 mL    Total NET: -800 mL      PHYSICAL EXAM:  GENERAL: NAD, well-groomed, well-developed  NERVOUS SYSTEM:  Alert & Oriented X3, Good concentration; Motor Strength 5/5 B/L upper and lower extremities  CHEST/LUNG: Clear to auscultation bilaterally; No rales, rhonchi, wheezing, or rubs  HEART: Regular rate and rhythm; No murmurs, rubs, or gallops  ABDOMEN: Soft, Nontender, Nondistended; Bowel sounds present  EXTREMITIES:  2+ Peripheral Pulses, No clubbing, cyanosis, or edema                          9.8    5.1   )-----------( 236      ( 11 Sep 2017 07:50 )             31.0     11 Sep 2017 07:50    141    |  103    |  13.0   ----------------------------<  86     3.7     |  27.0   |  0.77     Ca    8.1        11 Sep 2017 07:50    TPro  5.7    /  Alb  2.5    /  TBili  0.6    /  DBili  x      /  AST  15     /  ALT  10     /  AlkPhos  144    11 Sep 2017 07:50        LIVER FUNCTIONS - ( 11 Sep 2017 07:50 )  Alb: 2.5 g/dL / Pro: 5.7 g/dL / ALK PHOS: 144 U/L / ALT: 10 U/L / AST: 15 U/L / GGT: x               MEDICATIONS  (STANDING):  aspirin  chewable 81 milliGRAM(s) Oral daily  tamsulosin 0.4 milliGRAM(s) Oral at bedtime  dabigatran 150 milliGRAM(s) Oral every 12 hours  ascorbic acid 500 milliGRAM(s) Oral two times a day  multivitamin 1 Tablet(s) Oral daily  BACItracin   Ointment 1 Application(s) Topical two times a day  levothyroxine 50 MICROGram(s) Oral daily  ergocalciferol 84708 Unit(s) Oral every week  folic acid 1 milliGRAM(s) Oral daily  melatonin 3 milliGRAM(s) Oral at bedtime  ferrous    sulfate 325 milliGRAM(s) Oral daily  atorvastatin 10 milliGRAM(s) Oral at bedtime  magnesium oxide 400 milliGRAM(s) Oral two times a day with meals  metoprolol succinate ER 50 milliGRAM(s) Oral daily  nystatin Ointment 1 Application(s) Topical two times a day  pantoprazole    Tablet 40 milliGRAM(s) Oral before breakfast  fludroCORTISONE 0.1 milliGRAM(s) Oral daily  senna 2 Tablet(s) Oral at bedtime  midodrine 2.5 milliGRAM(s) Oral three times a day    MEDICATIONS  (PRN):  polyethylene glycol 3350 17 Gram(s) Oral daily PRN Constipation  calcium carbonate 500 mG (Tums) Chewable 1 Tablet(s) Chew three times a day PRN Upset Stomach  acetaminophen   Tablet. 650 milliGRAM(s) Oral every 6 hours PRN Severe Pain (7 - 10)  docusate sodium 100 milliGRAM(s) Oral daily PRN Constipation  bisacodyl 5 milliGRAM(s) Oral every 12 hours PRN Constipation  benzonatate 100 milliGRAM(s) Oral four times a day PRN Cough

## 2017-09-13 PROCEDURE — 99232 SBSQ HOSP IP/OBS MODERATE 35: CPT

## 2017-09-13 RX ORDER — FLUDROCORTISONE ACETATE 0.1 MG/1
0.1 TABLET ORAL DAILY
Qty: 0 | Refills: 0 | Status: DISCONTINUED | OUTPATIENT
Start: 2017-09-13 | End: 2017-09-15

## 2017-09-13 RX ORDER — ASCORBIC ACID 60 MG
500 TABLET,CHEWABLE ORAL DAILY
Qty: 0 | Refills: 0 | Status: DISCONTINUED | OUTPATIENT
Start: 2017-09-13 | End: 2017-09-15

## 2017-09-13 RX ADMIN — SENNA PLUS 2 TABLET(S): 8.6 TABLET ORAL at 21:48

## 2017-09-13 RX ADMIN — Medication 50 MILLIGRAM(S): at 05:55

## 2017-09-13 RX ADMIN — Medication 1 APPLICATION(S): at 05:56

## 2017-09-13 RX ADMIN — Medication 81 MILLIGRAM(S): at 11:58

## 2017-09-13 RX ADMIN — PANTOPRAZOLE SODIUM 40 MILLIGRAM(S): 20 TABLET, DELAYED RELEASE ORAL at 05:55

## 2017-09-13 RX ADMIN — MAGNESIUM OXIDE 400 MG ORAL TABLET 400 MILLIGRAM(S): 241.3 TABLET ORAL at 08:36

## 2017-09-13 RX ADMIN — NYSTATIN CREAM 1 APPLICATION(S): 100000 CREAM TOPICAL at 17:10

## 2017-09-13 RX ADMIN — MIDODRINE HYDROCHLORIDE 2.5 MILLIGRAM(S): 2.5 TABLET ORAL at 21:49

## 2017-09-13 RX ADMIN — TAMSULOSIN HYDROCHLORIDE 0.4 MILLIGRAM(S): 0.4 CAPSULE ORAL at 21:48

## 2017-09-13 RX ADMIN — Medication 500 MILLIGRAM(S): at 11:58

## 2017-09-13 RX ADMIN — MIDODRINE HYDROCHLORIDE 2.5 MILLIGRAM(S): 2.5 TABLET ORAL at 13:02

## 2017-09-13 RX ADMIN — Medication 3 MILLIGRAM(S): at 21:48

## 2017-09-13 RX ADMIN — FLUDROCORTISONE ACETATE 0.1 MILLIGRAM(S): 0.1 TABLET ORAL at 17:41

## 2017-09-13 RX ADMIN — NYSTATIN CREAM 1 APPLICATION(S): 100000 CREAM TOPICAL at 05:56

## 2017-09-13 RX ADMIN — Medication 50 MICROGRAM(S): at 05:55

## 2017-09-13 RX ADMIN — DABIGATRAN ETEXILATE MESYLATE 150 MILLIGRAM(S): 150 CAPSULE ORAL at 17:09

## 2017-09-13 RX ADMIN — Medication 1 APPLICATION(S): at 17:09

## 2017-09-13 RX ADMIN — DABIGATRAN ETEXILATE MESYLATE 150 MILLIGRAM(S): 150 CAPSULE ORAL at 05:55

## 2017-09-13 RX ADMIN — FLUDROCORTISONE ACETATE 0.1 MILLIGRAM(S): 0.1 TABLET ORAL at 05:56

## 2017-09-13 RX ADMIN — Medication 1 MILLIGRAM(S): at 11:54

## 2017-09-13 RX ADMIN — MAGNESIUM OXIDE 400 MG ORAL TABLET 400 MILLIGRAM(S): 241.3 TABLET ORAL at 17:10

## 2017-09-13 RX ADMIN — Medication 325 MILLIGRAM(S): at 11:54

## 2017-09-13 RX ADMIN — MIDODRINE HYDROCHLORIDE 2.5 MILLIGRAM(S): 2.5 TABLET ORAL at 05:55

## 2017-09-13 RX ADMIN — Medication 500 MILLIGRAM(S): at 05:55

## 2017-09-13 RX ADMIN — ATORVASTATIN CALCIUM 10 MILLIGRAM(S): 80 TABLET, FILM COATED ORAL at 21:48

## 2017-09-13 RX ADMIN — Medication 1 TABLET(S): at 11:54

## 2017-09-13 NOTE — PROGRESS NOTE ADULT - SUBJECTIVE AND OBJECTIVE BOX
Subjective and Objective:   HISTORY OF PRESENT ILLNESS:  Pt is an 82M with PMHx significant for spinal stenosis s/p multiple spinal surgeries and spinal cord stimulator admitted to [facility] on 8/1 for scheduled management of worsening LE pain and neuropathic pain radiating both feet and pain to back, buttocks and thighs s/p exploration of the prior spinal fusion, removal of hardware, removal of spinal stimulator, T11-L1 laminectomy, T4-pelvis spinal fusion with osteotomies on 8/1 with Dr. Titus. Hospital course notable for  intraoperative blood loss requiring 2 unit PRBC and persistent acute blood loss anemia,  post op hypotension requiring  pressors, rapid Afib w/ RVR with acute respiratory failure secondary to acute pulmonary edema and bilateral pleural effusions which improved and now off lasix per cardiology, Echo showed EF 65%, encephalopathy for which HCT (8/6 and 8/14)  negative for acute pathology but showed chronic infarcts.  SLP followed for mild-moderate dyspahgia on puree with honey liquids diet. On 8/14 had 37 beats of Vtach, and persistent leukocytosis. Deemed stable for discharge to acute rehab on 8/15.    No overnight events. Slept well. Denies pain, numbness or weakness.  +BM.  Denies dysuria, persistent urinary incontinence at night as per pre-admission    REVIEW OF SYSTEMS  Constitutional - No fever,  No fatigue  Neurological - No headaches, No loss of strength, No numbness, No tremors  Skin - No rashes, No lesions   Musculoskeletal - No joint pain, No joint swelling, No muscle pain  Psychiatric - No depression, No anxiety    Vital Signs Last 24 Hrs  T(C): 36.2 (13 Sep 2017 05:53), Max: 36.8 (12 Sep 2017 13:23)  T(F): 97.2 (13 Sep 2017 05:53), Max: 98.2 (12 Sep 2017 13:23)  HR: 63 (13 Sep 2017 05:53) (63 - 74)  BP: 135/67 (13 Sep 2017 05:53) (100/60 - 135/67)  BP(mean): --  RR: 18 (13 Sep 2017 05:53) (16 - 18)  SpO2: 94% (13 Sep 2017 05:53) (94% - 99%)    MEDICATIONS   aspirin  chewable 81 milliGRAM(s) daily  tamsulosin 0.4 milliGRAM(s) at bedtime  dabigatran 150 milliGRAM(s) every 12 hours  ascorbic acid 500 milliGRAM(s) two times a day  multivitamin 1 Tablet(s) daily  BACItracin   Ointment 1 Application(s) two times a day  levothyroxine 50 MICROGram(s) daily  polyethylene glycol 3350 17 Gram(s) daily PRN  ergocalciferol 04175 Unit(s) every week  folic acid 1 milliGRAM(s) daily  melatonin 3 milliGRAM(s) at bedtime  ferrous    sulfate 325 milliGRAM(s) daily  atorvastatin 10 milliGRAM(s) at bedtime  magnesium oxide 400 milliGRAM(s) two times a day with meals  calcium carbonate 500 mG (Tums) Chewable 1 Tablet(s) three times a day PRN  acetaminophen   Tablet. 650 milliGRAM(s) every 6 hours PRN  metoprolol succinate ER 50 milliGRAM(s) daily  docusate sodium 100 milliGRAM(s) daily PRN  nystatin Ointment 1 Application(s) two times a day  pantoprazole    Tablet 40 milliGRAM(s) before breakfast  fludroCORTISONE 0.1 milliGRAM(s) daily  senna 2 Tablet(s) at bedtime  bisacodyl 5 milliGRAM(s) every 12 hours PRN  benzonatate 100 milliGRAM(s) four times a day PRN  midodrine 2.5 milliGRAM(s) three times a day    -------  PHYSICAL EXAM  Constitutional - NAD, Comfortable  Pulm - CTAB  Abd - Soft, NTND  Extremities - No C/C/E, No calf tenderness  Back-Incision CDI with no erythema or edema noted  Neurologic Exam -                    Cognitive - AAOx3     Communication - Fluent     Sensory - Intact to LT  Psychiatric - Mood WNL, Affect WNL    FUNCTIONAL PROGRESS:  Gait:  60' min A RW  ADLs: Bathing mod A, UE dress sup, LE dress min A  Transfers: Supervision arms length  Bed mobility: Min A    ASSESSMENT/PLAN  Assessment:  Pt is an 81y/o male with spinal stenosis s/p T11-L1 laminectomy, T4-pelvis spinal fusion    Comprehensive rehab: PT/OT/ST  Spinal stenosis-Spinal precautions.  Pain-Tylenol prn  H/O acute respiratory distress- improved; pt oxygenating well on RA with less SOB on exertion; CXR  neg and ordered duonebs q6hrs x 3 days.  Appreciate hospitalist consult  Metabolic encephalopathy-Improved.  Caution with excessive pain meds or anxiolytics, fall precautions  Dysphagia-Resolved.  S/P MBS 8/28:  Advanced diet to regular from previous puree and honey-thickened liquid diet.  ST discontinued 9/1-cognition noted to be at premorbid level.  Atrial fibrillation-HR controlled.  Cont Toprol XL daily with holding parameters and Pradaxa.  EKG due to c/o "heart fluttering"  Appreciate cardiology consult; nuclear stress test performed 8/23; neg  Metoprolol increased- hold for SBP <90; keep Mg > 2 and K > 4.  monitor BP  Constipation- bowel regimen.   HTN-SBPs was low at   Orthostatic hypotension; IVF boluses given  Cont abdominal binder and TEDs when out of bed  Midodrine added-increased to 10mg tid and Remeron discontinued by hospitalist, cardiology follow up appreciated.  Added Florinef 8/30 as per cardiology.  Discontinued Tramadol due to pain improved and possible contribution to light-headedness and resumed tylenol prn for pain management.  Cortisol levels WNL.  Midodrine resumed due to persistent hypotension.   D/C Florinef   Hyperlipidemia -   Added lipitor 10mg.    Elevated TSH 5.28-Added Synthroid 50 daily  Elevated LFTs-Discontinued tylenol.  AST/ALT improved; now WNL.  Resumed tylenol prn for pain.  F/U CMP with improved LFTs  Acute blood loss anemia-H/H stable.  Cont Folic acid and Fe.    Leukocytosis:  Resolved.  WBC improved, UA negative, patient afebrile.   Vitamin D deficiency-Low normal Vit D at 31.  Added Vit D 50,000U weekly  Bowel regimen-Changed Colace to prn due to episode of bowel incontinence  Urinary incontinence-Urine culture 8/25 <10,000cfu/ml GNR    F/U with PMD Dr. Lares and cardiology Dr. Ny as outpt.

## 2017-09-13 NOTE — PROGRESS NOTE ADULT - SUBJECTIVE AND OBJECTIVE BOX
CC: F/u Orthostatic hypotension    HPI: 82M with PMHx significant for spinal stenosis s/p multiple spinal surgeries and spinal cord stimulator admitted to [facility] on 8/1 for scheduled management of worsening LE pain and neuropathic pain radiating both feet and pain to back, buttocks and thighs s/p exploration of the prior spinal fusion, removal of hardware, removal of spinal stimulator, T11-L1 laminectomy, T4-pelvis spinal fusion with osteotomies on 8/1 with Dr. Titus. Hospital course notable for  intraoperative blood loss requiring 2 unit PRBC and persistent acute blood loss anemia,  post op hypotension requiring  pressors, rapid Afib w/ RVR with acute respiratory failure secondary to acute pulmonary edema and bilateral pleural effusions which improved and now off lasix per cardiology, Echo showed EF 65%, encephalopathy for which HCT (8/6 and 8/14)  negative for acute pathology but showed chronic infarcts. On 8/14, as per RN note,  had 37 beats of  Vtach,  Ortho team was notified, House Cardiology was not informed and had signed off on 8/12.  Patient was discharged to acute rehab on 8/15.    INTERVAL HPI/OVERNIGHT EVENTS: Patient seen and examined sitting in the wheelchair in his room.  Patient denies any episodes of hypotension this am.  Patient having urinary incontinence at night that is chronic.  Patient denies any headache, dizziness, SOB, CP, abdominal pain, nausea, vomiting.  Other ROS reviewed and are negative.    Vital Signs Last 24 Hrs  T(C): 36.8 (13 Sep 2017 13:03), Max: 36.8 (12 Sep 2017 13:23)  T(F): 98.2 (13 Sep 2017 13:03), Max: 98.2 (12 Sep 2017 13:23)  HR: 65 (13 Sep 2017 13:03) (63 - 74)  BP: 100/60 (13 Sep 2017 13:03) (100/60 - 135/67)  BP(mean): --  RR: 18 (13 Sep 2017 13:03) (16 - 18)  SpO2: 94% (13 Sep 2017 13:03) (94% - 99%)  I&O's Detail    12 Sep 2017 07:01  -  13 Sep 2017 07:00  --------------------------------------------------------  IN:  Total IN: 0 mL    OUT:    Voided: 350 mL  Total OUT: 350 mL    Total NET: -350 mL      PHYSICAL EXAM:  GENERAL: NAD, well-groomed, well-developed  NERVOUS SYSTEM:  Alert & Oriented X3, Good concentration; Motor Strength 5/5 B/L upper and lower extremities  CHEST/LUNG: Clear to auscultation bilaterally; No rales, rhonchi, wheezing, or rubs  HEART: Regular rate and rhythm; No murmurs, rubs, or gallops  ABDOMEN: Soft, Nontender, Nondistended; Bowel sounds present  EXTREMITIES:  2+ Peripheral Pulses, No clubbing, cyanosis, or edema        MEDICATIONS  (STANDING):  aspirin  chewable 81 milliGRAM(s) Oral daily  tamsulosin 0.4 milliGRAM(s) Oral at bedtime  dabigatran 150 milliGRAM(s) Oral every 12 hours  multivitamin 1 Tablet(s) Oral daily  BACItracin   Ointment 1 Application(s) Topical two times a day  levothyroxine 50 MICROGram(s) Oral daily  ergocalciferol 62129 Unit(s) Oral every week  folic acid 1 milliGRAM(s) Oral daily  melatonin 3 milliGRAM(s) Oral at bedtime  ferrous    sulfate 325 milliGRAM(s) Oral daily  atorvastatin 10 milliGRAM(s) Oral at bedtime  magnesium oxide 400 milliGRAM(s) Oral two times a day with meals  metoprolol succinate ER 50 milliGRAM(s) Oral daily  nystatin Ointment 1 Application(s) Topical two times a day  pantoprazole    Tablet 40 milliGRAM(s) Oral before breakfast  senna 2 Tablet(s) Oral at bedtime  midodrine 2.5 milliGRAM(s) Oral three times a day  ascorbic acid 500 milliGRAM(s) Oral daily    MEDICATIONS  (PRN):  polyethylene glycol 3350 17 Gram(s) Oral daily PRN Constipation  calcium carbonate 500 mG (Tums) Chewable 1 Tablet(s) Chew three times a day PRN Upset Stomach  acetaminophen   Tablet. 650 milliGRAM(s) Oral every 6 hours PRN Severe Pain (7 - 10)  bisacodyl 5 milliGRAM(s) Oral every 12 hours PRN Constipation  benzonatate 100 milliGRAM(s) Oral four times a day PRN Cough

## 2017-09-13 NOTE — PROGRESS NOTE ADULT - ASSESSMENT
82M with PMHx significant for spinal stenosis s/p multiple spinal surgeries and spinal cord stimulator admitted to [facility] on 8/1 for scheduled management of worsening LE pain and neuropathic pain radiating both feet and pain to back, buttocks and thighs s/p exploration of the prior spinal fusion, removal of hardware, removal of spinal stimulator, T11-L1 laminectomy, T4-pelvis spinal fusion with osteotomies on 8/1 with Dr. Titus. Hospital course notable for  intraoperative blood loss requiring 2 unit PRBC and persistent acute blood loss anemia,  post op hypotension requiring  pressors, rapid Afib w/ RVR with acute respiratory failure secondary to acute pulmonary edema and bilateral pleural effusions which improved and now off lasix per cardiology, Echo showed EF 65%, encephalopathy for which HCT (8/6 and 8/14)  negative for acute pathology but showed chronic infarcts. On 8/14, as per RN note,  had 37 beats of  Vtach,  Ortho team was notified, House Cardiology was not informed and had signed off on 8/12.  Patient was discharged to acute rehab on 8/15.    Problem/Recommendation - 1:  Problem: Hypotension - Orthostatic: deconditioned, venous pooling, poor nutrition .  Continue Midodrine 2.5mg PO TID.  Would continue florinef for now on discharge, ct to encourage good po intake and activity    Problem/Recommendation - 2:  ·  Problem: Afib/ Nonsustained ventricular tachycardia/CAD.  stable on current meds      Problem/Recommendation - 3:  ·  Problem: Spinal stenosis of lumbosacral region.  Recommendation: s/p exploration of the prior spinal fusion, removal of hardware, removal of spinal stimulator, T11-L1 laminectomy, T4-pelvis spinal fusion with osteotomies on 8/1 with Dr. Titus with complicated post operative course  Rehab program.       Problem/Recommendation - 4:  - Problem: Constipation.  Recommendation: Bowel regimen.    Patient medically stable, will sign off.  Please call with questions.

## 2017-09-14 PROCEDURE — 99232 SBSQ HOSP IP/OBS MODERATE 35: CPT

## 2017-09-14 RX ORDER — DABIGATRAN ETEXILATE MESYLATE 150 MG/1
1 CAPSULE ORAL
Qty: 60 | Refills: 0 | OUTPATIENT
Start: 2017-09-14

## 2017-09-14 RX ORDER — NYSTATIN CREAM 100000 [USP'U]/G
1 CREAM TOPICAL
Qty: 1 | Refills: 0 | OUTPATIENT
Start: 2017-09-14 | End: 2017-09-24

## 2017-09-14 RX ORDER — CALCIUM CARBONATE 500(1250)
1 TABLET ORAL
Qty: 0 | Refills: 0 | COMMUNITY
Start: 2017-09-14

## 2017-09-14 RX ORDER — ASPIRIN/CALCIUM CARB/MAGNESIUM 324 MG
1 TABLET ORAL
Qty: 30 | Refills: 0 | OUTPATIENT
Start: 2017-09-14

## 2017-09-14 RX ORDER — TAMSULOSIN HYDROCHLORIDE 0.4 MG/1
1 CAPSULE ORAL
Qty: 30 | Refills: 0 | OUTPATIENT
Start: 2017-09-14

## 2017-09-14 RX ORDER — ASCORBIC ACID 60 MG
1 TABLET,CHEWABLE ORAL
Qty: 0 | Refills: 0 | COMMUNITY
Start: 2017-09-14

## 2017-09-14 RX ORDER — ATORVASTATIN CALCIUM 80 MG/1
1 TABLET, FILM COATED ORAL
Qty: 30 | Refills: 0 | OUTPATIENT
Start: 2017-09-14

## 2017-09-14 RX ORDER — PANTOPRAZOLE SODIUM 20 MG/1
1 TABLET, DELAYED RELEASE ORAL
Qty: 30 | Refills: 0 | OUTPATIENT
Start: 2017-09-14

## 2017-09-14 RX ORDER — LEVOTHYROXINE SODIUM 125 MCG
1 TABLET ORAL
Qty: 30 | Refills: 0 | OUTPATIENT
Start: 2017-09-14

## 2017-09-14 RX ORDER — MIDODRINE HYDROCHLORIDE 2.5 MG/1
1 TABLET ORAL
Qty: 90 | Refills: 0 | OUTPATIENT
Start: 2017-09-14

## 2017-09-14 RX ORDER — FOLIC ACID 0.8 MG
1 TABLET ORAL
Qty: 0 | Refills: 0 | COMMUNITY
Start: 2017-09-14

## 2017-09-14 RX ORDER — TRAMADOL HYDROCHLORIDE 50 MG/1
1 TABLET ORAL
Qty: 0 | Refills: 0 | COMMUNITY

## 2017-09-14 RX ORDER — FERROUS SULFATE 325(65) MG
325 TABLET ORAL
Qty: 30 | Refills: 0 | OUTPATIENT
Start: 2017-09-14

## 2017-09-14 RX ORDER — ACETAMINOPHEN 500 MG
2 TABLET ORAL
Qty: 0 | Refills: 0 | COMMUNITY
Start: 2017-09-14

## 2017-09-14 RX ORDER — FLUDROCORTISONE ACETATE 0.1 MG/1
1 TABLET ORAL
Qty: 30 | Refills: 0 | OUTPATIENT
Start: 2017-09-14

## 2017-09-14 RX ORDER — SENNA PLUS 8.6 MG/1
2 TABLET ORAL
Qty: 0 | Refills: 0 | COMMUNITY
Start: 2017-09-14

## 2017-09-14 RX ORDER — OXYCODONE HYDROCHLORIDE 5 MG/1
1 TABLET ORAL
Qty: 0 | Refills: 0 | COMMUNITY

## 2017-09-14 RX ORDER — TAMSULOSIN HYDROCHLORIDE 0.4 MG/1
1 CAPSULE ORAL
Qty: 0 | Refills: 0 | COMMUNITY

## 2017-09-14 RX ORDER — POLYETHYLENE GLYCOL 3350 17 G/17G
17 POWDER, FOR SOLUTION ORAL
Qty: 0 | Refills: 0 | COMMUNITY
Start: 2017-09-14

## 2017-09-14 RX ORDER — METOPROLOL TARTRATE 50 MG
1 TABLET ORAL
Qty: 30 | Refills: 0 | OUTPATIENT
Start: 2017-09-14

## 2017-09-14 RX ORDER — OMEPRAZOLE 10 MG/1
1 CAPSULE, DELAYED RELEASE ORAL
Qty: 0 | Refills: 0 | COMMUNITY

## 2017-09-14 RX ORDER — CHOLECALCIFEROL (VITAMIN D3) 125 MCG
1 CAPSULE ORAL
Qty: 30 | Refills: 0 | OUTPATIENT
Start: 2017-09-14 | End: 2017-10-14

## 2017-09-14 RX ORDER — MAGNESIUM OXIDE 400 MG ORAL TABLET 241.3 MG
1 TABLET ORAL
Qty: 30 | Refills: 0 | OUTPATIENT
Start: 2017-09-14

## 2017-09-14 RX ADMIN — Medication 1 TABLET(S): at 12:34

## 2017-09-14 RX ADMIN — Medication 50 MILLIGRAM(S): at 05:15

## 2017-09-14 RX ADMIN — MIDODRINE HYDROCHLORIDE 2.5 MILLIGRAM(S): 2.5 TABLET ORAL at 21:20

## 2017-09-14 RX ADMIN — MAGNESIUM OXIDE 400 MG ORAL TABLET 400 MILLIGRAM(S): 241.3 TABLET ORAL at 07:40

## 2017-09-14 RX ADMIN — SENNA PLUS 2 TABLET(S): 8.6 TABLET ORAL at 21:20

## 2017-09-14 RX ADMIN — DABIGATRAN ETEXILATE MESYLATE 150 MILLIGRAM(S): 150 CAPSULE ORAL at 05:15

## 2017-09-14 RX ADMIN — Medication 1 MILLIGRAM(S): at 12:34

## 2017-09-14 RX ADMIN — MIDODRINE HYDROCHLORIDE 2.5 MILLIGRAM(S): 2.5 TABLET ORAL at 05:15

## 2017-09-14 RX ADMIN — Medication 650 MILLIGRAM(S): at 11:52

## 2017-09-14 RX ADMIN — Medication 650 MILLIGRAM(S): at 12:52

## 2017-09-14 RX ADMIN — NYSTATIN CREAM 1 APPLICATION(S): 100000 CREAM TOPICAL at 17:36

## 2017-09-14 RX ADMIN — Medication 50 MICROGRAM(S): at 05:15

## 2017-09-14 RX ADMIN — MAGNESIUM OXIDE 400 MG ORAL TABLET 400 MILLIGRAM(S): 241.3 TABLET ORAL at 17:35

## 2017-09-14 RX ADMIN — Medication 325 MILLIGRAM(S): at 12:34

## 2017-09-14 RX ADMIN — NYSTATIN CREAM 1 APPLICATION(S): 100000 CREAM TOPICAL at 05:14

## 2017-09-14 RX ADMIN — Medication 1 APPLICATION(S): at 05:15

## 2017-09-14 RX ADMIN — Medication 500 MILLIGRAM(S): at 12:35

## 2017-09-14 RX ADMIN — Medication 81 MILLIGRAM(S): at 12:34

## 2017-09-14 RX ADMIN — MIDODRINE HYDROCHLORIDE 2.5 MILLIGRAM(S): 2.5 TABLET ORAL at 13:14

## 2017-09-14 RX ADMIN — Medication 1 TABLET(S): at 07:39

## 2017-09-14 RX ADMIN — ATORVASTATIN CALCIUM 10 MILLIGRAM(S): 80 TABLET, FILM COATED ORAL at 21:20

## 2017-09-14 RX ADMIN — Medication 3 MILLIGRAM(S): at 21:20

## 2017-09-14 RX ADMIN — TAMSULOSIN HYDROCHLORIDE 0.4 MILLIGRAM(S): 0.4 CAPSULE ORAL at 21:20

## 2017-09-14 RX ADMIN — DABIGATRAN ETEXILATE MESYLATE 150 MILLIGRAM(S): 150 CAPSULE ORAL at 17:35

## 2017-09-14 RX ADMIN — PANTOPRAZOLE SODIUM 40 MILLIGRAM(S): 20 TABLET, DELAYED RELEASE ORAL at 05:15

## 2017-09-14 RX ADMIN — FLUDROCORTISONE ACETATE 0.1 MILLIGRAM(S): 0.1 TABLET ORAL at 05:15

## 2017-09-14 NOTE — PROGRESS NOTE ADULT - SUBJECTIVE AND OBJECTIVE BOX
Subjective and Objective:   HISTORY OF PRESENT ILLNESS:  Pt is an 82M with PMHx significant for spinal stenosis s/p multiple spinal surgeries and spinal cord stimulator admitted to [facility] on 8/1 for scheduled management of worsening LE pain and neuropathic pain radiating both feet and pain to back, buttocks and thighs s/p exploration of the prior spinal fusion, removal of hardware, removal of spinal stimulator, T11-L1 laminectomy, T4-pelvis spinal fusion with osteotomies on 8/1 with Dr. Titus. Hospital course notable for  intraoperative blood loss requiring 2 unit PRBC and persistent acute blood loss anemia,  post op hypotension requiring  pressors, rapid Afib w/ RVR with acute respiratory failure secondary to acute pulmonary edema and bilateral pleural effusions which improved and now off lasix per cardiology, Echo showed EF 65%, encephalopathy for which HCT (8/6 and 8/14)  negative for acute pathology but showed chronic infarcts.  SLP followed for mild-moderate dyspahgia on puree with honey liquids diet. On 8/14 had 37 beats of Vtach, and persistent leukocytosis. Deemed stable for discharge to acute rehab on 8/15.    No overnight events. Slept well. Denies pain, numbness or weakness.  +BM today.  Denies dysuria, persistent urinary incontinence at night as per pre-admission    REVIEW OF SYSTEMS  Constitutional - No fever,  No fatigue  Neurological - No headaches, No loss of strength, No numbness, No tremors  Skin - + groin rash, No lesions   Musculoskeletal - No joint pain, No joint swelling, No muscle pain  Psychiatric - No depression, No anxiety    Vital Signs Last 24 Hrs  T(C): 36.6 (14 Sep 2017 05:01), Max: 36.8 (13 Sep 2017 13:03)  T(F): 97.8 (14 Sep 2017 05:01), Max: 98.2 (13 Sep 2017 13:03)  HR: 78 (14 Sep 2017 05:01) (65 - 78)  BP: 132/81 (14 Sep 2017 05:01) (100/60 - 132/81)  BP(mean): --  RR: 18 (14 Sep 2017 05:01) (18 - 18)  SpO2: 97% (14 Sep 2017 05:01) (94% - 97%)    MEDICATIONS   aspirin  chewable 81 milliGRAM(s) daily  tamsulosin 0.4 milliGRAM(s) at bedtime  dabigatran 150 milliGRAM(s) every 12 hours  ascorbic acid 500 milliGRAM(s) two times a day  multivitamin 1 Tablet(s) daily  BACItracin   Ointment 1 Application(s) two times a day  levothyroxine 50 MICROGram(s) daily  polyethylene glycol 3350 17 Gram(s) daily PRN  ergocalciferol 71732 Unit(s) every week  folic acid 1 milliGRAM(s) daily  melatonin 3 milliGRAM(s) at bedtime  ferrous    sulfate 325 milliGRAM(s) daily  atorvastatin 10 milliGRAM(s) at bedtime  magnesium oxide 400 milliGRAM(s) two times a day with meals  calcium carbonate 500 mG (Tums) Chewable 1 Tablet(s) three times a day PRN  acetaminophen   Tablet. 650 milliGRAM(s) every 6 hours PRN  metoprolol succinate ER 50 milliGRAM(s) daily  docusate sodium 100 milliGRAM(s) daily PRN  nystatin Ointment 1 Application(s) two times a day  pantoprazole    Tablet 40 milliGRAM(s) before breakfast  fludroCORTISONE 0.1 milliGRAM(s) daily  senna 2 Tablet(s) at bedtime  bisacodyl 5 milliGRAM(s) every 12 hours PRN  benzonatate 100 milliGRAM(s) four times a day PRN  midodrine 2.5 milliGRAM(s) three times a day    -------  PHYSICAL EXAM  Constitutional - NAD, Comfortable  Pulm - CTAB  Abd - Soft, NTND  Extremities - No C/C/E, No calf tenderness  Back-Incision CDI with no erythema or edema noted  Neurologic Exam -                    Cognitive - AAOx3     Communication - Fluent     Sensory - Intact to LT  Psychiatric - Mood WNL, Affect WNL    FUNCTIONAL PROGRESS:  Gait:  60' min A RW  Stairs: Pt negotiated 4, 6" stairs with railings min assist  Wheelchair:Supervision  ADLs: Bathing and LE dress min A, UE dress sup  Transfers: Supervision   Bed mobility: Min A    ASSESSMENT/PLAN  Assessment:  Pt is an 81y/o male with spinal stenosis s/p T11-L1 laminectomy, T4-pelvis spinal fusion    Comprehensive rehab: PT/OT/ST  Spinal stenosis-Spinal precautions.  Pain-Tylenol prn  H/O acute respiratory distress- improved; pt oxygenating well on RA with no longer any SOB on exertion; CXR  neg.  Appreciate hospitalist consult  Metabolic encephalopathy-Improved.  Caution with excessive pain meds or anxiolytics, fall precautions  Dysphagia-Resolved.  S/P MBS 8/28:  Advanced diet to regular from previous puree and honey-thickened liquid diet.  ST discontinued 9/1-cognition noted to be at premorbid level.  Atrial fibrillation-HR controlled.  Cont Toprol XL daily with holding parameters and Pradaxa.  EKG due to c/o "heart fluttering"  Appreciate cardiology consult; nuclear stress test performed 8/23; neg  Metoprolol increased- hold for SBP <90; keep Mg > 2 and K > 4.  monitor BP  Constipation- bowel regimen.   HTN-SBPs was low at   Orthostatic hypotension; IVF boluses given  Cont abdominal binder and TEDs when out of bed  Midodrine added-increased to 10mg tid and Remeron discontinued by hospitalist, cardiology follow up appreciated.  Added Florinef 8/30 as per cardiology.  Discontinued Tramadol due to pain improved and possible contribution to light-headedness and resumed tylenol prn for pain management.  Cortisol levels WNL.  Midodrine resumed due to persistent hypotension.     Hyperlipidemia -   Added lipitor 10mg.    Elevated TSH 5.28-Added Synthroid 50 daily  Elevated LFTs-Discontinued tylenol.  AST/ALT improved; now WNL.  Resumed tylenol prn for pain.  F/U CMP with improved LFTs  Acute blood loss anemia-H/H stable.  Cont Folic acid and Fe.    Leukocytosis:  Resolved.  WBC improved, UA negative, patient afebrile.   Vitamin D deficiency-Low normal Vit D at 31.  Added Vit D 50,000U weekly  Bowel regimen-Changed Colace to prn due to episode of bowel incontinence  Urinary incontinence-Urine culture 8/25 <10,000cfu/ml GNR    Groin rash-Nystatin bid  F/U with PMD Dr. Lares and cardiology Dr. Ny as outpt.     D/C planning: Home with home care PT and OT 9/15/17

## 2017-09-14 NOTE — CHART NOTE - NSCHARTNOTEFT_GEN_A_CORE
Source: Patient [x ]  Family [ ]   other [ ]    Current Diet: Regular diet with Ensure TID, Health shakes daily     Patient reports [ ] nausea  [ ] vomiting [ ] diarrhea [ ] constipation  [ ]chewing problems [ ] swallowing issues  [ ] other:     PO intake:  < 50% [ ]   50-75%  [x ]   %  [ ]  other :    Source for PO intake [x ] Patient [ ] family [ ] chart [ ] staff [ ] other    Enteral /Parenteral Nutrition:     Current Weight:  8/15 77 kg ( no new wt)     % Weight Change     Pertinent Medications: MEDICATIONS  (STANDING):  aspirin  chewable 81 milliGRAM(s) Oral daily  tamsulosin 0.4 milliGRAM(s) Oral at bedtime  dabigatran 150 milliGRAM(s) Oral every 12 hours  multivitamin 1 Tablet(s) Oral daily  BACItracin   Ointment 1 Application(s) Topical two times a day  levothyroxine 50 MICROGram(s) Oral daily  ergocalciferol 03511 Unit(s) Oral every week  folic acid 1 milliGRAM(s) Oral daily  melatonin 3 milliGRAM(s) Oral at bedtime  ferrous    sulfate 325 milliGRAM(s) Oral daily  atorvastatin 10 milliGRAM(s) Oral at bedtime  magnesium oxide 400 milliGRAM(s) Oral two times a day with meals  metoprolol succinate ER 50 milliGRAM(s) Oral daily  nystatin Ointment 1 Application(s) Topical two times a day  pantoprazole    Tablet 40 milliGRAM(s) Oral before breakfast  senna 2 Tablet(s) Oral at bedtime  midodrine 2.5 milliGRAM(s) Oral three times a day  ascorbic acid 500 milliGRAM(s) Oral daily  fludroCORTISONE 0.1 milliGRAM(s) Oral daily    MEDICATIONS  (PRN):  polyethylene glycol 3350 17 Gram(s) Oral daily PRN Constipation  calcium carbonate 500 mG (Tums) Chewable 1 Tablet(s) Chew three times a day PRN Upset Stomach  acetaminophen   Tablet. 650 milliGRAM(s) Oral every 6 hours PRN Severe Pain (7 - 10)  bisacodyl 5 milliGRAM(s) Oral every 12 hours PRN Constipation  benzonatate 100 milliGRAM(s) Oral four times a day PRN Cough    Pertinent Labs:       Skin: healing abrasion to Right arm     Nutrition focused physical exam conducted - found signs of malnutrition [ ]absent [ ]present    Subcutaneous fat loss: [ ] Orbital fat pads region, [ ]Buccal fat region, [ ]Triceps region,  [ ]Ribs region    Muscle wasting: [ ]Temples region, [ ]Clavicle region, [ ]Shoulder region, [ ]Scapula region, [ ]Interosseous region,  [ ]thigh region, [ ]Calf region    Estimated Needs:   [x ] no change since previous assessment  [ ] recalculated:     Current Nutrition Diagnosis:  Pt remains at nutrition risk secondary to increased nutrient needs related to recent spinal surgery as evidenced by increased physiologic demand for healing. Pt with fair to good PO intake, Pt eating approx 75% at meals. Pt reports liking and drinking supplements. See Recommendations below:     Recommendations:   Continue with diet Rx  Rx: MVI daily   Obtain new wt    Monitoring and Evaluation:   [x ] PO intake [x ] Tolerance to diet prescription [X] Weights  [X] Follow up per protocol [X] Labs:
Will sign off. Please call us PRN.   Thank you for the consult.

## 2017-09-15 VITALS
TEMPERATURE: 98 F | DIASTOLIC BLOOD PRESSURE: 60 MMHG | OXYGEN SATURATION: 99 % | RESPIRATION RATE: 16 BRPM | HEART RATE: 75 BPM | SYSTOLIC BLOOD PRESSURE: 110 MMHG

## 2017-09-15 PROCEDURE — 99238 HOSP IP/OBS DSCHRG MGMT 30/<: CPT

## 2017-09-15 RX ADMIN — Medication 1 TABLET(S): at 11:17

## 2017-09-15 RX ADMIN — MAGNESIUM OXIDE 400 MG ORAL TABLET 400 MILLIGRAM(S): 241.3 TABLET ORAL at 10:28

## 2017-09-15 RX ADMIN — NYSTATIN CREAM 1 APPLICATION(S): 100000 CREAM TOPICAL at 05:37

## 2017-09-15 RX ADMIN — DABIGATRAN ETEXILATE MESYLATE 150 MILLIGRAM(S): 150 CAPSULE ORAL at 05:33

## 2017-09-15 RX ADMIN — ERGOCALCIFEROL 50000 UNIT(S): 1.25 CAPSULE ORAL at 11:17

## 2017-09-15 RX ADMIN — FLUDROCORTISONE ACETATE 0.1 MILLIGRAM(S): 0.1 TABLET ORAL at 05:33

## 2017-09-15 RX ADMIN — PANTOPRAZOLE SODIUM 40 MILLIGRAM(S): 20 TABLET, DELAYED RELEASE ORAL at 05:33

## 2017-09-15 RX ADMIN — Medication 50 MICROGRAM(S): at 05:33

## 2017-09-15 RX ADMIN — Medication 81 MILLIGRAM(S): at 11:17

## 2017-09-15 RX ADMIN — MIDODRINE HYDROCHLORIDE 2.5 MILLIGRAM(S): 2.5 TABLET ORAL at 13:39

## 2017-09-15 RX ADMIN — MIDODRINE HYDROCHLORIDE 2.5 MILLIGRAM(S): 2.5 TABLET ORAL at 05:33

## 2017-09-15 RX ADMIN — Medication 50 MILLIGRAM(S): at 05:33

## 2017-09-15 RX ADMIN — Medication 1 TABLET(S): at 06:19

## 2017-09-15 RX ADMIN — Medication 1 MILLIGRAM(S): at 11:17

## 2017-09-15 RX ADMIN — Medication 500 MILLIGRAM(S): at 11:17

## 2017-09-15 RX ADMIN — Medication 325 MILLIGRAM(S): at 11:17

## 2017-09-15 RX ADMIN — Medication 1 TABLET(S): at 11:19

## 2017-09-15 NOTE — PROGRESS NOTE ADULT - PROVIDER SPECIALTY LIST ADULT
Cardiology
Hospitalist
Rehab Medicine
Cardiology

## 2017-09-15 NOTE — PROGRESS NOTE ADULT - SUBJECTIVE AND OBJECTIVE BOX
Subjective and Objective:   HISTORY OF PRESENT ILLNESS:  Pt is an 82M with PMHx significant for spinal stenosis s/p multiple spinal surgeries and spinal cord stimulator admitted to [facility] on 8/1 for scheduled management of worsening LE pain and neuropathic pain radiating both feet and pain to back, buttocks and thighs s/p exploration of the prior spinal fusion, removal of hardware, removal of spinal stimulator, T11-L1 laminectomy, T4-pelvis spinal fusion with osteotomies on 8/1 with Dr. Titus. Hospital course notable for  intraoperative blood loss requiring 2 unit PRBC and persistent acute blood loss anemia,  post op hypotension requiring  pressors, rapid Afib w/ RVR with acute respiratory failure secondary to acute pulmonary edema and bilateral pleural effusions which improved and now off lasix per cardiology, Echo showed EF 65%, encephalopathy for which HCT (8/6 and 8/14)  negative for acute pathology but showed chronic infarcts.  SLP followed for mild-moderate dyspahgia on puree with honey liquids diet. On 8/14 had 37 beats of Vtach, and persistent leukocytosis. Deemed stable for discharge to acute rehab on 8/15.    No overnight events.  Looking forward to his discharge.  Slept well. Denies pain, numbness or weakness.  +BM today.  Denies dysuria, persistent urinary incontinence at night as per pre-admission    REVIEW OF SYSTEMS  Constitutional - No fever,  No fatigue  Neurological - No headaches, No loss of strength, No numbness, No tremors  Skin - + groin rash, No lesions   Musculoskeletal - No joint pain, No joint swelling, No muscle pain  Psychiatric - No depression, No anxiety    Vital Signs Last 24 Hrs  T(C): 36.4 (14 Sep 2017 20:23), Max: 36.4 (14 Sep 2017 12:30)  T(F): 97.5 (14 Sep 2017 20:23), Max: 97.6 (14 Sep 2017 12:30)  HR: 76 (14 Sep 2017 20:23) (70 - 76)  BP: 110/70 (15 Sep 2017 07:26) (110/70 - 130/80)  BP(mean): --  RR: 18 (14 Sep 2017 20:23) (16 - 18)  SpO2: 100% (14 Sep 2017 20:23) (97% - 100%)    MEDICATIONS   aspirin  chewable 81 milliGRAM(s) daily  tamsulosin 0.4 milliGRAM(s) at bedtime  dabigatran 150 milliGRAM(s) every 12 hours  ascorbic acid 500 milliGRAM(s) two times a day  multivitamin 1 Tablet(s) daily  BACItracin   Ointment 1 Application(s) two times a day  levothyroxine 50 MICROGram(s) daily  polyethylene glycol 3350 17 Gram(s) daily PRN  ergocalciferol 97446 Unit(s) every week  folic acid 1 milliGRAM(s) daily  melatonin 3 milliGRAM(s) at bedtime  ferrous    sulfate 325 milliGRAM(s) daily  atorvastatin 10 milliGRAM(s) at bedtime  magnesium oxide 400 milliGRAM(s) two times a day with meals  calcium carbonate 500 mG (Tums) Chewable 1 Tablet(s) three times a day PRN  acetaminophen   Tablet. 650 milliGRAM(s) every 6 hours PRN  metoprolol succinate ER 50 milliGRAM(s) daily  docusate sodium 100 milliGRAM(s) daily PRN  nystatin Ointment 1 Application(s) two times a day  pantoprazole    Tablet 40 milliGRAM(s) before breakfast  fludroCORTISONE 0.1 milliGRAM(s) daily  senna 2 Tablet(s) at bedtime  bisacodyl 5 milliGRAM(s) every 12 hours PRN  benzonatate 100 milliGRAM(s) four times a day PRN  midodrine 2.5 milliGRAM(s) three times a day    -------  PHYSICAL EXAM  Constitutional - NAD, Comfortable  Pulm - CTAB  Abd - Soft, NTND  Extremities - No C/C/E, No calf tenderness  Back-Incision CDI with no erythema or edema noted  Neurologic Exam -                    Cognitive - AAOx3     Communication - Fluent     Sensory - Intact to LT  Psychiatric - Mood WNL, Affect WNL    FUNCTIONAL PROGRESS:  Gait:  60' min A RW  Stairs: Pt negotiated 8, 6" stairs with railings min assist  Wheelchair:Supervision  ADLs: Bathing and LE dress min A, UE dress sup  Transfers: Supervision   Bed mobility: Supervision    ASSESSMENT/PLAN  Assessment:  Pt is an 81y/o male with spinal stenosis s/p T11-L1 laminectomy, T4-pelvis spinal fusion    Comprehensive rehab: PT/OT/ST  Spinal stenosis-Spinal precautions.  Pain-Tylenol prn  H/O acute respiratory distress- improved; pt oxygenating well on RA with no longer any SOB on exertion; CXR  neg.  Appreciate hospitalist consult  Metabolic encephalopathy-Improved.  Caution with excessive pain meds or anxiolytics, fall precautions  Dysphagia-Resolved.  S/P MBS 8/28:  Advanced diet to regular from previous puree and honey-thickened liquid diet.  ST discontinued 9/1-cognition noted to be at premorbid level.  Atrial fibrillation-HR controlled.  Cont Toprol XL daily with holding parameters and Pradaxa.  EKG due to c/o "heart fluttering"  Appreciate cardiology consult; nuclear stress test performed 8/23; neg  Metoprolol increased- hold for SBP <90; keep Mg > 2 and K > 4.  monitor BP  Constipation- bowel regimen.   HTN-SBPs was low at   Orthostatic hypotension; IVF boluses given  Cont abdominal binder and TEDs when out of bed  Midodrine added-increased to 10mg tid and Remeron discontinued by hospitalist, cardiology follow up appreciated.  Added Florinef 8/30 as per cardiology.  Discontinued Tramadol due to pain improved and possible contribution to light-headedness and resumed tylenol prn for pain management.  Cortisol levels WNL.  Midodrine resumed due to persistent hypotension.     Hyperlipidemia -   Added lipitor 10mg.    Elevated TSH 5.28-Added Synthroid 50 daily  Elevated LFTs-Discontinued tylenol.  AST/ALT improved; now WNL.  Resumed tylenol prn for pain.  F/U CMP with improved LFTs  Acute blood loss anemia-H/H stable.  Cont Folic acid and Fe.    Leukocytosis:  Resolved.  WBC improved, UA negative, patient afebrile.   Vitamin D deficiency-Low normal Vit D at 31.  Added Vit D 50,000U weekly  Bowel regimen-Changed Colace to prn due to episode of bowel incontinence  Urinary incontinence-Urine culture 8/25 <10,000cfu/ml GNR    Groin rash-Nystatin bid    F/U with PMD Tacho Santoyo and cardiology Dr. Ny as outpt.     D/C planning: Home with home care PT and OT today  F/U labs x 1 wk

## 2017-09-18 ENCOUNTER — APPOINTMENT (OUTPATIENT)
Dept: ORTHOPEDIC SURGERY | Facility: CLINIC | Age: 82
End: 2017-09-18
Payer: MEDICARE

## 2017-09-18 PROCEDURE — 99024 POSTOP FOLLOW-UP VISIT: CPT

## 2017-09-18 PROCEDURE — 72082 X-RAY EXAM ENTIRE SPI 2/3 VW: CPT

## 2017-09-19 PROCEDURE — 97116 GAIT TRAINING THERAPY: CPT

## 2017-09-19 PROCEDURE — 93017 CV STRESS TEST TRACING ONLY: CPT

## 2017-09-19 PROCEDURE — 97110 THERAPEUTIC EXERCISES: CPT

## 2017-09-19 PROCEDURE — 92526 ORAL FUNCTION THERAPY: CPT

## 2017-09-19 PROCEDURE — 78452 HT MUSCLE IMAGE SPECT MULT: CPT

## 2017-09-19 PROCEDURE — 97162 PT EVAL MOD COMPLEX 30 MIN: CPT

## 2017-09-19 PROCEDURE — 82607 VITAMIN B-12: CPT

## 2017-09-19 PROCEDURE — 97530 THERAPEUTIC ACTIVITIES: CPT

## 2017-09-19 PROCEDURE — 84484 ASSAY OF TROPONIN QUANT: CPT

## 2017-09-19 PROCEDURE — 92507 TX SP LANG VOICE COMM INDIV: CPT

## 2017-09-19 PROCEDURE — 97535 SELF CARE MNGMENT TRAINING: CPT

## 2017-09-19 PROCEDURE — 97542 WHEELCHAIR MNGMENT TRAINING: CPT

## 2017-09-19 PROCEDURE — 80048 BASIC METABOLIC PNL TOTAL CA: CPT

## 2017-09-19 PROCEDURE — 92610 EVALUATE SWALLOWING FUNCTION: CPT

## 2017-09-19 PROCEDURE — 80061 LIPID PANEL: CPT

## 2017-09-19 PROCEDURE — A9500: CPT

## 2017-09-19 PROCEDURE — 83735 ASSAY OF MAGNESIUM: CPT

## 2017-09-19 PROCEDURE — 93306 TTE W/DOPPLER COMPLETE: CPT

## 2017-09-19 PROCEDURE — 71045 X-RAY EXAM CHEST 1 VIEW: CPT

## 2017-09-19 PROCEDURE — 80053 COMPREHEN METABOLIC PANEL: CPT

## 2017-09-19 PROCEDURE — 82150 ASSAY OF AMYLASE: CPT

## 2017-09-19 PROCEDURE — 97167 OT EVAL HIGH COMPLEX 60 MIN: CPT

## 2017-09-19 PROCEDURE — 36415 COLL VENOUS BLD VENIPUNCTURE: CPT

## 2017-09-19 PROCEDURE — 87086 URINE CULTURE/COLONY COUNT: CPT

## 2017-09-19 PROCEDURE — 84436 ASSAY OF TOTAL THYROXINE: CPT

## 2017-09-19 PROCEDURE — 81001 URINALYSIS AUTO W/SCOPE: CPT

## 2017-09-19 PROCEDURE — G0515: CPT

## 2017-09-19 PROCEDURE — 74230 X-RAY XM SWLNG FUNCJ C+: CPT

## 2017-09-19 PROCEDURE — 83690 ASSAY OF LIPASE: CPT

## 2017-09-19 PROCEDURE — 83880 ASSAY OF NATRIURETIC PEPTIDE: CPT

## 2017-09-19 PROCEDURE — 85027 COMPLETE CBC AUTOMATED: CPT

## 2017-09-19 PROCEDURE — 93005 ELECTROCARDIOGRAM TRACING: CPT

## 2017-09-19 PROCEDURE — 92611 MOTION FLUOROSCOPY/SWALLOW: CPT

## 2017-09-19 PROCEDURE — 84443 ASSAY THYROID STIM HORMONE: CPT

## 2017-09-19 PROCEDURE — 82306 VITAMIN D 25 HYDROXY: CPT

## 2017-09-19 PROCEDURE — 97112 NEUROMUSCULAR REEDUCATION: CPT

## 2017-09-19 PROCEDURE — 96125 COGNITIVE TEST BY HC PRO: CPT

## 2017-09-19 PROCEDURE — 84134 ASSAY OF PREALBUMIN: CPT

## 2017-09-29 ENCOUNTER — NON-APPOINTMENT (OUTPATIENT)
Age: 82
End: 2017-09-29

## 2017-09-29 ENCOUNTER — APPOINTMENT (OUTPATIENT)
Dept: CARDIOLOGY | Facility: CLINIC | Age: 82
End: 2017-09-29
Payer: MEDICARE

## 2017-09-29 VITALS
DIASTOLIC BLOOD PRESSURE: 74 MMHG | HEART RATE: 94 BPM | HEIGHT: 67 IN | OXYGEN SATURATION: 98 % | SYSTOLIC BLOOD PRESSURE: 106 MMHG

## 2017-09-29 PROCEDURE — 93000 ELECTROCARDIOGRAM COMPLETE: CPT

## 2017-09-29 PROCEDURE — 99215 OFFICE O/P EST HI 40 MIN: CPT

## 2017-09-29 RX ORDER — BUPRENORPHINE 15 UG/H
15 PATCH, EXTENDED RELEASE TRANSDERMAL
Refills: 0 | Status: DISCONTINUED | COMMUNITY
End: 2017-09-29

## 2017-09-29 RX ORDER — TRAMADOL HYDROCHLORIDE 25 MG/1
TABLET, COATED ORAL
Refills: 0 | Status: DISCONTINUED | COMMUNITY
End: 2017-09-29

## 2017-10-05 ENCOUNTER — APPOINTMENT (OUTPATIENT)
Dept: NEUROLOGY | Facility: CLINIC | Age: 82
End: 2017-10-05

## 2017-10-09 ENCOUNTER — APPOINTMENT (OUTPATIENT)
Dept: CARDIOLOGY | Facility: CLINIC | Age: 82
End: 2017-10-09
Payer: MEDICARE

## 2017-10-09 VITALS
OXYGEN SATURATION: 96 % | SYSTOLIC BLOOD PRESSURE: 92 MMHG | BODY MASS INDEX: 25.11 KG/M2 | HEART RATE: 77 BPM | WEIGHT: 160 LBS | DIASTOLIC BLOOD PRESSURE: 61 MMHG | HEIGHT: 67 IN

## 2017-10-09 PROCEDURE — 93000 ELECTROCARDIOGRAM COMPLETE: CPT

## 2017-10-09 PROCEDURE — 99215 OFFICE O/P EST HI 40 MIN: CPT

## 2017-10-09 RX ORDER — ROSUVASTATIN CALCIUM 20 MG/1
20 TABLET, FILM COATED ORAL
Qty: 90 | Refills: 3 | Status: DISCONTINUED | COMMUNITY
End: 2017-10-09

## 2017-10-25 ENCOUNTER — APPOINTMENT (OUTPATIENT)
Dept: ORTHOPEDIC SURGERY | Facility: CLINIC | Age: 82
End: 2017-10-25
Payer: MEDICARE

## 2017-10-25 ENCOUNTER — RX RENEWAL (OUTPATIENT)
Age: 82
End: 2017-10-25

## 2017-10-25 PROCEDURE — 99024 POSTOP FOLLOW-UP VISIT: CPT

## 2017-10-25 PROCEDURE — 72082 X-RAY EXAM ENTIRE SPI 2/3 VW: CPT

## 2017-11-05 ENCOUNTER — TRANSCRIPTION ENCOUNTER (OUTPATIENT)
Age: 82
End: 2017-11-05

## 2017-11-10 ENCOUNTER — APPOINTMENT (OUTPATIENT)
Dept: CARDIOLOGY | Facility: CLINIC | Age: 82
End: 2017-11-10
Payer: MEDICARE

## 2017-11-10 ENCOUNTER — NON-APPOINTMENT (OUTPATIENT)
Age: 82
End: 2017-11-10

## 2017-11-10 VITALS
DIASTOLIC BLOOD PRESSURE: 89 MMHG | OXYGEN SATURATION: 94 % | HEIGHT: 67 IN | SYSTOLIC BLOOD PRESSURE: 157 MMHG | HEART RATE: 86 BPM

## 2017-11-10 DIAGNOSIS — E78.5 HYPERLIPIDEMIA, UNSPECIFIED: ICD-10-CM

## 2017-11-10 PROCEDURE — 93000 ELECTROCARDIOGRAM COMPLETE: CPT

## 2017-11-10 PROCEDURE — 99214 OFFICE O/P EST MOD 30 MIN: CPT

## 2017-11-10 RX ORDER — FLUDROCORTISONE ACETATE 0.1 MG/1
0.1 TABLET ORAL
Qty: 30 | Refills: 5 | Status: DISCONTINUED | COMMUNITY
End: 2017-11-10

## 2017-12-01 ENCOUNTER — APPOINTMENT (OUTPATIENT)
Dept: SPEECH THERAPY | Facility: HOSPITAL | Age: 82
End: 2017-12-01

## 2017-12-01 ENCOUNTER — APPOINTMENT (OUTPATIENT)
Dept: RADIOLOGY | Facility: HOSPITAL | Age: 82
End: 2017-12-01

## 2017-12-04 ENCOUNTER — CHART COPY (OUTPATIENT)
Age: 82
End: 2017-12-04

## 2017-12-06 RX ORDER — AMOXICILLIN 250 MG/5ML
1 SUSPENSION, RECONSTITUTED, ORAL (ML) ORAL
Qty: 0 | Refills: 0 | COMMUNITY
Start: 2017-12-06

## 2017-12-07 ENCOUNTER — INPATIENT (INPATIENT)
Facility: HOSPITAL | Age: 82
LOS: 1 days | Discharge: HOME | End: 2017-12-09
Attending: HOSPITALIST | Admitting: HOSPITALIST
Payer: MEDICARE

## 2017-12-07 VITALS
OXYGEN SATURATION: 93 % | DIASTOLIC BLOOD PRESSURE: 67 MMHG | TEMPERATURE: 99 F | SYSTOLIC BLOOD PRESSURE: 126 MMHG | RESPIRATION RATE: 18 BRPM | HEART RATE: 91 BPM

## 2017-12-07 DIAGNOSIS — M48.02 SPINAL STENOSIS, CERVICAL REGION: ICD-10-CM

## 2017-12-07 DIAGNOSIS — Z45.42 ENCOUNTER FOR ADJUSTMENT AND MANAGEMENT OF NEUROSTIMULATOR: Chronic | ICD-10-CM

## 2017-12-07 DIAGNOSIS — I48.91 UNSPECIFIED ATRIAL FIBRILLATION: ICD-10-CM

## 2017-12-07 DIAGNOSIS — M54.16 RADICULOPATHY, LUMBAR REGION: ICD-10-CM

## 2017-12-07 DIAGNOSIS — S39.92XA UNSPECIFIED INJURY OF LOWER BACK, INITIAL ENCOUNTER: Chronic | ICD-10-CM

## 2017-12-07 DIAGNOSIS — J18.9 PNEUMONIA, UNSPECIFIED ORGANISM: ICD-10-CM

## 2017-12-07 DIAGNOSIS — K40.90 UNILATERAL INGUINAL HERNIA, WITHOUT OBSTRUCTION OR GANGRENE, NOT SPECIFIED AS RECURRENT: ICD-10-CM

## 2017-12-07 DIAGNOSIS — Z98.89 OTHER SPECIFIED POSTPROCEDURAL STATES: Chronic | ICD-10-CM

## 2017-12-07 DIAGNOSIS — Z98.890 OTHER SPECIFIED POSTPROCEDURAL STATES: Chronic | ICD-10-CM

## 2017-12-07 DIAGNOSIS — Z29.9 ENCOUNTER FOR PROPHYLACTIC MEASURES, UNSPECIFIED: ICD-10-CM

## 2017-12-07 DIAGNOSIS — I25.10 ATHEROSCLEROTIC HEART DISEASE OF NATIVE CORONARY ARTERY WITHOUT ANGINA PECTORIS: ICD-10-CM

## 2017-12-07 DIAGNOSIS — A41.9 SEPSIS, UNSPECIFIED ORGANISM: ICD-10-CM

## 2017-12-07 LAB
ALBUMIN SERPL ELPH-MCNC: 3.5 G/DL — SIGNIFICANT CHANGE UP (ref 3.3–5)
ALP SERPL-CCNC: 103 U/L — SIGNIFICANT CHANGE UP (ref 40–120)
ALT FLD-CCNC: 12 U/L — SIGNIFICANT CHANGE UP (ref 4–41)
ANISOCYTOSIS BLD QL: SIGNIFICANT CHANGE UP
APTT BLD: 37.1 SEC — SIGNIFICANT CHANGE UP (ref 27.5–37.4)
AST SERPL-CCNC: 20 U/L — SIGNIFICANT CHANGE UP (ref 4–40)
BASE EXCESS BLDV CALC-SCNC: -0.3 MMOL/L — SIGNIFICANT CHANGE UP
BASE EXCESS BLDV CALC-SCNC: 2.6 MMOL/L — SIGNIFICANT CHANGE UP
BASOPHILS # BLD AUTO: 0.03 K/UL — SIGNIFICANT CHANGE UP (ref 0–0.2)
BASOPHILS NFR BLD AUTO: 0.3 % — SIGNIFICANT CHANGE UP (ref 0–2)
BASOPHILS NFR SPEC: 0 % — SIGNIFICANT CHANGE UP (ref 0–2)
BILIRUB SERPL-MCNC: 0.8 MG/DL — SIGNIFICANT CHANGE UP (ref 0.2–1.2)
BLD GP AB SCN SERPL QL: NEGATIVE — SIGNIFICANT CHANGE UP
BLOOD GAS VENOUS - CREATININE: 0.67 MG/DL — SIGNIFICANT CHANGE UP (ref 0.5–1.3)
BLOOD GAS VENOUS - CREATININE: 0.75 MG/DL — SIGNIFICANT CHANGE UP (ref 0.5–1.3)
BUN SERPL-MCNC: 20 MG/DL — SIGNIFICANT CHANGE UP (ref 7–23)
CALCIUM SERPL-MCNC: 8.4 MG/DL — SIGNIFICANT CHANGE UP (ref 8.4–10.5)
CHLORIDE BLDV-SCNC: 103 MMOL/L — SIGNIFICANT CHANGE UP (ref 96–108)
CHLORIDE BLDV-SCNC: 104 MMOL/L — SIGNIFICANT CHANGE UP (ref 96–108)
CHLORIDE SERPL-SCNC: 101 MMOL/L — SIGNIFICANT CHANGE UP (ref 98–107)
CK MB BLD-MCNC: 1.68 NG/ML — SIGNIFICANT CHANGE UP (ref 1–6.6)
CK SERPL-CCNC: 65 U/L — SIGNIFICANT CHANGE UP (ref 30–200)
CO2 SERPL-SCNC: 27 MMOL/L — SIGNIFICANT CHANGE UP (ref 22–31)
CREAT SERPL-MCNC: 0.79 MG/DL — SIGNIFICANT CHANGE UP (ref 0.5–1.3)
ELLIPTOCYTES BLD QL SMEAR: SLIGHT — SIGNIFICANT CHANGE UP
EOSINOPHIL # BLD AUTO: 0.02 K/UL — SIGNIFICANT CHANGE UP (ref 0–0.5)
EOSINOPHIL NFR BLD AUTO: 0.2 % — SIGNIFICANT CHANGE UP (ref 0–6)
EOSINOPHIL NFR FLD: 0 % — SIGNIFICANT CHANGE UP (ref 0–6)
GAS PNL BLDV: 132 MMOL/L — LOW (ref 136–146)
GAS PNL BLDV: 134 MMOL/L — LOW (ref 136–146)
GIANT PLATELETS BLD QL SMEAR: PRESENT — SIGNIFICANT CHANGE UP
GLUCOSE BLDV-MCNC: 105 — HIGH (ref 70–99)
GLUCOSE BLDV-MCNC: 124 — HIGH (ref 70–99)
GLUCOSE SERPL-MCNC: 121 MG/DL — HIGH (ref 70–99)
HCO3 BLDV-SCNC: 24 MMOL/L — SIGNIFICANT CHANGE UP (ref 20–27)
HCO3 BLDV-SCNC: 25 MMOL/L — SIGNIFICANT CHANGE UP (ref 20–27)
HCT VFR BLD CALC: 32.2 % — LOW (ref 39–50)
HCT VFR BLDV CALC: 33.5 % — LOW (ref 39–51)
HCT VFR BLDV CALC: 34.6 % — LOW (ref 39–51)
HGB BLD-MCNC: 10.6 G/DL — LOW (ref 13–17)
HGB BLDV-MCNC: 10.9 G/DL — LOW (ref 13–17)
HGB BLDV-MCNC: 11.2 G/DL — LOW (ref 13–17)
IMM GRANULOCYTES # BLD AUTO: 0.07 # — SIGNIFICANT CHANGE UP
IMM GRANULOCYTES NFR BLD AUTO: 0.7 % — SIGNIFICANT CHANGE UP (ref 0–1.5)
INR BLD: 1.38 — HIGH (ref 0.88–1.17)
LACTATE BLDV-MCNC: 1 MMOL/L — SIGNIFICANT CHANGE UP (ref 0.5–2)
LACTATE BLDV-MCNC: 1.3 MMOL/L — SIGNIFICANT CHANGE UP (ref 0.5–2)
LYMPHOCYTES # BLD AUTO: 1.19 K/UL — SIGNIFICANT CHANGE UP (ref 1–3.3)
LYMPHOCYTES # BLD AUTO: 11.7 % — LOW (ref 13–44)
LYMPHOCYTES NFR SPEC AUTO: 3.5 % — LOW (ref 13–44)
MACROCYTES BLD QL: SIGNIFICANT CHANGE UP
MANUAL SMEAR VERIFICATION: SIGNIFICANT CHANGE UP
MCHC RBC-ENTMCNC: 30.2 PG — SIGNIFICANT CHANGE UP (ref 27–34)
MCHC RBC-ENTMCNC: 32.9 % — SIGNIFICANT CHANGE UP (ref 32–36)
MCV RBC AUTO: 91.7 FL — SIGNIFICANT CHANGE UP (ref 80–100)
MONOCYTES # BLD AUTO: 1.64 K/UL — HIGH (ref 0–0.9)
MONOCYTES NFR BLD AUTO: 16.1 % — HIGH (ref 2–14)
MONOCYTES NFR BLD: 9.7 % — HIGH (ref 2–9)
NEUTROPHIL AB SER-ACNC: 82.3 % — HIGH (ref 43–77)
NEUTROPHILS # BLD AUTO: 7.26 K/UL — SIGNIFICANT CHANGE UP (ref 1.8–7.4)
NEUTROPHILS NFR BLD AUTO: 71 % — SIGNIFICANT CHANGE UP (ref 43–77)
NEUTS BAND # BLD: 1.8 % — SIGNIFICANT CHANGE UP (ref 0–6)
NRBC # FLD: 0 — SIGNIFICANT CHANGE UP
PCO2 BLDV: 38 MMHG — LOW (ref 41–51)
PCO2 BLDV: 45 MMHG — SIGNIFICANT CHANGE UP (ref 41–51)
PH BLDV: 7.4 PH — SIGNIFICANT CHANGE UP (ref 7.32–7.43)
PH BLDV: 7.41 PH — SIGNIFICANT CHANGE UP (ref 7.32–7.43)
PLATELET # BLD AUTO: 150 K/UL — SIGNIFICANT CHANGE UP (ref 150–400)
PLATELET COUNT - ESTIMATE: NORMAL — SIGNIFICANT CHANGE UP
PMV BLD: 9.9 FL — SIGNIFICANT CHANGE UP (ref 7–13)
PO2 BLDV: 25 MMHG — LOW (ref 35–40)
PO2 BLDV: 54 MMHG — HIGH (ref 35–40)
POLYCHROMASIA BLD QL SMEAR: SLIGHT — SIGNIFICANT CHANGE UP
POTASSIUM BLDV-SCNC: 4 MMOL/L — SIGNIFICANT CHANGE UP (ref 3.4–4.5)
POTASSIUM BLDV-SCNC: 4.1 MMOL/L — SIGNIFICANT CHANGE UP (ref 3.4–4.5)
POTASSIUM SERPL-MCNC: 4.2 MMOL/L — SIGNIFICANT CHANGE UP (ref 3.5–5.3)
POTASSIUM SERPL-SCNC: 4.2 MMOL/L — SIGNIFICANT CHANGE UP (ref 3.5–5.3)
PROT SERPL-MCNC: 6.9 G/DL — SIGNIFICANT CHANGE UP (ref 6–8.3)
PROTHROM AB SERPL-ACNC: 15.6 SEC — HIGH (ref 9.8–13.1)
RBC # BLD: 3.51 M/UL — LOW (ref 4.2–5.8)
RBC # FLD: 15.6 % — HIGH (ref 10.3–14.5)
REVIEW TO FOLLOW: YES — SIGNIFICANT CHANGE UP
RH IG SCN BLD-IMP: POSITIVE — SIGNIFICANT CHANGE UP
SAO2 % BLDV: 37 % — LOW (ref 60–85)
SAO2 % BLDV: 86.3 % — HIGH (ref 60–85)
SODIUM SERPL-SCNC: 136 MMOL/L — SIGNIFICANT CHANGE UP (ref 135–145)
TROPONIN T SERPL-MCNC: < 0.06 NG/ML — SIGNIFICANT CHANGE UP (ref 0–0.06)
VARIANT LYMPHS # BLD: 2.7 % — SIGNIFICANT CHANGE UP
WBC # BLD: 10.21 K/UL — SIGNIFICANT CHANGE UP (ref 3.8–10.5)
WBC # FLD AUTO: 10.21 K/UL — SIGNIFICANT CHANGE UP (ref 3.8–10.5)

## 2017-12-07 PROCEDURE — 71010: CPT | Mod: 26

## 2017-12-07 PROCEDURE — 72170 X-RAY EXAM OF PELVIS: CPT | Mod: 26

## 2017-12-07 PROCEDURE — 70450 CT HEAD/BRAIN W/O DYE: CPT | Mod: 26

## 2017-12-07 PROCEDURE — 99223 1ST HOSP IP/OBS HIGH 75: CPT | Mod: AI,GC

## 2017-12-07 PROCEDURE — 71260 CT THORAX DX C+: CPT | Mod: 26

## 2017-12-07 PROCEDURE — 74177 CT ABD & PELVIS W/CONTRAST: CPT | Mod: 26

## 2017-12-07 PROCEDURE — 72125 CT NECK SPINE W/O DYE: CPT | Mod: 26

## 2017-12-07 RX ORDER — AZITHROMYCIN 500 MG/1
500 TABLET, FILM COATED ORAL ONCE
Qty: 0 | Refills: 0 | Status: COMPLETED | OUTPATIENT
Start: 2017-12-07 | End: 2017-12-07

## 2017-12-07 RX ORDER — AZITHROMYCIN 500 MG/1
TABLET, FILM COATED ORAL
Qty: 0 | Refills: 0 | Status: DISCONTINUED | OUTPATIENT
Start: 2017-12-07 | End: 2017-12-09

## 2017-12-07 RX ORDER — MORPHINE SULFATE 50 MG/1
2 CAPSULE, EXTENDED RELEASE ORAL EVERY 4 HOURS
Qty: 0 | Refills: 0 | Status: DISCONTINUED | OUTPATIENT
Start: 2017-12-07 | End: 2017-12-08

## 2017-12-07 RX ORDER — ATORVASTATIN CALCIUM 80 MG/1
1 TABLET, FILM COATED ORAL
Qty: 0 | Refills: 0 | COMMUNITY

## 2017-12-07 RX ORDER — VANCOMYCIN HCL 1 G
1000 VIAL (EA) INTRAVENOUS ONCE
Qty: 0 | Refills: 0 | Status: COMPLETED | OUTPATIENT
Start: 2017-12-07 | End: 2017-12-07

## 2017-12-07 RX ORDER — PANTOPRAZOLE SODIUM 20 MG/1
40 TABLET, DELAYED RELEASE ORAL
Qty: 0 | Refills: 0 | Status: DISCONTINUED | OUTPATIENT
Start: 2017-12-07 | End: 2017-12-09

## 2017-12-07 RX ORDER — ASPIRIN/CALCIUM CARB/MAGNESIUM 324 MG
81 TABLET ORAL DAILY
Qty: 0 | Refills: 0 | Status: DISCONTINUED | OUTPATIENT
Start: 2017-12-07 | End: 2017-12-09

## 2017-12-07 RX ORDER — DABIGATRAN ETEXILATE MESYLATE 150 MG/1
150 CAPSULE ORAL EVERY 12 HOURS
Qty: 0 | Refills: 0 | Status: DISCONTINUED | OUTPATIENT
Start: 2017-12-07 | End: 2017-12-09

## 2017-12-07 RX ORDER — POLYETHYLENE GLYCOL 3350 17 G/17G
17 POWDER, FOR SOLUTION ORAL DAILY
Qty: 0 | Refills: 0 | Status: DISCONTINUED | OUTPATIENT
Start: 2017-12-07 | End: 2017-12-09

## 2017-12-07 RX ORDER — DABIGATRAN ETEXILATE MESYLATE 150 MG/1
1 CAPSULE ORAL
Qty: 0 | Refills: 0 | COMMUNITY

## 2017-12-07 RX ORDER — CEFTRIAXONE 500 MG/1
1 INJECTION, POWDER, FOR SOLUTION INTRAMUSCULAR; INTRAVENOUS ONCE
Qty: 0 | Refills: 0 | Status: COMPLETED | OUTPATIENT
Start: 2017-12-07 | End: 2017-12-07

## 2017-12-07 RX ORDER — ACETAMINOPHEN 500 MG
975 TABLET ORAL ONCE
Qty: 0 | Refills: 0 | Status: COMPLETED | OUTPATIENT
Start: 2017-12-07 | End: 2017-12-07

## 2017-12-07 RX ORDER — AZITHROMYCIN 500 MG/1
500 TABLET, FILM COATED ORAL EVERY 24 HOURS
Qty: 0 | Refills: 0 | Status: DISCONTINUED | OUTPATIENT
Start: 2017-12-08 | End: 2017-12-09

## 2017-12-07 RX ORDER — SENNA PLUS 8.6 MG/1
2 TABLET ORAL AT BEDTIME
Qty: 0 | Refills: 0 | Status: DISCONTINUED | OUTPATIENT
Start: 2017-12-07 | End: 2017-12-09

## 2017-12-07 RX ORDER — DOCUSATE SODIUM 100 MG
100 CAPSULE ORAL DAILY
Qty: 0 | Refills: 0 | Status: DISCONTINUED | OUTPATIENT
Start: 2017-12-07 | End: 2017-12-09

## 2017-12-07 RX ORDER — SODIUM CHLORIDE 9 MG/ML
2000 INJECTION INTRAMUSCULAR; INTRAVENOUS; SUBCUTANEOUS ONCE
Qty: 0 | Refills: 0 | Status: DISCONTINUED | OUTPATIENT
Start: 2017-12-07 | End: 2017-12-07

## 2017-12-07 RX ORDER — PIPERACILLIN AND TAZOBACTAM 4; .5 G/20ML; G/20ML
3.38 INJECTION, POWDER, LYOPHILIZED, FOR SOLUTION INTRAVENOUS ONCE
Qty: 0 | Refills: 0 | Status: COMPLETED | OUTPATIENT
Start: 2017-12-07 | End: 2017-12-07

## 2017-12-07 RX ORDER — LEVOTHYROXINE SODIUM 125 MCG
50 TABLET ORAL DAILY
Qty: 0 | Refills: 0 | Status: DISCONTINUED | OUTPATIENT
Start: 2017-12-07 | End: 2017-12-09

## 2017-12-07 RX ORDER — ASPIRIN/CALCIUM CARB/MAGNESIUM 324 MG
1 TABLET ORAL
Qty: 0 | Refills: 0 | COMMUNITY

## 2017-12-07 RX ORDER — OXYCODONE HYDROCHLORIDE 5 MG/1
5 TABLET ORAL EVERY 4 HOURS
Qty: 0 | Refills: 0 | Status: DISCONTINUED | OUTPATIENT
Start: 2017-12-07 | End: 2017-12-09

## 2017-12-07 RX ORDER — MORPHINE SULFATE 50 MG/1
4 CAPSULE, EXTENDED RELEASE ORAL ONCE
Qty: 0 | Refills: 0 | Status: DISCONTINUED | OUTPATIENT
Start: 2017-12-07 | End: 2017-12-07

## 2017-12-07 RX ORDER — TAMSULOSIN HYDROCHLORIDE 0.4 MG/1
1 CAPSULE ORAL
Qty: 0 | Refills: 0 | COMMUNITY

## 2017-12-07 RX ORDER — MIDODRINE HYDROCHLORIDE 2.5 MG/1
2.5 TABLET ORAL EVERY 8 HOURS
Qty: 0 | Refills: 0 | Status: DISCONTINUED | OUTPATIENT
Start: 2017-12-07 | End: 2017-12-09

## 2017-12-07 RX ORDER — CEFTRIAXONE 500 MG/1
INJECTION, POWDER, FOR SOLUTION INTRAMUSCULAR; INTRAVENOUS
Qty: 0 | Refills: 0 | Status: DISCONTINUED | OUTPATIENT
Start: 2017-12-07 | End: 2017-12-09

## 2017-12-07 RX ORDER — CEFTRIAXONE 500 MG/1
1 INJECTION, POWDER, FOR SOLUTION INTRAMUSCULAR; INTRAVENOUS EVERY 24 HOURS
Qty: 0 | Refills: 0 | Status: DISCONTINUED | OUTPATIENT
Start: 2017-12-08 | End: 2017-12-09

## 2017-12-07 RX ORDER — TAMSULOSIN HYDROCHLORIDE 0.4 MG/1
0.4 CAPSULE ORAL AT BEDTIME
Qty: 0 | Refills: 0 | Status: DISCONTINUED | OUTPATIENT
Start: 2017-12-07 | End: 2017-12-09

## 2017-12-07 RX ORDER — PANTOPRAZOLE SODIUM 20 MG/1
1 TABLET, DELAYED RELEASE ORAL
Qty: 0 | Refills: 0 | COMMUNITY

## 2017-12-07 RX ORDER — IPRATROPIUM/ALBUTEROL SULFATE 18-103MCG
3 AEROSOL WITH ADAPTER (GRAM) INHALATION ONCE
Qty: 0 | Refills: 0 | Status: DISCONTINUED | OUTPATIENT
Start: 2017-12-07 | End: 2017-12-07

## 2017-12-07 RX ADMIN — SENNA PLUS 2 TABLET(S): 8.6 TABLET ORAL at 22:01

## 2017-12-07 RX ADMIN — MIDODRINE HYDROCHLORIDE 2.5 MILLIGRAM(S): 2.5 TABLET ORAL at 22:01

## 2017-12-07 RX ADMIN — Medication 975 MILLIGRAM(S): at 11:15

## 2017-12-07 RX ADMIN — MORPHINE SULFATE 4 MILLIGRAM(S): 50 CAPSULE, EXTENDED RELEASE ORAL at 14:45

## 2017-12-07 RX ADMIN — TAMSULOSIN HYDROCHLORIDE 0.4 MILLIGRAM(S): 0.4 CAPSULE ORAL at 22:01

## 2017-12-07 RX ADMIN — Medication 250 MILLIGRAM(S): at 15:02

## 2017-12-07 RX ADMIN — CEFTRIAXONE 100 GRAM(S): 500 INJECTION, POWDER, FOR SOLUTION INTRAMUSCULAR; INTRAVENOUS at 18:21

## 2017-12-07 RX ADMIN — PIPERACILLIN AND TAZOBACTAM 200 GRAM(S): 4; .5 INJECTION, POWDER, LYOPHILIZED, FOR SOLUTION INTRAVENOUS at 14:13

## 2017-12-07 RX ADMIN — AZITHROMYCIN 250 MILLIGRAM(S): 500 TABLET, FILM COATED ORAL at 18:21

## 2017-12-07 RX ADMIN — MORPHINE SULFATE 4 MILLIGRAM(S): 50 CAPSULE, EXTENDED RELEASE ORAL at 14:13

## 2017-12-07 RX ADMIN — DABIGATRAN ETEXILATE MESYLATE 150 MILLIGRAM(S): 150 CAPSULE ORAL at 18:59

## 2017-12-07 NOTE — ED PROVIDER NOTE - OBJECTIVE STATEMENT
82 YOM PMH CAD, HTN, HLD, AFIB, CHF on midodrine, Hernia, spinal stenosis s/p multiple spinal surgeries and spinal cord stimulator admitted to [facility] on 8/1 s/p surgery  admission for JULITO pain s/p stimulator removal and fusion returns with back pain today.      Last admission 8/1 s/p exploration of the prior spinal fusion, removal of hardware, removal of spinal stimulator, T11-L1 laminectomy, T4-pelvis spinal fusion with osteotomies course c/b anemia requiring transfusion, NSVT, pulmonary edema and encephalopathy CTH revealed chronic infarcts.  Pt D/C to rehab. 82 YOM PMH CAD, HTN, HLD, AFIB, CHF on midodrine, Hernia, spinal stenosis s/p multiple spinal surgeries and spinal cord stimulator admitted to [facility] on 8/1 s/p surgery  admission for JULITO pain s/p stimulator removal and fusion returns with back pain today.  Pt states he had a near fall yesterday and c/o back pain localized to his posterior ribs radiating to his LUQ.  Pt stood up from bed, grabbed the bed post and was helped back to bed by his wife.  No head strike or neck injury. Pt did endorse 3 falls this past month where he injured the bridge of his nose most recently 4-5 days ago.  PT alert to person and place however when questioned about the year states its 1978.  Pt states he falls because of leg weakness and dizziness described as feeling faint.  No chest pain, SOB, headache, nausea, or vomiting.  Last admission 8/1 s/p exploration of the prior spinal fusion, removal of hardware, removal of spinal stimulator, T11-L1 laminectomy, T4-pelvis spinal fusion with osteotomies course c/b anemia requiring transfusion, NSVT, pulmonary edema and encephalopathy CTH revealed chronic infarcts.  Pt D/C to rehab.  No  no midline spinal tenderness, no unilateral weakness, tingling, or numbness in the lower extremities, no saddle numbness, no constipation, no diarrhea, no loss of bowel continence, and no loss of bladder continence.

## 2017-12-07 NOTE — H&P ADULT - NSHPREVIEWOFSYSTEMS_GEN_ALL_CORE
CONSTITUTIONAL: No weakness, fevers or chills  EYES/ENT: No visual changes;  No vertigo or throat pain   NECK: No pain or stiffness  RESPIRATORY: No cough, wheezing, hemoptysis; No shortness of breath  CARDIOVASCULAR: No chest pain or palpitations  GASTROINTESTINAL: No abdominal or epigastric pain. No nausea, vomiting, or hematemesis; No diarrhea or constipation. No melena or hematochezia.  GENITOURINARY: No dysuria, frequency or hematuria  NEUROLOGICAL: No numbness or weakness  MUSCULOSKELETAL: Left lower back pain radiating to the front  SKIN: No itching, burning, rashes, or lesions   All other review of systems is negative unless indicated above.

## 2017-12-07 NOTE — CONSULT NOTE ADULT - SUBJECTIVE AND OBJECTIVE BOX
Orthopaedic Surgery Consult Note    Patient is a 82y old  Male who presents with a chief complaint of Chest pain/back pain Patient is s/p HANNA, T11-L1 Laminectomy, T4-Pelvis instrumentation/in situ (10 Aug 2017 09:32)    HPI:  80 y/o male with medical h/o CAD with cardiac stents on Aspirin, Afib on Pradaxa, HTN, HLD, BPH, GERD and Spinal stenosis. Pt has h/o previous spinal surgery in 2003 & 2006. pt endorses a persistent cough [id number]. Pt reports recent diagnosis of Left sided PNA. Pt reports left UE/shoulder radic symptoms and endorses having them for a while. Pt reports bouts of vertigo and dizziness. pt's wife endorses a trivial mech fall recently. pt endorses he gets dizzy sometimes. Pt denies any LOC or truama.  Pt presents today for for evaluation of mechanical fall as well as persistent cough.      PAST MEDICAL & SURGICAL HISTORY:  Spinal stenosis of lumbosacral region  Rotator cuff disorder, right  Arthritis  Inguinal hernia: right  Reflux  CAD (coronary artery disease)  HTN (hypertension)  HLD (hyperlipidemia)  Atrial fibrillation  Peripheral neuropathy  H/O rotator cuff surgery: 12/2015  History of skin surgery: melanoma excision - left cheek/face 2016  Encounter for interrogation of neurostimulator: 12/2015  Back injury: s/p surgery L1-L9 in two separate surgeries 2003 &amp; 2006  S/P angioplasty with stent: RCA 2008 &amp; LAD 2014 cardiac stent placement  S/P knee replacement, bilateral  S/P cataract extraction    [] No significant past history as reviewed with the patient and family    FAMILY HISTORY:  Family history of heart failure (Sibling)    [] Family history not pertinent as reviewed with the patient and family    SOCIAL HISTORY:    MEDICATIONS  (STANDING):    MEDICATIONS  (PRN):    Allergies    No Known Allergies    Intolerances        REVIEW OF SYSTEMS  [x] All review of systems negative except for those marked.  Systemic:	[] Fever		[] Chills		[] Night sweats		[] Fatigue	[] Other  [] Cardiovascular:  [] Pulmonary:  [] Renal/Urologic:  [] Gastrointestinal:  [] Metabolic:  [] Neurologic:  [] Hematologic:  [] ENT:  [] Ophthalmologic:  [] Musculoskeletal: see HPI    Vital Signs Last 24 Hrs  T(C): 37.4 (07 Dec 2017 10:14), Max: 37.4 (07 Dec 2017 10:14)  T(F): 99.4 (07 Dec 2017 10:14), Max: 99.4 (07 Dec 2017 10:14)  HR: 86 (07 Dec 2017 14:05) (86 - 95)  BP: 131/66 (07 Dec 2017 14:05) (126/67 - 141/80)  BP(mean): --  RR: 16 (07 Dec 2017 14:05) (16 - 18)  SpO2: 100% (07 Dec 2017 14:05) (93% - 100%)      PHYSICAL EXAM:  Gen: NAD, Pleasant  skin intact, no signs erythema, collections or sings of infections  Surgical site, healed, no signs of infection  Motor: LE: ehl/fhl/ta/g/s 5/5 B/L  UE: ain/pin/u/r intact 5/5   SILT, dec sensation S1 distribution b/l    compartments soft compressible  NVID, DPP 2+                           10.6   10.21 )-----------( 150      ( 07 Dec 2017 11:05 )             32.2     12-07    136  |  101  |  20   ----------------------------<  121<H>  4.2   |  27  |  0.79    Ca    8.4      07 Dec 2017 11:05    TPro  6.9  /  Alb  3.5  /  TBili  0.8  /  DBili  x   /  AST  20  /  ALT  12  /  AlkPhos  103  12-07    PT/INR - ( 07 Dec 2017 11:05 )   PT: 15.6 SEC;   INR: 1.38          PTT - ( 07 Dec 2017 11:05 )  PTT:37.1 SEC      IMAGING STUDIES:   CT: Cervical   IMPRESSION:  BRAIN: No acute findings. No interval change.  CERVICAL SPINE: No acute fracture. There is new grade 1 retrolisthesis of   C3 on C4. Grade 1 retrolisthesis of C4 on C5 and C5 on C6 is   reidentified. Extensive degenerative changes as described above.   Compression of the spinal cord is suspected. MRI can be done for further   evaluation if there are no contraindications.      82y Male well known to Orthopedic Spine Service consulted for Chest pain/back pain 2/2 cough/PNA currently stable    -Pain control  -No orthopedic surgical intervention at this time  -Case discussed in detail with  as well as the patient   -Primary team aware   -Consider management of Cough/PNA as per primary team/medicine   -medicine evaluation   -Pt will follow up in the office upon discharge   -WBAT B/L LE  -PT/OT evaluation for gait/ambulation ROM  -OOB to chair   -C-color on for ambulation only   -Will follow up FROILAN Thapa PA-C   Orhopedic Clinical Spine    #[id number]

## 2017-12-07 NOTE — H&P ADULT - PROBLEM SELECTOR PLAN 1
2/2 pneumonia. CT chest notable for patchy consolidation in the right upper and lower lobe. On midodrine, which could mask hypotension in the setting of an active infection.   -c/w ceftriaxone and azithromycin.  -monitor CBC and BMP  -f/u blood cx x2

## 2017-12-07 NOTE — ED PROVIDER NOTE - MEDICAL DECISION MAKING DETAILS
82 YOM PMH CAD, HTN, HLD, AFIB, CHF on midodrine, Hernia, spinal stenosis s/p multiple spinal surgeries and spinal cord stimulator admitted to [facility] on 8/1 s/p surgery  admission for JULITO pain s/p stimulator removal and fusion returns with back pain today.   VSS WNL.  Exam reveals signs of thoracic and head trauma.  Given patient is on NOAC and signs of trauma with multiple recent falls evaluate according to ATLS protocol.  EKG reveals AFIB with no signs of active ischemia.  R/O ACS.  Resuscitate.  Admit for cardiovascular evaluation including serial enzymes and echocardiogram.  Treat symptomatically for pain.  Consider transfer with the diagnosis of traumatic injuries.  Reassess.

## 2017-12-07 NOTE — ED PROVIDER NOTE - ATTENDING CONTRIBUTION TO CARE
82m, pmhx htn, dlp, dm, admitted 3 months ago for back pain, got laminectomy. went to rehab and eventully d/c home. presents today due to fall. reports numerous falls recently. walks with walker. has not seen MD for falls. c/o left sided c/p and worsening back pain. falls a/w lightheadedness but no c/p or sob. agree with resident physical exam. pan ct imaging here shows pneumonia, no rib fractures and possible SC impingement on spine. spine consulted and nil acute to be done. rest of labs neg. will be admitted for pna and fall w/u.

## 2017-12-07 NOTE — H&P ADULT - ASSESSMENT
82M with pmh of  CAD s/p stents x2, HTN, HLD, AFIB, CHF on midodrine, Hernia, spinal stenosis s/p multiple spinal surgeries and spinal cord stimulator presenting with lumbar radiculopathy, admitted for sepsis.

## 2017-12-07 NOTE — ED PROVIDER NOTE - PROGRESS NOTE DETAILS
JW CT concerning for PNA.  Pt producing bands.  ABX for HCAP.   Will admit to medicine.  Pt has no neck pain, endorses full ROM of C spine.  No midline tenderness, no arm weakness or numbness.  5+ muscular strength in the upper extremities, sensation and motor function intact.  No radicular Sx.  CT cervical spine concerning for possible cord compression.  Ortho consult placed. ZAID Pt seen by orthopedic surgery no indication for emergent surgical management.  Agree with admission for PNA and frequent falls.  Discussed with hospitalist.  MAR text paged.

## 2017-12-07 NOTE — H&P ADULT - NSHPPHYSICALEXAM_GEN_ALL_CORE
Constitutional: NAD  Eyes: EOMI, MORELIA  ENMT: moist mucous membrane  Neck: supple  Respiratory: lungs clear to auscultations bilaterally  Cardiovascular: Irregular rate and rhythm, +s1/s2  Gastrointestinal: soft, non tender, non distended.   Extremities: 5/5 strength in UE and RLE and 4/5 in LLE  Neurological: AAOx3,   Skin: no rash or lesion  Musculoskeletal: Guarding in L lower quadrant.

## 2017-12-07 NOTE — H&P ADULT - NSHPLABSRESULTS_GEN_ALL_CORE
LABS:                         10.6   10.21 )-----------( 150      ( 07 Dec 2017 11:05 )             32.2     12-07    136  |  101  |  20  ----------------------------<  121<H>  4.2   |  27  |  0.79    Ca    8.4      07 Dec 2017 11:05    TPro  6.9  /  Alb  3.5  /  TBili  0.8  /  DBili  x   /  AST  20  /  ALT  12  /  AlkPhos  103  12-07    PT/INR - ( 07 Dec 2017 11:05 )   PT: 15.6 SEC;   INR: 1.38          PTT - ( 07 Dec 2017 11:05 )  PTT:37.1 SEC          RADIOLOGY, EKG & ADDITIONAL TESTS: Reviewed.

## 2017-12-07 NOTE — H&P ADULT - HISTORY OF PRESENT ILLNESS
82M with pmh of  CAD s/p stents x2, HTN, HLD, AFIB, CHF on midodrine, Hernia, spinal stenosis s/p multiple spinal surgeries and spinal cord stimulator admitted to [facility] on 8/1 s/p surgery  admission for JULITO pain s/p stimulator removal and fusion returns with back pain today.  Pt states he had a near fallx2 yesterday and c/o back pain localized to his posterior ribs radiating to his LUQ.  Pt stood up from bed, felt the room was spinning, grabbed the bed post and was helped back to bed by his wife. Denies trauma to his head or neck. Pt did endorse 3 falls this past month where he injured the bridge of his nose most recently 4-5 days ago.  PT alert to person and place however when questioned about the year states its 1978.  Pt states he falls because of leg weakness and dizziness described as feeling faint.  No chest pain, SOB, headache, nausea, or vomiting.  Last admission 8/1 s/p exploration of the prior spinal fusion, removal of hardware, removal of spinal stimulator, T11-L1 laminectomy, T4-pelvis spinal fusion with osteotomies course c/b anemia requiring transfusion, NSVT, pulmonary edema and encephalopathy CTH revealed chronic infarcts.  Pt D/C to rehab.  No  no midline spinal tenderness, no unilateral weakness, tingling, or numbness in the lower extremities, no saddle numbness, no constipation, no diarrhea, no loss of bowel continence, and no loss of bladder continence.

## 2017-12-07 NOTE — H&P ADULT - PROBLEM SELECTOR PLAN 2
s/p HANNA, T11-L1 Laminectomy, T4-Pelvis instrumentation/in situ 8/1/17.  -referred pain from hardware vs musculoskeletal given trauma from recent falls.   -CT abd/pel not impressive for acute findings.   -Oxycodone 5mg q6h for pain  -Morphine 2mg for breakthrough  -PT consult  -Inspiratory spirometry

## 2017-12-07 NOTE — ED ADULT NURSE NOTE - OBJECTIVE STATEMENT
Pt received to rm 23, a&ox2, disoriented to time - reports to ED c/o lower back pain radiating to L abdomen x 3 days. Pt states patient went to [facility] 3 days ago and was prescribed antibiotics (pt is a poor historian, unsure of name of med). Pt reports frequent falls secondary to vertigo, with latest fall yesterday and episode of dizziness this morning from vertigo. Laceration noted to nose - pt states from fall yesterday. Denies any head trauma, syncope, chest pain. Blancheable redness noted to sacrum. MD Burkett by bedside for eval. EKG performed. Afib on cm. VS as noted, pt in NAD at present. No redness or bruising noted to back. Hx of laminectomy. Will monitor.

## 2017-12-07 NOTE — H&P ADULT - ATTENDING COMMENTS
Patient seen and examined with team and wife present.   Wife is Sheyla Luna cell [phone].  82M with pmh of  CAD s/p stents x2, HTN, HLD, AFIB, CHF on midodrine, Hernia, spinal stenosis s/p multiple spinal surgeries and spinal cord stimulator admitted to [facility] on 8/1 s/p surgery  admission for JULITO pain s/p stimulator removal and fusion returns with back pain today.  Pt states he had a near fallx2 yesterday and c/o back pain localized to his posterior ribs radiating to his LUQ.  Pt stood up from bed, felt the room was spinning, grabbed the bed post and was helped back to bed by his wife.  Patient has been on midodrine for low BP since surgery 9/2017. Denies sadle anesthesia or Significant pain with SLR.  PE: Sig for Low back pain that radiate to LLQ of abd.  No pain with SLR.   Labs / Rad sig R sided PNA   CT of Cspine- ? cord compression at C 3  Dx Plan.  Low back / Sciatica pain to L lower back- start percocet 2 tabs q6 prn mod/severe pain. IV morphine for break through pain with senna/ colace/ miralax  ID Ceftriaxone and Azythro for PNA. ?? Low BP from infection masked by Midodrine??  C spine comp ??- Ortho will det if patient can get MRI to better evaluate.  H/o Afib- c/w metoprolol for rate control and Pradaxa.  DVT proph- patient already on Pradaxa.

## 2017-12-07 NOTE — H&P ADULT - PROBLEM SELECTOR PLAN 3
Cervical CT with retrolisthesis of C3 and C4, with questionable C3 compression fracture. Patient asymptomatic at this time.   -Ortho c/s; recs no surgical intervention at this time. Will follow up outpatient with Dr. Jean-Baptiste.  -consider MRI during this admission.

## 2017-12-08 LAB
ALBUMIN SERPL ELPH-MCNC: 3.2 G/DL — LOW (ref 3.3–5)
ALP SERPL-CCNC: 96 U/L — SIGNIFICANT CHANGE UP (ref 40–120)
ALT FLD-CCNC: 11 U/L — SIGNIFICANT CHANGE UP (ref 4–41)
AST SERPL-CCNC: 17 U/L — SIGNIFICANT CHANGE UP (ref 4–40)
BASOPHILS # BLD AUTO: 0.05 K/UL — SIGNIFICANT CHANGE UP (ref 0–0.2)
BASOPHILS NFR BLD AUTO: 0.7 % — SIGNIFICANT CHANGE UP (ref 0–2)
BILIRUB SERPL-MCNC: 0.7 MG/DL — SIGNIFICANT CHANGE UP (ref 0.2–1.2)
BUN SERPL-MCNC: 18 MG/DL — SIGNIFICANT CHANGE UP (ref 7–23)
CALCIUM SERPL-MCNC: 8.7 MG/DL — SIGNIFICANT CHANGE UP (ref 8.4–10.5)
CHLORIDE SERPL-SCNC: 100 MMOL/L — SIGNIFICANT CHANGE UP (ref 98–107)
CO2 SERPL-SCNC: 25 MMOL/L — SIGNIFICANT CHANGE UP (ref 22–31)
CREAT SERPL-MCNC: 0.91 MG/DL — SIGNIFICANT CHANGE UP (ref 0.5–1.3)
EOSINOPHIL # BLD AUTO: 0.13 K/UL — SIGNIFICANT CHANGE UP (ref 0–0.5)
EOSINOPHIL NFR BLD AUTO: 1.7 % — SIGNIFICANT CHANGE UP (ref 0–6)
GLUCOSE SERPL-MCNC: 91 MG/DL — SIGNIFICANT CHANGE UP (ref 70–99)
HCT VFR BLD CALC: 33.5 % — LOW (ref 39–50)
HGB BLD-MCNC: 11.3 G/DL — LOW (ref 13–17)
IMM GRANULOCYTES # BLD AUTO: 0.05 # — SIGNIFICANT CHANGE UP
IMM GRANULOCYTES NFR BLD AUTO: 0.7 % — SIGNIFICANT CHANGE UP (ref 0–1.5)
LYMPHOCYTES # BLD AUTO: 1.38 K/UL — SIGNIFICANT CHANGE UP (ref 1–3.3)
LYMPHOCYTES # BLD AUTO: 18 % — SIGNIFICANT CHANGE UP (ref 13–44)
MAGNESIUM SERPL-MCNC: 1.7 MG/DL — SIGNIFICANT CHANGE UP (ref 1.6–2.6)
MANUAL SMEAR VERIFICATION: SIGNIFICANT CHANGE UP
MCHC RBC-ENTMCNC: 30.8 PG — SIGNIFICANT CHANGE UP (ref 27–34)
MCHC RBC-ENTMCNC: 33.7 % — SIGNIFICANT CHANGE UP (ref 32–36)
MCV RBC AUTO: 91.3 FL — SIGNIFICANT CHANGE UP (ref 80–100)
MONOCYTES # BLD AUTO: 1.32 K/UL — HIGH (ref 0–0.9)
MONOCYTES NFR BLD AUTO: 17.2 % — HIGH (ref 2–14)
NEUTROPHILS # BLD AUTO: 4.75 K/UL — SIGNIFICANT CHANGE UP (ref 1.8–7.4)
NEUTROPHILS NFR BLD AUTO: 61.7 % — SIGNIFICANT CHANGE UP (ref 43–77)
NRBC # FLD: 0 — SIGNIFICANT CHANGE UP
PHOSPHATE SERPL-MCNC: 3.4 MG/DL — SIGNIFICANT CHANGE UP (ref 2.5–4.5)
PLATELET # BLD AUTO: 134 K/UL — LOW (ref 150–400)
PMV BLD: 9.7 FL — SIGNIFICANT CHANGE UP (ref 7–13)
POTASSIUM SERPL-MCNC: 4.4 MMOL/L — SIGNIFICANT CHANGE UP (ref 3.5–5.3)
POTASSIUM SERPL-SCNC: 4.4 MMOL/L — SIGNIFICANT CHANGE UP (ref 3.5–5.3)
PROT SERPL-MCNC: 6.9 G/DL — SIGNIFICANT CHANGE UP (ref 6–8.3)
RBC # BLD: 3.67 M/UL — LOW (ref 4.2–5.8)
RBC # FLD: 15.4 % — HIGH (ref 10.3–14.5)
SODIUM SERPL-SCNC: 136 MMOL/L — SIGNIFICANT CHANGE UP (ref 135–145)
SPECIMEN SOURCE: SIGNIFICANT CHANGE UP
SPECIMEN SOURCE: SIGNIFICANT CHANGE UP
WBC # BLD: 7.68 K/UL — SIGNIFICANT CHANGE UP (ref 3.8–10.5)
WBC # FLD AUTO: 7.68 K/UL — SIGNIFICANT CHANGE UP (ref 3.8–10.5)

## 2017-12-08 PROCEDURE — 99233 SBSQ HOSP IP/OBS HIGH 50: CPT | Mod: GC

## 2017-12-08 RX ADMIN — PANTOPRAZOLE SODIUM 40 MILLIGRAM(S): 20 TABLET, DELAYED RELEASE ORAL at 05:13

## 2017-12-08 RX ADMIN — OXYCODONE HYDROCHLORIDE 5 MILLIGRAM(S): 5 TABLET ORAL at 17:33

## 2017-12-08 RX ADMIN — Medication 100 MILLIGRAM(S): at 12:14

## 2017-12-08 RX ADMIN — AZITHROMYCIN 250 MILLIGRAM(S): 500 TABLET, FILM COATED ORAL at 16:25

## 2017-12-08 RX ADMIN — MIDODRINE HYDROCHLORIDE 2.5 MILLIGRAM(S): 2.5 TABLET ORAL at 12:14

## 2017-12-08 RX ADMIN — OXYCODONE HYDROCHLORIDE 5 MILLIGRAM(S): 5 TABLET ORAL at 16:25

## 2017-12-08 RX ADMIN — DABIGATRAN ETEXILATE MESYLATE 150 MILLIGRAM(S): 150 CAPSULE ORAL at 17:55

## 2017-12-08 RX ADMIN — OXYCODONE HYDROCHLORIDE 5 MILLIGRAM(S): 5 TABLET ORAL at 05:38

## 2017-12-08 RX ADMIN — SENNA PLUS 2 TABLET(S): 8.6 TABLET ORAL at 21:42

## 2017-12-08 RX ADMIN — TAMSULOSIN HYDROCHLORIDE 0.4 MILLIGRAM(S): 0.4 CAPSULE ORAL at 21:42

## 2017-12-08 RX ADMIN — CEFTRIAXONE 100 GRAM(S): 500 INJECTION, POWDER, FOR SOLUTION INTRAMUSCULAR; INTRAVENOUS at 16:25

## 2017-12-08 RX ADMIN — Medication 50 MICROGRAM(S): at 05:09

## 2017-12-08 RX ADMIN — OXYCODONE HYDROCHLORIDE 5 MILLIGRAM(S): 5 TABLET ORAL at 05:08

## 2017-12-08 RX ADMIN — Medication 81 MILLIGRAM(S): at 12:14

## 2017-12-08 RX ADMIN — MIDODRINE HYDROCHLORIDE 2.5 MILLIGRAM(S): 2.5 TABLET ORAL at 21:42

## 2017-12-08 RX ADMIN — DABIGATRAN ETEXILATE MESYLATE 150 MILLIGRAM(S): 150 CAPSULE ORAL at 05:09

## 2017-12-08 NOTE — PROGRESS NOTE ADULT - ATTENDING COMMENTS
Patient seen and examined with team.  Agree with Above A/P.  Ortho- notes patient CAN have MRI but prefers not to sec to recent surgery- Ortho does not believe patient has cervical CORD compression and will follow patient OUT PATIENT and do MRI when appropriate.ORTHO Attending to sign note before discharge.  ID C/w Ceftriaxone and azytho for PNA - change to Po Levaquin of Sunday once blood cult - neg x 48hrs.  Pain controlled with Oxy IR.  PT- rec rehab- D/C planning to REHAB Medicine Attending Off Service note  Patient seen and examined with team.  Agree with Above A/P.  82M with pmh of  CAD s/p stents x2, HTN, HLD, AFIB, CHF on midodrine, Hernia, spinal stenosis s/p multiple spinal surgeries and spinal cord stimulator presenting with lumbar radiculopathy, admitted for sepsis.   Ortho- notes patient CAN have MRI but prefers not to sec to recent surgery- Ortho does not believe patient has cervical CORD compression and will follow patient OUT PATIENT and do MRI when appropriate.ORTHO Attending to sign note before discharge.  ID C/w Ceftriaxone and azytho for PNA  with Sepsis . Can change to Po Levaquin of Sunday once blood cult - neg x 48hrs.  Pain controlled with Oxy IR.  PT- rec rehab- D/C planning to REHAB

## 2017-12-08 NOTE — PROGRESS NOTE ADULT - SUBJECTIVE AND OBJECTIVE BOX
Patient is a 82y old  Male who presents with a chief complaint of lower back pain (07 Dec 2017 16:32)      SUBJECTIVE / OVERNIGHT EVENTS: No acute events overnight. Patient in bed doing leg exercises and states that he feels stronger. Lumbar pain much improved. Patient has a non productive cough. Denies nausea, vomiting, fever, SOB, CP.       MEDICATIONS  (STANDING):  aspirin enteric coated 81 milliGRAM(s) Oral daily  azithromycin  IVPB      azithromycin  IVPB 500 milliGRAM(s) IV Intermittent every 24 hours  cefTRIAXone   IVPB 1 Gram(s) IV Intermittent every 24 hours  cefTRIAXone   IVPB      dabigatran 150 milliGRAM(s) Oral every 12 hours  docusate sodium 100 milliGRAM(s) Oral daily  levothyroxine 50 MICROGram(s) Oral daily  midodrine 2.5 milliGRAM(s) Oral every 8 hours  pantoprazole    Tablet 40 milliGRAM(s) Oral before breakfast  polyethylene glycol 3350 17 Gram(s) Oral daily  senna 2 Tablet(s) Oral at bedtime  tamsulosin 0.4 milliGRAM(s) Oral at bedtime    MEDICATIONS  (PRN):  morphine  - Injectable 2 milliGRAM(s) IV Push every 4 hours PRN Severe Pain (7 - 10)  oxyCODONE    IR 5 milliGRAM(s) Oral every 4 hours PRN Moderate Pain (4 - 6)        CAPILLARY BLOOD GLUCOSE        I&O's Summary    07 Dec 2017 07:01  -  08 Dec 2017 07:00  --------------------------------------------------------  IN: 0 mL / OUT: 300 mL / NET: -300 mL      VITALS  ICU Vital Signs Last 24 Hrs  T(C): 37.1 (08 Dec 2017 04:59), Max: 37.4 (07 Dec 2017 10:14)  T(F): 98.8 (08 Dec 2017 04:59), Max: 99.4 (07 Dec 2017 10:14)  HR: 88 (08 Dec 2017 04:59) (72 - 95)  BP: 127/73 (08 Dec 2017 04:59) (104/65 - 141/80)  RR: 18 (08 Dec 2017 04:59) (16 - 18)  SpO2: 96% (08 Dec 2017 04:59) (93% - 100%)      PHYSICAL EXAM:  Constitutional: Awake and alert, NAD  Eyes: EOMI, MORELIA  ENMT: moist mucous membrane  Neck: supple, No JVD  Respiratory: lungs clear to auscultations bilaterally, breathing unlabored  Cardiovascular: Irregular rate and rhythm, +s1/s2  Gastrointestinal: soft, non tender, non distended.   Extremities: 5/5 strength in UE and RLE and 4/5 in LLE  Neurological: AAOx3,   Skin: no rash or lesion  Musculoskeletal: Guarding in L lower quadrant.      LABS:                        11.3   7.68  )-----------( 134      ( 08 Dec 2017 05:50 )             33.5     12-08    136  |  100  |  18  ----------------------------<  91  4.4   |  25  |  0.91    Ca    8.7      08 Dec 2017 05:50  Phos  3.4     12-08  Mg     1.7     12-08    TPro  6.9  /  Alb  3.2<L>  /  TBili  0.7  /  DBili  x   /  AST  17  /  ALT  11  /  AlkPhos  96  12-08    PT/INR - ( 07 Dec 2017 11:05 )   PT: 15.6 SEC;   INR: 1.38          PTT - ( 07 Dec 2017 11:05 )  PTT:37.1 SEC  CARDIAC MARKERS ( 07 Dec 2017 11:05 )  x     / < 0.06 ng/mL / 65 u/L / 1.68 ng/mL / x              RADIOLOGY & ADDITIONAL TESTS: reviewed by me.

## 2017-12-08 NOTE — PROGRESS NOTE ADULT - SUBJECTIVE AND OBJECTIVE BOX
As per Dr Titus, patient may follow up outpatient in his office in 1-2 weeks. Call office to make an appointment. [phone].  No surgical intervention is required at moment. No MRI or further imaging required at moment. Once patient is seen in office, MRI and further imaging can be performed if warranted. This can be setup in the outpatient setting.

## 2017-12-08 NOTE — PROGRESS NOTE ADULT - PROBLEM SELECTOR PLAN 1
2/2 pneumonia. CT chest notable for patchy consolidation in the right upper and lower lobe. On midodrine, which could mask hypotension in the setting of an active infection.   -c/w ceftriaxone and azithromycin.  -monitor CBC and BMP  -f/u blood cx x2 2/2 pneumonia. CT chest notable for patchy consolidation in the right upper and lower lobe. On midodrine, which could mask hypotension in the setting of an active infection.   -c/w ceftriaxone and azithromycin.  -monitor CBC and BMP  -f/u blood cx x2, neg at 24hrs.

## 2017-12-09 ENCOUNTER — TRANSCRIPTION ENCOUNTER (OUTPATIENT)
Age: 82
End: 2017-12-09

## 2017-12-09 VITALS — WEIGHT: 145.06 LBS

## 2017-12-09 LAB
BASOPHILS # BLD AUTO: 0.04 K/UL — SIGNIFICANT CHANGE UP (ref 0–0.2)
BASOPHILS NFR BLD AUTO: 0.6 % — SIGNIFICANT CHANGE UP (ref 0–2)
EOSINOPHIL # BLD AUTO: 0.34 K/UL — SIGNIFICANT CHANGE UP (ref 0–0.5)
EOSINOPHIL NFR BLD AUTO: 5.2 % — SIGNIFICANT CHANGE UP (ref 0–6)
HCT VFR BLD CALC: 36.9 % — LOW (ref 39–50)
HGB BLD-MCNC: 12.2 G/DL — LOW (ref 13–17)
IMM GRANULOCYTES # BLD AUTO: 0.05 # — SIGNIFICANT CHANGE UP
IMM GRANULOCYTES NFR BLD AUTO: 0.8 % — SIGNIFICANT CHANGE UP (ref 0–1.5)
LYMPHOCYTES # BLD AUTO: 1.52 K/UL — SIGNIFICANT CHANGE UP (ref 1–3.3)
LYMPHOCYTES # BLD AUTO: 23.2 % — SIGNIFICANT CHANGE UP (ref 13–44)
MCHC RBC-ENTMCNC: 30 PG — SIGNIFICANT CHANGE UP (ref 27–34)
MCHC RBC-ENTMCNC: 33.1 % — SIGNIFICANT CHANGE UP (ref 32–36)
MCV RBC AUTO: 90.7 FL — SIGNIFICANT CHANGE UP (ref 80–100)
MONOCYTES # BLD AUTO: 1.11 K/UL — HIGH (ref 0–0.9)
MONOCYTES NFR BLD AUTO: 17 % — HIGH (ref 2–14)
NEUTROPHILS # BLD AUTO: 3.48 K/UL — SIGNIFICANT CHANGE UP (ref 1.8–7.4)
NEUTROPHILS NFR BLD AUTO: 53.2 % — SIGNIFICANT CHANGE UP (ref 43–77)
NRBC # FLD: 0 — SIGNIFICANT CHANGE UP
PLATELET # BLD AUTO: 174 K/UL — SIGNIFICANT CHANGE UP (ref 150–400)
PMV BLD: 10 FL — SIGNIFICANT CHANGE UP (ref 7–13)
RBC # BLD: 4.07 M/UL — LOW (ref 4.2–5.8)
RBC # FLD: 15.2 % — HIGH (ref 10.3–14.5)
WBC # BLD: 6.54 K/UL — SIGNIFICANT CHANGE UP (ref 3.8–10.5)
WBC # FLD AUTO: 6.54 K/UL — SIGNIFICANT CHANGE UP (ref 3.8–10.5)

## 2017-12-09 PROCEDURE — 99239 HOSP IP/OBS DSCHRG MGMT >30: CPT

## 2017-12-09 RX ORDER — OXYCODONE HYDROCHLORIDE 5 MG/1
1 TABLET ORAL
Qty: 5 | Refills: 0 | OUTPATIENT
Start: 2017-12-09

## 2017-12-09 RX ORDER — METOPROLOL TARTRATE 50 MG
1 TABLET ORAL
Qty: 0 | Refills: 0 | COMMUNITY

## 2017-12-09 RX ORDER — CIPROFLOXACIN LACTATE 400MG/40ML
1 VIAL (ML) INTRAVENOUS
Qty: 5 | Refills: 0 | OUTPATIENT
Start: 2017-12-09 | End: 2017-12-14

## 2017-12-09 RX ADMIN — OXYCODONE HYDROCHLORIDE 5 MILLIGRAM(S): 5 TABLET ORAL at 09:53

## 2017-12-09 RX ADMIN — Medication 81 MILLIGRAM(S): at 12:30

## 2017-12-09 RX ADMIN — PANTOPRAZOLE SODIUM 40 MILLIGRAM(S): 20 TABLET, DELAYED RELEASE ORAL at 06:22

## 2017-12-09 RX ADMIN — Medication 50 MICROGRAM(S): at 06:22

## 2017-12-09 RX ADMIN — Medication 100 MILLIGRAM(S): at 12:30

## 2017-12-09 RX ADMIN — OXYCODONE HYDROCHLORIDE 5 MILLIGRAM(S): 5 TABLET ORAL at 06:45

## 2017-12-09 RX ADMIN — DABIGATRAN ETEXILATE MESYLATE 150 MILLIGRAM(S): 150 CAPSULE ORAL at 06:22

## 2017-12-09 RX ADMIN — OXYCODONE HYDROCHLORIDE 5 MILLIGRAM(S): 5 TABLET ORAL at 06:22

## 2017-12-09 RX ADMIN — POLYETHYLENE GLYCOL 3350 17 GRAM(S): 17 POWDER, FOR SOLUTION ORAL at 12:30

## 2017-12-09 NOTE — PROGRESS NOTE ADULT - PROBLEM SELECTOR PLAN 1
2/2 pneumonia. CT chest notable for patchy consolidation in the right upper and lower lobe. On midodrine, which could mask hypotension in the setting of an active infection.   -c/w ceftriaxone and azithromycin.  -monitor CBC and BMP  -f/u blood cx x2, neg at 24hrs.

## 2017-12-09 NOTE — DISCHARGE NOTE ADULT - MEDICATION SUMMARY - MEDICATIONS TO TAKE
I will START or STAY ON the medications listed below when I get home from the hospital:    aspirin 81 mg oral tablet, chewable  -- 1 tab(s) by mouth once a day  -- Indication: For CAD (coronary artery disease)    oxyCODONE 5 mg oral tablet  -- 1 tab(s) by mouth every 6 hours, As Needed for severe pain.  MDD:4 tab  -- Indication: For Pain    tamsulosin 0.4 mg oral capsule  -- 1 cap(s) by mouth once a day  -- Indication: For BPH    Pradaxa 150 mg oral capsule  -- 1 cap(s) by mouth 2 times a day  -- Indication: For CAD (coronary artery disease)    atorvastatin 10 mg oral tablet  -- 0.5 tab(s) (5mg) by mouth once a day  -- Indication: For HLD    docusate sodium 100 mg oral capsule  -- 1 cap(s) by mouth once a day, As Needed  -- Indication: For Constipation    midodrine 2.5 mg oral tablet  -- 1 tab(s) by mouth 3 times a day  -- Indication: For hypotension    pantoprazole 40 mg oral delayed release tablet  -- 1 tab(s) by mouth once a day (before a meal)  -- Indication: For Acid reflux    Levaquin 500 mg oral tablet  -- 1 tab(s) by mouth once a day for pneumonia  -- Avoid prolonged or excessive exposure to direct and/or artificial sunlight while taking this medication.  Do not take dairy products, antacids, or iron preparations within one hour of this medication.  Finish all this medication unless otherwise directed by prescriber.  May cause drowsiness or dizziness.  Medication should be taken with plenty of water.    -- Indication: For Pneumonia    levothyroxine 50 mcg (0.05 mg) oral tablet  -- 1 tab(s) by mouth once a day  -- Indication: For hypothyroidsim    Multiple Vitamins oral tablet  -- 1 tab(s) by mouth once a day  -- Indication: For vitamins

## 2017-12-09 NOTE — PROGRESS NOTE ADULT - PROBLEM SELECTOR PLAN 3
Cervical CT with retrolisthesis of C3 and C4, with questionable C3 compression fracture. Patient asymptomatic at this time.   -Ortho c/s; recs no surgical intervention or imaging at this time.  Will follow up outpatient with Dr. Jean-Baptiste.

## 2017-12-09 NOTE — DISCHARGE NOTE ADULT - ADDITIONAL INSTRUCTIONS
Please follow up with Dr. Titus in 1-2 weeks. Call office to make an appointment. Continue taking your antibiotics, Levaquin for pneumonia. IF you notice worsening signs and symptoms, please call 911 or come to the ED immediately.  Please follow up with your PMD for continuity of care and management as medications may have changed. Do not take any NSAIDS (motrin, advil, ibuprofren, aleve), Antiinflammatory medications unless instructed by your orthopaedic surgeon to continue. Avoid any heavy lifting, bending, squatting, twisting motion. Weight bear as tolerated.  Please follow up with cardiologist Dr Ny for continued care and management as medications may have been altered.

## 2017-12-09 NOTE — PROGRESS NOTE ADULT - SUBJECTIVE AND OBJECTIVE BOX
Patient is a 82y old  Male who presents with a chief complaint of lower back pain (07 Dec 2017 16:32)      SUBJECTIVE / OVERNIGHT EVENTS: No acute events overnight. Patient states he barely got any sleep due to reconstruction going on next door.  Still endorses lumbar pain on movement and mild productive cough. Denies nausea, vomiting, fever, SOB, CP.    MEDICATIONS  (STANDING):  aspirin enteric coated 81 milliGRAM(s) Oral daily  azithromycin  IVPB      azithromycin  IVPB 500 milliGRAM(s) IV Intermittent every 24 hours  cefTRIAXone   IVPB 1 Gram(s) IV Intermittent every 24 hours  cefTRIAXone   IVPB      dabigatran 150 milliGRAM(s) Oral every 12 hours  docusate sodium 100 milliGRAM(s) Oral daily  levothyroxine 50 MICROGram(s) Oral daily  midodrine 2.5 milliGRAM(s) Oral every 8 hours  pantoprazole    Tablet 40 milliGRAM(s) Oral before breakfast  polyethylene glycol 3350 17 Gram(s) Oral daily  senna 2 Tablet(s) Oral at bedtime  tamsulosin 0.4 milliGRAM(s) Oral at bedtime    MEDICATIONS  (PRN):  oxyCODONE    IR 5 milliGRAM(s) Oral every 4 hours PRN Moderate Pain (4 - 6)        CAPILLARY BLOOD GLUCOSE        I&O's Summary    08 Dec 2017 07:01  -  09 Dec 2017 07:00  --------------------------------------------------------  IN: 0 mL / OUT: 995 mL / NET: -995 mL      VITALS  ICU Vital Signs Last 24 Hrs  T(C): 36.3 (09 Dec 2017 06:19), Max: 37.1 (08 Dec 2017 12:08)  T(F): 97.3 (09 Dec 2017 06:19), Max: 98.7 (08 Dec 2017 12:08)  HR: 59 (09 Dec 2017 06:19) (59 - 98)  BP: 129/88 (09 Dec 2017 06:19) (94/62 - 129/88)  RR: 18 (09 Dec 2017 06:19) (18 - 18)  SpO2: 97% (09 Dec 2017 06:19) (97% - 98%)        PHYSICAL EXAM:  Constitutional: Awake and alert, NAD  Eyes: EOMI, MORELIA  ENMT: moist mucous membrane  Neck: supple, No JVD  Respiratory: lungs clear to auscultations bilaterally, breathing unlabored  Cardiovascular: Irregular rate and rhythm, +s1/s2  Gastrointestinal: soft, non tender, non distended.   Extremities: 5/5 strength in UE and RLE and 4/5 in LLE  Neurological: AAOx3,   Skin: no rash or lesion  Musculoskeletal: Guarding in L lower quadrant.      LABS:                        12.2   6.54  )-----------( 174      ( 09 Dec 2017 06:20 )             36.9     12-08    136  |  100  |  18  ----------------------------<  91  4.4   |  25  |  0.91    Ca    8.7      08 Dec 2017 05:50  Phos  3.4     12-08  Mg     1.7     12-08    TPro  6.9  /  Alb  3.2<L>  /  TBili  0.7  /  DBili  x   /  AST  17  /  ALT  11  /  AlkPhos  96  12-08    PT/INR - ( 07 Dec 2017 11:05 )   PT: 15.6 SEC;   INR: 1.38          PTT - ( 07 Dec 2017 11:05 )  PTT:37.1 SEC  CARDIAC MARKERS ( 07 Dec 2017 11:05 )  x     / < 0.06 ng/mL / 65 u/L / 1.68 ng/mL / x              RADIOLOGY & ADDITIONAL TESTS: reviewed by me.

## 2017-12-09 NOTE — DISCHARGE NOTE ADULT - HOSPITAL COURSE
82M with pmh of  CAD s/p stents x2, HTN, HLD, AFIB, CHF on midodrine, R inguinal hernia, spinal stenosis s/p multiple spinal surgeries and spinal cord stimulator admitted to [facility] on 8/1 s/p surgery  admission for JULITO pain s/p stimulator removal and fusion returned to the ED with back pain, admitted for CAP pneumonia. Patient was started on azithromycin and ceftriaxone.  Orthopedic spine was consulted and recommended no surgical intervention, MRI or further imaging required at moment. Oxycodone was started for lumbar back pain management. PT consulted and recommended rehab, however patient is refusing rehab and prefers to go home. Wife understands the risks and states that they have a private aide for assistance. Patient discharged on Levaquin 500mg for 5 days.

## 2017-12-09 NOTE — DISCHARGE NOTE ADULT - CARE PROVIDERS DIRECT ADDRESSES
[email or web address]

## 2017-12-09 NOTE — DISCHARGE NOTE ADULT - CARE PLAN
Principal Discharge DX:	Pneumonia  Goal:	resolve infection  Instructions for follow-up, activity and diet:	In the ED, CT scan of chest was notable for R sided pneumonia. You were started on azithromycin and ceftriaxone. Please continue taking your antibiotic (Levaquin) for 5 more days.  Secondary Diagnosis:	Lumbar radiculopathy  Goal:	pain control, increased mobility.  Instructions for follow-up, activity and diet:	For your lower back pain, Dr. Jean-Baptiste team was consulted and didn't think you needed an emergent surgery or further imaging at this time. You were started on oxycodone for pain management. Continue exercises and physical therapy for conditioning. Please follow up with Dr. Titus in 1-2 weeks. Call office to make an appointment. Principal Discharge DX:	Pneumonia  Goal:	resolve infection  Assessment and plan of treatment:	In the ED, CT scan of chest was notable for R sided pneumonia. You were started on azithromycin and ceftriaxone. Please continue taking your antibiotic (Levaquin) for 5 more days.  Secondary Diagnosis:	Lumbar radiculopathy  Goal:	pain control, increased mobility.  Assessment and plan of treatment:	For your lower back pain, Dr. Jean-Baptiste team was consulted and didn't think you needed an emergent surgery or further imaging at this time. You were started on oxycodone for pain management. Continue exercises and physical therapy for conditioning. Please follow up with Dr. Titus in 1-2 weeks. Call office to make an appointment.

## 2017-12-09 NOTE — PROGRESS NOTE ADULT - ATTENDING COMMENTS
82M with pmh of  CAD s/p stents x2, HTN, HLD, AFIB, CHF on midodrine, Hernia, spinal stenosis s/p multiple spinal surgeries and spinal cord stimulator presenting with lumbar radiculopathy, admitted for sepsis.   I reviewed the whole chart. Ortho cleared pt for discharge. MRI can be done as outpatient and follow up with Dr. Titus in the office per Ortho rec.   Pt is not septic at this time. can switch to oral abx levaquin  PT- rec rehab  I spoke with pt and his wife at bedside extensively. They request discharge today. They refused rehab. They are anxious to leave the hospital. Pt aaox3, feels well, no complaints at this time  d/c planning. time 40 min

## 2017-12-09 NOTE — DISCHARGE NOTE ADULT - PATIENT PORTAL LINK FT
“You can access the [employer] Patient Portal, offered by [facility], by registering with the following website: [email or web address]”

## 2017-12-09 NOTE — DISCHARGE NOTE ADULT - PLAN OF CARE
resolve infection In the ED, CT scan of chest was notable for R sided pneumonia. You were started on azithromycin and ceftriaxone. Please continue taking your antibiotic (Levaquin) for 5 more days. pain control, increased mobility. For your lower back pain, Dr. Jean-Baptiste team was consulted and didn't think you needed an emergent surgery or further imaging at this time. You were started on oxycodone for pain management. Continue exercises and physical therapy for conditioning. Please follow up with Dr. Titus in 1-2 weeks. Call office to make an appointment.

## 2017-12-10 NOTE — H&P PST ADULT - NEGATIVE MALE-SPECIFIC SYMPTOMS
Problem: Perioperative Period (Adult)  Goal: Signs and Symptoms of Listed Potential Problems Will be Absent or Manageable (Perioperative Period)  Outcome: Ongoing (interventions implemented as appropriate)       no scrotal mass L/no scrotal mass R/no genital sores/no urethral discharge

## 2017-12-12 LAB
BACTERIA BLD CULT: SIGNIFICANT CHANGE UP
BACTERIA BLD CULT: SIGNIFICANT CHANGE UP

## 2018-03-15 ENCOUNTER — IMPORTED ENCOUNTER (OUTPATIENT)
Dept: URBAN - METROPOLITAN AREA CLINIC 50 | Facility: CLINIC | Age: 83
End: 2018-03-15

## 2018-05-02 ENCOUNTER — APPOINTMENT (OUTPATIENT)
Dept: ORTHOPEDIC SURGERY | Facility: CLINIC | Age: 83
End: 2018-05-02
Payer: MEDICARE

## 2018-05-02 PROCEDURE — 72082 X-RAY EXAM ENTIRE SPI 2/3 VW: CPT

## 2018-05-02 PROCEDURE — 99214 OFFICE O/P EST MOD 30 MIN: CPT

## 2018-06-12 ENCOUNTER — APPOINTMENT (OUTPATIENT)
Dept: ORTHOPEDIC SURGERY | Facility: CLINIC | Age: 83
End: 2018-06-12
Payer: COMMERCIAL

## 2018-06-12 VITALS — HEIGHT: 66 IN | WEIGHT: 149 LBS | BODY MASS INDEX: 23.95 KG/M2

## 2018-06-12 PROCEDURE — 72190 X-RAY EXAM OF PELVIS: CPT

## 2018-06-12 PROCEDURE — 99213 OFFICE O/P EST LOW 20 MIN: CPT

## 2018-07-11 ENCOUNTER — APPOINTMENT (OUTPATIENT)
Dept: ORTHOPEDIC SURGERY | Facility: CLINIC | Age: 83
End: 2018-07-11
Payer: MEDICARE

## 2018-07-11 VITALS — HEART RATE: 96 BPM | DIASTOLIC BLOOD PRESSURE: 67 MMHG | SYSTOLIC BLOOD PRESSURE: 103 MMHG

## 2018-07-11 PROCEDURE — 99214 OFFICE O/P EST MOD 30 MIN: CPT

## 2018-07-25 ENCOUNTER — APPOINTMENT (OUTPATIENT)
Dept: ORTHOPEDIC SURGERY | Facility: CLINIC | Age: 83
End: 2018-07-25
Payer: MEDICARE

## 2018-07-25 VITALS
DIASTOLIC BLOOD PRESSURE: 68 MMHG | WEIGHT: 149 LBS | SYSTOLIC BLOOD PRESSURE: 106 MMHG | BODY MASS INDEX: 23.95 KG/M2 | HEART RATE: 86 BPM | HEIGHT: 66 IN

## 2018-07-25 DIAGNOSIS — M70.61 TROCHANTERIC BURSITIS, RIGHT HIP: ICD-10-CM

## 2018-07-25 DIAGNOSIS — S32.492A: ICD-10-CM

## 2018-07-25 PROCEDURE — 20610 DRAIN/INJ JOINT/BURSA W/O US: CPT | Mod: RT

## 2018-07-25 PROCEDURE — 72190 X-RAY EXAM OF PELVIS: CPT

## 2018-07-25 PROCEDURE — 99213 OFFICE O/P EST LOW 20 MIN: CPT | Mod: 25

## 2018-07-27 PROBLEM — M70.61 GREATER TROCHANTERIC BURSITIS OF RIGHT HIP: Status: ACTIVE | Noted: 2017-05-05

## 2018-07-27 PROBLEM — S32.492A: Status: ACTIVE | Noted: 2018-06-12

## 2018-10-22 ENCOUNTER — APPOINTMENT (OUTPATIENT)
Dept: ORTHOPEDIC SURGERY | Facility: CLINIC | Age: 83
End: 2018-10-22
Payer: MEDICARE

## 2018-10-22 DIAGNOSIS — M40.209 UNSPECIFIED KYPHOSIS, SITE UNSPECIFIED: ICD-10-CM

## 2018-10-22 PROCEDURE — 99214 OFFICE O/P EST MOD 30 MIN: CPT

## 2018-10-22 PROCEDURE — 72082 X-RAY EXAM ENTIRE SPI 2/3 VW: CPT

## 2018-12-28 ENCOUNTER — CHART COPY (OUTPATIENT)
Age: 83
End: 2018-12-28

## 2019-01-15 PROBLEM — M48.07 SPINAL STENOSIS, LUMBOSACRAL REGION: Chronic | Status: ACTIVE | Noted: 2017-07-17

## 2019-01-30 ENCOUNTER — APPOINTMENT (OUTPATIENT)
Dept: GERIATRICS | Facility: CLINIC | Age: 84
End: 2019-01-30
Payer: SELF-PAY

## 2019-01-30 VITALS
WEIGHT: 149 LBS | BODY MASS INDEX: 24.05 KG/M2 | DIASTOLIC BLOOD PRESSURE: 64 MMHG | TEMPERATURE: 98.4 F | SYSTOLIC BLOOD PRESSURE: 112 MMHG | HEART RATE: 62 BPM | OXYGEN SATURATION: 96 %

## 2019-01-30 DIAGNOSIS — M48.04 SPINAL STENOSIS, THORACIC REGION: ICD-10-CM

## 2019-01-30 DIAGNOSIS — I25.10 ATHEROSCLEROTIC HEART DISEASE OF NATIVE CORONARY ARTERY W/OUT ANGINA PECTORIS: ICD-10-CM

## 2019-01-30 DIAGNOSIS — S32.409A UNSPECIFIED FRACTURE OF UNSPECIFIED ACETABULUM, INITIAL ENCOUNTER FOR CLOSED FRACTURE: ICD-10-CM

## 2019-01-30 DIAGNOSIS — N32.81 OVERACTIVE BLADDER: ICD-10-CM

## 2019-01-30 PROCEDURE — 99205 OFFICE O/P NEW HI 60 MIN: CPT | Mod: GC

## 2019-01-30 RX ORDER — MIDODRINE HYDROCHLORIDE 2.5 MG/1
2.5 TABLET ORAL
Qty: 90 | Refills: 3 | Status: DISCONTINUED | COMMUNITY
Start: 2017-09-29 | End: 2019-01-30

## 2019-01-30 RX ORDER — FLUDROCORTISONE ACETATE 0.1 MG/1
0.1 TABLET ORAL
Qty: 90 | Refills: 1 | Status: DISCONTINUED | COMMUNITY
Start: 2017-10-09 | End: 2019-01-30

## 2019-01-30 RX ORDER — SERTRALINE HYDROCHLORIDE 25 MG/1
25 TABLET, FILM COATED ORAL
Refills: 0 | Status: ACTIVE | COMMUNITY
Start: 2019-01-30

## 2019-01-30 RX ORDER — DONEPEZIL HYDROCHLORIDE 5 MG/1
5 TABLET ORAL
Refills: 0 | Status: DISCONTINUED | COMMUNITY
Start: 2019-01-30 | End: 2019-01-30

## 2019-01-30 RX ORDER — OXYBUTYNIN CHLORIDE 5 MG/1
5 TABLET, EXTENDED RELEASE ORAL
Refills: 0 | Status: DISCONTINUED | COMMUNITY
Start: 2019-01-30 | End: 2019-01-30

## 2019-01-30 RX ORDER — MIRABEGRON 25 MG/1
25 TABLET, FILM COATED, EXTENDED RELEASE ORAL
Qty: 30 | Refills: 3 | Status: DISCONTINUED | COMMUNITY
End: 2019-01-30

## 2019-01-30 NOTE — REVIEW OF SYSTEMS
[Joint Pain] : joint pain [Difficulty Walking] : difficulty walking [Emotional Problems] : emotional problems [Fever] : no fever [Eye Pain] : no eye pain [Sore Throat] : no sore throat [Chest Pain] : no chest pain [Palpitations] : no palpitations [Cough] : no cough [SOB on Exertion] : no shortness of breath during exertion [Abdominal Pain] : no abdominal pain [Dysuria] : no dysuria [Skin Lesions] : no skin lesions [Depression] : no depression [Muscle Weakness] : no muscle weakness [Easy Bleeding] : no tendency for easy bleeding

## 2019-01-30 NOTE — HISTORY OF PRESENT ILLNESS
[0] : 2) Feeling down, depressed, or hopeless: Not at all [FreeTextEntry1] : 83M h/o spinal stenosis s/p lumbar lamenectomy, HTN, HLD, CAD s/p stent 2008, 2014, afib on pradaxa, BPH, hypothyroid, recent admission at [facility] for visual hallucinations and diagnosed with Lewy Body dementia. Now presenting from rehab for new visit. \par \par Had laminectomy revision April 2017, and admission s/p fall for non-displaced acetabular April 2018 and admission s/p fall Nov 2018 for multiple rib fractures. Early december 2018 started hallucinating. Wife reports visual hallucinations and paranoia where he believes people are out to "snatch him." Wife took patient to [facility] end of December 2018 where he was seen by psych and neuro. Patient diagnosed with Lewy body dementia and discharged to rehab. \par \par Wife brings patient in today for assistance with placing  in SNF. Patients denies any complaints. MMSE score 20/30. \par \par Followed with Dr. Titus for fractures and Dr. Ny for CAD, afib.

## 2019-01-30 NOTE — END OF VISIT
[] : Resident [FreeTextEntry3] : 82 yo male brought in by his devoted wife who is overwhelmed and stressed by his upcoming discharge from the sub-acute rehabilitation. Apparently the patient was very recently dx with Lewy-Body disease after he became combative in late December 2018 and she had to call 911, who brought him to [facility]. Workup was entirely negative and patient was dx with Lewy Body and discharged to sub-acute. He is now expected to be sent home and wife doesn't know how she is going to cope. Patient had been generally well and independt. PMH: spinal stenosis s/p lumbar laminectomy, HTN, HLD, CAD s/p stent 2008, 2014, afib on Pradaxa, laminectomy revision April 2017, and admission s/p fall for non-displaced acetabular April 2018 and admission s/p fall Nov 2018 for multiple rib fractures. \par On PE: quite, in no distress, lungs clear to A/P herat RSR, no edema. No focal neuro . MMSE 20/30\par Imp: Lewy Body dementia- hallucinations, behavioral issues. Reviewed all meds, can D/C Donepezil, and oxybutin. Referred to [facility] [place], [place] to provide overwhelmed wife with community resources and [facility] referral agencies. \par No labs needs since recently discharged from hospital. Followup with cardiology as needed.

## 2019-01-30 NOTE — PHYSICAL EXAM
[General Appearance - Alert] : alert [General Appearance - Well Nourished] : well nourished [General Appearance - Well Developed] : well developed [Sclera] : the sclera and conjunctiva were normal [Extraocular Movements] : extraocular movements were intact [Oropharynx] : The oropharynx was normal [] : the neck was supple [Auscultation Breath Sounds / Voice Sounds] : lungs were clear to auscultation bilaterally [Heart Sounds] : normal S1 and S2 [Edema] : there was no peripheral edema [No Spinal Tenderness] : no spinal tenderness [Musculoskeletal - Swelling] : no joint swelling seen [Skin Color & Pigmentation] : normal skin color and pigmentation [No Focal Deficits] : no focal deficits [Total Score ___ / 30] : the patient achieved a score of [unfilled] /30 [Date / Time ___ / 5] : date / time [unfilled] / 5 [Place ___ / 5] : place [unfilled] / 5 [Registration ___ / 3] : registration [unfilled] / 3 [Serial Sevens ___/5] : serial sevens [unfilled] / 5 [Naming 2 Objects ___ / 2] : naming two objects [unfilled] / 2 [Repeating a Sentence ___ / 1] : repeating a sentence [unfilled] / 1 [Writing a Sentence ___ / 1] : write sentence [unfilled] / 1 [3-stage Verbal Command ___ / 3] : three-stage verbal command [unfilled] / 3 [Written Command ___ / 1] : written command [unfilled] / 1 [Copy a Design ___ / 1] : copy a design [unfilled] / 1 [Recall ___ / 3] : recall [unfilled] / 3 [Affect] : the affect was normal [FreeTextEntry1] : no evidence of muscle rigidity or pill roll tremor

## 2019-04-01 ENCOUNTER — APPOINTMENT (OUTPATIENT)
Dept: GERIATRICS | Facility: CLINIC | Age: 84
End: 2019-04-01
Payer: MEDICARE

## 2019-04-01 VITALS
SYSTOLIC BLOOD PRESSURE: 100 MMHG | OXYGEN SATURATION: 83 % | HEIGHT: 66 IN | TEMPERATURE: 97.6 F | BODY MASS INDEX: 24.44 KG/M2 | WEIGHT: 152.06 LBS | DIASTOLIC BLOOD PRESSURE: 60 MMHG | RESPIRATION RATE: 16 BRPM | HEART RATE: 51 BPM

## 2019-04-01 DIAGNOSIS — F02.81 DEMENTIA WITH LEWY BODIES: ICD-10-CM

## 2019-04-01 DIAGNOSIS — I10 ESSENTIAL (PRIMARY) HYPERTENSION: ICD-10-CM

## 2019-04-01 DIAGNOSIS — I48.91 UNSPECIFIED ATRIAL FIBRILLATION: ICD-10-CM

## 2019-04-01 DIAGNOSIS — K21.9 GASTRO-ESOPHAGEAL REFLUX DISEASE W/OUT ESOPHAGITIS: ICD-10-CM

## 2019-04-01 DIAGNOSIS — G31.83 DEMENTIA WITH LEWY BODIES: ICD-10-CM

## 2019-04-01 DIAGNOSIS — N40.0 BENIGN PROSTATIC HYPERPLASIA WITHOUT LOWER URINARY TRACT SYMPMS: ICD-10-CM

## 2019-04-01 DIAGNOSIS — E03.9 HYPOTHYROIDISM, UNSPECIFIED: ICD-10-CM

## 2019-04-01 DIAGNOSIS — M48.02 SPINAL STENOSIS, CERVICAL REGION: ICD-10-CM

## 2019-04-01 PROCEDURE — 99215 OFFICE O/P EST HI 40 MIN: CPT

## 2019-04-01 RX ORDER — QUETIAPINE FUMARATE 25 MG/1
25 TABLET ORAL 3 TIMES DAILY
Qty: 270 | Refills: 1 | Status: ACTIVE | COMMUNITY
Start: 2019-01-30 | End: 1900-01-01

## 2019-04-01 RX ORDER — MIRTAZAPINE 15 MG/1
15 TABLET, FILM COATED ORAL
Qty: 45 | Refills: 0 | Status: ACTIVE | COMMUNITY
Start: 2019-04-01 | End: 1900-01-01

## 2019-04-01 RX ORDER — LEVOTHYROXINE SODIUM 0.05 MG/1
50 TABLET ORAL DAILY
Qty: 90 | Refills: 1 | Status: ACTIVE | COMMUNITY
Start: 2017-10-09 | End: 1900-01-01

## 2019-04-01 RX ORDER — ATORVASTATIN CALCIUM 10 MG/1
10 TABLET, FILM COATED ORAL
Qty: 90 | Refills: 1 | Status: ACTIVE | COMMUNITY
Start: 2017-10-09 | End: 1900-01-01

## 2019-04-01 NOTE — END OF VISIT
[FreeTextEntry3] : 84 yo man with Lewy Body disease, discharged yesterday from [facility] living- memory care unit back to his home with his wife ( HCP: cell [phone]). She has arranged for a hospital bed and he is now living in the reconvened family room.  He is now back at home, with his wife and they will have 24/7 . He is on Remeron and has gained weight 3lbs since last visit, now weighs 152lbs. Pe calm in no distress, lungs clear heart ireg irreg, no edema, no focal. I did not repeat MMSE\par Plan Reviewed all meds no change. Wife is attending monthly support lewy-Body group.\par Had recent bloods, will need the followed.

## 2019-04-01 NOTE — PHYSICAL EXAM
[General Appearance - Alert] : alert [General Appearance - In No Acute Distress] : in no acute distress [Sclera] : the sclera and conjunctiva were normal [PERRL With Normal Accommodation] : pupils were equal in size, round, and reactive to light [Extraocular Movements] : extraocular movements were intact [Normal Oral Mucosa] : normal oral mucosa [No Oral Pallor] : no oral pallor [No Oral Cyanosis] : no oral cyanosis [Outer Ear] : the ears and nose were normal in appearance [Oropharynx] : The oropharynx was normal [Auscultation Breath Sounds / Voice Sounds] : lungs were clear to auscultation bilaterally [Heart Rate And Rhythm] : heart rate was normal and rhythm regular [Heart Sounds] : normal S1 and S2 [Heart Sounds Gallop] : no gallops [Murmurs] : no murmurs [Heart Sounds Pericardial Friction Rub] : no pericardial rub [Bowel Sounds] : normal bowel sounds [Abdomen Soft] : soft [Abdomen Tenderness] : non-tender [Abdomen Mass (___ Cm)] : no abdominal mass palpated [No CVA Tenderness] : no ~M costovertebral angle tenderness [No Spinal Tenderness] : no spinal tenderness [Abnormal Walk] : normal gait [Nail Clubbing] : no clubbing  or cyanosis of the fingernails [Musculoskeletal - Swelling] : no joint swelling seen [Motor Tone] : muscle strength and tone were normal [Skin Color & Pigmentation] : normal skin color and pigmentation [Skin Turgor] : normal skin turgor [] : no rash [Deep Tendon Reflexes (DTR)] : deep tendon reflexes were 2+ and symmetric [Sensation] : the sensory exam was normal to light touch and pinprick [No Focal Deficits] : no focal deficits

## 2019-04-01 NOTE — SOCIAL HISTORY
[Two or more falls in past year] : Patient reported two or more falls in the past year [Walker] : walker [Smoke Detector] : smoke detector [Carbon Monoxide Detector] : carbon monoxide detector [Safety elements used in home] : safety elements used in home [Grab Bars] : grab bars [Shower Chair] : shower chair [Night Light] : night light [Anti-Slip Measures] : anti-slip measures [Seat Belt] :  uses seat belt [Sunscreen] : uses sunscreen [FreeTextEntry1] : 24/7 help at home [FreeTextEntry2] : excellent [FreeTextEntry3] : cannot participate in day care [FreeTextEntry4] : none [Guns at Home] : no guns at home [Travel to Developing Areas] : does not  travel to developing areas [TB Exposure] : no exposure to tuberculosis [Caregiver Concerns] : no caregiver concerns [Driving] : not driving [de-identified] : Needs total help [de-identified] : Needs total help

## 2019-04-01 NOTE — HISTORY OF PRESENT ILLNESS
[Severe] : Stage: Severe [Worse] : Status: Worse [Memory Lapses Or Loss] : worsened memory impairment [Patient Observed To Be Agitated] : stable agitation [Hostility Toward Caregivers] : denies aggression [Sleep Disturbances] : denies sleep disturbances [] : denies wandering [Fixed Beliefs Contradicted By Reality (Delusions)] : stable delusions [FreeTextEntry1] : 84 yo man with Lewy Body disease, recently discharged from [facility] memory care unit. He is now back at home, with his wife and they will have 24/7 PMH: spinal stenosis s/p lumbar laminectomy, HTN, HLD, CAD s/p stent 2008, 2014, afib on Pradaxa, BPH, hypothyroid, admission 12/25/2018 at [facility] for visual hallucinations. \par \par Wife states that he is more docile, less agitated, stilll occasional visual hallucinations (mice..). She is concerned about anxiety, sometimes unprovoked\par

## 2019-04-22 ENCOUNTER — INPATIENT (INPATIENT)
Facility: HOSPITAL | Age: 84
LOS: 7 days | Discharge: NURSING, REHAB OR LONG-TERM CARE | End: 2019-04-30
Attending: HOSPITALIST | Admitting: HOSPITALIST
Payer: MEDICARE

## 2019-04-22 VITALS
SYSTOLIC BLOOD PRESSURE: 100 MMHG | DIASTOLIC BLOOD PRESSURE: 70 MMHG | TEMPERATURE: 98 F | HEART RATE: 88 BPM | OXYGEN SATURATION: 97 % | RESPIRATION RATE: 16 BRPM

## 2019-04-22 DIAGNOSIS — Z98.89 OTHER SPECIFIED POSTPROCEDURAL STATES: Chronic | ICD-10-CM

## 2019-04-22 DIAGNOSIS — S39.92XA UNSPECIFIED INJURY OF LOWER BACK, INITIAL ENCOUNTER: Chronic | ICD-10-CM

## 2019-04-22 DIAGNOSIS — Z98.890 OTHER SPECIFIED POSTPROCEDURAL STATES: Chronic | ICD-10-CM

## 2019-04-22 DIAGNOSIS — Z45.42 ENCOUNTER FOR ADJUSTMENT AND MANAGEMENT OF NEUROSTIMULATOR: Chronic | ICD-10-CM

## 2019-04-22 DIAGNOSIS — J18.9 PNEUMONIA, UNSPECIFIED ORGANISM: ICD-10-CM

## 2019-04-22 LAB
ALBUMIN SERPL ELPH-MCNC: 3.5 G/DL — SIGNIFICANT CHANGE UP (ref 3.3–5)
ALP SERPL-CCNC: 130 U/L — HIGH (ref 40–120)
ALT FLD-CCNC: 20 U/L — SIGNIFICANT CHANGE UP (ref 4–41)
ANION GAP SERPL CALC-SCNC: 12 MMO/L — SIGNIFICANT CHANGE UP (ref 7–14)
APTT BLD: 27.1 SEC — LOW (ref 27.5–36.3)
AST SERPL-CCNC: 34 U/L — SIGNIFICANT CHANGE UP (ref 4–40)
B PERT DNA SPEC QL NAA+PROBE: NOT DETECTED — SIGNIFICANT CHANGE UP
BASE EXCESS BLDV CALC-SCNC: 0.7 MMOL/L — SIGNIFICANT CHANGE UP
BASOPHILS # BLD AUTO: 0.04 K/UL — SIGNIFICANT CHANGE UP (ref 0–0.2)
BASOPHILS NFR BLD AUTO: 0.5 % — SIGNIFICANT CHANGE UP (ref 0–2)
BASOPHILS NFR SPEC: 0 % — SIGNIFICANT CHANGE UP (ref 0–2)
BILIRUB SERPL-MCNC: 1.8 MG/DL — HIGH (ref 0.2–1.2)
BLASTS # FLD: 0 % — SIGNIFICANT CHANGE UP (ref 0–0)
BLD GP AB SCN SERPL QL: NEGATIVE — SIGNIFICANT CHANGE UP
BLOOD GAS VENOUS - CREATININE: 0.68 MG/DL — SIGNIFICANT CHANGE UP (ref 0.5–1.3)
BUN SERPL-MCNC: 28 MG/DL — HIGH (ref 7–23)
C PNEUM DNA SPEC QL NAA+PROBE: NOT DETECTED — SIGNIFICANT CHANGE UP
CALCIUM SERPL-MCNC: 9.8 MG/DL — SIGNIFICANT CHANGE UP (ref 8.4–10.5)
CHLORIDE BLDV-SCNC: 105 MMOL/L — SIGNIFICANT CHANGE UP (ref 96–108)
CHLORIDE SERPL-SCNC: 101 MMOL/L — SIGNIFICANT CHANGE UP (ref 98–107)
CO2 SERPL-SCNC: 24 MMOL/L — SIGNIFICANT CHANGE UP (ref 22–31)
CREAT SERPL-MCNC: 0.81 MG/DL — SIGNIFICANT CHANGE UP (ref 0.5–1.3)
EOSINOPHIL # BLD AUTO: 0 K/UL — SIGNIFICANT CHANGE UP (ref 0–0.5)
EOSINOPHIL NFR BLD AUTO: 0 % — SIGNIFICANT CHANGE UP (ref 0–6)
EOSINOPHIL NFR FLD: 0 % — SIGNIFICANT CHANGE UP (ref 0–6)
FLUAV H1 2009 PAND RNA SPEC QL NAA+PROBE: NOT DETECTED — SIGNIFICANT CHANGE UP
FLUAV H1 RNA SPEC QL NAA+PROBE: NOT DETECTED — SIGNIFICANT CHANGE UP
FLUAV H3 RNA SPEC QL NAA+PROBE: NOT DETECTED — SIGNIFICANT CHANGE UP
FLUAV SUBTYP SPEC NAA+PROBE: NOT DETECTED — SIGNIFICANT CHANGE UP
FLUBV RNA SPEC QL NAA+PROBE: NOT DETECTED — SIGNIFICANT CHANGE UP
GAS PNL BLDV: 139 MMOL/L — SIGNIFICANT CHANGE UP (ref 136–146)
GIANT PLATELETS BLD QL SMEAR: PRESENT — SIGNIFICANT CHANGE UP
GLUCOSE BLDV-MCNC: 95 — SIGNIFICANT CHANGE UP (ref 70–99)
GLUCOSE SERPL-MCNC: 95 MG/DL — SIGNIFICANT CHANGE UP (ref 70–99)
HADV DNA SPEC QL NAA+PROBE: NOT DETECTED — SIGNIFICANT CHANGE UP
HCO3 BLDV-SCNC: 23 MMOL/L — SIGNIFICANT CHANGE UP (ref 20–27)
HCOV PNL SPEC NAA+PROBE: SIGNIFICANT CHANGE UP
HCT VFR BLD CALC: 35.9 % — LOW (ref 39–50)
HCT VFR BLDV CALC: 35.1 % — LOW (ref 39–51)
HGB BLD-MCNC: 11.1 G/DL — LOW (ref 13–17)
HGB BLDV-MCNC: 11.4 G/DL — LOW (ref 13–17)
HMPV RNA SPEC QL NAA+PROBE: NOT DETECTED — SIGNIFICANT CHANGE UP
HPIV1 RNA SPEC QL NAA+PROBE: NOT DETECTED — SIGNIFICANT CHANGE UP
HPIV2 RNA SPEC QL NAA+PROBE: NOT DETECTED — SIGNIFICANT CHANGE UP
HPIV3 RNA SPEC QL NAA+PROBE: NOT DETECTED — SIGNIFICANT CHANGE UP
HPIV4 RNA SPEC QL NAA+PROBE: NOT DETECTED — SIGNIFICANT CHANGE UP
IMM GRANULOCYTES NFR BLD AUTO: 1.8 % — HIGH (ref 0–1.5)
INR BLD: 1.29 — HIGH (ref 0.88–1.17)
LACTATE BLDV-MCNC: 1.9 MMOL/L — SIGNIFICANT CHANGE UP (ref 0.5–2)
LYMPHOCYTES # BLD AUTO: 1.46 K/UL — SIGNIFICANT CHANGE UP (ref 1–3.3)
LYMPHOCYTES # BLD AUTO: 16.6 % — SIGNIFICANT CHANGE UP (ref 13–44)
LYMPHOCYTES NFR SPEC AUTO: 8.7 % — LOW (ref 13–44)
MCHC RBC-ENTMCNC: 29.4 PG — SIGNIFICANT CHANGE UP (ref 27–34)
MCHC RBC-ENTMCNC: 30.9 % — LOW (ref 32–36)
MCV RBC AUTO: 95.2 FL — SIGNIFICANT CHANGE UP (ref 80–100)
METAMYELOCYTES # FLD: 0 % — SIGNIFICANT CHANGE UP (ref 0–1)
MONOCYTES # BLD AUTO: 1.58 K/UL — HIGH (ref 0–0.9)
MONOCYTES NFR BLD AUTO: 18 % — HIGH (ref 2–14)
MONOCYTES NFR BLD: 12.2 % — HIGH (ref 2–9)
MYELOCYTES NFR BLD: 0 % — SIGNIFICANT CHANGE UP (ref 0–0)
NEUTROPHIL AB SER-ACNC: 76.5 % — SIGNIFICANT CHANGE UP (ref 43–77)
NEUTROPHILS # BLD AUTO: 5.53 K/UL — SIGNIFICANT CHANGE UP (ref 1.8–7.4)
NEUTROPHILS NFR BLD AUTO: 63.1 % — SIGNIFICANT CHANGE UP (ref 43–77)
NEUTS BAND # BLD: 0 % — SIGNIFICANT CHANGE UP (ref 0–6)
NRBC # FLD: 0 K/UL — SIGNIFICANT CHANGE UP (ref 0–0)
OTHER - HEMATOLOGY %: 0 — SIGNIFICANT CHANGE UP
OVALOCYTES BLD QL SMEAR: SLIGHT — SIGNIFICANT CHANGE UP
PCO2 BLDV: 42 MMHG — SIGNIFICANT CHANGE UP (ref 41–51)
PH BLDV: 7.39 PH — SIGNIFICANT CHANGE UP (ref 7.32–7.43)
PLATELET # BLD AUTO: 270 K/UL — SIGNIFICANT CHANGE UP (ref 150–400)
PLATELET COUNT - ESTIMATE: NORMAL — SIGNIFICANT CHANGE UP
PMV BLD: 9.4 FL — SIGNIFICANT CHANGE UP (ref 7–13)
PO2 BLDV: 17 MMHG — LOW (ref 35–40)
POIKILOCYTOSIS BLD QL AUTO: SLIGHT — SIGNIFICANT CHANGE UP
POLYCHROMASIA BLD QL SMEAR: SLIGHT — SIGNIFICANT CHANGE UP
POTASSIUM BLDV-SCNC: 4.6 MMOL/L — HIGH (ref 3.4–4.5)
POTASSIUM SERPL-MCNC: 4.4 MMOL/L — SIGNIFICANT CHANGE UP (ref 3.5–5.3)
POTASSIUM SERPL-SCNC: 4.4 MMOL/L — SIGNIFICANT CHANGE UP (ref 3.5–5.3)
PROMYELOCYTES # FLD: 0 % — SIGNIFICANT CHANGE UP (ref 0–0)
PROT SERPL-MCNC: 8.2 G/DL — SIGNIFICANT CHANGE UP (ref 6–8.3)
PROTHROM AB SERPL-ACNC: 14.8 SEC — HIGH (ref 9.8–13.1)
RBC # BLD: 3.77 M/UL — LOW (ref 4.2–5.8)
RBC # FLD: 15.4 % — HIGH (ref 10.3–14.5)
RH IG SCN BLD-IMP: POSITIVE — SIGNIFICANT CHANGE UP
RSV RNA SPEC QL NAA+PROBE: NOT DETECTED — SIGNIFICANT CHANGE UP
RV+EV RNA SPEC QL NAA+PROBE: DETECTED — HIGH
SAO2 % BLDV: 16.7 % — LOW (ref 60–85)
SODIUM SERPL-SCNC: 137 MMOL/L — SIGNIFICANT CHANGE UP (ref 135–145)
VARIANT LYMPHS # BLD: 2.6 % — SIGNIFICANT CHANGE UP
WBC # BLD: 8.77 K/UL — SIGNIFICANT CHANGE UP (ref 3.8–10.5)
WBC # FLD AUTO: 8.77 K/UL — SIGNIFICANT CHANGE UP (ref 3.8–10.5)

## 2019-04-22 PROCEDURE — 71045 X-RAY EXAM CHEST 1 VIEW: CPT | Mod: 26

## 2019-04-22 RX ORDER — CEFTRIAXONE 500 MG/1
1 INJECTION, POWDER, FOR SOLUTION INTRAMUSCULAR; INTRAVENOUS ONCE
Qty: 0 | Refills: 0 | Status: COMPLETED | OUTPATIENT
Start: 2019-04-22 | End: 2019-04-22

## 2019-04-22 RX ORDER — METOPROLOL TARTRATE 50 MG
25 TABLET ORAL ONCE
Qty: 0 | Refills: 0 | Status: COMPLETED | OUTPATIENT
Start: 2019-04-22 | End: 2019-04-22

## 2019-04-22 RX ORDER — AZITHROMYCIN 500 MG/1
500 TABLET, FILM COATED ORAL ONCE
Qty: 0 | Refills: 0 | Status: COMPLETED | OUTPATIENT
Start: 2019-04-22 | End: 2019-04-22

## 2019-04-22 RX ORDER — SODIUM CHLORIDE 9 MG/ML
1000 INJECTION, SOLUTION INTRAVENOUS
Qty: 0 | Refills: 0 | Status: DISCONTINUED | OUTPATIENT
Start: 2019-04-22 | End: 2019-04-30

## 2019-04-22 RX ORDER — DABIGATRAN ETEXILATE MESYLATE 150 MG/1
150 CAPSULE ORAL EVERY 12 HOURS
Qty: 0 | Refills: 0 | Status: DISCONTINUED | OUTPATIENT
Start: 2019-04-22 | End: 2019-04-30

## 2019-04-22 RX ORDER — SODIUM CHLORIDE 9 MG/ML
2000 INJECTION INTRAMUSCULAR; INTRAVENOUS; SUBCUTANEOUS ONCE
Qty: 0 | Refills: 0 | Status: COMPLETED | OUTPATIENT
Start: 2019-04-22 | End: 2019-04-22

## 2019-04-22 RX ADMIN — Medication 25 MILLIGRAM(S): at 22:22

## 2019-04-22 RX ADMIN — AZITHROMYCIN 250 MILLIGRAM(S): 500 TABLET, FILM COATED ORAL at 22:21

## 2019-04-22 RX ADMIN — DABIGATRAN ETEXILATE MESYLATE 150 MILLIGRAM(S): 150 CAPSULE ORAL at 22:23

## 2019-04-22 RX ADMIN — SODIUM CHLORIDE 2000 MILLILITER(S): 9 INJECTION INTRAMUSCULAR; INTRAVENOUS; SUBCUTANEOUS at 18:05

## 2019-04-22 RX ADMIN — CEFTRIAXONE 1 GRAM(S): 500 INJECTION, POWDER, FOR SOLUTION INTRAMUSCULAR; INTRAVENOUS at 20:53

## 2019-04-22 RX ADMIN — SODIUM CHLORIDE 2000 MILLILITER(S): 9 INJECTION INTRAMUSCULAR; INTRAVENOUS; SUBCUTANEOUS at 20:04

## 2019-04-22 RX ADMIN — CEFTRIAXONE 100 GRAM(S): 500 INJECTION, POWDER, FOR SOLUTION INTRAMUSCULAR; INTRAVENOUS at 20:03

## 2019-04-22 RX ADMIN — SODIUM CHLORIDE 100 MILLILITER(S): 9 INJECTION, SOLUTION INTRAVENOUS at 20:53

## 2019-04-22 NOTE — ED ADULT NURSE NOTE - OBJECTIVE STATEMENT
rosa rn: patient arrives to room 12, accompanied by wife selin torres body dementia diagnosed in january, patient is a*ox2, following some commands, disoriented to time/event. per wife patient has had a dry cough x several days, no fevers or chills, and has been refusing to take home medications (seroquel/pradaxa and other antipsychotics) on assessment patient denies any si hi,  wife states that "he has been hallucinating and thinks there are bugs crawling all over him and he feels like the wind is blowing in his face when he is inside." patient calm and cooperative. denies complaints of pain or discomfort, normal sinus per monitor, 20G IVSL placed all labs drawn and sent. wife also states that pt has been refusing to eat or dink and sleeping for 20-22 hours/day. pending further orders at this time,  pt resting comfortably and in no acute distress, bed in lowest position with call bell within reach, primary RN JQ in area made aware of patient status.

## 2019-04-22 NOTE — ED PROVIDER NOTE - CHIEF COMPLAINT
The patient is a 84y Male complaining of The patient is a 84y Male complaining of AMS, decreased PO intake.

## 2019-04-22 NOTE — ED ADULT NURSE NOTE - NSIMPLEMENTINTERV_GEN_ALL_ED
Implemented All Fall with Harm Risk Interventions:  [place] to call system. Call bell, personal items and telephone within reach. Instruct patient to call for assistance. Room bathroom lighting operational. Non-slip footwear when patient is off stretcher. Physically safe environment: no spills, clutter or unnecessary equipment. Stretcher in lowest position, wheels locked, appropriate side rails in place. Provide visual cue, wrist band, yellow gown, etc. Monitor gait and stability. Monitor for mental status changes and reorient to person, place, and time. Review medications for side effects contributing to fall risk. Reinforce activity limits and safety measures with patient and family. Provide visual clues: red socks.
HEADACHE

## 2019-04-22 NOTE — ED PROVIDER NOTE - ATTENDING CONTRIBUTION TO CARE
85 yo male presents to ED for evaluation of decreased PO intake, no taking medications, worsened mental status (recently dx Lewy body dementia,) foul smelling urine.   Home for 3 weeks with health aide  no fevers  dry cough 85 yo male presents to ED for evaluation of decreased PO intake, productive cough, foul smelling urine, noncompliant with medications, and worsening mental status x 4 days. Wife reports that patient was diagnosed with Lewy Body dementia in 12/2019 and has been baseline AAox2, reports over past few days has been hallucinating, more aggressive, and noncompliant. Denies fevers or chills at home. Patient unreliable historian.     VS:      Gen: no acute distress, well appearing, awake, alert and oriented x 3  Cardiac: tachycardic, +S1S2, no murmurs rubs or gallops  Pulm: Clear to auscultation bilaterally, equal air entry bilaterally - poor effort  Abd: soft, nontender, nondistended, no guarding    MDM  Elderly male with recent diagnosis of dementia, presenting with some infectious symptoms. Will check labs, Ekg, imaging, medicate, admit

## 2019-04-22 NOTE — ED PROVIDER NOTE - PROGRESS NOTE DETAILS
HR ranging from high 90s to low 100s, BP has remained wnl. Labs show +entero, XR suspicious for PNA. treated w/ home metoprolol and ceftriaxone for pulm and urinary coverage. d/w [facility] hospitalist, will admit

## 2019-04-22 NOTE — ED ADULT TRIAGE NOTE - CHIEF COMPLAINT QUOTE
pt BIBA from home, pt sent to ED for dark urine, poor PO intake and not taking meds at home. pt awake and following commands

## 2019-04-22 NOTE — ED PROVIDER NOTE - OBJECTIVE STATEMENT
85 y/o M with history of cad s/p stents, afib, htn, hld, recently diagnosed lewy body dementia brought in by wife for 4-5 days of decreased PO intake, refusal to take medications, worsening AMS and dark/foul smelling urine. Patient was discharged from dementia facility 3 weeks ago and has been home with HHA and wife. Acutely worsened 4-5 days ago. Also having dry cough. No fevers measured at home. Patient unable to provide accurate history or ROS at this time.

## 2019-04-23 DIAGNOSIS — I10 ESSENTIAL (PRIMARY) HYPERTENSION: ICD-10-CM

## 2019-04-23 DIAGNOSIS — I48.91 UNSPECIFIED ATRIAL FIBRILLATION: ICD-10-CM

## 2019-04-23 DIAGNOSIS — G31.83 NEUROCOGNITIVE DISORDER WITH LEWY BODIES: ICD-10-CM

## 2019-04-23 DIAGNOSIS — Z29.9 ENCOUNTER FOR PROPHYLACTIC MEASURES, UNSPECIFIED: ICD-10-CM

## 2019-04-23 DIAGNOSIS — J18.9 PNEUMONIA, UNSPECIFIED ORGANISM: ICD-10-CM

## 2019-04-23 DIAGNOSIS — I25.10 ATHEROSCLEROTIC HEART DISEASE OF NATIVE CORONARY ARTERY WITHOUT ANGINA PECTORIS: ICD-10-CM

## 2019-04-23 LAB
ALBUMIN SERPL ELPH-MCNC: 2.7 G/DL — LOW (ref 3.3–5)
ALP SERPL-CCNC: 115 U/L — SIGNIFICANT CHANGE UP (ref 40–120)
ALT FLD-CCNC: 35 U/L — SIGNIFICANT CHANGE UP (ref 4–41)
ANION GAP SERPL CALC-SCNC: 11 MMO/L — SIGNIFICANT CHANGE UP (ref 7–14)
APPEARANCE UR: CLEAR — SIGNIFICANT CHANGE UP
APTT BLD: 38.6 SEC — HIGH (ref 27.5–36.3)
AST SERPL-CCNC: 59 U/L — HIGH (ref 4–40)
BACTERIA # UR AUTO: NEGATIVE — SIGNIFICANT CHANGE UP
BASOPHILS # BLD AUTO: 0.04 K/UL — SIGNIFICANT CHANGE UP (ref 0–0.2)
BASOPHILS NFR BLD AUTO: 0.5 % — SIGNIFICANT CHANGE UP (ref 0–2)
BILIRUB SERPL-MCNC: 1.2 MG/DL — SIGNIFICANT CHANGE UP (ref 0.2–1.2)
BILIRUB UR-MCNC: SIGNIFICANT CHANGE UP
BLOOD UR QL VISUAL: NEGATIVE — SIGNIFICANT CHANGE UP
BUN SERPL-MCNC: 20 MG/DL — SIGNIFICANT CHANGE UP (ref 7–23)
CALCIUM SERPL-MCNC: 8.4 MG/DL — SIGNIFICANT CHANGE UP (ref 8.4–10.5)
CHLORIDE SERPL-SCNC: 105 MMOL/L — SIGNIFICANT CHANGE UP (ref 98–107)
CHOLEST SERPL-MCNC: 90 MG/DL — LOW (ref 120–199)
CO2 SERPL-SCNC: 21 MMOL/L — LOW (ref 22–31)
COLOR SPEC: YELLOW — SIGNIFICANT CHANGE UP
CREAT SERPL-MCNC: 0.6 MG/DL — SIGNIFICANT CHANGE UP (ref 0.5–1.3)
EOSINOPHIL # BLD AUTO: 0.01 K/UL — SIGNIFICANT CHANGE UP (ref 0–0.5)
EOSINOPHIL NFR BLD AUTO: 0.1 % — SIGNIFICANT CHANGE UP (ref 0–6)
GLUCOSE SERPL-MCNC: 136 MG/DL — HIGH (ref 70–99)
GLUCOSE UR-MCNC: NEGATIVE — SIGNIFICANT CHANGE UP
HBA1C BLD-MCNC: 5.5 % — SIGNIFICANT CHANGE UP (ref 4–5.6)
HCT VFR BLD CALC: 31.2 % — LOW (ref 39–50)
HDLC SERPL-MCNC: 28 MG/DL — LOW (ref 35–55)
HGB BLD-MCNC: 10 G/DL — LOW (ref 13–17)
HYALINE CASTS # UR AUTO: NEGATIVE — SIGNIFICANT CHANGE UP
IMM GRANULOCYTES NFR BLD AUTO: 1.9 % — HIGH (ref 0–1.5)
INR BLD: 1.56 — HIGH (ref 0.88–1.17)
KETONES UR-MCNC: NEGATIVE — SIGNIFICANT CHANGE UP
LEUKOCYTE ESTERASE UR-ACNC: NEGATIVE — SIGNIFICANT CHANGE UP
LIPID PNL WITH DIRECT LDL SERPL: 60 MG/DL — SIGNIFICANT CHANGE UP
LYMPHOCYTES # BLD AUTO: 1.13 K/UL — SIGNIFICANT CHANGE UP (ref 1–3.3)
LYMPHOCYTES # BLD AUTO: 15 % — SIGNIFICANT CHANGE UP (ref 13–44)
MAGNESIUM SERPL-MCNC: 1.7 MG/DL — SIGNIFICANT CHANGE UP (ref 1.6–2.6)
MCHC RBC-ENTMCNC: 29.7 PG — SIGNIFICANT CHANGE UP (ref 27–34)
MCHC RBC-ENTMCNC: 32.1 % — SIGNIFICANT CHANGE UP (ref 32–36)
MCV RBC AUTO: 92.6 FL — SIGNIFICANT CHANGE UP (ref 80–100)
MONOCYTES # BLD AUTO: 0.97 K/UL — HIGH (ref 0–0.9)
MONOCYTES NFR BLD AUTO: 12.9 % — SIGNIFICANT CHANGE UP (ref 2–14)
NEUTROPHILS # BLD AUTO: 5.22 K/UL — SIGNIFICANT CHANGE UP (ref 1.8–7.4)
NEUTROPHILS NFR BLD AUTO: 69.6 % — SIGNIFICANT CHANGE UP (ref 43–77)
NITRITE UR-MCNC: NEGATIVE — SIGNIFICANT CHANGE UP
NRBC # FLD: 0 K/UL — SIGNIFICANT CHANGE UP (ref 0–0)
NT-PROBNP SERPL-SCNC: 2631 PG/ML — SIGNIFICANT CHANGE UP
PH UR: 6 — SIGNIFICANT CHANGE UP (ref 5–8)
PHOSPHATE SERPL-MCNC: 2.1 MG/DL — LOW (ref 2.5–4.5)
PLATELET # BLD AUTO: 231 K/UL — SIGNIFICANT CHANGE UP (ref 150–400)
PMV BLD: 9.4 FL — SIGNIFICANT CHANGE UP (ref 7–13)
POTASSIUM SERPL-MCNC: 3.9 MMOL/L — SIGNIFICANT CHANGE UP (ref 3.5–5.3)
POTASSIUM SERPL-SCNC: 3.9 MMOL/L — SIGNIFICANT CHANGE UP (ref 3.5–5.3)
PROT SERPL-MCNC: 6.7 G/DL — SIGNIFICANT CHANGE UP (ref 6–8.3)
PROT UR-MCNC: 30 — SIGNIFICANT CHANGE UP
PROTHROM AB SERPL-ACNC: 17.5 SEC — HIGH (ref 9.8–13.1)
RBC # BLD: 3.37 M/UL — LOW (ref 4.2–5.8)
RBC # FLD: 15.1 % — HIGH (ref 10.3–14.5)
RBC CASTS # UR COMP ASSIST: SIGNIFICANT CHANGE UP (ref 0–?)
SODIUM SERPL-SCNC: 137 MMOL/L — SIGNIFICANT CHANGE UP (ref 135–145)
SP GR SPEC: 1.03 — SIGNIFICANT CHANGE UP (ref 1–1.04)
SPECIMEN SOURCE: SIGNIFICANT CHANGE UP
SQUAMOUS # UR AUTO: SIGNIFICANT CHANGE UP
TRIGL SERPL-MCNC: 56 MG/DL — SIGNIFICANT CHANGE UP (ref 10–149)
TROPONIN T, HIGH SENSITIVITY: 20 NG/L — SIGNIFICANT CHANGE UP (ref ?–14)
TSH SERPL-MCNC: 1.24 UIU/ML — SIGNIFICANT CHANGE UP (ref 0.27–4.2)
UROBILINOGEN FLD QL: HIGH
WBC # BLD: 7.51 K/UL — SIGNIFICANT CHANGE UP (ref 3.8–10.5)
WBC # FLD AUTO: 7.51 K/UL — SIGNIFICANT CHANGE UP (ref 3.8–10.5)
WBC UR QL: SIGNIFICANT CHANGE UP (ref 0–?)

## 2019-04-23 PROCEDURE — 99223 1ST HOSP IP/OBS HIGH 75: CPT

## 2019-04-23 RX ORDER — HALOPERIDOL DECANOATE 100 MG/ML
0.5 INJECTION INTRAMUSCULAR ONCE
Qty: 0 | Refills: 0 | Status: COMPLETED | OUTPATIENT
Start: 2019-04-23 | End: 2019-04-23

## 2019-04-23 RX ORDER — QUETIAPINE FUMARATE 200 MG/1
25 TABLET, FILM COATED ORAL AT BEDTIME
Qty: 0 | Refills: 0 | Status: DISCONTINUED | OUTPATIENT
Start: 2019-04-23 | End: 2019-04-23

## 2019-04-23 RX ORDER — MIDODRINE HYDROCHLORIDE 2.5 MG/1
1 TABLET ORAL
Qty: 0 | Refills: 0 | COMMUNITY

## 2019-04-23 RX ORDER — MIRTAZAPINE 45 MG/1
7.5 TABLET, ORALLY DISINTEGRATING ORAL DAILY
Qty: 0 | Refills: 0 | Status: DISCONTINUED | OUTPATIENT
Start: 2019-04-23 | End: 2019-04-23

## 2019-04-23 RX ORDER — ASPIRIN/CALCIUM CARB/MAGNESIUM 324 MG
1 TABLET ORAL
Qty: 0 | Refills: 0 | COMMUNITY

## 2019-04-23 RX ORDER — CEFTRIAXONE 500 MG/1
1 INJECTION, POWDER, FOR SOLUTION INTRAMUSCULAR; INTRAVENOUS EVERY 24 HOURS
Qty: 0 | Refills: 0 | Status: DISCONTINUED | OUTPATIENT
Start: 2019-04-23 | End: 2019-04-28

## 2019-04-23 RX ORDER — LEVOTHYROXINE SODIUM 125 MCG
50 TABLET ORAL DAILY
Qty: 0 | Refills: 0 | Status: DISCONTINUED | OUTPATIENT
Start: 2019-04-23 | End: 2019-04-30

## 2019-04-23 RX ORDER — MIRTAZAPINE 45 MG/1
7.5 TABLET, ORALLY DISINTEGRATING ORAL AT BEDTIME
Qty: 0 | Refills: 0 | Status: DISCONTINUED | OUTPATIENT
Start: 2019-04-23 | End: 2019-04-24

## 2019-04-23 RX ORDER — AZITHROMYCIN 500 MG/1
500 TABLET, FILM COATED ORAL EVERY 24 HOURS
Qty: 0 | Refills: 0 | Status: DISCONTINUED | OUTPATIENT
Start: 2019-04-23 | End: 2019-04-28

## 2019-04-23 RX ORDER — PANTOPRAZOLE SODIUM 20 MG/1
40 TABLET, DELAYED RELEASE ORAL
Qty: 0 | Refills: 0 | Status: DISCONTINUED | OUTPATIENT
Start: 2019-04-23 | End: 2019-04-30

## 2019-04-23 RX ORDER — HALOPERIDOL DECANOATE 100 MG/ML
0.5 INJECTION INTRAMUSCULAR EVERY 6 HOURS
Qty: 0 | Refills: 0 | Status: DISCONTINUED | OUTPATIENT
Start: 2019-04-23 | End: 2019-04-24

## 2019-04-23 RX ORDER — LEVOTHYROXINE SODIUM 125 MCG
1 TABLET ORAL
Qty: 0 | Refills: 0 | COMMUNITY

## 2019-04-23 RX ORDER — PANTOPRAZOLE SODIUM 20 MG/1
1 TABLET, DELAYED RELEASE ORAL
Qty: 0 | Refills: 0 | COMMUNITY

## 2019-04-23 RX ORDER — TAMSULOSIN HYDROCHLORIDE 0.4 MG/1
0.4 CAPSULE ORAL AT BEDTIME
Qty: 0 | Refills: 0 | Status: DISCONTINUED | OUTPATIENT
Start: 2019-04-23 | End: 2019-04-30

## 2019-04-23 RX ORDER — QUETIAPINE FUMARATE 200 MG/1
25 TABLET, FILM COATED ORAL THREE TIMES A DAY
Qty: 0 | Refills: 0 | Status: DISCONTINUED | OUTPATIENT
Start: 2019-04-23 | End: 2019-04-23

## 2019-04-23 RX ORDER — QUETIAPINE FUMARATE 200 MG/1
25 TABLET, FILM COATED ORAL EVERY 6 HOURS
Qty: 0 | Refills: 0 | Status: DISCONTINUED | OUTPATIENT
Start: 2019-04-23 | End: 2019-04-30

## 2019-04-23 RX ORDER — SODIUM CHLORIDE 9 MG/ML
3 INJECTION INTRAMUSCULAR; INTRAVENOUS; SUBCUTANEOUS EVERY 8 HOURS
Qty: 0 | Refills: 0 | Status: DISCONTINUED | OUTPATIENT
Start: 2019-04-23 | End: 2019-04-30

## 2019-04-23 RX ORDER — QUETIAPINE FUMARATE 200 MG/1
50 TABLET, FILM COATED ORAL AT BEDTIME
Qty: 0 | Refills: 0 | Status: DISCONTINUED | OUTPATIENT
Start: 2019-04-23 | End: 2019-04-24

## 2019-04-23 RX ADMIN — AZITHROMYCIN 250 MILLIGRAM(S): 500 TABLET, FILM COATED ORAL at 21:18

## 2019-04-23 RX ADMIN — DABIGATRAN ETEXILATE MESYLATE 150 MILLIGRAM(S): 150 CAPSULE ORAL at 06:39

## 2019-04-23 RX ADMIN — SODIUM CHLORIDE 3 MILLILITER(S): 9 INJECTION INTRAMUSCULAR; INTRAVENOUS; SUBCUTANEOUS at 13:40

## 2019-04-23 RX ADMIN — SODIUM CHLORIDE 3 MILLILITER(S): 9 INJECTION INTRAMUSCULAR; INTRAVENOUS; SUBCUTANEOUS at 06:39

## 2019-04-23 RX ADMIN — Medication 50 MICROGRAM(S): at 14:02

## 2019-04-23 RX ADMIN — TAMSULOSIN HYDROCHLORIDE 0.4 MILLIGRAM(S): 0.4 CAPSULE ORAL at 21:17

## 2019-04-23 RX ADMIN — CEFTRIAXONE 100 GRAM(S): 500 INJECTION, POWDER, FOR SOLUTION INTRAMUSCULAR; INTRAVENOUS at 20:13

## 2019-04-23 RX ADMIN — QUETIAPINE FUMARATE 50 MILLIGRAM(S): 200 TABLET, FILM COATED ORAL at 21:18

## 2019-04-23 RX ADMIN — MIRTAZAPINE 7.5 MILLIGRAM(S): 45 TABLET, ORALLY DISINTEGRATING ORAL at 21:17

## 2019-04-23 RX ADMIN — DABIGATRAN ETEXILATE MESYLATE 150 MILLIGRAM(S): 150 CAPSULE ORAL at 17:47

## 2019-04-23 RX ADMIN — SODIUM CHLORIDE 3 MILLILITER(S): 9 INJECTION INTRAMUSCULAR; INTRAVENOUS; SUBCUTANEOUS at 21:17

## 2019-04-23 RX ADMIN — HALOPERIDOL DECANOATE 0.5 MILLIGRAM(S): 100 INJECTION INTRAMUSCULAR at 02:03

## 2019-04-23 NOTE — H&P ADULT - NSHPLABSRESULTS_GEN_ALL_CORE
11.1   8.77  )-----------( 270      ( 22 Apr 2019 17:14 )             35.9     04-22    137  |  101  |  28<H>  ----------------------------<  95  4.4   |  24  |  0.81    Ca    9.8      22 Apr 2019 17:14    TPro  8.2  /  Alb  3.5  /  TBili  1.8<H>  /  DBili  x   /  AST  34  /  ALT  20  /  AlkPhos  130<H>  04-22    < from: Xray Chest 1 View- PORTABLE-Urgent (04.22.19 @ 19:20) >    EXAM:  XR CHEST PORTABLE URGENT 1V      PROCEDURE DATE:  Apr 22 2019     INTERPRETATION:  CLINICAL INDICATION: cough    EXAM:  Portable supine frontal chest from 4/22/2019 at 1920. Reviewed in   conjunction with chest CT from 12/7/2017.    IMPRESSION:  Lungs suboptimally inflated.    Vague increased right basilar markings and opacity suspicious for   infiltrate/pneumonia in the proper clinical context, follow-up suggested.   Clear remaining visualized lungs. No pleural effusions or pneumothorax.    Cardiac and mediastinal silhouettes appear slightly prominent but   inaccurately assessed on this projection.    Trachea midline.    Generalized osteopenia. Stable extensive posterior spinal fusion hardware.    THAO WALKER M.D., ATTENDING RADIOLOGIST  This document has been electronically signed. Apr 22 2019  8:16PM

## 2019-04-23 NOTE — H&P ADULT - NSICDXFAMILYHX_GEN_ALL_CORE_FT
FAMILY HISTORY:  Sibling  Still living? No  Family history of heart failure, Age at diagnosis: Age Unknown

## 2019-04-23 NOTE — H&P ADULT - HISTORY OF PRESENT ILLNESS
pt is a poor historian, wife not at bedside at the time of admission, 85 y/o M with Pmhx of CAD s/p stents, Afib, HTN, HLD, recently diagnosed lewy body dementia brought in by wife for 4-5 days of decreased PO intake, refusal to take medications, worsening AMS and dark/foul smelling urine. Patient was discharged from dementia facility 3 weeks ago and has been home with HHA and wife. Pt admits to dry cough, intermittent chills and dyspnea. Denies fevers, and CP. ROS is limited due to patients dementia.

## 2019-04-23 NOTE — BEHAVIORAL HEALTH ASSESSMENT NOTE - ORIENTATION OTHER
says year is "2020" and can't name the month, takes a while to name the President, says he got a surgery yesterday but knows a [facility] ambulance brought him here

## 2019-04-23 NOTE — H&P ADULT - NSICDXPASTMEDICALHX_GEN_ALL_CORE_FT
PAST MEDICAL HISTORY:  Arthritis     Atrial fibrillation     CAD (coronary artery disease)     HLD (hyperlipidemia)     HTN (hypertension)     Inguinal hernia right    Peripheral neuropathy     Reflux     Rotator cuff disorder, right     Spinal stenosis of lumbosacral region

## 2019-04-23 NOTE — BEHAVIORAL HEALTH ASSESSMENT NOTE - NSBHCHARTREVIEWVS_PSY_A_CORE FT
Vital Signs Last 24 Hrs  T(C): 37.2 (23 Apr 2019 14:25), Max: 38.1 (23 Apr 2019 01:02)  T(F): 98.9 (23 Apr 2019 14:25), Max: 100.6 (23 Apr 2019 01:02)  HR: 85 (23 Apr 2019 14:25) (85 - 115)  BP: 106/66 (23 Apr 2019 14:25) (100/70 - 141/78)  BP(mean): --  RR: 18 (23 Apr 2019 14:25) (16 - 24)  SpO2: 95% (23 Apr 2019 14:25) (94% - 100%)

## 2019-04-23 NOTE — BEHAVIORAL HEALTH ASSESSMENT NOTE - NSBHCHARTREVIEWLAB_PSY_A_CORE FT
CBC Full  -  ( 2019 10:54 )  WBC Count : 7.51 K/uL  RBC Count : 3.37 M/uL  Hemoglobin : 10.0 g/dL  Hematocrit : 31.2 %  Platelet Count - Automated : 231 K/uL  Mean Cell Volume : 92.6 fL  Mean Cell Hemoglobin : 29.7 pg  Mean Cell Hemoglobin Concentration : 32.1 %  Auto Neutrophil # : 5.22 K/uL  Auto Lymphocyte # : 1.13 K/uL  Auto Monocyte # : 0.97 K/uL  Auto Eosinophil # : 0.01 K/uL  Auto Basophil # : 0.04 K/uL  Auto Neutrophil % : 69.6 %  Auto Lymphocyte % : 15.0 %  Auto Monocyte % : 12.9 %  Auto Eosinophil % : 0.1 %  Auto Basophil % : 0.5 %        137  |  105  |  20  ----------------------------<  136<H>  3.9   |  21<L>  |  0.60    Ca    8.4      2019 10:54  Phos  2.1       Mg     1.7         TPro  6.7  /  Alb  2.7<L>  /  TBili  1.2  /  DBili  x   /  AST  59<H>  /  ALT  35  /  AlkPhos  115      LIVER FUNCTIONS - ( 2019 10:54 )  Alb: 2.7 g/dL / Pro: 6.7 g/dL / ALK PHOS: 115 u/L / ALT: 35 u/L / AST: 59 u/L / GGT: x           Urinalysis Basic - ( 2019 13:26 )    Color: YELLOW / Appearance: CLEAR / S.028 / pH: 6.0  Gluc: NEGATIVE / Ketone: NEGATIVE  / Bili: TRACE / Urobili: SMALL   Blood: NEGATIVE / Protein: 30 / Nitrite: NEGATIVE   Leuk Esterase: NEGATIVE / RBC: 0-2 / WBC 0-2   Sq Epi: OCC / Non Sq Epi: x / Bacteria: NEGATIVE

## 2019-04-23 NOTE — BEHAVIORAL HEALTH ASSESSMENT NOTE - SUMMARY
85 y/o M with Pmhx of CAD s/p stents, Afib, HTN, HLD, recently diagnosed lewy body dementia brought in by wife for 4-5 days of decreased PO intake, refusal to take medications, worsening AMS and dark/foul smelling urine. Patient was discharged from dementia facility 3 weeks ago and has been home with HHA and wife. Pt admits to dry cough, intermittent chills and dyspnea. CXR shows opacity suspicious for infiltrate/pneumonia, started on antibiotics. Pt is a limited historian. Calm and pleasant on interview. Pt reporting occasional illusions but denies any AH/VH, no paranoia and no SI/HI. Acknowledges that he is difficult at home about taking his medications and he and his wife get into arguments about this.

## 2019-04-23 NOTE — H&P ADULT - ASSESSMENT
83 y/o M with Pmhx of CAD s/p stents, Afib, HTN, HLD, recently diagnosed lewy body dementia brought in by wife for 4-5 days of decreased PO intake, refusal to take medications, worsening AMS and dark/foul smelling urine. Admitted for Pneumonia and R/o UTI 85 y/o M with Pmhx of CAD s/p stents, Afib, HTN, HLD, recently diagnosed lewy body dementia brought in by wife for 4-5 days of decreased PO intake, refusal to take medications, worsening AMS and dark/foul smelling urine. Admitted for Pneumonia and R/o UTI

## 2019-04-23 NOTE — H&P ADULT - ATTENDING COMMENTS
Chart reviewed. Vitals and Labs noted.   Pt seen and examined at bedside. Plan formulated with the resident/PA/NP. Detail H&P as above.     85 y/o M with PMHx of CAD s/p stents, Afib on Pradaxa, HTN, HLD, recently diagnosed lewy body dementia (dx in 2018) brought in by wife for 4-5 days of decreased PO intake, refusal to take medications, sleeping all day, and dark/foul smelling urine. Also with significant cough past few days.    Admit for failure to thrive in the setting of new cough. RVP + for Entero/rhino virus.   He is baseline with advance dementia. Currently he is alert, awake, but very forgetful. only remember his name and . Thinks he is in [facility], doesn't know the date, doesn't now current events. +cough, nonproductive. Lungs with dec breath sounds bibasilar, Heart: irregular a.fib.   Labs/vitals reviewed.   CXR with vague increased right basilar markings and opacity suspicious for infiltrate/pneumonia.   He was started on Ceftriaxone/Azithro. Will complete brief 5 days course. Supportive care for +viral bronchitis. Robittusin PRN cough. will switch his home Seroquel 25 mg TID to Seroquel QHS given lethargy at home. Can switch back to home regimen if needed. (Per wife he has been on this regimen for past 4 months and helping with his hallucinations).  Pt at home able to transfer himself from bed to bathroom but doesn't walk much. Have home stay-in aide.  The wife interested in speaking with  for long term placement.  Psyc consult, he is refusing all medications.     Luis Enrique Leach will be covering for me starting 19. He can be reached at  if needed.   DVT ppx  - Dr. BRENDA Burks ([facility])  - [phone] Chart reviewed. Vitals and Labs noted.   Pt seen and examined at bedside. Plan formulated with the resident/PA/NP. Detail H&P as above.     83 y/o M with PMHx of CAD s/p stents, Afib on Pradaxa, HTN, HLD, recently diagnosed lewy body dementia (dx in 2018) brought in by wife for 4-5 days of decreased PO intake, refusal to take medications, sleeping all day, and dark/foul smelling urine. Also with significant cough past few days.    Admit for failure to thrive in the setting of new cough. RVP + for Entero/rhino virus.   He is baseline with advance dementia. Currently he is alert, awake, but very forgetful. only remember his name and . Thinks he is in [facility], doesn't know the date, doesn't now current events. +cough, nonproductive. Lungs with dec breath sounds bibasilar, Heart: irregular a.fib.   Labs/vitals reviewed.   CXR with vague increased right basilar markings and opacity suspicious for infiltrate/pneumonia.   He was started on Ceftriaxone/Azithro. Will complete brief 5 days course. Supportive care for +viral bronchitis. Robittusin PRN cough. will switch his home Seroquel 25 mg TID to Seroquel QHS given lethargy at home. Can switch back to home regimen if needed. (Per wife he has been on this regimen for past 4 months and helping with his hallucinations).  Pt at home able to transfer himself from bed to bathroom but doesn't walk much. Have home stay-in aide.  The wife interested in speaking with  for long term placement.  Psyc consult, he is refusing all medications.   Medications reconciled and updated as appropriate. Orders placed.     Luis Enrique Leach will be covering for me starting 19. He can be reached at  if needed.   DVT ppx  - Dr[facility]  - [phone]

## 2019-04-23 NOTE — BEHAVIORAL HEALTH ASSESSMENT NOTE - NS ED [facility] MED ROS GENITOURINARY
6/2/2017 Name: Teresita Santos MRN: [id number] YOB: 1999 Date of Visit: 6/1/2017 Dear Dr. Sandra Belcher MD  
 
I had the opportunity to see your patient, Teresita Santos, in the Pediatric Lung Care office at [facility] for ongoing management of asthma. Please find my impression and suggestions below. Despite regular use of ICS and a low NO, her PFT remain obstructive (and had demonstrated a BD response at last visit). In an attempt to clarify the etiology of the obstruction, I have given a 4 day course of oral steroids - PFT will be repeated next week. Dr. Jodi Dumont MD, [facility] [address] [facility], [address] [place] [address] 
) [phone] () [phone] Impression/Suggestions: 
Patient Instructions Regular QVAR Continued Symptoms Obstructive PFT with low NO IMPRESSION: 
Asthma - moderate - poorly controlled PLAN: 
4 days oral steroids (6/2-6/5) Control Medication: 
Regular QVAR inhaler 80, 2 puffs, twice a day, with chamber Rescue medication (for wheeze and difficulty breathing): Every four hours as needed Albuterol inhaler 90, 1-2 puffs, with chamber OR Albuterol 1 vial, by nebulization FUTURE: 
Repeat PFT 6/5 Interim History: 
History obtained from guardian, chart review and the patient Jasmin Harris was last seen by myself on 5/1/2017; in the interval Jasmin Harris has been less active - soccer is over. Taking QVAR regular. On trampoline yesterday - SOB as previous - did not try albuterol . For [employer] next week Wants to play field hockey next fall Adherence of daily controller: Good. Current Disease Severity Jasmin Harris has occasional daytime asthma symptoms. Jasmin Harris has  no nightime asthma symptoms . Jasmin Harris is using short-acting beta agonists for symptom control less than twice a week.   
Jasmin Harris has  0 exacerbations requiring oral systemic corticosteroids or ER visits in the interval. 
 Number of urgent/emergent visit in the interval: 0 Current limitations in activity from asthma: none. Number of days of school or work missed in the interval: 0. Review of Systems: A comprehensive review of systems was negative except for that written in the HPI. Medications: 
Current Outpatient Prescriptions Medication Sig  
 ISOtretinoin (ACCUTANE) 40 mg capsule Take  by mouth.  predniSONE (DELTASONE) 50 mg tablet Take 1 Tab by mouth daily.  beclomethasone (QVAR) 80 mcg/actuation aero Take 2 Puffs by inhalation two (2) times a day.  OCELLA 3-0.03 mg tab  CLARAVIS 30 mg cap  albuterol (PROAIR HFA) 90 mcg/actuation inhaler Take 2 Puffs by inhalation every four (4) hours as needed for Wheezing.  dextromethorphan-guaiFENesin (ROBITUSSIN COUGH-CHEST FRANCISCO DM)  mg/5 mL liqd Take  by mouth. No current facility-administered medications for this visit. Background: 
 Speciality Comments: No specialty comments available. Family History: No interval change. Environment: No interval change. Medical History: 
  
Past Medical History:  
Diagnosis Date  Reactive airway disease Allergies: 
 Oxycodone Allergies Allergen Reactions  Oxycodone Itching and Other (comments) Severe headache Physical Exam: 
Visit Vitals  Ht 5' 4.17\" (1.63 m)  Wt 132 lb 0.9 oz (59.9 kg)  SpO2 100%  BMI 22.55 kg/m2 Physical Exam  
Constitutional: She appears well-developed and well-nourished. HENT:  
Head: Normocephalic. Right Ear: Tympanic membrane and external ear normal.  
Left Ear: Tympanic membrane and external ear normal.  
Nose: Nose normal.  
Mouth/Throat: Oropharynx is clear and moist.  
Eyes: Conjunctivae are normal.  
Neck: Normal range of motion. Neck supple. Cardiovascular: Normal rate, regular rhythm, S1 normal, S2 normal, normal heart sounds, intact distal pulses and normal pulses. No murmur heard. Pulmonary/Chest: Effort normal and breath sounds normal. No accessory muscle usage. No respiratory distress. She has no decreased breath sounds. She has no wheezes. She has no rhonchi. She has no rales. She exhibits no tenderness. Abdominal: Soft. Bowel sounds are normal. There is no hepatosplenomegaly. There is no tenderness. Musculoskeletal: Normal range of motion. Neurological: She is alert. Skin: Skin is warm and dry. Nails show no clubbing. Psychiatric: Her behavior is normal.  
 
Investigations: 
Pulmonary Function Testing:  
Spirometry reviewed: mild obstructive as previous NO 12 
 No complaints

## 2019-04-23 NOTE — BEHAVIORAL HEALTH ASSESSMENT NOTE - NSBHCONSULTRECOMMENDOTHER_PSY_A_CORE FT
- Monitor EKG for qtc; if qtc >500ms would need to discontinue antipsychotic     - The patient would also benefit from maintenance of regular sleep/wake cycles, frequent re-orientation, family member at bedside, ensuring personal eyeglasses or hearing aides available if used, avoidance of benzodiazepines and anticholinergic medications, and judicious use of opiates for pain control if necessary. - Monitor EKG for qtc; if qtc >500ms would need to discontinue antipsychotic     - The patient would also benefit from maintenance of regular sleep/wake cycles, frequent re-orientation, family member at bedside, ensuring personal eyeglasses or hearing aides available if used, avoidance of benzodiazepines and anticholinergic medications, and judicious use of opiates for pain control if necessary.    - Need to obtain collateral from pt's wife Em- tried phone # today, was out of service

## 2019-04-23 NOTE — BEHAVIORAL HEALTH ASSESSMENT NOTE - NSBHCONSULTMEDS_PSY_A_CORE FT
Seroquel 50mg po qhs (at home pt was taking 25mg tid but there was report of sedation)  Remeron 7.5mg qhs

## 2019-04-23 NOTE — H&P ADULT - PROBLEM SELECTOR PLAN 1
admit to tele   Am labs ordered   RVP + for Entero/Rhino Virus, Pt placed on droplet precautions  Rocephin and Azithromycin ordered  will consider CT of Chest if clinically warranted  complete medication reconciliation to be done in am when wife returns   f/u MD note   discussed plan with On call [facility] attending admit to tele   Am labs ordered   RVP + for Entero/Rhino Virus, Pt placed on droplet precautions  Rocephin and Azithromycin ordered  will consider CT of Chest if clinically warranted  complete medication reconciliation to be done in am when wife returns   f/u MD note   discussed plan with On call [facility] attending Dr. Romano

## 2019-04-23 NOTE — BEHAVIORAL HEALTH ASSESSMENT NOTE - HPI (INCLUDE ILLNESS QUALITY, SEVERITY, DURATION, TIMING, CONTEXT, MODIFYING FACTORS, ASSOCIATED SIGNS AND SYMPTOMS)
Patient is an 83 y/o M with Pmhx of CAD s/p stents, Afib, HTN, HLD, recently diagnosed lewy body dementia brought in by wife for 4-5 days of decreased PO intake, refusal to take medications, worsening AMS and dark/foul smelling urine. Patient was discharged from dementia facility 3 weeks ago and has been home with HHA and wife. Pt admits to dry cough, intermittent chills and dyspnea. Pt is a limited historian. Calm and pleasant on interview. Pt states that he came here for a "surgery on my thyroid" which he states he had performed yesterday and points to his groin area when stating this; denies any current pain and reports yesterday he felt upset due to his medical issues, but "today I feel great." Pt reports that sometimes he and his wife argue Patient is an 85 y/o M with Pmhx of CAD s/p stents, Afib, HTN, HLD, recently diagnosed lewy body dementia brought in by wife for 4-5 days of decreased PO intake, refusal to take medications, worsening AMS and dark/foul smelling urine. Patient was discharged from dementia facility 3 weeks ago and has been home with HHA and wife. Pt admits to dry cough, intermittent chills and dyspnea. Pt is a limited historian. Calm and pleasant on interview. Pt states that he came here for a "surgery on my thyroid" which he states he had performed yesterday and points to his groin area when stating this; denies any current pain and reports yesterday he felt upset due to his medical issues, but "today I feel great." Pt reports that sometimes he and his wife argue about how she wants him to take his medications and he doesn't want to, says he feels like he doesn't need what she's trying to give him, and at times he waves his hands around at her (pt motions this) but denies actually hitting her or wanting to harm her. Pt denies any depressed mood or anhedonia, reports he enjoys being with his children and grandchildren, and sometimes gets fresh air outside when his wife takes him. No SI/HI/PI. He does endorse occasional illusions at home of objects looking like something, and then changing to look like something else. In the hospital room now, pt endorses seeing a washing machine actually located outside the room in the perez, then says it changed into something else. Patient is an 83 y/o M with Pmhx of CAD s/p stents, Afib, HTN, HLD, recently diagnosed lewy body dementia brought in by wife for 4-5 days of decreased PO intake, refusal to take medications, worsening AMS and dark/foul smelling urine. Patient was discharged from dementia facility 3 weeks ago and has been home with HHA and wife. Pt admits to dry cough, intermittent chills and dyspnea. CXR shows opacity suspicious for infiltrate/pneumonia, started on antibiotics. Pt is a limited historian. Calm and pleasant on interview. Pt states that he came here for a "surgery on my thyroid" which he states he had performed yesterday and points to his groin area when stating this; denies any current pain and reports yesterday he felt upset due to his medical issues, but "today I feel great." Pt reports that sometimes he and his wife argue about how she wants him to take his medications and he doesn't want to, says he feels like he doesn't need what she's trying to give him, and at times he waves his hands around at her (pt motions this) but denies actually hitting her or wanting to harm her. Pt denies any depressed mood or anhedonia, reports he enjoys being with his children and grandchildren, and sometimes gets fresh air outside when his wife takes him. He does report sometimes feeling down about not being able to walk around well, but denies any sustained depressed mood lasting for days. No SI/HI/PI. He does endorse occasional illusions at home of objects looking like something, and then changing to look like something else. In the hospital room now, pt endorses seeing a washing machine actually located outside the room in the perez, then says it changed into something else. He reports good appetite and sleep at home. Patient is an 83 y/o M with Pmhx of CAD s/p stents, Afib, HTN, HLD, recently diagnosed lewy body dementia brought in by wife for 4-5 days of decreased PO intake, refusal to take medications, worsening AMS and dark/foul smelling urine. Patient was discharged from dementia facility 3 weeks ago and has been home with HHA and wife. Pt admits to dry cough, intermittent chills and dyspnea. CXR shows opacity suspicious for infiltrate/pneumonia, started on antibiotics. Pt is a limited historian. Calm and pleasant on interview. Pt states that he came here for a "surgery on my thyroid" which he states he had performed yesterday and points to his groin area when stating this; denies any current pain and reports yesterday he felt upset due to his medical issues, but "today I feel great." Pt reports that sometimes he and his wife argue about how she wants him to take his medications and he doesn't want to, says he feels like he doesn't need what she's trying to give him, and at times he waves his hands around at her (pt motions this) but denies actually hitting her or wanting to harm her. Pt denies any depressed mood or anhedonia, reports he enjoys being with his children and grandchildren, and sometimes gets fresh air outside when his wife takes him. He does report sometimes feeling down about not being able to walk around well, but denies any sustained depressed mood lasting for days. No SI/HI/PI. He does endorse occasional illusions at home of objects looking like something, and then changing to look like something else. In the hospital room now, pt endorses seeing a washing machine actually located outside the room in the perez, then says it changed into something else. He reports good appetite and sleep at home.    Called phone number listed for pt's wife Em Sunshine ([phone]) however it says number is not in service.

## 2019-04-24 ENCOUNTER — TRANSCRIPTION ENCOUNTER (OUTPATIENT)
Age: 84
End: 2019-04-24

## 2019-04-24 LAB
ANION GAP SERPL CALC-SCNC: 14 MMO/L — SIGNIFICANT CHANGE UP (ref 7–14)
BASOPHILS # BLD AUTO: 0.03 K/UL — SIGNIFICANT CHANGE UP (ref 0–0.2)
BASOPHILS NFR BLD AUTO: 0.5 % — SIGNIFICANT CHANGE UP (ref 0–2)
BUN SERPL-MCNC: 15 MG/DL — SIGNIFICANT CHANGE UP (ref 7–23)
CALCIUM SERPL-MCNC: 8.4 MG/DL — SIGNIFICANT CHANGE UP (ref 8.4–10.5)
CHLORIDE SERPL-SCNC: 102 MMOL/L — SIGNIFICANT CHANGE UP (ref 98–107)
CO2 SERPL-SCNC: 19 MMOL/L — LOW (ref 22–31)
CREAT SERPL-MCNC: 0.63 MG/DL — SIGNIFICANT CHANGE UP (ref 0.5–1.3)
EOSINOPHIL # BLD AUTO: 0.02 K/UL — SIGNIFICANT CHANGE UP (ref 0–0.5)
EOSINOPHIL NFR BLD AUTO: 0.3 % — SIGNIFICANT CHANGE UP (ref 0–6)
GLUCOSE SERPL-MCNC: 88 MG/DL — SIGNIFICANT CHANGE UP (ref 70–99)
HCT VFR BLD CALC: 30.2 % — LOW (ref 39–50)
HGB BLD-MCNC: 9.7 G/DL — LOW (ref 13–17)
IMM GRANULOCYTES NFR BLD AUTO: 2.1 % — HIGH (ref 0–1.5)
LYMPHOCYTES # BLD AUTO: 0.96 K/UL — LOW (ref 1–3.3)
LYMPHOCYTES # BLD AUTO: 16.4 % — SIGNIFICANT CHANGE UP (ref 13–44)
MAGNESIUM SERPL-MCNC: 1.7 MG/DL — SIGNIFICANT CHANGE UP (ref 1.6–2.6)
MCHC RBC-ENTMCNC: 29.8 PG — SIGNIFICANT CHANGE UP (ref 27–34)
MCHC RBC-ENTMCNC: 32.1 % — SIGNIFICANT CHANGE UP (ref 32–36)
MCV RBC AUTO: 92.6 FL — SIGNIFICANT CHANGE UP (ref 80–100)
MONOCYTES # BLD AUTO: 1.01 K/UL — HIGH (ref 0–0.9)
MONOCYTES NFR BLD AUTO: 17.3 % — HIGH (ref 2–14)
NEUTROPHILS # BLD AUTO: 3.7 K/UL — SIGNIFICANT CHANGE UP (ref 1.8–7.4)
NEUTROPHILS NFR BLD AUTO: 63.4 % — SIGNIFICANT CHANGE UP (ref 43–77)
NRBC # FLD: 0 K/UL — SIGNIFICANT CHANGE UP (ref 0–0)
PHOSPHATE SERPL-MCNC: 2.8 MG/DL — SIGNIFICANT CHANGE UP (ref 2.5–4.5)
PLATELET # BLD AUTO: 232 K/UL — SIGNIFICANT CHANGE UP (ref 150–400)
PMV BLD: 9.6 FL — SIGNIFICANT CHANGE UP (ref 7–13)
POTASSIUM SERPL-MCNC: 3.7 MMOL/L — SIGNIFICANT CHANGE UP (ref 3.5–5.3)
POTASSIUM SERPL-SCNC: 3.7 MMOL/L — SIGNIFICANT CHANGE UP (ref 3.5–5.3)
RBC # BLD: 3.26 M/UL — LOW (ref 4.2–5.8)
RBC # FLD: 14.8 % — HIGH (ref 10.3–14.5)
SODIUM SERPL-SCNC: 135 MMOL/L — SIGNIFICANT CHANGE UP (ref 135–145)
SPECIMEN SOURCE: SIGNIFICANT CHANGE UP
WBC # BLD: 5.84 K/UL — SIGNIFICANT CHANGE UP (ref 3.8–10.5)
WBC # FLD AUTO: 5.84 K/UL — SIGNIFICANT CHANGE UP (ref 3.8–10.5)

## 2019-04-24 PROCEDURE — 99233 SBSQ HOSP IP/OBS HIGH 50: CPT

## 2019-04-24 RX ORDER — QUETIAPINE FUMARATE 200 MG/1
50 TABLET, FILM COATED ORAL
Qty: 0 | Refills: 0 | Status: DISCONTINUED | OUTPATIENT
Start: 2019-04-24 | End: 2019-04-30

## 2019-04-24 RX ORDER — OLANZAPINE 15 MG/1
2.5 TABLET, FILM COATED ORAL EVERY 6 HOURS
Qty: 0 | Refills: 0 | Status: DISCONTINUED | OUTPATIENT
Start: 2019-04-24 | End: 2019-04-30

## 2019-04-24 RX ADMIN — QUETIAPINE FUMARATE 50 MILLIGRAM(S): 200 TABLET, FILM COATED ORAL at 18:08

## 2019-04-24 RX ADMIN — DABIGATRAN ETEXILATE MESYLATE 150 MILLIGRAM(S): 150 CAPSULE ORAL at 18:08

## 2019-04-24 RX ADMIN — AZITHROMYCIN 250 MILLIGRAM(S): 500 TABLET, FILM COATED ORAL at 21:31

## 2019-04-24 RX ADMIN — SODIUM CHLORIDE 3 MILLILITER(S): 9 INJECTION INTRAMUSCULAR; INTRAVENOUS; SUBCUTANEOUS at 05:16

## 2019-04-24 RX ADMIN — TAMSULOSIN HYDROCHLORIDE 0.4 MILLIGRAM(S): 0.4 CAPSULE ORAL at 21:31

## 2019-04-24 RX ADMIN — SODIUM CHLORIDE 3 MILLILITER(S): 9 INJECTION INTRAMUSCULAR; INTRAVENOUS; SUBCUTANEOUS at 13:14

## 2019-04-24 RX ADMIN — Medication 50 MICROGRAM(S): at 05:24

## 2019-04-24 RX ADMIN — PANTOPRAZOLE SODIUM 40 MILLIGRAM(S): 20 TABLET, DELAYED RELEASE ORAL at 05:24

## 2019-04-24 RX ADMIN — SODIUM CHLORIDE 3 MILLILITER(S): 9 INJECTION INTRAMUSCULAR; INTRAVENOUS; SUBCUTANEOUS at 21:32

## 2019-04-24 RX ADMIN — DABIGATRAN ETEXILATE MESYLATE 150 MILLIGRAM(S): 150 CAPSULE ORAL at 05:24

## 2019-04-24 RX ADMIN — CEFTRIAXONE 100 GRAM(S): 500 INJECTION, POWDER, FOR SOLUTION INTRAMUSCULAR; INTRAVENOUS at 20:33

## 2019-04-24 NOTE — DISCHARGE NOTE PROVIDER - CARE PROVIDER_API CALL
Sayra Lares)  Internal Medicine  [address] [place] [address]  Phone: [phone]  Fax: [phone]  Follow Up Time:

## 2019-04-24 NOTE — DISCHARGE NOTE PROVIDER - NSDCQMERRANDS_GEN_ALL_CORE
Telephone Encounter by Sarah Alvarez RN at 04/06/17 04:16 PM     Author:  Sarah Alvarez RN Service:  (none) Author Type:  Registered Nurse     Filed:  04/06/17 04:16 PM Encounter Date:  4/6/2017 Status:  Signed     :  Sarah Alvarez RN (Registered Nurse)            Mother of[TB1.1M] Patient notified[TB1.1T] of results. See result notes if needed.[TB1.1M]       Revision History        User Key Date/Time User Provider Type Action    > TB1.1 04/06/17 04:16 PM Sarah Alvarez RN Registered Nurse Sign    M - Manual, T - Template             Yes

## 2019-04-24 NOTE — PHYSICAL THERAPY INITIAL EVALUATION ADULT - PERTINENT HX OF CURRENT PROBLEM, REHAB EVAL
Pt. admitted for Pneumonia. PMHX of  Pmhx of CAD s/p stents, Afib, HTN, HLD, recently diagnosed lewy body dementia

## 2019-04-24 NOTE — PHYSICAL THERAPY INITIAL EVALUATION ADULT - CRITERIA FOR SKILLED THERAPEUTIC INTERVENTIONS
rehab potential/anticipated discharge recommendation/predicted duration of therapy intervention/therapy frequency/impairments found

## 2019-04-24 NOTE — PHYSICAL THERAPY INITIAL EVALUATION ADULT - ADDITIONAL COMMENTS
Pt. is poor historian, social history and Previous level of function obtained from wife via phone call. Prior to admission patient was able to ambulate short distances with rolling walker. At times patient would use wheelchair for mobility.   Pt. reports owning DME of rolling walker wheelchair, hospital bed     Pt. was left supine in bed post PT evaluation, NAD, call bell within reach.

## 2019-04-24 NOTE — PROGRESS NOTE BEHAVIORAL HEALTH - SUMMARY
85 y/o M with Pmhx of CAD s/p stents, Afib, HTN, HLD, recently diagnosed lewy body dementia brought in by wife for 4-5 days of decreased PO intake, refusal to take medications, worsening AMS and dark/foul smelling urine. Patient was discharged from dementia facility 3 weeks ago and has been home with HHA and wife. Pt admits to dry cough, intermittent chills and dyspnea. CXR shows opacity suspicious for infiltrate/pneumonia, started on antibiotics. Pt is a limited historian. Calm and pleasant on interview. Pt reporting occasional illusions but denies any AH/VH, no paranoia and no SI/HI. Acknowledges that he is difficult at home about taking his medications and he and his wife get into arguments about this.    4/24/19- Has been calm and cooperative since admission. Remains disoriented.     Plan:   Discontinue Remeron as he is on another sedating agent (Seroquel)  Seroquel 50 mg po @1800- patient would benefit from administering at this time to prevent sun-dowing.   Seroquel 25 mg po q6h prn for agitation/anxiety  Zyprexa 2.5 mg IM q6 prn for severe agitation/unable to take po  Will continue to expand collateral to facilitate discharge planning

## 2019-04-24 NOTE — DISCHARGE NOTE PROVIDER - HOSPITAL COURSE
85 y/o M with Pmhx of CAD s/p stents, Afib, HTN, HLD, recently diagnosed lewy body dementia brought in by wife for 4-5 days of decreased PO intake, refusal to take medications, worsening AMS and dark/foul smelling urine. Admitted for Pneumonia and R/o Continue antibiotics as directed and monitor for signs/symptoms of infection, such as, fever/chills, burning/pain with urination, urinary frequency/hesitancy, cloudy urine, or blood in urine.        COURSE_______________ 85 y/o M with Pmhx of CAD s/p stents, Afib, HTN, HLD, recently diagnosed lewy body dementia brought in by wife for 4-5 days of decreased PO intake, refusal to take medications, worsening AMS and dark/foul smelling urine. Admitted for Pneumonia and R/o Continue antibiotics as directed and monitor for signs/symptoms of infection, such as, fever/chills, burning/pain with urination, urinary frequency/hesitancy, cloudy urine, or blood in urine.            : Sepsis.  Plan: 2/2 pneumonia. CT chest notable for patchy consolidation in the right upper and lower lobe. On midodrine, which could mask hypotension in the setting of an active infection.     -c/w ceftriaxone and azithromycin.  Would aim for 5 day course, April 27th wasbe last day for both    -resolved    -f/u blood cx x2, neg so far.         Sepsis    -2/2 pneumonia.     -CT chest notable for patchy consolidation in the right upper and lower lobe.      -c/w ceftriaxone and azithromycin: completed 5 day course     -Blood cx x2: neg to date    -resolved            Delirium superimposed on dementia    -Superimposed on Lewy-body dementia    -Appreciate behavioral health    -Frequent reorientation    -Avoid benzos and haldol    -Seroquel/zyprexa prn.             CAD (coronary artery disease)    -aspirin     -cholesterol medication on hold 2/2 elevated alk phos         Atrial fibrillation    -c/w home pradaxa and metoprolol XL.        ALK phos elevated     -Please follow up with the medical doctors upon arrival to rehab.  Please continue to trend alk phos.  Please check alk phos within 1 week of discharge.  Please hold hyperlipidemia medications until liver function test are within normal limits         DVT ppx    -patient on Pradaxa for Afib

## 2019-04-24 NOTE — DISCHARGE NOTE PROVIDER - NSDCCPCAREPLAN_GEN_ALL_CORE_FT
PRINCIPAL DISCHARGE DIAGNOSIS  Diagnosis: Pneumonia  Assessment and Plan of Treatment: A course of antibiotics was initiated during your hospital stay - please continue as prescribed. Monitor for worsening of disease, such as, increased cough/sputum, difficulty breathing, fever/chills, or changes in mental status. Follow-up with your PCP as an outpatient for further medical care/recommendations. PRINCIPAL DISCHARGE DIAGNOSIS  Diagnosis: Pneumonia  Assessment and Plan of Treatment: A course of antibiotics was initiated during your hospital stay - please continue as prescribed. Monitor for worsening of disease, such as, increased cough/sputum, difficulty breathing, fever/chills, or changes in mental status. Follow-up with your PCP as an outpatient for further medical care/recommendations.      SECONDARY DISCHARGE DIAGNOSES  Diagnosis: Lewy body dementia with behavioral disturbance  Assessment and Plan of Treatment: Continue your medications as directed and please follow-up as an outpatient with your primary care provider for further care and recommendations.    Diagnosis: Atrial fibrillation  Assessment and Plan of Treatment: Please continue your medications as directed and follow-up with your primary provider/cardiologist to further manage your care. Monitor for signs/symptoms of uncontrolled atrial fibrillation, such as, increased heart rate, palpitations, chest pain, dizziness, or shortness of breath - Return to emergency room if these signs/symptoms are present.    Diagnosis: CAD (coronary artery disease)  Assessment and Plan of Treatment: Continue cholesterol control medications. Continue DASH diet. Follow up with your PCP within 1 week of discharge for further management and monitoring of lipid and cholesterol panels.    Diagnosis: Delirium superimposed on dementia  Assessment and Plan of Treatment: Delirium superimposed on dementia PRINCIPAL DISCHARGE DIAGNOSIS  Diagnosis: Sepsis  Assessment and Plan of Treatment:   --2/2 pneumonia.   -CT chest notable for patchy consolidation in the right upper and lower lobe.    -c/w ceftriaxone and azithromycin: completed 5 day course   -Blood cx x2: neg to date  -resolved  -Monitor for worsening of disease, such as, increased cough/sputum, difficulty breathing, fever/chills, or changes in mental status. Follow-up with your PCP as an outpatient for further medical care/recommendations.      SECONDARY DISCHARGE DIAGNOSES  Diagnosis: High alkaline phosphatase  Assessment and Plan of Treatment: Please follow up with the medical doctors upon arrival to rehab.  Please continue to trend alk phos.  Please check alk phos within 1 week of discharge.  Please hold hyperlipidemia medications until liver function test are within normal limits    Diagnosis: Pneumonia  Assessment and Plan of Treatment: A course of antibiotics was initiated during your hospital stay   -CT chest notable for patchy consolidation in the right upper and lower lobe.    -c/w ceftriaxone and azithromycin: completed 5 day course   -Blood cx x2: neg to date  -resolved  -Monitor for worsening of disease, such as, increased cough/sputum, difficulty breathing, fever/chills, or changes in mental status. Follow-up with your PCP as an outpatient for further medical care/recommendations.    Diagnosis: CAD (coronary artery disease)  Assessment and Plan of Treatment: Continue cholesterol control medications. Continue DASH diet. Follow up with your PCP within 1 week of discharge for further management and monitoring of lipid and cholesterol panels.    Diagnosis: Atrial fibrillation  Assessment and Plan of Treatment: Please continue your medications as directed and follow-up with your primary provider/cardiologist to further manage your care. Monitor for signs/symptoms of uncontrolled atrial fibrillation, such as, increased heart rate, palpitations, chest pain, dizziness, or shortness of breath - Return to emergency room if these signs/symptoms are present. Continue pradaxa as prescribed    Diagnosis: Lewy body dementia with behavioral disturbance  Assessment and Plan of Treatment: Continue your medications as directed and please follow-up as an outpatient with your primary care provider for further care and recommendations.    Diagnosis: HTN (hypertension)  Assessment and Plan of Treatment: Continue blood pressure medication regimen as directed. Monitor for any visual changes, headaches or dizziness.  Monitor blood pressure regularly.  Follow up with your PCP for further management for high blood pressure.    Diagnosis: Delirium superimposed on dementia  Assessment and Plan of Treatment: Please follow up with the medical doctors upon arrival to rehab.  --Superimposed on Lewy-body dementia  -you were seen by the psychiatry team   -Frequent reorientation  -Avoid benzos and haldol  -Seroquel/zyprexa prn.

## 2019-04-25 LAB
ANION GAP SERPL CALC-SCNC: 12 MMO/L — SIGNIFICANT CHANGE UP (ref 7–14)
BACTERIA UR CULT: SIGNIFICANT CHANGE UP
BUN SERPL-MCNC: 15 MG/DL — SIGNIFICANT CHANGE UP (ref 7–23)
CALCIUM SERPL-MCNC: 8.9 MG/DL — SIGNIFICANT CHANGE UP (ref 8.4–10.5)
CHLORIDE SERPL-SCNC: 102 MMOL/L — SIGNIFICANT CHANGE UP (ref 98–107)
CO2 SERPL-SCNC: 22 MMOL/L — SIGNIFICANT CHANGE UP (ref 22–31)
CREAT SERPL-MCNC: 0.73 MG/DL — SIGNIFICANT CHANGE UP (ref 0.5–1.3)
GLUCOSE SERPL-MCNC: 91 MG/DL — SIGNIFICANT CHANGE UP (ref 70–99)
HCT VFR BLD CALC: 30.5 % — LOW (ref 39–50)
HGB BLD-MCNC: 9.9 G/DL — LOW (ref 13–17)
MAGNESIUM SERPL-MCNC: 1.8 MG/DL — SIGNIFICANT CHANGE UP (ref 1.6–2.6)
MCHC RBC-ENTMCNC: 31.1 PG — SIGNIFICANT CHANGE UP (ref 27–34)
MCHC RBC-ENTMCNC: 32.5 % — SIGNIFICANT CHANGE UP (ref 32–36)
MCV RBC AUTO: 95.9 FL — SIGNIFICANT CHANGE UP (ref 80–100)
NRBC # FLD: 0 K/UL — SIGNIFICANT CHANGE UP (ref 0–0)
PHOSPHATE SERPL-MCNC: 3.1 MG/DL — SIGNIFICANT CHANGE UP (ref 2.5–4.5)
PLATELET # BLD AUTO: 245 K/UL — SIGNIFICANT CHANGE UP (ref 150–400)
PMV BLD: 9.2 FL — SIGNIFICANT CHANGE UP (ref 7–13)
POTASSIUM SERPL-MCNC: 3.8 MMOL/L — SIGNIFICANT CHANGE UP (ref 3.5–5.3)
POTASSIUM SERPL-SCNC: 3.8 MMOL/L — SIGNIFICANT CHANGE UP (ref 3.5–5.3)
RBC # BLD: 3.18 M/UL — LOW (ref 4.2–5.8)
RBC # FLD: 14.9 % — HIGH (ref 10.3–14.5)
SODIUM SERPL-SCNC: 136 MMOL/L — SIGNIFICANT CHANGE UP (ref 135–145)
WBC # BLD: 7.35 K/UL — SIGNIFICANT CHANGE UP (ref 3.8–10.5)
WBC # FLD AUTO: 7.35 K/UL — SIGNIFICANT CHANGE UP (ref 3.8–10.5)

## 2019-04-25 PROCEDURE — 99232 SBSQ HOSP IP/OBS MODERATE 35: CPT

## 2019-04-25 PROCEDURE — 93306 TTE W/DOPPLER COMPLETE: CPT | Mod: 26

## 2019-04-25 RX ADMIN — CEFTRIAXONE 100 GRAM(S): 500 INJECTION, POWDER, FOR SOLUTION INTRAMUSCULAR; INTRAVENOUS at 22:00

## 2019-04-25 RX ADMIN — SODIUM CHLORIDE 3 MILLILITER(S): 9 INJECTION INTRAMUSCULAR; INTRAVENOUS; SUBCUTANEOUS at 13:10

## 2019-04-25 RX ADMIN — DABIGATRAN ETEXILATE MESYLATE 150 MILLIGRAM(S): 150 CAPSULE ORAL at 05:57

## 2019-04-25 RX ADMIN — Medication 50 MICROGRAM(S): at 06:54

## 2019-04-25 RX ADMIN — DABIGATRAN ETEXILATE MESYLATE 150 MILLIGRAM(S): 150 CAPSULE ORAL at 17:23

## 2019-04-25 RX ADMIN — TAMSULOSIN HYDROCHLORIDE 0.4 MILLIGRAM(S): 0.4 CAPSULE ORAL at 22:00

## 2019-04-25 RX ADMIN — QUETIAPINE FUMARATE 50 MILLIGRAM(S): 200 TABLET, FILM COATED ORAL at 17:24

## 2019-04-25 RX ADMIN — PANTOPRAZOLE SODIUM 40 MILLIGRAM(S): 20 TABLET, DELAYED RELEASE ORAL at 06:54

## 2019-04-25 RX ADMIN — QUETIAPINE FUMARATE 25 MILLIGRAM(S): 200 TABLET, FILM COATED ORAL at 22:00

## 2019-04-25 RX ADMIN — SODIUM CHLORIDE 3 MILLILITER(S): 9 INJECTION INTRAMUSCULAR; INTRAVENOUS; SUBCUTANEOUS at 07:22

## 2019-04-25 RX ADMIN — AZITHROMYCIN 250 MILLIGRAM(S): 500 TABLET, FILM COATED ORAL at 22:55

## 2019-04-25 RX ADMIN — SODIUM CHLORIDE 3 MILLILITER(S): 9 INJECTION INTRAMUSCULAR; INTRAVENOUS; SUBCUTANEOUS at 22:26

## 2019-04-25 NOTE — PROGRESS NOTE BEHAVIORAL HEALTH - SUMMARY
85 y/o M with Pmhx of CAD s/p stents, Afib, HTN, HLD, recently diagnosed lewy body dementia brought in by wife for 4-5 days of decreased PO intake, refusal to take medications, worsening AMS and dark/foul smelling urine. Patient was discharged from dementia facility 3 weeks ago and has been home with HHA and wife. Pt admits to dry cough, intermittent chills and dyspnea. CXR shows opacity suspicious for infiltrate/pneumonia, started on antibiotics. Pt is a limited historian. Calm and pleasant on interview. Pt reporting occasional illusions but denies any AH/VH, no paranoia and no SI/HI. Acknowledges that he is difficult at home about taking his medications and he and his wife get into arguments about this.    4/24/19- Has been calm and cooperative since admission. Remains disoriented.   4/25/19-Has remained calm, but refusing PT.     Plan:   Continue Seroquel 50 mg po @1800- patient would benefit from administering at this time to prevent sun-dowing.   Seroquel 25 mg po q6h prn for agitation/anxiety  Zyprexa 2.5 mg IM q6 prn for severe agitation/unable to take po  Will continue to expand collateral to facilitate discharge planning

## 2019-04-25 NOTE — PROGRESS NOTE BEHAVIORAL HEALTH - CASE SUMMARY
84 year old male with dementia   1- would change timing of Seroquel 50mg Q6pm  2-d/c Remeron   3- Prn Zyprexa 2.5mg IM prn Q6 for agitation
84 year old male with dementia   1-continue with Seroquel 50mg PO QHS  2-prns as is   3-await placement

## 2019-04-26 LAB
ALBUMIN SERPL ELPH-MCNC: 3 G/DL — LOW (ref 3.3–5)
ALP SERPL-CCNC: 116 U/L — SIGNIFICANT CHANGE UP (ref 40–120)
ALT FLD-CCNC: 44 U/L — HIGH (ref 4–41)
ANION GAP SERPL CALC-SCNC: 14 MMO/L — SIGNIFICANT CHANGE UP (ref 7–14)
AST SERPL-CCNC: 38 U/L — SIGNIFICANT CHANGE UP (ref 4–40)
BILIRUB SERPL-MCNC: 0.7 MG/DL — SIGNIFICANT CHANGE UP (ref 0.2–1.2)
BUN SERPL-MCNC: 16 MG/DL — SIGNIFICANT CHANGE UP (ref 7–23)
CALCIUM SERPL-MCNC: 8.7 MG/DL — SIGNIFICANT CHANGE UP (ref 8.4–10.5)
CHLORIDE SERPL-SCNC: 103 MMOL/L — SIGNIFICANT CHANGE UP (ref 98–107)
CO2 SERPL-SCNC: 20 MMOL/L — LOW (ref 22–31)
CREAT SERPL-MCNC: 0.69 MG/DL — SIGNIFICANT CHANGE UP (ref 0.5–1.3)
GLUCOSE SERPL-MCNC: 90 MG/DL — SIGNIFICANT CHANGE UP (ref 70–99)
HCT VFR BLD CALC: 31.8 % — LOW (ref 39–50)
HGB BLD-MCNC: 10.2 G/DL — LOW (ref 13–17)
MAGNESIUM SERPL-MCNC: 1.9 MG/DL — SIGNIFICANT CHANGE UP (ref 1.6–2.6)
MCHC RBC-ENTMCNC: 29.3 PG — SIGNIFICANT CHANGE UP (ref 27–34)
MCHC RBC-ENTMCNC: 32.1 % — SIGNIFICANT CHANGE UP (ref 32–36)
MCV RBC AUTO: 91.4 FL — SIGNIFICANT CHANGE UP (ref 80–100)
NRBC # FLD: 0 K/UL — SIGNIFICANT CHANGE UP (ref 0–0)
PHOSPHATE SERPL-MCNC: 3 MG/DL — SIGNIFICANT CHANGE UP (ref 2.5–4.5)
PLATELET # BLD AUTO: 279 K/UL — SIGNIFICANT CHANGE UP (ref 150–400)
PMV BLD: 9.5 FL — SIGNIFICANT CHANGE UP (ref 7–13)
POTASSIUM SERPL-MCNC: 3.7 MMOL/L — SIGNIFICANT CHANGE UP (ref 3.5–5.3)
POTASSIUM SERPL-SCNC: 3.7 MMOL/L — SIGNIFICANT CHANGE UP (ref 3.5–5.3)
PROT SERPL-MCNC: 7 G/DL — SIGNIFICANT CHANGE UP (ref 6–8.3)
RBC # BLD: 3.48 M/UL — LOW (ref 4.2–5.8)
RBC # FLD: 15.1 % — HIGH (ref 10.3–14.5)
SODIUM SERPL-SCNC: 137 MMOL/L — SIGNIFICANT CHANGE UP (ref 135–145)
WBC # BLD: 6.26 K/UL — SIGNIFICANT CHANGE UP (ref 3.8–10.5)
WBC # FLD AUTO: 6.26 K/UL — SIGNIFICANT CHANGE UP (ref 3.8–10.5)

## 2019-04-26 PROCEDURE — 99232 SBSQ HOSP IP/OBS MODERATE 35: CPT

## 2019-04-26 RX ADMIN — AZITHROMYCIN 250 MILLIGRAM(S): 500 TABLET, FILM COATED ORAL at 22:14

## 2019-04-26 RX ADMIN — Medication 50 MICROGRAM(S): at 05:40

## 2019-04-26 RX ADMIN — DABIGATRAN ETEXILATE MESYLATE 150 MILLIGRAM(S): 150 CAPSULE ORAL at 17:17

## 2019-04-26 RX ADMIN — SODIUM CHLORIDE 3 MILLILITER(S): 9 INJECTION INTRAMUSCULAR; INTRAVENOUS; SUBCUTANEOUS at 13:07

## 2019-04-26 RX ADMIN — TAMSULOSIN HYDROCHLORIDE 0.4 MILLIGRAM(S): 0.4 CAPSULE ORAL at 22:15

## 2019-04-26 RX ADMIN — QUETIAPINE FUMARATE 50 MILLIGRAM(S): 200 TABLET, FILM COATED ORAL at 17:17

## 2019-04-26 RX ADMIN — CEFTRIAXONE 100 GRAM(S): 500 INJECTION, POWDER, FOR SOLUTION INTRAMUSCULAR; INTRAVENOUS at 20:59

## 2019-04-26 RX ADMIN — DABIGATRAN ETEXILATE MESYLATE 150 MILLIGRAM(S): 150 CAPSULE ORAL at 05:40

## 2019-04-26 RX ADMIN — SODIUM CHLORIDE 3 MILLILITER(S): 9 INJECTION INTRAMUSCULAR; INTRAVENOUS; SUBCUTANEOUS at 05:40

## 2019-04-26 RX ADMIN — SODIUM CHLORIDE 3 MILLILITER(S): 9 INJECTION INTRAMUSCULAR; INTRAVENOUS; SUBCUTANEOUS at 20:28

## 2019-04-26 RX ADMIN — PANTOPRAZOLE SODIUM 40 MILLIGRAM(S): 20 TABLET, DELAYED RELEASE ORAL at 06:43

## 2019-04-26 NOTE — PROGRESS NOTE BEHAVIORAL HEALTH - ORIENTATION OTHER
Says year is 1920 and that it is the "20th month." Takes a while to name the President.
says year is "2020" and can't name the month, takes a while to name the President, says he got a surgery yesterday but knows a [facility] ambulance brought him here
says year is "2020" and can't name the month, takes a while to name the President, says he got a surgery yesterday but knows a [facility] ambulance brought him here

## 2019-04-26 NOTE — PROGRESS NOTE BEHAVIORAL HEALTH - SUMMARY
85 y/o M with Pmhx of CAD s/p stents, Afib, HTN, HLD, recently diagnosed lewy body dementia brought in by wife for 4-5 days of decreased PO intake, refusal to take medications, worsening AMS and dark/foul smelling urine. Patient was discharged from dementia facility 3 weeks ago and has been home with HHA and wife. Pt admits to dry cough, intermittent chills and dyspnea. CXR shows opacity suspicious for infiltrate/pneumonia, started on antibiotics. Pt is a limited historian. Calm and pleasant on interview. Pt reporting occasional illusions but denies any AH/VH, no paranoia and no SI/HI. Acknowledges that he is difficult at home about taking his medications and he and his wife get into arguments about this.    4/24/19- Has been calm and cooperative since admission. Remains disoriented.   4/25/19-Has remained calm, but refusing PT.   4/26/19- Received PRN 25mg seroquel o/n 4/25 @ 2200. Reports seeing "mirages" and hearing voices, but was not cooperative to elaborate. Remains disoriented to time.     Plan:   Continue Seroquel 50 mg po @1800- patient would benefit from administering at this time to prevent sun-dowing.   Seroquel 25 mg po q6h prn for agitation/anxiety  Zyprexa 2.5 mg IM q6 prn for severe agitation/unable to take po  Will continue to expand collateral to facilitate discharge planning 85 y/o M with Pmhx of CAD s/p stents, Afib, HTN, HLD, recently diagnosed lewy body dementia brought in by wife for 4-5 days of decreased PO intake, refusal to take medications, worsening AMS and dark/foul smelling urine. Patient was discharged from dementia facility 3 weeks ago and has been home with HHA and wife. Pt admits to dry cough, intermittent chills and dyspnea. CXR shows opacity suspicious for infiltrate/pneumonia, started on antibiotics. Pt is a limited historian. Calm and pleasant on interview. Pt reporting occasional illusions but denies any AH/VH, no paranoia and no SI/HI. Acknowledges that he is difficult at home about taking his medications and he and his wife get into arguments about this.    4/24/19- Has been calm and cooperative since admission. Remains disoriented.   4/25/19-Has remained calm, but refusing PT.   4/26/19- Received PRN 25mg seroquel o/n 4/25 @ 2200. Reports seeing "mirages" and hearing voices, but was not cooperative to elaborate. Remains disoriented to time.     Plan:   Continue Seroquel 50 mg po @1800  Seroquel 25 mg po q6h prn for agitation/anxiety  Zyprexa 2.5 mg IM q6 prn for severe agitation/unable to take po  Will continue to expand collateral to facilitate discharge planning

## 2019-04-26 NOTE — PROGRESS NOTE BEHAVIORAL HEALTH - NSBHCHARTREVIEWLAB_PSY_A_CORE FT
CBC Full  -  ( 26 Apr 2019 05:15 )  WBC Count : 6.26 K/uL  RBC Count : 3.48 M/uL  Hemoglobin : 10.2 g/dL  Hematocrit : 31.8 %  Platelet Count - Automated : 279 K/uL  Mean Cell Volume : 91.4 fL  Mean Cell Hemoglobin : 29.3 pg  Mean Cell Hemoglobin Concentration : 32.1 %      04-26    137  |  103  |  16  ----------------------------<  90  3.7   |  20<L>  |  0.69    Ca    8.7      26 Apr 2019 05:15  Phos  3.0     04-26  Mg     1.9     04-26    TPro  7.0  /  Alb  3.0<L>  /  TBili  0.7  /  DBili  x   /  AST  38  /  ALT  44<H>  /  AlkPhos  116  04-26
9.7    5.84  )-----------( 232      ( 24 Apr 2019 06:34 )             30.2   04-24    135  |  102  |  15  ----------------------------<  88  3.7   |  19<L>  |  0.63    Ca    8.4      24 Apr 2019 06:34  Phos  2.8     04-24  Mg     1.7     04-24    TPro  6.7  /  Alb  2.7<L>  /  TBili  1.2  /  DBili  x   /  AST  59<H>  /  ALT  35  /  AlkPhos  115  04-23    Urine culture with no growth to date
9.9    7.35  )-----------( 245      ( 25 Apr 2019 06:31 )             30.5   04-25    136  |  102  |  15  ----------------------------<  91  3.8   |  22  |  0.73    Ca    8.9      25 Apr 2019 06:31  Phos  3.1     04-25  Mg     1.8     04-25

## 2019-04-26 NOTE — PROGRESS NOTE BEHAVIORAL HEALTH - OTHER
patient appeared lethargic, did not elaborate on responses not formally tested in bed, unable to assess reports seeing "mirages" that he knows we don't see and reports hearing voices when asked. Did not cooperate to elaborate on either responses

## 2019-04-26 NOTE — PROGRESS NOTE BEHAVIORAL HEALTH - NSBHFUPINTERVALHXFT_PSY_A_CORE
Patient seen for delirium superimposed on Lewy Body Dementia. No acute events overnight. Did not receive prn medication. Has not become acutely agitated. On assessment, patient is reclining in bed, breakfast tray in front of him. States he slept well. Appetite is good, but dislikes the food, and much of it remains in front of him uneaten. Oriented to place and situation, states he is at "[facility]", but not to time- states April 19th 2021. States Pradip is the current [place] resident, and when told that is incorrect states "Bush". Stated mood is "fine". Per PT, patient has been uncooperative with rehab, stating he is paraplegic, which is not factual.
Patient seen for delirium superimposed on Lewy Body Dementia. No acute events overnight. Did not receive prn medication. Patient lying in bed, awake and alert on approach. Engages with writer and responds to questions in linear fashion. Reports he slept well. Per patient's nurse, has been eating meals. States his mood is "good". States date is Wed April 17 2019. Not oriented to place or situation- states he thought he was at his daughter in law's house. When told he is in the hospital he is able to provide name "HELGA" and says he must be here for "old age". Unable to recall events leading to admission. Feels safe in the hospital. Denies AVH. Denies SI/HI/I/P. Does not report any recent illusions.    Of note, patient appeared lethargic when team returned to interview patient. Had difficulty staying alert and was unable to answer questions.
Patient seen for delirium superimposed on Lewy Body Dementia. No acute events overnight. Received prn seroquel 25mg PO at 2200. On assessment, patient in bed and lethargic. States he is sleeping and eating ok. Oriented to place as he stated he is at "[facility]", but not to time- states "20th month of 1920." States Xiang is the current [place] resident. Stated mood is "okay." Patient reports that he sees mirages, that he knows we don't see so did not elaborate. Also reports hearing voices but did not further elaborate.

## 2019-04-26 NOTE — PROGRESS NOTE BEHAVIORAL HEALTH - RISK ASSESSMENT
Patient is not at imminent risk of harm to self or others. However his current risk is above baseline due to recent behavioral disturbances at home.
Patient is not at imminent risk of harm to self or others. However his current risk is above baseline due to recent behavioral disturbances at home.

## 2019-04-26 NOTE — PROGRESS NOTE BEHAVIORAL HEALTH - NSBHCONSULTMEDAGITATION_PSY_A_CORE FT
Seroquel 25mg po q6h prn mild agitation  Zyprexa 2.5 mg IM q6h prn severe agitation

## 2019-04-26 NOTE — PROGRESS NOTE BEHAVIORAL HEALTH - NSBHCONSULTRECOMMENDOTHER_PSY_A_CORE FT
- Monitor EKG for qtc; if qtc >500ms would need to discontinue antipsychotic     - The patient would also benefit from maintenance of regular sleep/wake cycles, frequent re-orientation, family member at bedside, ensuring personal eyeglasses or hearing aides available if used, avoidance of benzodiazepines and anticholinergic medications, and judicious use of opiates for pain control if necessary.    - Need to obtain collateral from pt's wife Em- tried phone # today, was out of service

## 2019-04-26 NOTE — PROGRESS NOTE BEHAVIORAL HEALTH - PRIMARY DX
Lewy body dementia with behavioral disturbance

## 2019-04-26 NOTE — PROGRESS NOTE BEHAVIORAL HEALTH - NSBHCHARTREVIEWIMAGING_PSY_A_CORE FT
Study Date: 4/25/2019  ------------------------------------------------------------------------  PROCEDURE: Transthoracic echocardiogram with 2-D, M-Mode  and complete spectral and color flow Doppler.  INDICATION: Abnormal electrocardiogram (ECG) (EKG) (R94.31)  ------------------------------------------------------------------------  DIMENSIONS:  Dimensions:     Normal Values:  LA:     3.8 cm    2.0 - 4.0 cm  Ao:     3.3 cm    2.0 - 3.8 cm  SEPTUM: 0.8 cm    0.6 - 1.2 cm  PWT:    0.8 cm    0.6 - 1.1 cm  LVIDd:  4.7 cm    3.0 - 5.6 cm  LVIDs:  3.0 cm    1.8 - 4.0 cm  Derived Variables:  LVMI: 67 g/m2  RWT: 0.34  Fractional short: 36 %  Ejection Fraction (Visual Estimate): 35-40 %  ------------------------------------------------------------------------  CONCLUSIONS:  1. Moderate left atrial enlargement.  2. Left ventricular ejection fraction, by visual  estimation, is 35-40%.  Moderate global hypokinesia.  3. Moderate right atrial enlargement.  4. Right ventricle moderately dilated with moderately  reduced systolic function.  5. Moderate aortic stenosis.  6. Normal pericardium with no pericardial effusion.
EXAM:  XR CHEST PORTABLE URGENT 1V        PROCEDURE DATE:  Apr 22 2019         INTERPRETATION:  CLINICAL INDICATION: cough    EXAM:  Portable supine frontal chest from 4/22/2019 at 1920. Reviewed in   conjunction with chest CT from 12/7/2017.    IMPRESSION:  Lungs suboptimally inflated.    Vague increased right basilar markings and opacity suspicious for   infiltrate/pneumonia in the proper clinical context, follow-up suggested.   Clear remaining visualized lungs. No pleural effusions or pneumothorax.    Cardiac and mediastinal silhouettes appear slightly prominent but   inaccurately assessed on this projection.    Trachea midline.    Generalized osteopenia. Stable extensive posterior spinal fusion hardware.                  THAO WALKER M.D., ATTENDING RADIOLOGIST  This document has been electronically signed. Apr 22 2019  8:16PM
EXAM:  XR CHEST PORTABLE URGENT 1V        PROCEDURE DATE:  Apr 22 2019         INTERPRETATION:  CLINICAL INDICATION: cough    EXAM:  Portable supine frontal chest from 4/22/2019 at 1920. Reviewed in   conjunction with chest CT from 12/7/2017.    IMPRESSION:  Lungs suboptimally inflated.    Vague increased right basilar markings and opacity suspicious for   infiltrate/pneumonia in the proper clinical context, follow-up suggested.   Clear remaining visualized lungs. No pleural effusions or pneumothorax.    Cardiac and mediastinal silhouettes appear slightly prominent but   inaccurately assessed on this projection.    Trachea midline.    Generalized osteopenia. Stable extensive posterior spinal fusion hardware.                  THAO WALKER M.D., ATTENDING RADIOLOGIST  This document has been electronically signed. Apr 22 2019  8:16PM

## 2019-04-26 NOTE — PROGRESS NOTE BEHAVIORAL HEALTH - NSBHCHARTREVIEWINVESTIGATE_PSY_A_CORE FT
EKG 4/22: Afib with RVR, qtc 476ms

## 2019-04-26 NOTE — PROGRESS NOTE BEHAVIORAL HEALTH - NSBHFUPINTERVALCCFT_PSY_A_CORE
"I thought I was at [facility]"
"The food here is terrible"
No acute events o/n. PRN seroquel @ 2200. No complaints at this time.

## 2019-04-26 NOTE — PROGRESS NOTE BEHAVIORAL HEALTH - NSBHCHARTREVIEWVS_PSY_A_CORE FT
Vital Signs Last 24 Hrs  T(C): 36.8 (26 Apr 2019 04:52), Max: 36.9 (25 Apr 2019 20:10)  T(F): 98.2 (26 Apr 2019 04:52), Max: 98.4 (25 Apr 2019 20:10)  HR: 93 (26 Apr 2019 04:52) (90 - 93)  BP: 100/71 (26 Apr 2019 04:52) (100/71 - 116/74)  BP(mean): --  RR: 18 (26 Apr 2019 04:52) (18 - 18)  SpO2: 96% (26 Apr 2019 04:52) (96% - 98%)
Vital Signs Last 24 Hrs  T(C): 36.7 (25 Apr 2019 05:56), Max: 36.8 (24 Apr 2019 18:58)  T(F): 98 (25 Apr 2019 05:56), Max: 98.3 (24 Apr 2019 18:58)  HR: 76 (25 Apr 2019 05:56) (60 - 76)  BP: 118/83 (25 Apr 2019 05:56) (118/83 - 120/76)  BP(mean): --  RR: 18 (25 Apr 2019 05:56) (17 - 18)  SpO2: 97% (25 Apr 2019 05:56) (96% - 97%)
Vital Signs Last 24 Hrs  T(C): 37 (24 Apr 2019 05:22), Max: 37.2 (23 Apr 2019 14:25)  T(F): 98.6 (24 Apr 2019 05:22), Max: 98.9 (23 Apr 2019 14:25)  HR: 70 (24 Apr 2019 05:22) (70 - 85)  BP: 121/84 (24 Apr 2019 05:22) (106/66 - 133/77)  BP(mean): --  RR: 17 (24 Apr 2019 05:22) (17 - 18)  SpO2: 95% (24 Apr 2019 05:22) (95% - 97%)

## 2019-04-27 DIAGNOSIS — F05 DELIRIUM DUE TO KNOWN PHYSIOLOGICAL CONDITION: ICD-10-CM

## 2019-04-27 LAB
ALBUMIN SERPL ELPH-MCNC: 3.1 G/DL — LOW (ref 3.3–5)
ALP SERPL-CCNC: 127 U/L — HIGH (ref 40–120)
ALT FLD-CCNC: 36 U/L — SIGNIFICANT CHANGE UP (ref 4–41)
ANION GAP SERPL CALC-SCNC: 14 MMO/L — SIGNIFICANT CHANGE UP (ref 7–14)
AST SERPL-CCNC: 30 U/L — SIGNIFICANT CHANGE UP (ref 4–40)
BACTERIA BLD CULT: SIGNIFICANT CHANGE UP
BILIRUB SERPL-MCNC: 0.9 MG/DL — SIGNIFICANT CHANGE UP (ref 0.2–1.2)
BUN SERPL-MCNC: 18 MG/DL — SIGNIFICANT CHANGE UP (ref 7–23)
CALCIUM SERPL-MCNC: 9.2 MG/DL — SIGNIFICANT CHANGE UP (ref 8.4–10.5)
CHLORIDE SERPL-SCNC: 104 MMOL/L — SIGNIFICANT CHANGE UP (ref 98–107)
CO2 SERPL-SCNC: 21 MMOL/L — LOW (ref 22–31)
CREAT SERPL-MCNC: 0.74 MG/DL — SIGNIFICANT CHANGE UP (ref 0.5–1.3)
GLUCOSE SERPL-MCNC: 96 MG/DL — SIGNIFICANT CHANGE UP (ref 70–99)
HCT VFR BLD CALC: 33.1 % — LOW (ref 39–50)
HGB BLD-MCNC: 10.6 G/DL — LOW (ref 13–17)
MAGNESIUM SERPL-MCNC: 1.9 MG/DL — SIGNIFICANT CHANGE UP (ref 1.6–2.6)
MCHC RBC-ENTMCNC: 30.4 PG — SIGNIFICANT CHANGE UP (ref 27–34)
MCHC RBC-ENTMCNC: 32 % — SIGNIFICANT CHANGE UP (ref 32–36)
MCV RBC AUTO: 94.8 FL — SIGNIFICANT CHANGE UP (ref 80–100)
NRBC # FLD: 0 K/UL — SIGNIFICANT CHANGE UP (ref 0–0)
PHOSPHATE SERPL-MCNC: 3.2 MG/DL — SIGNIFICANT CHANGE UP (ref 2.5–4.5)
PLATELET # BLD AUTO: 268 K/UL — SIGNIFICANT CHANGE UP (ref 150–400)
PMV BLD: 9.7 FL — SIGNIFICANT CHANGE UP (ref 7–13)
POTASSIUM SERPL-MCNC: 4.4 MMOL/L — SIGNIFICANT CHANGE UP (ref 3.5–5.3)
POTASSIUM SERPL-SCNC: 4.4 MMOL/L — SIGNIFICANT CHANGE UP (ref 3.5–5.3)
PROT SERPL-MCNC: 7.4 G/DL — SIGNIFICANT CHANGE UP (ref 6–8.3)
RBC # BLD: 3.49 M/UL — LOW (ref 4.2–5.8)
RBC # FLD: 14.9 % — HIGH (ref 10.3–14.5)
SODIUM SERPL-SCNC: 139 MMOL/L — SIGNIFICANT CHANGE UP (ref 135–145)
WBC # BLD: 8.1 K/UL — SIGNIFICANT CHANGE UP (ref 3.8–10.5)
WBC # FLD AUTO: 8.1 K/UL — SIGNIFICANT CHANGE UP (ref 3.8–10.5)

## 2019-04-27 RX ADMIN — SODIUM CHLORIDE 3 MILLILITER(S): 9 INJECTION INTRAMUSCULAR; INTRAVENOUS; SUBCUTANEOUS at 13:54

## 2019-04-27 RX ADMIN — TAMSULOSIN HYDROCHLORIDE 0.4 MILLIGRAM(S): 0.4 CAPSULE ORAL at 21:54

## 2019-04-27 RX ADMIN — Medication 50 MICROGRAM(S): at 08:06

## 2019-04-27 RX ADMIN — QUETIAPINE FUMARATE 50 MILLIGRAM(S): 200 TABLET, FILM COATED ORAL at 17:58

## 2019-04-27 RX ADMIN — CEFTRIAXONE 100 GRAM(S): 500 INJECTION, POWDER, FOR SOLUTION INTRAMUSCULAR; INTRAVENOUS at 20:19

## 2019-04-27 RX ADMIN — PANTOPRAZOLE SODIUM 40 MILLIGRAM(S): 20 TABLET, DELAYED RELEASE ORAL at 08:07

## 2019-04-27 RX ADMIN — SODIUM CHLORIDE 3 MILLILITER(S): 9 INJECTION INTRAMUSCULAR; INTRAVENOUS; SUBCUTANEOUS at 21:51

## 2019-04-27 RX ADMIN — DABIGATRAN ETEXILATE MESYLATE 150 MILLIGRAM(S): 150 CAPSULE ORAL at 17:58

## 2019-04-27 RX ADMIN — SODIUM CHLORIDE 3 MILLILITER(S): 9 INJECTION INTRAMUSCULAR; INTRAVENOUS; SUBCUTANEOUS at 07:43

## 2019-04-27 RX ADMIN — AZITHROMYCIN 250 MILLIGRAM(S): 500 TABLET, FILM COATED ORAL at 21:53

## 2019-04-27 RX ADMIN — DABIGATRAN ETEXILATE MESYLATE 150 MILLIGRAM(S): 150 CAPSULE ORAL at 08:06

## 2019-04-28 LAB
ALBUMIN SERPL ELPH-MCNC: 3 G/DL — LOW (ref 3.3–5)
ALP SERPL-CCNC: 132 U/L — HIGH (ref 40–120)
ALT FLD-CCNC: 35 U/L — SIGNIFICANT CHANGE UP (ref 4–41)
ANION GAP SERPL CALC-SCNC: 13 MMO/L — SIGNIFICANT CHANGE UP (ref 7–14)
AST SERPL-CCNC: 31 U/L — SIGNIFICANT CHANGE UP (ref 4–40)
BILIRUB SERPL-MCNC: 0.8 MG/DL — SIGNIFICANT CHANGE UP (ref 0.2–1.2)
BUN SERPL-MCNC: 16 MG/DL — SIGNIFICANT CHANGE UP (ref 7–23)
CALCIUM SERPL-MCNC: 9 MG/DL — SIGNIFICANT CHANGE UP (ref 8.4–10.5)
CHLORIDE SERPL-SCNC: 103 MMOL/L — SIGNIFICANT CHANGE UP (ref 98–107)
CO2 SERPL-SCNC: 22 MMOL/L — SIGNIFICANT CHANGE UP (ref 22–31)
CREAT SERPL-MCNC: 0.75 MG/DL — SIGNIFICANT CHANGE UP (ref 0.5–1.3)
GLUCOSE SERPL-MCNC: 90 MG/DL — SIGNIFICANT CHANGE UP (ref 70–99)
HCT VFR BLD CALC: 32.9 % — LOW (ref 39–50)
HGB BLD-MCNC: 10.5 G/DL — LOW (ref 13–17)
MAGNESIUM SERPL-MCNC: 2.1 MG/DL — SIGNIFICANT CHANGE UP (ref 1.6–2.6)
MCHC RBC-ENTMCNC: 29.3 PG — SIGNIFICANT CHANGE UP (ref 27–34)
MCHC RBC-ENTMCNC: 31.9 % — LOW (ref 32–36)
MCV RBC AUTO: 91.9 FL — SIGNIFICANT CHANGE UP (ref 80–100)
NRBC # FLD: 0 K/UL — SIGNIFICANT CHANGE UP (ref 0–0)
PHOSPHATE SERPL-MCNC: 3.3 MG/DL — SIGNIFICANT CHANGE UP (ref 2.5–4.5)
PLATELET # BLD AUTO: 292 K/UL — SIGNIFICANT CHANGE UP (ref 150–400)
PMV BLD: 9.5 FL — SIGNIFICANT CHANGE UP (ref 7–13)
POTASSIUM SERPL-MCNC: 4 MMOL/L — SIGNIFICANT CHANGE UP (ref 3.5–5.3)
POTASSIUM SERPL-SCNC: 4 MMOL/L — SIGNIFICANT CHANGE UP (ref 3.5–5.3)
PROT SERPL-MCNC: 7.5 G/DL — SIGNIFICANT CHANGE UP (ref 6–8.3)
RBC # BLD: 3.58 M/UL — LOW (ref 4.2–5.8)
RBC # FLD: 14.8 % — HIGH (ref 10.3–14.5)
SODIUM SERPL-SCNC: 138 MMOL/L — SIGNIFICANT CHANGE UP (ref 135–145)
WBC # BLD: 6.15 K/UL — SIGNIFICANT CHANGE UP (ref 3.8–10.5)
WBC # FLD AUTO: 6.15 K/UL — SIGNIFICANT CHANGE UP (ref 3.8–10.5)

## 2019-04-28 RX ADMIN — DABIGATRAN ETEXILATE MESYLATE 150 MILLIGRAM(S): 150 CAPSULE ORAL at 17:43

## 2019-04-28 RX ADMIN — Medication 50 MICROGRAM(S): at 06:35

## 2019-04-28 RX ADMIN — QUETIAPINE FUMARATE 50 MILLIGRAM(S): 200 TABLET, FILM COATED ORAL at 17:43

## 2019-04-28 RX ADMIN — DABIGATRAN ETEXILATE MESYLATE 150 MILLIGRAM(S): 150 CAPSULE ORAL at 06:35

## 2019-04-28 RX ADMIN — SODIUM CHLORIDE 3 MILLILITER(S): 9 INJECTION INTRAMUSCULAR; INTRAVENOUS; SUBCUTANEOUS at 13:37

## 2019-04-28 RX ADMIN — TAMSULOSIN HYDROCHLORIDE 0.4 MILLIGRAM(S): 0.4 CAPSULE ORAL at 22:02

## 2019-04-28 RX ADMIN — PANTOPRAZOLE SODIUM 40 MILLIGRAM(S): 20 TABLET, DELAYED RELEASE ORAL at 06:35

## 2019-04-28 RX ADMIN — SODIUM CHLORIDE 3 MILLILITER(S): 9 INJECTION INTRAMUSCULAR; INTRAVENOUS; SUBCUTANEOUS at 06:35

## 2019-04-28 RX ADMIN — SODIUM CHLORIDE 3 MILLILITER(S): 9 INJECTION INTRAMUSCULAR; INTRAVENOUS; SUBCUTANEOUS at 22:01

## 2019-04-29 DIAGNOSIS — Z02.9 ENCOUNTER FOR ADMINISTRATIVE EXAMINATIONS, UNSPECIFIED: ICD-10-CM

## 2019-04-29 LAB
ALBUMIN SERPL ELPH-MCNC: 3.1 G/DL — LOW (ref 3.3–5)
ALP SERPL-CCNC: 145 U/L — HIGH (ref 40–120)
ALT FLD-CCNC: 40 U/L — SIGNIFICANT CHANGE UP (ref 4–41)
ANION GAP SERPL CALC-SCNC: 13 MMO/L — SIGNIFICANT CHANGE UP (ref 7–14)
AST SERPL-CCNC: 37 U/L — SIGNIFICANT CHANGE UP (ref 4–40)
BILIRUB SERPL-MCNC: 0.8 MG/DL — SIGNIFICANT CHANGE UP (ref 0.2–1.2)
BUN SERPL-MCNC: 16 MG/DL — SIGNIFICANT CHANGE UP (ref 7–23)
CALCIUM SERPL-MCNC: 9.1 MG/DL — SIGNIFICANT CHANGE UP (ref 8.4–10.5)
CHLORIDE SERPL-SCNC: 102 MMOL/L — SIGNIFICANT CHANGE UP (ref 98–107)
CO2 SERPL-SCNC: 22 MMOL/L — SIGNIFICANT CHANGE UP (ref 22–31)
CREAT SERPL-MCNC: 0.71 MG/DL — SIGNIFICANT CHANGE UP (ref 0.5–1.3)
GLUCOSE SERPL-MCNC: 92 MG/DL — SIGNIFICANT CHANGE UP (ref 70–99)
MAGNESIUM SERPL-MCNC: 2.1 MG/DL — SIGNIFICANT CHANGE UP (ref 1.6–2.6)
PHOSPHATE SERPL-MCNC: 3.1 MG/DL — SIGNIFICANT CHANGE UP (ref 2.5–4.5)
POTASSIUM SERPL-MCNC: 4 MMOL/L — SIGNIFICANT CHANGE UP (ref 3.5–5.3)
POTASSIUM SERPL-SCNC: 4 MMOL/L — SIGNIFICANT CHANGE UP (ref 3.5–5.3)
PROT SERPL-MCNC: 7.3 G/DL — SIGNIFICANT CHANGE UP (ref 6–8.3)
SODIUM SERPL-SCNC: 137 MMOL/L — SIGNIFICANT CHANGE UP (ref 135–145)

## 2019-04-29 RX ADMIN — PANTOPRAZOLE SODIUM 40 MILLIGRAM(S): 20 TABLET, DELAYED RELEASE ORAL at 05:26

## 2019-04-29 RX ADMIN — SODIUM CHLORIDE 3 MILLILITER(S): 9 INJECTION INTRAMUSCULAR; INTRAVENOUS; SUBCUTANEOUS at 13:13

## 2019-04-29 RX ADMIN — SODIUM CHLORIDE 3 MILLILITER(S): 9 INJECTION INTRAMUSCULAR; INTRAVENOUS; SUBCUTANEOUS at 04:49

## 2019-04-29 RX ADMIN — SODIUM CHLORIDE 3 MILLILITER(S): 9 INJECTION INTRAMUSCULAR; INTRAVENOUS; SUBCUTANEOUS at 21:14

## 2019-04-29 RX ADMIN — TAMSULOSIN HYDROCHLORIDE 0.4 MILLIGRAM(S): 0.4 CAPSULE ORAL at 21:15

## 2019-04-29 RX ADMIN — QUETIAPINE FUMARATE 50 MILLIGRAM(S): 200 TABLET, FILM COATED ORAL at 17:20

## 2019-04-29 RX ADMIN — DABIGATRAN ETEXILATE MESYLATE 150 MILLIGRAM(S): 150 CAPSULE ORAL at 05:25

## 2019-04-29 RX ADMIN — DABIGATRAN ETEXILATE MESYLATE 150 MILLIGRAM(S): 150 CAPSULE ORAL at 17:20

## 2019-04-29 RX ADMIN — Medication 50 MICROGRAM(S): at 05:25

## 2019-04-29 NOTE — DIETITIAN INITIAL EVALUATION ADULT. - ENERGY NEEDS
Ht: 69 inches Wt: 139 pounds BMI: 20.5 kg/m2 IBW: 160 pounds (+/-10%) %IBW: 86%  Edema: no edema noted.  Skin: patient noted with suspected deep tissue injury --> right buttock.

## 2019-04-29 NOTE — DIETITIAN INITIAL EVALUATION ADULT. - OTHER INFO
Patient seen for extended length of stay. Per chart- Patient with history of CAD s/p stents x2, HTN, HLD, AFIB, CHF on midodrine, Hernia, spinal stenosis s/p multiple spinal surgeries and spinal cord stimulator presenting with lumbar radiculopathy, admitted for sepsis. Per RN - patient with fair appetite during hospital stay- consuming less than 50% of estimated nutrient needs x 7 days. RN denies any nausea/vomiting/diarrhea/constipation or difficulty chewing and swallowing.  No food allergies or intolerances noted in chart. Unable to obtain weight history. Weight per chart: 148.5 pounds. RDN obtained weight via bed scale on 4/29: weight: 139 pounds. No edema noted noted in chart. ? weight change- possible weight loss due to decrease PO intake vs bed scale accuracy.

## 2019-04-29 NOTE — PROGRESS NOTE ADULT - PROBLEM SELECTOR PLAN 2
s/p HANNA, T11-L1 Laminectomy, T4-Pelvis instrumentation/in situ 8/1/17.  -referred pain from hardware vs musculoskeletal given trauma from recent falls.   -CT abd/pel not impressive for acute findings.   -Oxycodone 5mg q6h for pain  -Morphine 2mg for breakthrough  -PT consult  -Inspiratory spirometry
s/p HANNA, T11-L1 Laminectomy, T4-Pelvis instrumentation/in situ 8/1/17.  -referred pain from hardware vs musculoskeletal given trauma from recent falls.   -CT abd/pel not impressive for acute findings.   -Oxycodone 5mg q6h for pain  -Morphine 2mg for breakthrough  -PT consult  -Inspiratory spirometry
s/p HANNA, T11-L1 Laminectomy, T4-Pelvis instrumentation/in situ 8/1/17.  -referred pain from hardware vs musculoskeletal given trauma from recent falls.   -CT abd/pel not impressive for acute findings.   -Oxycodone 5mg q6h for pain  -Morphine 2mg for breakthrough  -PT consult underway  -Inspiratory spirometry

## 2019-04-29 NOTE — PROGRESS NOTE ADULT - PROBLEM SELECTOR PLAN 6
-non obstructive.   -Will continue to observe and follow up outpatient.
c/w home pradaxa and metoprolol XL

## 2019-04-29 NOTE — PROGRESS NOTE ADULT - PROBLEM SELECTOR PROBLEM 6
Inguinal hernia
Atrial fibrillation
Inguinal hernia
Inguinal hernia

## 2019-04-29 NOTE — PROGRESS NOTE ADULT - PROBLEM SELECTOR PROBLEM 2
Lumbar radiculopathy

## 2019-04-29 NOTE — DIETITIAN INITIAL EVALUATION ADULT. - PERTINENT MEDS FT
MEDICATIONS  (STANDING):  dabigatran 150 milliGRAM(s) Oral every 12 hours  dextrose 5% + sodium chloride 0.45%. 1000 milliLiter(s) (100 mL/Hr) IV Continuous <Continuous>  levothyroxine 50 MICROGram(s) Oral daily  pantoprazole    Tablet 40 milliGRAM(s) Oral before breakfast  QUEtiapine 50 milliGRAM(s) Oral <User Schedule>  sodium chloride 0.9% lock flush 3 milliLiter(s) IV Push every 8 hours  tamsulosin 0.4 milliGRAM(s) Oral at bedtime    MEDICATIONS  (PRN):  guaiFENesin    Syrup 200 milliGRAM(s) Oral every 6 hours PRN Cough  OLANZapine 2.5 milliGRAM(s) Oral every 6 hours PRN agitation  QUEtiapine 25 milliGRAM(s) Oral every 6 hours PRN Agitation

## 2019-04-29 NOTE — PROGRESS NOTE ADULT - PROBLEM SELECTOR PROBLEM 4
CAD (coronary artery disease)
Cervical stenosis of spine

## 2019-04-29 NOTE — PROGRESS NOTE ADULT - PROBLEM SELECTOR PROBLEM 5
Atrial fibrillation
CAD (coronary artery disease)

## 2019-04-29 NOTE — DIETITIAN INITIAL EVALUATION ADULT. - PROBLEM SELECTOR PLAN 1
admit to tele   Am labs ordered   RVP + for Entero/Rhino Virus, Pt placed on droplet precautions  Rocephin and Azithromycin ordered  will consider CT of Chest if clinically warranted  complete medication reconciliation to be done in am when wife returns   f/u MD note   discussed plan with On call [facility] attending Dr. Romano

## 2019-04-29 NOTE — PROGRESS NOTE ADULT - PROBLEM SELECTOR PLAN 4
-aspirin and simvastatin
Cervical CT with retrolisthesis of C3 and C4, with questionable C3 compression fracture. Patient asymptomatic at this time.   -Ortho c/s; recs no surgical intervention or imaging at this time.  Will follow up outpatient with Dr. Jean-Baptiste.

## 2019-04-29 NOTE — DIETITIAN INITIAL EVALUATION ADULT. - PROBLEM/PLAN-3
Dr Hare patient is asking for a letter for an extension for the Healthy Lifestyle program/weigh in     She states she had steroids in July and this caused slight weight gain, she is looking for extension in hopes to lose weight for weigh in     Please advise thank you    DISPLAY PLAN FREE TEXT

## 2019-04-29 NOTE — PROGRESS NOTE ADULT - SUBJECTIVE AND OBJECTIVE BOX
No acute changes in his breathing pattern  No sudden appearance of chest pain or abdominal pain  Denies diarrhea  Not combative or agitated    Vital Signs Last 24 Hrs  T(C): 36.7 (26 Apr 2019 21:31), Max: 36.7 (26 Apr 2019 21:31)  T(F): 98 (26 Apr 2019 21:31), Max: 98 (26 Apr 2019 21:31)  HR: 110 (26 Apr 2019 21:31) (110 - 110)  BP: 115/71 (26 Apr 2019 21:31) (115/71 - 131/81)  BP(mean): --  RR: 18 (26 Apr 2019 21:31) (18 - 18)  SpO2: 95% (26 Apr 2019 21:31) (95% - 95%)    GEN: NAD, confused  Eyes: EOMI, MORELIA  ENMT: moist mucous membrane  Neck: supple, No JVD  Respiratory: lungs clear to auscultations bilaterally, breathing unlabored  Cardiovascular: Irregular rate and rhythm, +s1/s2  Gastrointestinal: soft, non tender, non distended.   Extremities: 5/5 strength in UE and RLE and 4/5 in LLE  Neurological: grossly nonfocal  Skin: no rash or lesions    LABS:                        10.6   8.10  )-----------( 268      ( 27 Apr 2019 06:07 )             33.1     04-27    139  |  104  |  18  ----------------------------<  96  4.4   |  21<L>  |  0.74    Ca    9.2      27 Apr 2019 06:07  Phos  3.2     04-27  Mg     1.9     04-27    TPro  7.4  /  Alb  3.1<L>  /  TBili  0.9  /  DBili  x   /  AST  30  /  ALT  36  /  AlkPhos  127<H>  04-27      CAPILLARY BLOOD GLUCOSE
No acute pulmonary or cardiac symptoms  No N/V/abd pain/diarrhea    Vital Signs Last 24 Hrs  T(C): 36.6 (29 Apr 2019 13:32), Max: 36.6 (29 Apr 2019 05:24)  T(F): 97.9 (29 Apr 2019 13:32), Max: 97.9 (29 Apr 2019 13:32)  HR: 84 (29 Apr 2019 13:32) (84 - 111)  BP: 117/73 (29 Apr 2019 13:32) (116/79 - 118/83)  BP(mean): --  RR: 18 (29 Apr 2019 13:32) (17 - 18)  SpO2: 97% (29 Apr 2019 13:32) (97% - 98%)    GEN: NAD, confused  HEENT: EOMI, NCAT  Neck: supple, No JVD  Respiratory: lungs clear to auscultations bilaterally, breathing unlabored  Cardiovascular: Irregular rate and rhythm, +s1/s2  Gastrointestinal: soft, non tender, non distended.   Extremities: 5/5 strength in UE and RLE and 4/5 in LLE  Neurological: grossly nonfocal  Skin: no rash or lesions  Psych: not agitated or combative; flat affect    LABS:                        10.5   6.15  )-----------( 292      ( 28 Apr 2019 05:30 )             32.9     04-29    137  |  102  |  16  ----------------------------<  92  4.0   |  22  |  0.71    Ca    9.1      29 Apr 2019 06:35  Phos  3.1     04-29  Mg     2.1     04-29    TPro  7.3  /  Alb  3.1<L>  /  TBili  0.8  /  DBili  x   /  AST  37  /  ALT  40  /  AlkPhos  145<H>  04-29      CAPILLARY BLOOD GLUCOSE
Not agitated  Denies cough, SOB, abd pain, or diarrhea    Vital Signs Last 24 Hrs  T(C): 36.4 (28 Apr 2019 06:34), Max: 36.6 (27 Apr 2019 13:55)  T(F): 97.5 (28 Apr 2019 06:34), Max: 97.9 (27 Apr 2019 19:50)  HR: 87 (28 Apr 2019 06:34) (84 - 95)  BP: 105/72 (28 Apr 2019 06:34) (105/72 - 130/85)  BP(mean): --  RR: 18 (28 Apr 2019 06:34) (18 - 18)  SpO2: 98% (28 Apr 2019 06:34) (96% - 98%)    GEN: NAD, confused  HEENT: EOMI, NCAT  Neck: supple, No JVD  Respiratory: lungs clear to auscultations bilaterally, breathing unlabored  Cardiovascular: Irregular rate and rhythm, +s1/s2  Gastrointestinal: soft, non tender, non distended.   Extremities: 5/5 strength in UE and RLE and 4/5 in LLE  Neurological: grossly nonfocal  Skin: no rash or lesions    LABS:                        10.5   6.15  )-----------( 292      ( 28 Apr 2019 05:30 )             32.9     04-28    138  |  103  |  16  ----------------------------<  90  4.0   |  22  |  0.75    Ca    9.0      28 Apr 2019 05:30  Phos  3.3     04-28  Mg     2.1     04-28    TPro  7.5  /  Alb  3.0<L>  /  TBili  0.8  /  DBili  x   /  AST  31  /  ALT  35  /  AlkPhos  132<H>  04-28      CAPILLARY BLOOD GLUCOSE
Not agitated this morning  Does not know the exact date but [facility]    Vital Signs Last 24 Hrs  T(C): 36.8 (26 Apr 2019 04:52), Max: 36.9 (25 Apr 2019 20:10)  T(F): 98.2 (26 Apr 2019 04:52), Max: 98.4 (25 Apr 2019 20:10)  HR: 93 (26 Apr 2019 04:52) (66 - 93)  BP: 100/71 (26 Apr 2019 04:52) (100/71 - 116/74)  BP(mean): --  RR: 18 (26 Apr 2019 04:52) (18 - 18)  SpO2: 96% (26 Apr 2019 04:52) (94% - 98%)    GEN: NAD, confused  Eyes: EOMI, MORELIA  ENMT: moist mucous membrane  Neck: supple, No JVD  Respiratory: lungs clear to auscultations bilaterally, breathing unlabored  Cardiovascular: Irregular rate and rhythm, +s1/s2  Gastrointestinal: soft, non tender, non distended.   Extremities: 5/5 strength in UE and RLE and 4/5 in LLE  Neurological: grossly nonfocal  Skin: no rash or lesions    LABS:                        10.2   6.26  )-----------( 279      ( 26 Apr 2019 05:15 )             31.8     04-26    137  |  103  |  16  ----------------------------<  90  3.7   |  20<L>  |  0.69    Ca    8.7      26 Apr 2019 05:15  Phos  3.0     04-26  Mg     1.9     04-26    TPro  7.0  /  Alb  3.0<L>  /  TBili  0.7  /  DBili  x   /  AST  38  /  ALT  44<H>  /  AlkPhos  116  04-26      CAPILLARY BLOOD GLUCOSE
still a bit confused, waxing/waning depending on time of day    Vital Signs Last 24 Hrs  T(C): 36.7 (2019 05:56), Max: 36.8 (2019 18:58)  T(F): 98 (2019 05:56), Max: 98.3 (2019 18:58)  HR: 76 (2019 05:56) (60 - 76)  BP: 118/83 (2019 05:56) (118/83 - 120/76)  BP(mean): --  RR: 18 (2019 05:56) (17 - 18)  SpO2: 97% (2019 05:56) (96% - 97%)    GEN: NAD, confused  Eyes: EOMI, MORELIA  ENMT: moist mucous membrane  Neck: supple, No JVD  Respiratory: lungs clear to auscultations bilaterally, breathing unlabored  Cardiovascular: Irregular rate and rhythm, +s1/s2  Gastrointestinal: soft, non tender, non distended.   Extremities: 5/5 strength in UE and RLE and 4/5 in LLE  Neurological: grossly nonfocal  Skin: no rash or lesions    LABS:                        9.9    7.35  )-----------( 245      ( 2019 06:31 )             30.5     04-25    136  |  102  |  15  ----------------------------<  91  3.8   |  22  |  0.73    Ca    8.9      2019 06:31  Phos  3.1     04-25  Mg     1.8     04-25    TPro  6.7  /  Alb  2.7<L>  /  TBili  1.2  /  DBili  x   /  AST  59<H>  /  ALT  35  /  AlkPhos  115  04-23    PT/INR - ( 2019 10:54 )   PT: 17.5 SEC;   INR: 1.56          PTT - ( 2019 10:54 )  PTT:38.6 SEC  CAPILLARY BLOOD GLUCOSE            Urinalysis Basic - ( 2019 13:26 )    Color: YELLOW / Appearance: CLEAR / S.028 / pH: 6.0  Gluc: NEGATIVE / Ketone: NEGATIVE  / Bili: TRACE / Urobili: SMALL   Blood: NEGATIVE / Protein: 30 / Nitrite: NEGATIVE   Leuk Esterase: NEGATIVE / RBC: 0-2 / WBC 0-2   Sq Epi: OCC / Non Sq Epi: x / Bacteria: NEGATIVE
Sleepy but arousable  Denies any acute N/V/abd pain  No diarrhea  (+)residual cough and back pain    Vital Signs Last 24 Hrs  T(C): 37 (2019 05:22), Max: 37.2 (2019 14:25)  T(F): 98.6 (2019 05:22), Max: 98.9 (2019 14:25)  HR: 70 (2019 05:22) (70 - 95)  BP: 121/84 (2019 05:22) (101/55 - 133/77)  BP(mean): --  RR: 17 (2019 05:22) (17 - 18)  SpO2: 95% (2019 05:22) (95% - 97%)    Constitutional: NAD  Eyes: EOMI, MORELIA  ENMT: moist mucous membrane  Neck: supple, No JVD  Respiratory: lungs clear to auscultations bilaterally, breathing unlabored  Cardiovascular: Irregular rate and rhythm, +s1/s2  Gastrointestinal: soft, non tender, non distended.   Extremities: 5/5 strength in UE and RLE and 4/5 in LLE  Neurological: grossly nonfocal  Skin: no rash or lesions    LABS:                        9.7    5.84  )-----------( 232      ( 2019 06:34 )             30.2     04-24    135  |  102  |  15  ----------------------------<  88  3.7   |  19<L>  |  0.63    Ca    8.4      2019 06:34  Phos  2.8     -24  Mg     1.7     -24    TPro  6.7  /  Alb  2.7<L>  /  TBili  1.2  /  DBili  x   /  AST  59<H>  /  ALT  35  /  AlkPhos  115  04-23    PT/INR - ( 2019 10:54 )   PT: 17.5 SEC;   INR: 1.56          PTT - ( 2019 10:54 )  PTT:38.6 SEC  CAPILLARY BLOOD GLUCOSE            Urinalysis Basic - ( 2019 13:26 )    Color: YELLOW / Appearance: CLEAR / S.028 / pH: 6.0  Gluc: NEGATIVE / Ketone: NEGATIVE  / Bili: TRACE / Urobili: SMALL   Blood: NEGATIVE / Protein: 30 / Nitrite: NEGATIVE   Leuk Esterase: NEGATIVE / RBC: 0-2 / WBC 0-2   Sq Epi: OCC / Non Sq Epi: x / Bacteria: NEGATIVE

## 2019-04-29 NOTE — PROGRESS NOTE ADULT - PROBLEM SELECTOR PLAN 3
Cervical CT with retrolisthesis of C3 and C4, with questionable C3 compression fracture. Patient asymptomatic at this time.   -Ortho c/s; recs no surgical intervention or imaging at this time.  Will follow up outpatient with Dr. Jean-Baptiste.
Superimposed on Lewy-body dementia  Appreciate behavioral health  Frequent reorientation  Avoid benzos and haldol  Seroquel/zyprexa prn

## 2019-04-29 NOTE — PROGRESS NOTE ADULT - PROBLEM SELECTOR PROBLEM 3
Cervical stenosis of spine
Delirium superimposed on dementia

## 2019-04-29 NOTE — PROGRESS NOTE ADULT - PROBLEM SELECTOR PLAN 7
DVT ppx: patient on Pradaxa for Afib      [place] Nav PGY1  [id number]
-non obstructive.   -Will continue to observe and follow up outpatient.
DVT ppx: patient on Pradaxa for Afib      [place] Nav PGY1  [id number]
DVT ppx: patient on Pradaxa for Afib      [place] Nav PGY1  [id number]

## 2019-04-29 NOTE — PROGRESS NOTE ADULT - PROBLEM SELECTOR PLAN 1
2/2 pneumonia. CT chest notable for patchy consolidation in the right upper and lower lobe. On midodrine, which could mask hypotension in the setting of an active infection.   -c/w ceftriaxone and azithromycin.  -resolved  -f/u blood cx x2, neg at 24hrs.
2/2 pneumonia. CT chest notable for patchy consolidation in the right upper and lower lobe. On midodrine, which could mask hypotension in the setting of an active infection.   -c/w ceftriaxone and azithromycin.  Would aim for 5 day course  -resolved  -f/u blood cx x2, neg at 24hrs.
2/2 pneumonia. CT chest notable for patchy consolidation in the right upper and lower lobe. On midodrine, which could mask hypotension in the setting of an active infection.   -c/w ceftriaxone and azithromycin.  Would aim for 5 day course, April 27th wasbe last day for both  -resolved  -f/u blood cx x2, neg so far
2/2 pneumonia. CT chest notable for patchy consolidation in the right upper and lower lobe. On midodrine, which could mask hypotension in the setting of an active infection.   -c/w ceftriaxone and azithromycin.  Would aim for 5 day course, April 27th would be last day for both  -resolved  -f/u blood cx x2, neg so far

## 2019-04-29 NOTE — DIETITIAN INITIAL EVALUATION ADULT. - PHYSICAL APPEARANCE
Unable to perform Nutrition focused physical exam due to mental status, however patient with visible muscle and fat wasting  - Subcutaneous fat loss: [moderate] Orbital fat pads region.  Muscle wasting: [moderate]Temples region, [moderate]Clavicle region.

## 2019-04-29 NOTE — DIETITIAN INITIAL EVALUATION ADULT. - PERTINENT LABORATORY DATA
04-29 Na137 mmol/L Glu 92 mg/dL K+ 4.0 mmol/L Cr  0.71 mg/dL BUN 16 mg/dL 04-29 Phos 3.1 mg/dL 04-29 Alb 3.1 g/dL<L> 04-23 [id number] 5.5 % 04-23 Chol 90 mg/dL<L> LDL 60 mg/dL HDL 28 mg/dL<L> Trig 56 mg/dL

## 2019-04-30 ENCOUNTER — TRANSCRIPTION ENCOUNTER (OUTPATIENT)
Age: 84
End: 2019-04-30

## 2019-04-30 VITALS
DIASTOLIC BLOOD PRESSURE: 80 MMHG | TEMPERATURE: 98 F | SYSTOLIC BLOOD PRESSURE: 131 MMHG | RESPIRATION RATE: 18 BRPM | OXYGEN SATURATION: 97 % | HEART RATE: 91 BPM

## 2019-04-30 RX ORDER — QUETIAPINE FUMARATE 200 MG/1
1 TABLET, FILM COATED ORAL
Qty: 0 | Refills: 0 | COMMUNITY

## 2019-04-30 RX ORDER — ATORVASTATIN CALCIUM 80 MG/1
0.5 TABLET, FILM COATED ORAL
Qty: 0 | Refills: 0 | COMMUNITY

## 2019-04-30 RX ORDER — PANTOPRAZOLE SODIUM 20 MG/1
1 TABLET, DELAYED RELEASE ORAL
Qty: 0 | Refills: 0 | COMMUNITY
Start: 2019-04-30

## 2019-04-30 RX ORDER — QUETIAPINE FUMARATE 200 MG/1
1 TABLET, FILM COATED ORAL
Qty: 0 | Refills: 0 | COMMUNITY
Start: 2019-04-30

## 2019-04-30 RX ORDER — MIRTAZAPINE 45 MG/1
1 TABLET, ORALLY DISINTEGRATING ORAL
Qty: 0 | Refills: 0 | COMMUNITY

## 2019-04-30 RX ADMIN — SODIUM CHLORIDE 3 MILLILITER(S): 9 INJECTION INTRAMUSCULAR; INTRAVENOUS; SUBCUTANEOUS at 03:43

## 2019-04-30 RX ADMIN — PANTOPRAZOLE SODIUM 40 MILLIGRAM(S): 20 TABLET, DELAYED RELEASE ORAL at 05:49

## 2019-04-30 RX ADMIN — Medication 50 MICROGRAM(S): at 05:49

## 2019-04-30 RX ADMIN — DABIGATRAN ETEXILATE MESYLATE 150 MILLIGRAM(S): 150 CAPSULE ORAL at 05:49

## 2019-04-30 RX ADMIN — SODIUM CHLORIDE 3 MILLILITER(S): 9 INJECTION INTRAMUSCULAR; INTRAVENOUS; SUBCUTANEOUS at 14:18

## 2019-04-30 NOTE — CHART NOTE - NSCHARTNOTEFT_GEN_A_CORE
Please follow up with the medical doctors upon arrival to rehab.  Please continue to trend alk phos.  Please check alk phos within 1 week of discharge.  Please hold hyperlipidemia medications until liver function test are within normal limits   will hold cholesterol medications until liver function test resolves

## 2019-04-30 NOTE — DISCHARGE NOTE NURSING/CASE MANAGEMENT/SOCIAL WORK - NSDCDPATPORTLINK_GEN_ALL_CORE
You can access the [facility] Portal, offered by [facility], by registering with the following website: [email or web address]

## 2019-04-30 NOTE — DISCHARGE NOTE NURSING/CASE MANAGEMENT/SOCIAL WORK - NSDCCRNAME_GEN_ALL_CORE_FT
[facility] [place] [address] [phone]  [facility]

## 2019-08-19 ENCOUNTER — APPOINTMENT (OUTPATIENT)
Dept: NEUROLOGY | Facility: CLINIC | Age: 84
End: 2019-08-19

## 2019-10-08 NOTE — PROGRESS NOTE ADULT - ASSESSMENT
78 y/o M s/p replacement of spinal cord stimulator, T2 Pelvis Fusion, T11 - T12 Laminectomy, required SICU monitoring for hypotension.    Neuro: Oxycodone for pain management, diluadid for breakthrough pain  Cardio: Cardizem drip for rate-control of A. fib. HSQ for DVT ppx.   Resp: CXR shows pulmonary edema. D/c fluids and encourage IS use  GI: Advance diet today, protonix for GERD  Renal: Tamulosin for BPH, d/c IVF, george in place  Heme: SQH for dvt ppx  Endo: No acute issues  ID: No acute issues  Dispo: Downgrade to tele      Suzan Banks PGY-1 [As Noted in HPI] : as noted in HPI [Negative] : Genitourinary

## 2020-03-16 NOTE — ED PROVIDER NOTE - NS ED NOTE AC HIGH RISK COUNTRIES
Janet Smith is a 62year old female. HPI:     CC:  Patient presents with:  Derm Problem: Pt presents for consult re: spots on the forehead along hairline, and under L eye. C/o occassional itchyness.   Uses facial lotion daily d/t dry skin        A • Sertraline HCl 100 MG Oral Tab Take 1 tablet by mouth daily.        Allergies:   No Known Allergies    Past Medical History:   Diagnosis Date   • Actinic keratosis 03/2020    left infraorbital cheek   • COPD (chronic obstructive [facility] disease) ([facility] Intimate partner violence:        Fear of current or ex partner: Not on file        Emotionally abused: Not on file        Physically abused: Not on file        Forced sexual activity: Not on file    Other Topics      Concerns:         Service: Not No noted.      ROS:  Denies any other systemic complaints. No new or changeing lesions other than noted above. No fevers, chills, night sweats, unusual sun sensitivity. No other skin complaints. History, medications, allergies reviewed as noted. suspicious lesions currently full skin check in the fall  Please refer to map for specific lesions. See additional diagnoses. Pros cons of various therapies, risks benefits discussed. Pathophysiology discussed with patient. Therapeutic options reviewed.

## 2020-04-03 NOTE — CONSULT NOTE ADULT - CONSULT REQUESTED DATE/TIME
Pharmacy calling for prior authorization on:    Medication: Topiramate  Dosage: 100 mg   Quantity requested: #90   1R  Pharmacy for prescription has been selected.    Initiation of prior authorization needed.                         01-Aug-2017 20:00

## 2020-06-18 NOTE — PROGRESS NOTE ADULT - SUBJECTIVE AND OBJECTIVE BOX
HISTORY OF PRESENT ILLNESS:  Pt is an 82M with PMHx significant for spinal stenosis s/p multiple spinal surgeries and spinal cord stimulator admitted to [facility] on 8/1 for scheduled management of worsening LE pain and neuropathic pain radiating both feet and pain to back, buttocks and thighs s/p exploration of the prior spinal fusion, removal of hardware, removal of spinal stimulator, T11-L1 laminectomy, T4-pelvis spinal fusion with osteotomies on 8/1 with Dr. Titus. Hospital course notable for  intraoperative blood loss requiring 2 unit PRBC and persistent acute blood loss anemia,  post op hypotension requiring  pressors, rapid Afib w/ RVR with acute respiratory failure secondary to acute pulmonary edema and bilateral pleural effusions which improved and now off lasix per cardiology, Echo showed EF 65%, encephalopathy for which HCT (8/6 and 8/14)  negative for acute pathology but showed chronic infarcts.  SLP followed for mild-moderate dyspahgia on puree with honey liquids diet. On 8/14 had 37 beats of Vtach, and persistent leukocytosis. Deemed stable for discharge to acute rehab on 8/15.      TODAY'S SUBJECTIVE & REVIEW OF SYMPTOMS:  Patient seen at bedside. c/o constipation. otherwise feels well. Denies any meds. Orthostatic BP drop again when OOB with OT with dizziness. Upon review of meds- noted midodrine held for .    Denies HA/ dizziness/CP/dyspnea/abdominal pain/nausea + constipation    Vital Signs Last 24 Hrs  T(C): 36.8 (08 Sep 2017 12:00), Max: 36.8 (07 Sep 2017 19:50)  T(F): 98.2 (08 Sep 2017 12:00), Max: 98.2 (07 Sep 2017 19:50)  HR: 80 (08 Sep 2017 12:00) (75 - 83)  BP: 100/60 (08 Sep 2017 12:00) (100/60 - 149/97)  BP(mean): --  RR: 16 (08 Sep 2017 12:00) (16 - 18)  SpO2: 96% (08 Sep 2017 12:00) (95% - 98%)    General:  NAD  HEENT: NCAT  Cardio: Irregular rhythm.   Resp: CTAB  Abdomen: Soft NTND  Neuro: Decreased sensation BLE   Motor: 4-5/5  Extrem: No edema.  Calves soft   Skin: Back incision CDI with no erythema noted  Erythematous rash perineal region  Wounds: No decubiti  Cognitive/Psych: Impaired cognition    FUNCTIONAL PROGRESS:  Gait: min A RW  ADLs: Bathing mod A, UE dress sup, LE dress min A  Transfers: Min A  Bed mobility: Min A      RECENT LABS:                        10.3   5.9   )-----------( 263      ( 08 Sep 2017 07:21 )             33.0     09-08    139  |  102  |  11.0  ----------------------------<  95  4.0   |  26.0  |  0.62    Ca    8.4<L>      08 Sep 2017 07:21, Mg     2.0     09-08                              9.6    6.1   )-----------( 229      ( 02 Sep 2017 08:00 )             30.6   09-02    140  |  104  |  10.0  ----------------------------<  89  3.8   |  24.0  |  0.67    Ca    8.1<L>      02 Sep 2017 08:00  Mg     1.9     09-02    TPro  5.6<L>  /  Alb  2.8<L>  /  TBili  0.6  /  DBili  x   /  AST  20  /  ALT  21  /  AlkPhos  155<H>  09-02    9/2 Prealbumin 12      RADIOLOGY/OTHER RESULTS:    MEDICATIONS  (STANDING):  aspirin  chewable 81 milliGRAM(s) Oral daily  tamsulosin 0.4 milliGRAM(s) Oral at bedtime  dabigatran 150 milliGRAM(s) Oral every 12 hours  ascorbic acid 500 milliGRAM(s) Oral two times a day  multivitamin 1 Tablet(s) Oral daily  BACItracin   Ointment 1 Application(s) Topical two times a day  levothyroxine 50 MICROGram(s) Oral daily  ergocalciferol 87274 Unit(s) Oral every week  folic acid 1 milliGRAM(s) Oral daily  melatonin 3 milliGRAM(s) Oral at bedtime  ferrous    sulfate 325 milliGRAM(s) Oral daily  atorvastatin 10 milliGRAM(s) Oral at bedtime  magnesium oxide 400 milliGRAM(s) Oral two times a day with meals  metoprolol succinate ER 50 milliGRAM(s) Oral daily  nystatin Ointment 1 Application(s) Topical two times a day  pantoprazole    Tablet 40 milliGRAM(s) Oral before breakfast  fludroCORTISONE 0.1 milliGRAM(s) Oral daily  senna 2 Tablet(s) Oral at bedtime  midodrine 5 milliGRAM(s) Oral three times a day    MEDICATIONS  (PRN):  polyethylene glycol 3350 17 Gram(s) Oral daily PRN Constipation  calcium carbonate 500 mG (Tums) Chewable 1 Tablet(s) Chew three times a day PRN Upset Stomach  acetaminophen   Tablet. 650 milliGRAM(s) Oral every 6 hours PRN Severe Pain (7 - 10)  docusate sodium 100 milliGRAM(s) Oral daily PRN Constipation  bisacodyl 5 milliGRAM(s) Oral every 12 hours PRN Constipation  benzonatate 100 milliGRAM(s) Oral four times a day PRN Cough      Assessment:  Pt is an 81y/o male with spinal stenosis s/p T11-L1 laminectomy, T4-pelvis spinal fusion with functional decline     Comprehensive rehab: PT/OT/ST.     Pain-Tylenol prn    Metabolic mruzgvkbsanzvu-Bldxvtdr-Dqyb ST.  Avoid pain meds or anxiolytics.    Atrial fibrillation- Toprol XL daily and Pradaxa,    HTN- with episodes of orthostatic changes. Adjust midodrine to 2.5mg TID. continue florinef    Hyperlipidemia - lipitor 10mg.    Elevated TSH:on Synthroid 50 daily    Elevated LFTs- WNL.  Resumed tylenol prn for pain.     Acute blood loss anemia-H/H stable.  Cont Folic acid and Fe.       Vitamin D deficiency-Low normal Vit D at 31.  Added Vit D 50,000U weekly    Bowel regimen-Colace, miralax and dulcolax prn    DVT prophylaxis:on dabigatran     Diet:regular with supplements    Today's labs ok Is This A New Presentation, Or A Follow-Up?: Skin Lesions HISTORY OF PRESENT ILLNESS:  Pt is an 82M with PMHx significant for spinal stenosis s/p multiple spinal surgeries and spinal cord stimulator admitted to [facility] on 8/1 for scheduled management of worsening LE pain and neuropathic pain radiating both feet and pain to back, buttocks and thighs s/p exploration of the prior spinal fusion, removal of hardware, removal of spinal stimulator, T11-L1 laminectomy, T4-pelvis spinal fusion with osteotomies on 8/1 with Dr. Titus. Hospital course notable for  intraoperative blood loss requiring 2 unit PRBC and persistent acute blood loss anemia,  post op hypotension requiring  pressors, rapid Afib w/ RVR with acute respiratory failure secondary to acute pulmonary edema and bilateral pleural effusions which improved and now off lasix per cardiology, Echo showed EF 65%, encephalopathy for which HCT (8/6 and 8/14)  negative for acute pathology but showed chronic infarcts.  SLP followed for mild-moderate dyspahgia on puree with honey liquids diet. On 8/14 had 37 beats of Vtach, and persistent leukocytosis. Deemed stable for discharge to acute rehab on 8/15.      TODAY'S SUBJECTIVE & REVIEW OF SYMPTOMS:  Patient seen at bedside. c/o constipation. otherwise feels well. Denies any meds. Orthostatic BP drop again when OOB with OT with dizziness. Upon review of meds- noted midodrine held for .    Denies HA/ dizziness/CP/dyspnea/abdominal pain/nausea + constipation    Vital Signs Last 24 Hrs  T(C): 36.8 (08 Sep 2017 12:00), Max: 36.8 (07 Sep 2017 19:50)  T(F): 98.2 (08 Sep 2017 12:00), Max: 98.2 (07 Sep 2017 19:50)  HR: 80 (08 Sep 2017 12:00) (75 - 83)  BP: 100/60 (08 Sep 2017 12:00) (100/60 - 149/97)  BP(mean): --  RR: 16 (08 Sep 2017 12:00) (16 - 18)  SpO2: 96% (08 Sep 2017 12:00) (95% - 98%)    General:  NAD  HEENT: NCAT  Cardio: Irregular rhythm.   Resp: CTAB  Abdomen: Soft NTND  Neuro: Decreased sensation BLE   Motor: 4-5/5  Extrem: No edema.  Calves soft   Skin: Back incision CDI with no erythema noted  Erythematous rash perineal region  Wounds: No decubiti  Cognitive/Psych: Impaired cognition    FUNCTIONAL PROGRESS:  Gait: min A RW  ADLs: Bathing mod A, UE dress sup, LE dress min A  Transfers: Min A  Bed mobility: Min A      RECENT LABS:                        10.3   5.9   )-----------( 263      ( 08 Sep 2017 07:21 )             33.0     09-08    139  |  102  |  11.0  ----------------------------<  95  4.0   |  26.0  |  0.62    Ca    8.4<L>      08 Sep 2017 07:21, Mg     2.0     09-08                              9.6    6.1   )-----------( 229      ( 02 Sep 2017 08:00 )             30.6   09-02    140  |  104  |  10.0  ----------------------------<  89  3.8   |  24.0  |  0.67    Ca    8.1<L>      02 Sep 2017 08:00  Mg     1.9     09-02    TPro  5.6<L>  /  Alb  2.8<L>  /  TBili  0.6  /  DBili  x   /  AST  20  /  ALT  21  /  AlkPhos  155<H>  09-02    9/2 Prealbumin 12      RADIOLOGY/OTHER RESULTS:    MEDICATIONS  (STANDING):  aspirin  chewable 81 milliGRAM(s) Oral daily  tamsulosin 0.4 milliGRAM(s) Oral at bedtime  dabigatran 150 milliGRAM(s) Oral every 12 hours  ascorbic acid 500 milliGRAM(s) Oral two times a day  multivitamin 1 Tablet(s) Oral daily  BACItracin   Ointment 1 Application(s) Topical two times a day  levothyroxine 50 MICROGram(s) Oral daily  ergocalciferol 07837 Unit(s) Oral every week  folic acid 1 milliGRAM(s) Oral daily  melatonin 3 milliGRAM(s) Oral at bedtime  ferrous    sulfate 325 milliGRAM(s) Oral daily  atorvastatin 10 milliGRAM(s) Oral at bedtime  magnesium oxide 400 milliGRAM(s) Oral two times a day with meals  metoprolol succinate ER 50 milliGRAM(s) Oral daily  nystatin Ointment 1 Application(s) Topical two times a day  pantoprazole    Tablet 40 milliGRAM(s) Oral before breakfast  fludroCORTISONE 0.1 milliGRAM(s) Oral daily  senna 2 Tablet(s) Oral at bedtime  midodrine 5 milliGRAM(s) Oral three times a day    MEDICATIONS  (PRN):  polyethylene glycol 3350 17 Gram(s) Oral daily PRN Constipation  calcium carbonate 500 mG (Tums) Chewable 1 Tablet(s) Chew three times a day PRN Upset Stomach  acetaminophen   Tablet. 650 milliGRAM(s) Oral every 6 hours PRN Severe Pain (7 - 10)  docusate sodium 100 milliGRAM(s) Oral daily PRN Constipation  bisacodyl 5 milliGRAM(s) Oral every 12 hours PRN Constipation  benzonatate 100 milliGRAM(s) Oral four times a day PRN Cough      Assessment:  Pt is an 81y/o male with spinal stenosis s/p T11-L1 laminectomy, T4-pelvis spinal fusion with functional decline     Comprehensive rehab: PT/OT/ST.     Pain-Tylenol prn    Metabolic ytamwjuxdjxdsw-Ghfkcacc-Aufr ST.  Avoid pain meds or anxiolytics.    Atrial fibrillation- Toprol XL daily and Pradaxa,    HTN- with episodes of orthostatic changes. Adjust midodrine to 2.5mg TID. Continue florinef    Hyperlipidemia - lipitor 10mg.    Elevated TSH:on Synthroid 50 daily    Elevated LFTs- WNL.  Resumed tylenol prn for pain.     Acute blood loss anemia-H/H stable.  Cont Folic acid and Fe.       Vitamin D deficiency-Low normal Vit D  on Vit D 50,000U weekly    Bowel regimen-Colace, miralax and dulcolax prn    DVT prophylaxis:on dabigatran     Diet:regular with supplements    Today's labs ok    f/u labs on monday What Type Of Note Output Would You Prefer (Optional)?: Standard Output How Severe Is Your Skin Lesion?: mild Has Your Skin Lesion Been Treated?: not been treated Additional History: \\n\\n\\n* Patient here for lesions/ sores inside his mouth that has been going on for many years, it started out rough and his L side inside the cheek is swollen.  He states he is afraid to eat at times because he thinks he may bite his cheek.   SM CMA

## 2020-07-12 NOTE — ED ADULT NURSE REASSESSMENT NOTE - NS ED NURSE REASSESS COMMENT FT1
Patient agreed to straight cath, straight cath attempted, sterility maintained, met resistance, catheter removed, no urine output. Observed patient to have right groin hernia, patient states has had "hernia for years", denies pain to site. Denies abdominal pain. MD Meidna aware. As per provider, give Rocephin 1g even if patient could not provide urine sample at time. Medication currently infusing, will continue to monitor patient. Skin normal color for race, warm, dry and intact. No evidence of rash.

## 2020-10-13 NOTE — PATIENT DISCUSSION
Not responding to prostaglandin. D/C Neal Z OU QHS. Pt has SULFA ALLERGY AND ASTHMA. Rx's Alphagan P 0.1% OU BID. Discussed consultation with [address] [place] [address] for possible SLT.

## 2020-11-30 NOTE — PATIENT DISCUSSION
Poor response to prostaglandin. Pt has SULFA ALLERGY AND ASTHMA. Adequate response to Alphagan P. Cont. Brimonidine 0.2% (generic - Alphagan P not covered by insurance) OU BID. Discussed consultation with [address] [place] [address] for possible SLT.

## 2021-01-08 NOTE — H&P ADULT - CONSTITUTIONAL DETAILS
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  
well-groomed/well-nourished/no distress/well-developed

## 2021-03-09 NOTE — PATIENT DISCUSSION
Past smoker. Discussed diet. Pt taking supplements currently. Recommended adding Omega 3 2,000 mg day (does not eat fish) and CoQ10 (taking statin).

## 2021-03-09 NOTE — PATIENT DISCUSSION
Poor response to prostaglandin in past. Pt has SULFA ALLERGY AND ASTHMA. Initial moderate response to Alphagan P. IOP elevated again today. Will try Rocklatan OU QHS. Will likely need referral to [address] [place] [address] for possible SLT and/or surgical consultation. Pt understands.

## 2021-04-05 NOTE — PATIENT DISCUSSION
Poor response to prostaglandin alone in past. Pt has SULFA ALLERGY AND ASTHMA. Initial moderate response to Alphagan P but IOP elevated after time. Responded very well to Rocklatan OU QHS, but cannot afford. Try Vyzulta OU QHS. Refer to [facility] for possible SLT and/or surgical consultation. Pt understands.

## 2021-04-14 NOTE — PATIENT DISCUSSION
Poor response to prostaglandin alone in past. Pt has SULFA ALLERGY AND ASTHMA. Initial moderate response to Alphagan P but IOP elevated after time. Responded very well to Rocklatan OU QHS but could not tolerate the medications. Adequate response to Vyzulta OU QHS but not able to afford medication . Refer to [facility] for possible SLT and/or surgical consultation. Pt understands.

## 2021-04-17 ASSESSMENT — VISUAL ACUITY
OD_CC: 20/80-
OS_CC: 20/25+2
OD_PH: 20/60

## 2021-04-17 ASSESSMENT — TONOMETRY
OD_IOP_MMHG: 11
OS_IOP_MMHG: 11

## 2021-05-04 NOTE — PATIENT DISCUSSION
Poor response to prostaglandin alone in past. Pt has SULFA ALLERGY AND ASTHMA. Initial moderate response to Alphagan P but IOP elevated after time. Responded very well to Rocklatan OU QHS but could not tolerate the medications. Adequate response to Vyzulta OU QHS but not able to afford medication . Refer to [facility] for possible SLT and/or surgical consultation. Pt understands.

## 2021-05-20 NOTE — PATIENT DISCUSSION
Poor response to prostaglandin alone in past. Pt has SULFA ALLERGY AND ASTHMA. Initial moderate response to Alphagan P but IOP elevated after time. Responded very well to Rocklatan OU QHS but could not tolerate the medications. Adequate response to Vyzulta OU QHS but not able to afford medication . Refer to [facility] for possible SLT and/or surgical consultation. Pt understands.

## 2021-06-28 NOTE — PATIENT DISCUSSION
The patient has occludable angles. Laser peripheral iridotomy is recommended. The risks, benefits, and alternatives to surgery were discussed. The patient elects to proceed with surgery. No

## 2021-06-28 NOTE — PATIENT DISCUSSION
Poor response to prostaglandin alone in past. Pt has SULFA ALLERGY AND ASTHMA. Initial moderate response to Alphagan P but IOP elevated after time. Responded very well to Rocklatan OU QHS but could not tolerate the medications. Adequate response to Vyzulta OU QHS but not able to afford medication . Refer to [facility] for possible SLT and/or surgical consultation. Pt understands.

## 2021-06-28 NOTE — PATIENT DISCUSSION
Poor response to prostaglandin alone in past. Pt has SULFA ALLERGY AND ASTHMA. Initial moderate response to Alphagan P but IOP elevated after time. Responded very well to Rocklatan OU QHS but could not tolerate the medications. Adequate response to Vyzulta OU QHS but not able to afford medication . Refer to [facility] for possible SLT and/or surgical consultation. Pt understands.

## 2021-07-20 NOTE — PATIENT DISCUSSION
DOING WELL ON VYZULTA.  CONTINUE CURRENT MANAGEMENT.  CONTINUE TO FOLLOW W/ LAV W/ HVF, OCT AND OPTIC NERVE EVAL.

## 2022-08-19 NOTE — PATIENT DISCUSSION
Already  History     Chief Complaint:    Abnormal Labs      HPI   Mando Freedman is a 85 year old male who presents with low hemoglobin of 4.5.  He has some mild shortness of breath.  He notes he was recently admitted for similar and had an EGD and colonoscopy without findings.  He received 2 units of blood.  He is not anticoagulated.  He denies any NSAID use.  He has black stools another couple of them a day denies large volume of stool.  Since his recent admission the been following his hemoglobin closely.  He was notified of a hemoglobin today of 4.5 that was drawn yesterday.    Reading through discharge summary capsule study was suggested if he has ongoing anemia issues.    Review of Systems  Positive for shortness of breath fatigue and melena negative for coffee-ground emesis all other systems negative    Allergies:      No Known Allergies      Medications:      No current outpatient medications on file.      Past Medical History:        Past Medical History:   Diagnosis Date     Basal cell carcinoma      Patient Active Problem List    Diagnosis Date Noted     Anemia, unspecified type 08/19/2022     Priority: Medium     Gastrointestinal hemorrhage, unspecified gastrointestinal hemorrhage type 08/01/2022     Priority: Medium        Past Surgical History:        Past Surgical History:   Procedure Laterality Date     COLONOSCOPY N/A 8/5/2022    Procedure: COLONOSCOPY;  Surgeon: Kimani Loo MD;  Location:  GI     ESOPHAGOSCOPY, GASTROSCOPY, DUODENOSCOPY (EGD), COMBINED N/A 8/2/2022    Procedure: ESOPHAGOGASTRODUODENOSCOPY (EGD);  Surgeon: Kimani Loo MD;  Location:  GI       Family History:        No family history on file.    Social History:  Lives alone  [place]  The patient presents to the ED alone    Physical Exam     Patient Vitals for the past 24 hrs:   BP Temp Temp src Pulse Resp SpO2 Weight   08/19/22 1719 (!) 144/79 98.5  F (36.9  C) Oral 75 20 100 % --   08/19/22 1645 125/77 -- -- 74  Monitor cataract for progression. -- 98 % --   08/19/22 1636 128/60 98.3  F (36.8  C) -- -- -- 99 % --   08/19/22 1625 123/60 98.5  F (36.9  C) -- 78 20 -- --   08/19/22 1600 129/64 -- -- 69 -- 100 % --   08/19/22 1555 123/61 -- -- -- -- -- --   08/19/22 1530 119/65 -- -- 67 14 100 % --   08/19/22 1514 -- 98.4  F (36.9  C) -- 70 19 100 % 92.4 kg (203 lb 11.3 oz)   08/19/22 1513 -- -- -- 72 18 90 % --   08/19/22 1512 -- -- -- -- 23 100 % --   08/19/22 1511 134/75 -- -- 73 -- -- --       Physical Exam  GENERAL: well developed, pleasant  HEAD: atraumatic  EYES: pupils reactive, extraocular muscles intact, conjunctivae normal  ENT:  mucus membranes moist  NECK:  trachea midline, normal range of motion  RESPIRATORY: no tachypnea, diminished in the bases  CVS: normal S1/S2, no murmurs, intact distal pulses, peripheral edema bilaterally moderate to significant  ABDOMEN: soft, nontender, nondistention  MUSCULOSKELETAL: no deformities  SKIN: warm and dry, no acute rashes or ulceration  NEURO: GCS 15, cranial nerves intact, alert and oriented x3  PSYCH:  Mood/affect normal        Emergency Department Course       ECG results from 08/19/22   EKG 12 lead     Value    Systolic Blood Pressure     Diastolic Blood Pressure     Ventricular Rate 72    Atrial Rate 70    OK Interval 174    QRS Duration 130        QTc 468    P Axis -29    R AXIS -63    T Axis 23    Interpretation ECG      Undetermined rhythm  Right bundle branch block  Left anterior fascicular block  * Bifascicular block *  Abnormal ECG  When compared with ECG of 01-AUG-2022 13:58,  Current undetermined rhythm precludes rhythm comparison, needs review  Confirmed by GENERATED REPORT, COMPUTER ([id number]),  Juan C Garcia ([id number]) on 8/19/2022 3:27:43 PM         Imaging:  XR Chest 2 Views   Preliminary Result   IMPRESSION: Small hiatal hernia. There are no acute infiltrates. The   cardiac silhouette is not enlarged. Pulmonary vasculature is   unremarkable.        Report per  radiology    Laboratory:  Labs Ordered and Resulted from Time of ED Arrival to Time of ED Departure   COMPREHENSIVE METABOLIC PANEL - Abnormal       Result Value    Sodium 140      Potassium 6.2 (*)     Chloride 118 (*)     Carbon Dioxide (CO2) 16 (*)     Anion Gap 6      Urea Nitrogen 81 (*)     Creatinine 2.31 (*)     Calcium 8.2 (*)     Glucose 131 (*)     Alkaline Phosphatase 57      AST 11      ALT 14      Protein Total 5.3 (*)     Albumin 2.7 (*)     Bilirubin Total 0.2      GFR Estimate 27 (*)    CBC WITH PLATELETS AND DIFFERENTIAL - Abnormal    WBC Count 5.3      RBC Count 1.40 (*)     Hemoglobin 4.5 (*)     Hematocrit 16.1 (*)      (*)     MCH 32.1      MCHC 28.0 (*)     RDW 29.3 (*)     Platelet Count 146 (*)     % Neutrophils 73      % Lymphocytes 20      % Monocytes 5      % Eosinophils 1      % Basophils 0      % Immature Granulocytes 1      NRBCs per 100 WBC 1 (*)     Absolute Neutrophils 3.8      Absolute Lymphocytes 1.1      Absolute Monocytes 0.3      Absolute Eosinophils 0.1      Absolute Basophils 0.0      Absolute Immature Granulocytes 0.0      Absolute NRBCs 0.0     POTASSIUM - Abnormal    Potassium 5.4 (*)    COVID-19 VIRUS (CORONAVIRUS) BY PCR - Normal    SARS CoV2 PCR Negative     NT PROBNP INPATIENT - Normal    N terminal Pro BNP Inpatient 942     INR - Normal    INR 1.13     PARTIAL THROMBOPLASTIN TIME - Normal    aPTT 38     TYPE AND SCREEN, ADULT    ABO/RH(D) A POS      Antibody Screen Negative      SPECIMEN EXPIRATION DATE [id number]     PREPARE RED BLOOD CELLS (UNIT)    CROSSMATCH Compatible      UNIT ABO/RH A Pos      Unit Number [id number]      Unit Status Issued      Blood Component Type Red Blood Cells      Product Code T8671G07      CODING SYSTEM [id number]      UNIT TYPE ISBT 6200      ISSUE DATE AND TIME [id number]     PREPARE RED BLOOD CELLS (UNIT)    CROSSMATCH Compatible      UNIT ABO/RH A Pos      Unit Number [id number]      Unit Status Ready      Blood  Component Type Red Blood Cells      Product Code Y7904E76      CODING SYSTEM [id number]      UNIT TYPE ISBT 6200     PREPARE RED BLOOD CELLS (UNIT)   TRANSFUSE RED BLOOD CELLS (UNIT)   ABO/RH TYPE AND SCREEN       Procedures:  na    Emergency Department Course:             Reviewed:    I reviewed nursing notes, vitals and past medical history    Assessments:   I obtained history and examined the patient as noted above.    I rechecked the patient and explained findings.       Consults:   na         Interventions:    Medications   Vitamin D3 (CHOLECALCIFEROL) tablet 125 mcg (has no administration in time range)   citalopram (celeXA) half-tab 5 mg (has no administration in time range)   amLODIPine (NORVASC) tablet 5 mg (has no administration in time range)   allopurinol (ZYLOPRIM) tablet 100 mg (has no administration in time range)   lidocaine 1 % 0.1-1 mL (has no administration in time range)   lidocaine (LMX4) cream (has no administration in time range)   sodium chloride (PF) 0.9% PF flush 3 mL (has no administration in time range)   sodium chloride (PF) 0.9% PF flush 3 mL (has no administration in time range)   melatonin tablet 1 mg (has no administration in time range)   acetaminophen (TYLENOL) tablet 650 mg (has no administration in time range)     Or   acetaminophen (TYLENOL) Suppository 650 mg (has no administration in time range)   senna-docusate (SENOKOT-S/PERICOLACE) 8.6-50 MG per tablet 1 tablet (has no administration in time range)     Or   senna-docusate (SENOKOT-S/PERICOLACE) 8.6-50 MG per tablet 2 tablet (has no administration in time range)   polyethylene glycol (MIRALAX) Packet 17 g (has no administration in time range)   ondansetron (ZOFRAN ODT) ODT tab 4 mg (has no administration in time range)     Or   ondansetron (ZOFRAN) injection 4 mg (has no administration in time range)   prochlorperazine (COMPAZINE) injection 5 mg (has no administration in time range)     Or   prochlorperazine (COMPAZINE) tablet  5 mg (has no administration in time range)     Or   prochlorperazine (COMPAZINE) suppository 12.5 mg (has no administration in time range)       Disposition:  The patient was admitted to the hospital under the care of Dr. Luna.     Impression & Plan            CMS Diagnoses:        Medical Decision Making:  Patient presents with low hemoglobin.  He was recently admitted for same and had an endoscopy and colonoscopy and received 2 units of blood.  They have been following his hemoglobin closely and was drawn yesterday and today was called to him that it was 4.5.  He does have some shortness of breath and lightheadedness.  He also has black stools.  He denies any NSAID use and denies any anticoagulation.  He denies any physical complaints in terms of pain.  Patient was given 2 units of blood.  He does have some increased weight from the last time he was here he may need diuretics in between transfusions.  They did not find source of bleeding last time and was suggested to do capsule study.  Patient's initial potassium was low at 6.2 but repeat was 5.4.  Looks very similar to his presentation last time.    Critical Care time:  none    Covid-19  Mando Freedman was evaluated during a global COVID-19 pandemic, which necessitated consideration that the patient might be at risk for infection with the SARS-CoV-2 virus that causes COVID-19.   Applicable protocols for evaluation were followed during the patient's care.   COVID-19 was considered as part of the patient's evaluation.    Diagnosis:    ICD-10-CM    1. Anemia, unspecified type  D64.9    2. Gastrointestinal hemorrhage, unspecified gastrointestinal hemorrhage type  K92.2        Discharge Medications:  Current Discharge Medication List            Scribe Disclosure:  Dave WILLARD MD, am serving as a scribe at 3:45 PM on 8/19/2022 to document services personally performed by Dave Hernandez MD based on my observations and the provider's statements to me.       Dave Hernandez MD  08/19/22 [id number]

## 2022-10-17 NOTE — PROGRESS NOTE BEHAVIORAL HEALTH - NSBHCONSFOLLOWNEEDS_PSY_A_CORE
Patient needs further psychiatric safety assessment prior to discharge
98.4

## 2022-11-11 NOTE — H&P ADULT - NSHPOUTPATIENTPROVIDERS_GEN_ALL_CORE
Addended by: JACQUELINE MOORE on: 11/11/2022 04:20 PM     Modules accepted: Orders     Dr. Sayra Lares (PCP at [facility])

## 2023-05-10 NOTE — H&P PST ADULT - TEACHING/LEARNING FACTORS INFLUENCE READINESS TO LEARN
Per FAX there were no changes to med, dosage, sig or quantity.   The medication(s) set up as pending and waiting for your approval.  Preferred Pharmacy has been set up and verified     none

## 2023-08-24 NOTE — PATIENT PROFILE ADULT - LAST BOWEL MOVEMENT DATE
[Negative] : Heme/Lymph [Nasal Discharge] : no nasal discharge [Shortness Of Breath] : no shortness of breath [Wheezing] : no wheezing [Cough] : no cough 22-Apr-2019

## 2024-01-04 NOTE — DISCHARGE NOTE ADULT - CARE PROVIDER_API CALL
Patient came into office for EKG stated he had a sharp pain across his chest today and his left arm became numb but is back to baseline now, /72  HR 90 stated his chest is heavy feeling now, EKG completed and will show provider.  
Sayra Lares), Internal Medicine  [address] [place] [address]  Phone: [phone]  Fax: [phone]    Daniel Titus), Orthopaedic Surgery  [address] [place] [id number]  Phone: [phone]  Fax: [phone]

## 2024-07-08 NOTE — PROGRESS NOTE ADULT - PROVIDER SPECIALTY LIST ADULT
Cardiology
Critical Care
Hospitalist
Orthopedics
SICU
Orthopedics
SICU
Discontinue Regimen: -\\n-cephalexin 500 mg tablet\\nSig: Take one pill two times a day for 5 days
Detail Level: Zone
Render In Strict Bullet Format?: No
Initiate Treatment: -\\n-Bactrim  mg-160 mg tablet Bid\\nQuantity: 28.0 Tablet  Days Supply: 14\\nSig: Take 1 BID x 2 weeks

## 2025-01-17 NOTE — ED PROVIDER NOTE - PHYSICAL EXAMINATION
Returned call vm left for pt  to contact office .    No obvious skull fracture, depression, hematoma.  +Nasal bridge abrasion that is old.   No palpable skull tenderness or deformity.  No friend sign, raccoon eyes, CSF rhinorrhea, CSF otorrhea, hematotympanum, or other sign of basilar skull fracture.  No maxillary or facial ecchymosis, hematoma, deformity, or palpable tenderness.   No septal hematoma. No midline cervical, thoracic, or lumbar tenderness, step off, fracture, or deformity.   No sign of abdominal or pelvic trauma.  No sign of extremity trauma.  2+ DTRS, rectal tone intact.
